# Patient Record
Sex: FEMALE | Race: WHITE | NOT HISPANIC OR LATINO | Employment: OTHER | URBAN - NONMETROPOLITAN AREA
[De-identification: names, ages, dates, MRNs, and addresses within clinical notes are randomized per-mention and may not be internally consistent; named-entity substitution may affect disease eponyms.]

---

## 2020-08-08 DIAGNOSIS — Z12.31 VISIT FOR SCREENING MAMMOGRAM: Primary | ICD-10-CM

## 2020-08-09 DIAGNOSIS — Z13.820 SCREENING FOR OSTEOPOROSIS: Primary | ICD-10-CM

## 2020-10-12 ENCOUNTER — LAB VISIT (OUTPATIENT)
Dept: LAB | Facility: HOSPITAL | Age: 75
End: 2020-10-12
Attending: INTERNAL MEDICINE
Payer: MEDICARE

## 2020-10-12 DIAGNOSIS — R32 UNSPECIFIED URINARY INCONTINENCE: ICD-10-CM

## 2020-10-12 DIAGNOSIS — N18.9 CHRONIC KIDNEY DISEASE, UNSPECIFIED: Primary | ICD-10-CM

## 2020-10-12 DIAGNOSIS — R80.9 PROTEINURIA: ICD-10-CM

## 2020-10-12 LAB
ALBUMIN SERPL BCP-MCNC: 3.8 G/DL (ref 3.5–5.2)
ALP SERPL-CCNC: 79 U/L (ref 55–135)
ALT SERPL W/O P-5'-P-CCNC: 18 U/L (ref 10–44)
ANION GAP SERPL CALC-SCNC: 6 MMOL/L (ref 8–16)
AST SERPL-CCNC: 12 U/L (ref 10–40)
BACTERIA #/AREA URNS HPF: ABNORMAL /HPF
BASOPHILS # BLD AUTO: 0.06 K/UL (ref 0–0.2)
BASOPHILS NFR BLD: 1.1 % (ref 0–1.9)
BILIRUB SERPL-MCNC: 0.7 MG/DL (ref 0.1–1)
BILIRUB UR QL STRIP: NEGATIVE
BUN SERPL-MCNC: 21 MG/DL (ref 8–23)
CALCIUM SERPL-MCNC: 9.1 MG/DL (ref 8.7–10.5)
CHLORIDE SERPL-SCNC: 109 MMOL/L (ref 95–110)
CLARITY UR: ABNORMAL
CO2 SERPL-SCNC: 27 MMOL/L (ref 23–29)
COLOR UR: YELLOW
CREAT SERPL-MCNC: 1.8 MG/DL (ref 0.5–1.4)
CREAT UR-MCNC: 43.9 MG/DL (ref 15–325)
DIFFERENTIAL METHOD: ABNORMAL
EOSINOPHIL # BLD AUTO: 0.2 K/UL (ref 0–0.5)
EOSINOPHIL NFR BLD: 2.8 % (ref 0–8)
ERYTHROCYTE [DISTWIDTH] IN BLOOD BY AUTOMATED COUNT: 13.5 % (ref 11.5–14.5)
EST. GFR  (AFRICAN AMERICAN): 31.3 ML/MIN/1.73 M^2
EST. GFR  (NON AFRICAN AMERICAN): 27.1 ML/MIN/1.73 M^2
GLUCOSE SERPL-MCNC: 127 MG/DL (ref 70–110)
GLUCOSE UR QL STRIP: NEGATIVE
HCT VFR BLD AUTO: 37.9 % (ref 37–48.5)
HGB BLD-MCNC: 12.1 G/DL (ref 12–16)
HGB UR QL STRIP: ABNORMAL
HYALINE CASTS #/AREA URNS LPF: 5 /LPF
IMM GRANULOCYTES # BLD AUTO: 0.01 K/UL (ref 0–0.04)
IMM GRANULOCYTES NFR BLD AUTO: 0.2 % (ref 0–0.5)
KETONES UR QL STRIP: NEGATIVE
LEUKOCYTE ESTERASE UR QL STRIP: ABNORMAL
LYMPHOCYTES # BLD AUTO: 1.7 K/UL (ref 1–4.8)
LYMPHOCYTES NFR BLD: 30.4 % (ref 18–48)
MCH RBC QN AUTO: 30.6 PG (ref 27–31)
MCHC RBC AUTO-ENTMCNC: 31.9 G/DL (ref 32–36)
MCV RBC AUTO: 96 FL (ref 82–98)
MICROSCOPIC COMMENT: ABNORMAL
MONOCYTES # BLD AUTO: 0.4 K/UL (ref 0.3–1)
MONOCYTES NFR BLD: 7.3 % (ref 4–15)
NEUTROPHILS # BLD AUTO: 3.3 K/UL (ref 1.8–7.7)
NEUTROPHILS NFR BLD: 58.2 % (ref 38–73)
NITRITE UR QL STRIP: NEGATIVE
NRBC BLD-RTO: 0 /100 WBC
PH UR STRIP: 8 [PH] (ref 5–8)
PLATELET # BLD AUTO: 201 K/UL (ref 150–350)
PMV BLD AUTO: 10.6 FL (ref 9.2–12.9)
POTASSIUM SERPL-SCNC: 3.9 MMOL/L (ref 3.5–5.1)
PROT SERPL-MCNC: 7.6 G/DL (ref 6–8.4)
PROT UR QL STRIP: ABNORMAL
PROT UR-MCNC: 19.1 MG/DL (ref 0–15)
RBC # BLD AUTO: 3.96 M/UL (ref 4–5.4)
RBC #/AREA URNS HPF: 0 /HPF (ref 0–4)
SODIUM SERPL-SCNC: 142 MMOL/L (ref 136–145)
SP GR UR STRIP: 1.01 (ref 1–1.03)
SQUAMOUS #/AREA URNS HPF: 8 /HPF
URN SPEC COLLECT METH UR: ABNORMAL
UROBILINOGEN UR STRIP-ACNC: NEGATIVE EU/DL
WBC # BLD AUTO: 5.63 K/UL (ref 3.9–12.7)
WBC #/AREA URNS HPF: 59 /HPF (ref 0–5)

## 2020-10-12 PROCEDURE — 82570 ASSAY OF URINE CREATININE: CPT

## 2020-10-12 PROCEDURE — 85025 COMPLETE CBC W/AUTO DIFF WBC: CPT

## 2020-10-12 PROCEDURE — 36415 COLL VENOUS BLD VENIPUNCTURE: CPT

## 2020-10-12 PROCEDURE — 80053 COMPREHEN METABOLIC PANEL: CPT

## 2020-10-12 PROCEDURE — 81000 URINALYSIS NONAUTO W/SCOPE: CPT

## 2020-10-12 PROCEDURE — 84156 ASSAY OF PROTEIN URINE: CPT

## 2021-02-09 ENCOUNTER — HOSPITAL ENCOUNTER (OUTPATIENT)
Dept: RADIOLOGY | Facility: HOSPITAL | Age: 76
Discharge: HOME | End: 2021-02-09
Attending: FAMILY MEDICINE
Payer: MEDICARE

## 2021-02-09 VITALS — BODY MASS INDEX: 28.7 KG/M2 | HEIGHT: 61 IN | WEIGHT: 152 LBS

## 2021-02-09 DIAGNOSIS — Z12.31 VISIT FOR SCREENING MAMMOGRAM: ICD-10-CM

## 2021-02-09 DIAGNOSIS — Z13.820 SCREENING FOR OSTEOPOROSIS: ICD-10-CM

## 2021-02-09 PROCEDURE — 77080 DXA BONE DENSITY AXIAL: CPT | Mod: TC

## 2021-02-09 PROCEDURE — 77067 SCR MAMMO BI INCL CAD: CPT | Mod: TC

## 2021-07-31 ENCOUNTER — HOSPITAL ENCOUNTER (EMERGENCY)
Facility: HOSPITAL | Age: 76
Discharge: HOME | End: 2021-07-31
Attending: EMERGENCY MEDICINE
Payer: MEDICARE

## 2021-07-31 VITALS
OXYGEN SATURATION: 97 % | RESPIRATION RATE: 20 BRPM | HEIGHT: 61 IN | HEART RATE: 86 BPM | WEIGHT: 141.13 LBS | DIASTOLIC BLOOD PRESSURE: 74 MMHG | TEMPERATURE: 99 F | SYSTOLIC BLOOD PRESSURE: 122 MMHG | BODY MASS INDEX: 26.65 KG/M2

## 2021-07-31 DIAGNOSIS — U07.1 COVID-19 VIRUS DETECTED: ICD-10-CM

## 2021-07-31 DIAGNOSIS — U07.1 COVID-19 VIRUS INFECTION: Primary | ICD-10-CM

## 2021-07-31 LAB
CTP QC/QA: YES
SARS-COV-2 RDRP RESP QL NAA+PROBE: POSITIVE

## 2021-07-31 PROCEDURE — 99284 EMERGENCY DEPT VISIT MOD MDM: CPT

## 2021-07-31 PROCEDURE — U0002 COVID-19 LAB TEST NON-CDC: HCPCS | Performed by: EMERGENCY MEDICINE

## 2021-07-31 RX ORDER — AZITHROMYCIN 250 MG/1
250 TABLET, FILM COATED ORAL DAILY
Qty: 6 TABLET | Refills: 0 | Status: ON HOLD | OUTPATIENT
Start: 2021-07-31 | End: 2022-05-06

## 2021-07-31 RX ORDER — IPRATROPIUM BROMIDE AND ALBUTEROL SULFATE 2.5; .5 MG/3ML; MG/3ML
3 SOLUTION RESPIRATORY (INHALATION) EVERY 6 HOURS PRN
Qty: 1 BOX | Refills: 0 | Status: ON HOLD | OUTPATIENT
Start: 2021-07-31 | End: 2022-05-06

## 2021-08-04 ENCOUNTER — HOSPITAL ENCOUNTER (OUTPATIENT)
Dept: RADIOLOGY | Facility: HOSPITAL | Age: 76
Discharge: HOME | End: 2021-08-04
Attending: FAMILY MEDICINE
Payer: MEDICARE

## 2021-08-04 ENCOUNTER — NURSE TRIAGE (OUTPATIENT)
Dept: ADMINISTRATIVE | Facility: CLINIC | Age: 76
End: 2021-08-04

## 2021-08-04 DIAGNOSIS — U07.1 COVID-19: Primary | ICD-10-CM

## 2021-08-04 DIAGNOSIS — U07.1 COVID-19: ICD-10-CM

## 2021-08-04 PROCEDURE — 71046 X-RAY EXAM CHEST 2 VIEWS: CPT | Mod: TC

## 2021-08-06 ENCOUNTER — HOSPITAL ENCOUNTER (EMERGENCY)
Facility: HOSPITAL | Age: 76
Discharge: HOME | End: 2021-08-06
Attending: EMERGENCY MEDICINE | Admitting: INTERNAL MEDICINE
Payer: MEDICARE

## 2021-08-06 VITALS
DIASTOLIC BLOOD PRESSURE: 72 MMHG | HEART RATE: 96 BPM | RESPIRATION RATE: 18 BRPM | TEMPERATURE: 98 F | BODY MASS INDEX: 23.48 KG/M2 | HEIGHT: 65 IN | OXYGEN SATURATION: 95 % | SYSTOLIC BLOOD PRESSURE: 134 MMHG

## 2021-08-06 DIAGNOSIS — J12.82 PNEUMONIA DUE TO COVID-19 VIRUS: Primary | ICD-10-CM

## 2021-08-06 DIAGNOSIS — R06.02 SOB (SHORTNESS OF BREATH): ICD-10-CM

## 2021-08-06 DIAGNOSIS — U07.1 COVID-19: ICD-10-CM

## 2021-08-06 DIAGNOSIS — U07.1 PNEUMONIA DUE TO COVID-19 VIRUS: Primary | ICD-10-CM

## 2021-08-06 LAB
ALBUMIN SERPL BCP-MCNC: 3 G/DL (ref 3.5–5.2)
ALP SERPL-CCNC: 90 U/L (ref 55–135)
ALT SERPL W/O P-5'-P-CCNC: 22 U/L (ref 10–44)
ANION GAP SERPL CALC-SCNC: 10 MMOL/L (ref 8–16)
AST SERPL-CCNC: 28 U/L (ref 10–40)
BASOPHILS # BLD AUTO: 0 K/UL (ref 0–0.2)
BASOPHILS NFR BLD: 0 % (ref 0–1.9)
BILIRUB SERPL-MCNC: 0.5 MG/DL (ref 0.1–1)
BUN SERPL-MCNC: 45 MG/DL (ref 8–23)
CALCIUM SERPL-MCNC: 7.9 MG/DL (ref 8.7–10.5)
CHLORIDE SERPL-SCNC: 105 MMOL/L (ref 95–110)
CO2 SERPL-SCNC: 22 MMOL/L (ref 23–29)
CREAT SERPL-MCNC: 2 MG/DL (ref 0.5–1.4)
DIFFERENTIAL METHOD: ABNORMAL
EOSINOPHIL # BLD AUTO: 0 K/UL (ref 0–0.5)
EOSINOPHIL NFR BLD: 0 % (ref 0–8)
ERYTHROCYTE [DISTWIDTH] IN BLOOD BY AUTOMATED COUNT: 13.7 % (ref 11.5–14.5)
EST. GFR  (AFRICAN AMERICAN): 27.5 ML/MIN/1.73 M^2
EST. GFR  (NON AFRICAN AMERICAN): 23.9 ML/MIN/1.73 M^2
GLUCOSE SERPL-MCNC: 317 MG/DL (ref 70–110)
HCT VFR BLD AUTO: 38 % (ref 37–48.5)
HGB BLD-MCNC: 12.8 G/DL (ref 12–16)
IMM GRANULOCYTES # BLD AUTO: 0.07 K/UL (ref 0–0.04)
IMM GRANULOCYTES NFR BLD AUTO: 0.9 % (ref 0–0.5)
LACTATE SERPL-SCNC: 1.8 MMOL/L (ref 0.5–2.2)
LYMPHOCYTES # BLD AUTO: 0.3 K/UL (ref 1–4.8)
LYMPHOCYTES NFR BLD: 3.7 % (ref 18–48)
MCH RBC QN AUTO: 30 PG (ref 27–31)
MCHC RBC AUTO-ENTMCNC: 33.7 G/DL (ref 32–36)
MCV RBC AUTO: 89 FL (ref 82–98)
MONOCYTES # BLD AUTO: 0.3 K/UL (ref 0.3–1)
MONOCYTES NFR BLD: 4.1 % (ref 4–15)
NEUTROPHILS # BLD AUTO: 7.5 K/UL (ref 1.8–7.7)
NEUTROPHILS NFR BLD: 91.3 % (ref 38–73)
NRBC BLD-RTO: 0 /100 WBC
PLATELET # BLD AUTO: 263 K/UL (ref 150–450)
PMV BLD AUTO: 10 FL (ref 9.2–12.9)
POTASSIUM SERPL-SCNC: 4.2 MMOL/L (ref 3.5–5.1)
PROT SERPL-MCNC: 7.8 G/DL (ref 6–8.4)
RBC # BLD AUTO: 4.27 M/UL (ref 4–5.4)
SODIUM SERPL-SCNC: 137 MMOL/L (ref 136–145)
WBC # BLD AUTO: 8.21 K/UL (ref 3.9–12.7)

## 2021-08-06 PROCEDURE — 83605 ASSAY OF LACTIC ACID: CPT | Performed by: CLINICAL NURSE SPECIALIST

## 2021-08-06 PROCEDURE — 36415 COLL VENOUS BLD VENIPUNCTURE: CPT | Performed by: CLINICAL NURSE SPECIALIST

## 2021-08-06 PROCEDURE — 80053 COMPREHEN METABOLIC PANEL: CPT | Performed by: CLINICAL NURSE SPECIALIST

## 2021-08-06 PROCEDURE — 99284 EMERGENCY DEPT VISIT MOD MDM: CPT | Mod: 25

## 2021-08-06 PROCEDURE — 85025 COMPLETE CBC W/AUTO DIFF WBC: CPT | Performed by: CLINICAL NURSE SPECIALIST

## 2021-08-06 RX ORDER — SODIUM CHLORIDE 0.9 % (FLUSH) 0.9 %
10 SYRINGE (ML) INJECTION
Status: CANCELLED | OUTPATIENT
Start: 2021-08-06

## 2021-08-06 RX ORDER — ONDANSETRON 2 MG/ML
4 INJECTION INTRAMUSCULAR; INTRAVENOUS EVERY 8 HOURS PRN
Status: CANCELLED | OUTPATIENT
Start: 2021-08-06

## 2021-08-06 RX ORDER — ACETAMINOPHEN 325 MG/1
650 TABLET ORAL EVERY 8 HOURS PRN
Status: CANCELLED | OUTPATIENT
Start: 2021-08-06

## 2021-08-06 RX ORDER — SODIUM CHLORIDE 9 MG/ML
INJECTION, SOLUTION INTRAVENOUS CONTINUOUS
Status: CANCELLED | OUTPATIENT
Start: 2021-08-06

## 2021-08-06 RX ORDER — IPRATROPIUM BROMIDE AND ALBUTEROL SULFATE 2.5; .5 MG/3ML; MG/3ML
3 SOLUTION RESPIRATORY (INHALATION) EVERY 6 HOURS PRN
Status: CANCELLED | OUTPATIENT
Start: 2021-08-06

## 2021-08-06 RX ORDER — TALC
6 POWDER (GRAM) TOPICAL NIGHTLY PRN
Status: CANCELLED | OUTPATIENT
Start: 2021-08-06

## 2021-08-06 RX ORDER — DEXAMETHASONE SODIUM PHOSPHATE 4 MG/ML
6 INJECTION, SOLUTION INTRA-ARTICULAR; INTRALESIONAL; INTRAMUSCULAR; INTRAVENOUS; SOFT TISSUE EVERY 24 HOURS
Status: CANCELLED | OUTPATIENT
Start: 2021-08-06

## 2022-05-05 ENCOUNTER — HOSPITAL ENCOUNTER (INPATIENT)
Facility: HOSPITAL | Age: 77
LOS: 7 days | Discharge: HOME HEALTH | DRG: 657 | End: 2022-05-12
Attending: HOSPITALIST | Admitting: HOSPITALIST
Payer: MEDICARE

## 2022-05-05 ENCOUNTER — HOSPITAL ENCOUNTER (EMERGENCY)
Facility: HOSPITAL | Age: 77
Discharge: ANOTHER HOSPITAL | End: 2022-05-05
Attending: STUDENT IN AN ORGANIZED HEALTH CARE EDUCATION/TRAINING PROGRAM
Payer: MEDICARE

## 2022-05-05 VITALS
WEIGHT: 132 LBS | HEART RATE: 91 BPM | TEMPERATURE: 98 F | HEIGHT: 65 IN | OXYGEN SATURATION: 95 % | BODY MASS INDEX: 21.99 KG/M2 | SYSTOLIC BLOOD PRESSURE: 143 MMHG | RESPIRATION RATE: 19 BRPM | DIASTOLIC BLOOD PRESSURE: 65 MMHG

## 2022-05-05 DIAGNOSIS — D49.4 BLADDER TUMOR: Primary | ICD-10-CM

## 2022-05-05 DIAGNOSIS — N32.89 BLADDER MASS: Primary | ICD-10-CM

## 2022-05-05 DIAGNOSIS — R07.9 CHEST PAIN: ICD-10-CM

## 2022-05-05 DIAGNOSIS — R26.81 GAIT INSTABILITY: ICD-10-CM

## 2022-05-05 DIAGNOSIS — R53.1 WEAKNESS: ICD-10-CM

## 2022-05-05 DIAGNOSIS — N28.9 RENAL INSUFFICIENCY: ICD-10-CM

## 2022-05-05 DIAGNOSIS — E83.52 HYPERCALCEMIA: ICD-10-CM

## 2022-05-05 DIAGNOSIS — N32.89 BLADDER MASS: ICD-10-CM

## 2022-05-05 DIAGNOSIS — I31.39 PERICARDIAL EFFUSION: ICD-10-CM

## 2022-05-05 LAB
ABO + RH BLD: NORMAL
ACETONE BLD-MCNC: NEGATIVE MG/DL
ALBUMIN SERPL BCP-MCNC: 2.6 G/DL (ref 3.5–5.2)
ALP SERPL-CCNC: 104 U/L (ref 55–135)
ALT SERPL W/O P-5'-P-CCNC: 14 U/L (ref 10–44)
ANION GAP SERPL CALC-SCNC: 4 MMOL/L (ref 8–16)
APTT BLDCRRT: <21 SEC (ref 21–32)
AST SERPL-CCNC: 8 U/L (ref 10–40)
BACTERIA #/AREA URNS HPF: ABNORMAL /HPF
BASOPHILS # BLD AUTO: 0.07 K/UL (ref 0–0.2)
BASOPHILS NFR BLD: 0.3 % (ref 0–1.9)
BILIRUB SERPL-MCNC: 0.5 MG/DL (ref 0.1–1)
BILIRUB UR QL STRIP: NEGATIVE
BLD GP AB SCN CELLS X3 SERPL QL: NORMAL
BUN SERPL-MCNC: 33 MG/DL (ref 8–23)
CALCIUM SERPL-MCNC: 14.6 MG/DL (ref 8.7–10.5)
CHLORIDE SERPL-SCNC: 98 MMOL/L (ref 95–110)
CLARITY UR: ABNORMAL
CO2 SERPL-SCNC: 31 MMOL/L (ref 23–29)
COLOR UR: YELLOW
CREAT SERPL-MCNC: 2.3 MG/DL (ref 0.5–1.4)
CTP QC/QA: YES
DIFFERENTIAL METHOD: ABNORMAL
EOSINOPHIL # BLD AUTO: 0.1 K/UL (ref 0–0.5)
EOSINOPHIL NFR BLD: 0.4 % (ref 0–8)
ERYTHROCYTE [DISTWIDTH] IN BLOOD BY AUTOMATED COUNT: 15.1 % (ref 11.5–14.5)
EST. GFR  (AFRICAN AMERICAN): 23.1 ML/MIN/1.73 M^2
EST. GFR  (NON AFRICAN AMERICAN): 20 ML/MIN/1.73 M^2
GLUCOSE SERPL-MCNC: 245 MG/DL (ref 70–110)
GLUCOSE UR QL STRIP: NEGATIVE
HCT VFR BLD AUTO: 27.4 % (ref 37–48.5)
HGB BLD-MCNC: 8.5 G/DL (ref 12–16)
HGB UR QL STRIP: ABNORMAL
HYALINE CASTS #/AREA URNS LPF: 2.6 /LPF
IMM GRANULOCYTES # BLD AUTO: 0.15 K/UL (ref 0–0.04)
IMM GRANULOCYTES NFR BLD AUTO: 0.6 % (ref 0–0.5)
INR PPP: 1.2 (ref 0.8–1.2)
KETONES UR QL STRIP: NEGATIVE
LACTATE SERPL-SCNC: 1.8 MMOL/L (ref 0.5–2.2)
LEUKOCYTE ESTERASE UR QL STRIP: ABNORMAL
LYMPHOCYTES # BLD AUTO: 1 K/UL (ref 1–4.8)
LYMPHOCYTES NFR BLD: 4.4 % (ref 18–48)
MCH RBC QN AUTO: 26.2 PG (ref 27–31)
MCHC RBC AUTO-ENTMCNC: 31 G/DL (ref 32–36)
MCV RBC AUTO: 85 FL (ref 82–98)
MICROSCOPIC COMMENT: ABNORMAL
MONOCYTES # BLD AUTO: 0.7 K/UL (ref 0.3–1)
MONOCYTES NFR BLD: 2.9 % (ref 4–15)
NEUTROPHILS # BLD AUTO: 21.4 K/UL (ref 1.8–7.7)
NEUTROPHILS NFR BLD: 91.4 % (ref 38–73)
NITRITE UR QL STRIP: NEGATIVE
NRBC BLD-RTO: 0 /100 WBC
NT-PROBNP SERPL-MCNC: 1470 PG/ML (ref 5–1800)
OB PNL STL: NEGATIVE
PH UR STRIP: 7 [PH] (ref 5–8)
PLATELET # BLD AUTO: 444 K/UL (ref 150–450)
PMV BLD AUTO: 9.9 FL (ref 9.2–12.9)
POCT GLUCOSE: 249 MG/DL (ref 70–110)
POCT GLUCOSE: 258 MG/DL (ref 70–110)
POTASSIUM SERPL-SCNC: 4.4 MMOL/L (ref 3.5–5.1)
PROT SERPL-MCNC: 8.1 G/DL (ref 6–8.4)
PROT UR QL STRIP: ABNORMAL
PROTHROMBIN TIME: 12.7 SEC (ref 9–12.5)
RBC # BLD AUTO: 3.24 M/UL (ref 4–5.4)
RBC #/AREA URNS HPF: 4 /HPF (ref 0–4)
SARS-COV-2 RDRP RESP QL NAA+PROBE: NEGATIVE
SODIUM SERPL-SCNC: 133 MMOL/L (ref 136–145)
SP GR UR STRIP: 1.01 (ref 1–1.03)
SQUAMOUS #/AREA URNS HPF: 0 /HPF
TROPONIN I SERPL DL<=0.01 NG/ML-MCNC: 10.7 PG/ML (ref 0–60)
URN SPEC COLLECT METH UR: ABNORMAL
UROBILINOGEN UR STRIP-ACNC: NEGATIVE EU/DL
WBC # BLD AUTO: 23.49 K/UL (ref 3.9–12.7)
WBC #/AREA URNS HPF: 61 /HPF (ref 0–5)
YEAST URNS QL MICRO: ABNORMAL

## 2022-05-05 PROCEDURE — 83880 ASSAY OF NATRIURETIC PEPTIDE: CPT | Performed by: STUDENT IN AN ORGANIZED HEALTH CARE EDUCATION/TRAINING PROGRAM

## 2022-05-05 PROCEDURE — 82962 GLUCOSE BLOOD TEST: CPT

## 2022-05-05 PROCEDURE — 87088 URINE BACTERIA CULTURE: CPT | Performed by: STUDENT IN AN ORGANIZED HEALTH CARE EDUCATION/TRAINING PROGRAM

## 2022-05-05 PROCEDURE — 87086 URINE CULTURE/COLONY COUNT: CPT | Performed by: STUDENT IN AN ORGANIZED HEALTH CARE EDUCATION/TRAINING PROGRAM

## 2022-05-05 PROCEDURE — 82009 KETONE BODYS QUAL: CPT | Performed by: STUDENT IN AN ORGANIZED HEALTH CARE EDUCATION/TRAINING PROGRAM

## 2022-05-05 PROCEDURE — 85610 PROTHROMBIN TIME: CPT | Performed by: STUDENT IN AN ORGANIZED HEALTH CARE EDUCATION/TRAINING PROGRAM

## 2022-05-05 PROCEDURE — 96361 HYDRATE IV INFUSION ADD-ON: CPT

## 2022-05-05 PROCEDURE — 82272 OCCULT BLD FECES 1-3 TESTS: CPT | Performed by: STUDENT IN AN ORGANIZED HEALTH CARE EDUCATION/TRAINING PROGRAM

## 2022-05-05 PROCEDURE — 86850 RBC ANTIBODY SCREEN: CPT | Performed by: STUDENT IN AN ORGANIZED HEALTH CARE EDUCATION/TRAINING PROGRAM

## 2022-05-05 PROCEDURE — U0002 COVID-19 LAB TEST NON-CDC: HCPCS | Performed by: STUDENT IN AN ORGANIZED HEALTH CARE EDUCATION/TRAINING PROGRAM

## 2022-05-05 PROCEDURE — 85730 THROMBOPLASTIN TIME PARTIAL: CPT | Performed by: STUDENT IN AN ORGANIZED HEALTH CARE EDUCATION/TRAINING PROGRAM

## 2022-05-05 PROCEDURE — 36415 COLL VENOUS BLD VENIPUNCTURE: CPT | Performed by: STUDENT IN AN ORGANIZED HEALTH CARE EDUCATION/TRAINING PROGRAM

## 2022-05-05 PROCEDURE — 63600175 PHARM REV CODE 636 W HCPCS: Performed by: STUDENT IN AN ORGANIZED HEALTH CARE EDUCATION/TRAINING PROGRAM

## 2022-05-05 PROCEDURE — 84484 ASSAY OF TROPONIN QUANT: CPT | Performed by: STUDENT IN AN ORGANIZED HEALTH CARE EDUCATION/TRAINING PROGRAM

## 2022-05-05 PROCEDURE — 99285 EMERGENCY DEPT VISIT HI MDM: CPT | Mod: 25

## 2022-05-05 PROCEDURE — 25000003 PHARM REV CODE 250: Performed by: STUDENT IN AN ORGANIZED HEALTH CARE EDUCATION/TRAINING PROGRAM

## 2022-05-05 PROCEDURE — 87040 BLOOD CULTURE FOR BACTERIA: CPT | Mod: 59 | Performed by: STUDENT IN AN ORGANIZED HEALTH CARE EDUCATION/TRAINING PROGRAM

## 2022-05-05 PROCEDURE — 81000 URINALYSIS NONAUTO W/SCOPE: CPT | Performed by: STUDENT IN AN ORGANIZED HEALTH CARE EDUCATION/TRAINING PROGRAM

## 2022-05-05 PROCEDURE — 93010 EKG 12-LEAD: ICD-10-PCS | Mod: ,,, | Performed by: INTERNAL MEDICINE

## 2022-05-05 PROCEDURE — 96365 THER/PROPH/DIAG IV INF INIT: CPT

## 2022-05-05 PROCEDURE — 20600001 HC STEP DOWN PRIVATE ROOM

## 2022-05-05 PROCEDURE — 83605 ASSAY OF LACTIC ACID: CPT | Performed by: STUDENT IN AN ORGANIZED HEALTH CARE EDUCATION/TRAINING PROGRAM

## 2022-05-05 PROCEDURE — 85025 COMPLETE CBC W/AUTO DIFF WBC: CPT | Performed by: STUDENT IN AN ORGANIZED HEALTH CARE EDUCATION/TRAINING PROGRAM

## 2022-05-05 PROCEDURE — 80053 COMPREHEN METABOLIC PANEL: CPT | Performed by: STUDENT IN AN ORGANIZED HEALTH CARE EDUCATION/TRAINING PROGRAM

## 2022-05-05 PROCEDURE — 93005 ELECTROCARDIOGRAM TRACING: CPT

## 2022-05-05 PROCEDURE — 51702 INSERT TEMP BLADDER CATH: CPT

## 2022-05-05 PROCEDURE — 93010 ELECTROCARDIOGRAM REPORT: CPT | Mod: ,,, | Performed by: INTERNAL MEDICINE

## 2022-05-05 RX ORDER — SODIUM CHLORIDE, SODIUM LACTATE, POTASSIUM CHLORIDE, CALCIUM CHLORIDE 600; 310; 30; 20 MG/100ML; MG/100ML; MG/100ML; MG/100ML
1000 INJECTION, SOLUTION INTRAVENOUS
Status: COMPLETED | OUTPATIENT
Start: 2022-05-05 | End: 2022-05-05

## 2022-05-05 RX ORDER — ALBUTEROL SULFATE 2.5 MG/.5ML
SOLUTION RESPIRATORY (INHALATION)
Status: DISCONTINUED
Start: 2022-05-05 | End: 2022-05-05 | Stop reason: WASHOUT

## 2022-05-05 RX ADMIN — SODIUM CHLORIDE 1000 ML: 0.9 INJECTION, SOLUTION INTRAVENOUS at 12:05

## 2022-05-05 RX ADMIN — CEFTRIAXONE 1 G: 1 INJECTION, SOLUTION INTRAVENOUS at 11:05

## 2022-05-05 RX ADMIN — SODIUM CHLORIDE, SODIUM LACTATE, POTASSIUM CHLORIDE, AND CALCIUM CHLORIDE 1000 ML: .6; .31; .03; .02 INJECTION, SOLUTION INTRAVENOUS at 03:05

## 2022-05-05 RX ADMIN — SODIUM CHLORIDE, SODIUM LACTATE, POTASSIUM CHLORIDE, AND CALCIUM CHLORIDE 1000 ML: .6; .31; .03; .02 INJECTION, SOLUTION INTRAVENOUS at 11:05

## 2022-05-05 NOTE — Clinical Note
A pre-sedation assessment was completed by the physician immediately prior to sedation start.     Patient receiving IV Rocephin Q 24 hrs on the floor (due to receive next dose at 12 pm); per MD Murillo no additional dose needed at this time.

## 2022-05-05 NOTE — ED PROVIDER NOTES
"Encounter Date: 5/5/2022       History     Chief Complaint   Patient presents with    Weakness     Pt stated that for the past week she has been experiencing generalized weakness. Last night she began experiencing dyspnea. Pt stated that she has been having elevated blood sugar in 300-400 range for the past week as well.     Dysuria     Pt stated that her urine has been "strong" smelling with some burning urination.      76-year-old female with history of diabetes presents with generalized weakness for the past 2 weeks with episodes of glucose being above 300-400.  patient also endorses dysuria and odorous urine during this time.  Denies any vomiting, black tarry stool, chest pain, shortness breath, diarrhea,        Review of patient's allergies indicates:  No Known Allergies  Past Medical History:   Diagnosis Date    Bladder mass 5/6/2022    Choledocholithiasis 5/6/2022    Diabetes mellitus     Pneumonia due to COVID-19 virus 8/6/2021     Past Surgical History:   Procedure Laterality Date    CYSTOSCOPY N/A 5/9/2022    Procedure: CYSTOSCOPY;  Surgeon: Adrianna Gutierrez MD;  Location: [facility] OR [address] [place] [address];  Service: Urology;  Laterality: N/A;    RETROGRADE PYELOGRAPHY Right 5/9/2022    Procedure: PYELOGRAM, RETROGRADE;  Surgeon: Adrianna Gutierrez MD;  Location: [facility] OR [address] [place] [address];  Service: Urology;  Laterality: Right;     Family History   Problem Relation Age of Onset    Diabetes Mother     Stomach cancer Mother     Heart attack Father     Diabetes Daughter     Thyroid disease Daughter     Diabetes Son     Kidney cancer Son      Social History     Tobacco Use    Smoking status: Never Smoker    Smokeless tobacco: Never Used   Substance Use Topics    Alcohol use: No    Drug use: No     Review of Systems   Constitutional: Negative.    HENT: Negative.    Respiratory: Positive for shortness of breath.    Cardiovascular: Negative.    Gastrointestinal: Negative.    Genitourinary: Negative.    Musculoskeletal: " Negative.    Skin: Negative.    Neurological: Positive for weakness.   Psychiatric/Behavioral: Negative.    All other systems reviewed and are negative.      Physical Exam     Initial Vitals [05/05/22 0921]   BP Pulse Resp Temp SpO2   (!) 191/76 100 16 98.4 °F (36.9 °C) 100 %      MAP       --         Physical Exam    Nursing note and vitals reviewed.  Constitutional: Vital signs are normal. She appears well-developed and well-nourished.   HENT:   Head: Normocephalic and atraumatic.   Dry mucous membrane   Eyes: Conjunctivae and lids are normal.   Pallor   Neck: Trachea normal. Neck supple.   Cardiovascular: Normal rate, regular rhythm, normal heart sounds, intact distal pulses and normal pulses.   No murmur heard.  Pulmonary/Chest: Breath sounds normal. No respiratory distress.   Abdominal: Abdomen is soft. Bowel sounds are normal.   Musculoskeletal:         General: Normal range of motion.      Cervical back: Neck supple.     Neurological: She is alert and oriented to person, place, and time. She has normal strength. No cranial nerve deficit or sensory deficit.   Skin: Skin is warm. Capillary refill takes less than 2 seconds.   Psychiatric: She has a normal mood and affect. Her speech is normal. Thought content normal.         ED Course   Critical Care    Date/Time: 5/30/2022 5:38 PM  Performed by: Aniket Sullivan MD  Authorized by: Aniket Sullivan MD   Direct patient critical care time: 5 minutes  Additional history critical care time: 5 minutes  Ordering / reviewing critical care time: 5 minutes  Documentation critical care time: 5 minutes  Other critical care time: 5 minutes  Total critical care time (exclusive of procedural time) : 25 minutes  Critical care time was exclusive of separately billable procedures and treating other patients.  Critical care was necessary to treat or prevent imminent or life-threatening deterioration of the following conditions: shock.  Critical care was time spent personally by me on  the following activities: evaluation of patient's response to treatment, examination of patient, ordering and performing treatments and interventions, obtaining history from patient or surrogate, ordering and review of laboratory studies, ordering and review of radiographic studies, re-evaluation of patient's condition and pulse oximetry.        Labs Reviewed   CULTURE, URINE - Abnormal; Notable for the following components:       Result Value    Urine Culture, Routine   (*)     Value: VIRIDANS STREPTOCOCCUS GROUP  > 100,000 cfu/ml  No other significant isolate  Susceptibility testing not routinely performed      All other components within normal limits    Narrative:     Preferred Collection Type->Urine, Clean Catch  Specimen Source->Urine   CBC W/ AUTO DIFFERENTIAL - Abnormal; Notable for the following components:    WBC 23.49 (*)     RBC 3.24 (*)     Hemoglobin 8.5 (*)     Hematocrit 27.4 (*)     MCH 26.2 (*)     MCHC 31.0 (*)     RDW 15.1 (*)     Immature Granulocytes 0.6 (*)     Gran # (ANC) 21.4 (*)     Immature Grans (Abs) 0.15 (*)     Gran % 91.4 (*)     Lymph % 4.4 (*)     Mono % 2.9 (*)     All other components within normal limits   COMPREHENSIVE METABOLIC PANEL - Abnormal; Notable for the following components:    Sodium 133 (*)     CO2 31 (*)     Glucose 245 (*)     BUN 33 (*)     Creatinine 2.3 (*)     Calcium 14.6 (*)     Albumin 2.6 (*)     AST 8 (*)     Anion Gap 4 (*)     eGFR if  23.1 (*)     eGFR if non  20.0 (*)     All other components within normal limits    Narrative:     Calcium   critical result(s) called and verbal readback obtained from   Jorgito Rhodes (ED) by SORIN 05/05/2022 10:38   URINALYSIS, REFLEX TO URINE CULTURE - Abnormal; Notable for the following components:    Appearance, UA Cloudy (*)     Protein, UA 2+ (*)     Occult Blood UA 2+ (*)     Leukocytes, UA 3+ (*)     All other components within normal limits    Narrative:     Preferred Collection  Type->Urine, Clean Catch  Specimen Source->Urine   PROTIME-INR - Abnormal; Notable for the following components:    Prothrombin Time 12.7 (*)     All other components within normal limits   URINALYSIS MICROSCOPIC - Abnormal; Notable for the following components:    WBC, UA 61 (*)     Yeast, UA Rare (*)     Hyaline Casts, UA 2.6 (*)     All other components within normal limits    Narrative:     Preferred Collection Type->Urine, Clean Catch  Specimen Source->Urine   POCT GLUCOSE - Abnormal; Notable for the following components:    POCT Glucose 249 (*)     All other components within normal limits   POCT GLUCOSE - Abnormal; Notable for the following components:    POCT Glucose 258 (*)     All other components within normal limits   CULTURE, BLOOD   CULTURE, BLOOD   TROPONIN I HIGH SENSITIVITY   NT-PRO NATRIURETIC PEPTIDE   APTT   ACETONE   LACTIC ACID, PLASMA   OCCULT BLOOD X 1, STOOL   SARS-COV-2 RDRP GENE    Narrative:     This test utilizes isothermal nucleic acid amplification   technology to detect the SARS-CoV-2 RdRp nucleic acid segment.   The analytical sensitivity (limit of detection) is 125 genome   equivalents/mL.   A POSITIVE result implies infection with the SARS-CoV-2 virus;   the patient is presumed to be contagious.     A NEGATIVE result means that SARS-CoV-2 nucleic acids are not   present above the limit of detection. A NEGATIVE result should be   treated as presumptive. It does not rule out the possibility of   COVID-19 and should not be the sole basis for treatment decisions.   If COVID-19 is strongly suspected based on clinical and exposure   history, re-testing using an alternate molecular assay should be   considered.   This test is only for use under the Food and Drug   Administration s Emergency Use Authorization (EUA).   Commercial kits are provided by [employer].   Performance characteristics of the EUA have been independently   verified by [facility] Department of    Pathology and Laboratory Medicine.   _________________________________________________________________   The authorized Fact Sheet for Healthcare Providers and the authorized Fact   Sheet for Patients of the ID NOW COVID-19 are available on the FDA   website:     https://www.fda.gov/media/408391/download  https://www.fda.gov/media/580046/download           TYPE & SCREEN        ECG Results          EKG 12-lead (Final result)  Result time 05/05/22 13:14:12    Final result by Interface, Lab In [facility] (05/05/22 13:14:12)                 Narrative:    Test Reason :     Vent. Rate : 095 BPM     Atrial Rate : 095 BPM     P-R Int : 158 ms          QRS Dur : 088 ms      QT Int : 320 ms       P-R-T Axes : 051 023 031 degrees     QTc Int : 402 ms    Normal sinus rhythm  Normal ECG  When compared with ECG of 05-MAY-2022 09:24,  No significant change was found  Confirmed by Stefany Bah MD ([id number]) on 5/5/2022 1:14:04 PM    Referred By: AAAREFCASTLILO   SELF           Confirmed By:Stefany Bah MD                             EKG 12-lead (Final result)  Result time 05/05/22 13:09:45    Final result by Interface, Lab In [facility] (05/05/22 13:09:45)                 Narrative:    Test Reason : R53.1,    Vent. Rate : 097 BPM     Atrial Rate : 097 BPM     P-R Int : 150 ms          QRS Dur : 088 ms      QT Int : 314 ms       P-R-T Axes : 059 044 046 degrees     QTc Int : 398 ms    Normal sinus rhythm  Normal ECG  No previous ECGs available  Confirmed by Stefany Bah MD ([id number]) on 5/5/2022 1:09:38 PM    Referred By: AAAREFCASTILLO   SELF           Confirmed By:Stefany Bah MD                            Imaging Results          CT Abdomen Pelvis  Without Contrast (Final result)  Result time 05/05/22 11:45:58    Final result by Ramos Ambrocio MD (05/05/22 11:45:58)                 Impression:      1. Choledocholithiasis with moderate intra and extrahepatic biliary duct dilatation.  Also cholelithiasis.  2. Approximately 6.5 cm mass involving the  left inferior wall of the bladder, worrisome for neoplasm.  3. Severe bilateral hydronephrosis, which may be related to bladder mass and/or bladder distention.      Electronically signed by: Ramos Ambrocio MD  Date:    05/05/2022  Time:    11:45             Narrative:    EXAMINATION:  CT ABDOMEN PELVIS WITHOUT CONTRAST    CLINICAL HISTORY:  bone pain;    TECHNIQUE:  Iterative reconstruction technique was used.    CT/cardiac nuclear exam/s in prior 12 months:  0.    COMPARISON:  None.    FINDINGS:  Unremarkable liver and spleen.  Multiple calcified gallstones.  Moderate intrahepatic and extrahepatic biliary ductal dilatation.  4 mm stone within the common duct.  Unremarkable pancreas and adrenal glands.  Severe distention of the urinary bladder with irregular soft tissue measuring approximately 6.5 cm along the inferior and left lateral wall, highly suspicious for bladder neoplasm.  Severe bilateral hydronephrosis, which may be related to the bladder mass and/or distension no bowel obstruction.  3.2 cm left posterolateral uterine fibroid.  No free fluid.  Extensive vascular calcifications.                               CT Chest Without Contrast (Final result)  Result time 05/05/22 11:41:35    Final result by Ramos Ambrocio MD (05/05/22 11:41:35)                 Impression:      Small amount of pericardial fluid.      Electronically signed by: Ramos Amrbocio MD  Date:    05/05/2022  Time:    11:41             Narrative:    EXAMINATION:  CT CHEST WITHOUT CONTRAST    CLINICAL HISTORY:  bone pain;    TECHNIQUE:  Iterative reconstruction technique was used.    CT/cardiac nuclear exam/s in prior 12 months:  0.    COMPARISON:  None.    FINDINGS:  No mediastinal adenopathy.  No suspicious pulmonary nodule or mass.  No focal consolidation.  No pleural pericardial fluid.  Small amount of pericardial fluid.  No axillary abnormality.  Moderate vascular calcifications.                               X-Ray Chest 1 View (Final  result)  Result time 05/05/22 10:08:31    Final result by Ramos Ambrocio MD (05/05/22 10:08:31)                 Impression:      No active disease.      Electronically signed by: Ramos Ambrocio MD  Date:    05/05/2022  Time:    10:08             Narrative:    EXAMINATION:  XR CHEST 1 VIEW    CLINICAL HISTORY:  Weakness    COMPARISON:  Chest x-ray 08/06/2021.    FINDINGS:  The cardiac silhouette is normal in size. The lungs are clear. No pleural fluid.                                 Medications   sodium chloride 0.9% bolus 1,000 mL (0 mLs Intravenous Stopped 5/5/22 1325)   cefTRIAXone (ROCEPHIN) 1 g/50 mL D5W IVPB (0 g Intravenous Stopped 5/5/22 1159)   lactated ringers bolus 1,000 mL (0 mLs Intravenous Stopped 5/5/22 1419)   lactated ringers infusion (1,000 mLs Intravenous New Bag 5/5/22 1511)     Medical Decision Making:   Initial Assessment:   76-year-old female with history of diabetes presents with generalized weakness for the past 2 weeks with episodes of glucose being above 300-400.  Afebrile vitals stable.  Physical noted.  Will get labs and imaging to rule out infection, electrolyte derangement, anemia.  Give 1 L fluid.  Re-evaluate  Clinical Tests:   Lab Tests: Ordered and Reviewed  The following lab test(s) were unremarkable: CBC, CMP, Troponin, BNP and Urinalysis  Radiological Study: Ordered and Reviewed  Medical Tests: Ordered and Reviewed             ED Course as of 05/30/22 1738   Thu May 05, 2022   1027 Leukocytosis and anemia.  Will add blood culture and lactic acid.  Anemia.  Will do Hemoccult.  Re-evaluate [HD]   1137 Ultrasound shows possible obstructive uropathy.  Soto placed. [HD]   1203 Labs imaging noted.  Patient has new bladder mass concerning for neoplasm.  Plan to transfer patient to facility with oncologist and urology [HD]      ED Course User Index  [HD] Aniket Sullivan MD             Clinical Impression:   Final diagnoses:  [R53.1] Weakness  [N32.89] Bladder mass (Primary)  [E83.52]  Hypercalcemia  [N28.9] Renal insufficiency          ED Disposition Condition    Transfer to Another Facility Stable              Aniket Sullivan MD  05/05/22 9602       Aniket Sullivan MD  05/30/22 1827

## 2022-05-06 PROBLEM — D64.9 ANEMIA: Status: ACTIVE | Noted: 2022-05-06

## 2022-05-06 PROBLEM — E83.52 HYPERCALCEMIA: Status: ACTIVE | Noted: 2022-05-06

## 2022-05-06 PROBLEM — N13.30 HYDRONEPHROSIS: Status: ACTIVE | Noted: 2022-05-06

## 2022-05-06 PROBLEM — E11.9 TYPE 2 DIABETES MELLITUS: Chronic | Status: ACTIVE | Noted: 2022-05-06

## 2022-05-06 PROBLEM — N32.89 BLADDER MASS: Status: ACTIVE | Noted: 2022-05-06

## 2022-05-06 PROBLEM — I10 HTN (HYPERTENSION): Status: ACTIVE | Noted: 2022-05-06

## 2022-05-06 PROBLEM — N39.0 UTI (URINARY TRACT INFECTION): Status: ACTIVE | Noted: 2022-05-06

## 2022-05-06 PROBLEM — K80.50 CHOLEDOCHOLITHIASIS: Status: ACTIVE | Noted: 2022-05-06

## 2022-05-06 LAB
ALBUMIN SERPL BCP-MCNC: 2.4 G/DL (ref 3.5–5.2)
ALP SERPL-CCNC: 87 U/L (ref 55–135)
ALT SERPL W/O P-5'-P-CCNC: 9 U/L (ref 10–44)
ANION GAP SERPL CALC-SCNC: 8 MMOL/L (ref 8–16)
ASCENDING AORTA: 2.7 CM
AST SERPL-CCNC: 11 U/L (ref 10–40)
AV INDEX (PROSTH): 0.79
AV MEAN GRADIENT: 3 MMHG
AV PEAK GRADIENT: 7 MMHG
AV VALVE AREA: 2.33 CM2
AV VELOCITY RATIO: 0.75
BACTERIA UR CULT: ABNORMAL
BASOPHILS # BLD AUTO: 0.07 K/UL (ref 0–0.2)
BASOPHILS NFR BLD: 0.3 % (ref 0–1.9)
BILIRUB SERPL-MCNC: 0.4 MG/DL (ref 0.1–1)
BSA FOR ECHO PROCEDURE: 1.49 M2
BUN SERPL-MCNC: 26 MG/DL (ref 8–23)
CALCIUM SERPL-MCNC: 12.2 MG/DL (ref 8.7–10.5)
CALCIUM SERPL-MCNC: 12.9 MG/DL (ref 8.7–10.5)
CALCIUM SERPL-MCNC: 13.1 MG/DL (ref 8.7–10.5)
CHLORIDE SERPL-SCNC: 106 MMOL/L (ref 95–110)
CO2 SERPL-SCNC: 24 MMOL/L (ref 23–29)
CREAT SERPL-MCNC: 1.8 MG/DL (ref 0.5–1.4)
CV ECHO LV RWT: 0.39 CM
DIFFERENTIAL METHOD: ABNORMAL
DOP CALC AO PEAK VEL: 1.3 M/S
DOP CALC AO VTI: 25.54 CM
DOP CALC LVOT AREA: 3 CM2
DOP CALC LVOT DIAMETER: 1.94 CM
DOP CALC LVOT PEAK VEL: 0.97 M/S
DOP CALC LVOT STROKE VOLUME: 59.62 CM3
DOP CALCLVOT PEAK VEL VTI: 20.18 CM
E WAVE DECELERATION TIME: 189.66 MSEC
E/A RATIO: 0.64
E/E' RATIO: 11.82 M/S
ECHO LV POSTERIOR WALL: 0.75 CM (ref 0.6–1.1)
EJECTION FRACTION: 60 %
EOSINOPHIL # BLD AUTO: 0.1 K/UL (ref 0–0.5)
EOSINOPHIL NFR BLD: 0.4 % (ref 0–8)
ERYTHROCYTE [DISTWIDTH] IN BLOOD BY AUTOMATED COUNT: 15.2 % (ref 11.5–14.5)
EST. GFR  (AFRICAN AMERICAN): 31.1 ML/MIN/1.73 M^2
EST. GFR  (NON AFRICAN AMERICAN): 27 ML/MIN/1.73 M^2
ESTIMATED AVG GLUCOSE: 151 MG/DL (ref 68–131)
FERRITIN SERPL-MCNC: 804 NG/ML (ref 20–300)
FOLATE SERPL-MCNC: 12.9 NG/ML (ref 4–24)
FRACTIONAL SHORTENING: 31 % (ref 28–44)
GLUCOSE SERPL-MCNC: 206 MG/DL (ref 70–110)
HBA1C MFR BLD: 6.9 % (ref 4–5.6)
HCT VFR BLD AUTO: 27 % (ref 37–48.5)
HGB BLD-MCNC: 7.9 G/DL (ref 12–16)
IMM GRANULOCYTES # BLD AUTO: 0.14 K/UL (ref 0–0.04)
IMM GRANULOCYTES NFR BLD AUTO: 0.6 % (ref 0–0.5)
INTERVENTRICULAR SEPTUM: 0.87 CM (ref 0.6–1.1)
IRON SERPL-MCNC: <10 UG/DL (ref 30–160)
IVRT: 94.2 MSEC
LA MAJOR: 4.15 CM
LA MINOR: 4.52 CM
LA WIDTH: 3.84 CM
LEFT ATRIUM SIZE: 3.15 CM
LEFT ATRIUM VOLUME INDEX: 29.9 ML/M2
LEFT ATRIUM VOLUME: 44.49 CM3
LEFT INTERNAL DIMENSION IN SYSTOLE: 2.68 CM (ref 2.1–4)
LEFT VENTRICLE DIASTOLIC VOLUME INDEX: 43.83 ML/M2
LEFT VENTRICLE DIASTOLIC VOLUME: 65.31 ML
LEFT VENTRICLE MASS INDEX: 61 G/M2
LEFT VENTRICLE SYSTOLIC VOLUME INDEX: 17.7 ML/M2
LEFT VENTRICLE SYSTOLIC VOLUME: 26.4 ML
LEFT VENTRICULAR INTERNAL DIMENSION IN DIASTOLE: 3.88 CM (ref 3.5–6)
LEFT VENTRICULAR MASS: 90.43 G
LIPASE SERPL-CCNC: 7 U/L (ref 4–60)
LV LATERAL E/E' RATIO: 10.83 M/S
LV SEPTAL E/E' RATIO: 13 M/S
LYMPHOCYTES # BLD AUTO: 1 K/UL (ref 1–4.8)
LYMPHOCYTES NFR BLD: 4.5 % (ref 18–48)
MAGNESIUM SERPL-MCNC: 1.7 MG/DL (ref 1.6–2.6)
MCH RBC QN AUTO: 26.1 PG (ref 27–31)
MCHC RBC AUTO-ENTMCNC: 29.3 G/DL (ref 32–36)
MCV RBC AUTO: 89 FL (ref 82–98)
MONOCYTES # BLD AUTO: 0.8 K/UL (ref 0.3–1)
MONOCYTES NFR BLD: 3.7 % (ref 4–15)
MV A" WAVE DURATION": 11.7 MSEC
MV PEAK A VEL: 1.02 M/S
MV PEAK E VEL: 0.65 M/S
MV STENOSIS PRESSURE HALF TIME: 55 MS
MV VALVE AREA P 1/2 METHOD: 4 CM2
NEUTROPHILS # BLD AUTO: 20.3 K/UL (ref 1.8–7.7)
NEUTROPHILS NFR BLD: 90.5 % (ref 38–73)
NRBC BLD-RTO: 0 /100 WBC
PHOSPHATE SERPL-MCNC: 2.2 MG/DL (ref 2.7–4.5)
PLATELET # BLD AUTO: 428 K/UL (ref 150–450)
PMV BLD AUTO: 9.9 FL (ref 9.2–12.9)
POCT GLUCOSE: 188 MG/DL (ref 70–110)
POCT GLUCOSE: 203 MG/DL (ref 70–110)
POCT GLUCOSE: 282 MG/DL (ref 70–110)
POTASSIUM SERPL-SCNC: 3.9 MMOL/L (ref 3.5–5.1)
PROT 24H UR-MRATE: ABNORMAL MG/SPEC (ref 0–100)
PROT SERPL-MCNC: 6.8 G/DL (ref 6–8.4)
PROT UR-MCNC: 131 MG/DL (ref 0–15)
PTH-INTACT SERPL-MCNC: 7.1 PG/ML (ref 9–77)
PULM VEIN S/D RATIO: 1.66
PV PEAK D VEL: 0.32 M/S
PV PEAK S VEL: 0.53 M/S
RA MAJOR: 4.13 CM
RA PRESSURE: 3 MMHG
RA WIDTH: 3.25 CM
RBC # BLD AUTO: 3.03 M/UL (ref 4–5.4)
RIGHT VENTRICULAR END-DIASTOLIC DIMENSION: 2.69 CM
RV TISSUE DOPPLER FREE WALL SYSTOLIC VELOCITY 1 (APICAL 4 CHAMBER VIEW): 17.61 CM/S
SATURATED IRON: ABNORMAL % (ref 20–50)
SINUS: 2.91 CM
SODIUM SERPL-SCNC: 138 MMOL/L (ref 136–145)
STJ: 2.13 CM
TDI LATERAL: 0.06 M/S
TDI SEPTAL: 0.05 M/S
TDI: 0.06 M/S
TOTAL IRON BINDING CAPACITY: 182 UG/DL (ref 250–450)
TRANSFERRIN SERPL-MCNC: 123 MG/DL (ref 200–375)
TRICUSPID ANNULAR PLANE SYSTOLIC EXCURSION: 1.65 CM
TSH SERPL DL<=0.005 MIU/L-ACNC: 0.61 UIU/ML (ref 0.4–4)
URINE COLLECTION DURATION: ABNORMAL HR
URINE VOLUME: ABNORMAL ML
VIT B12 SERPL-MCNC: 1071 PG/ML (ref 210–950)
WBC # BLD AUTO: 22.45 K/UL (ref 3.9–12.7)

## 2022-05-06 PROCEDURE — 25000003 PHARM REV CODE 250: Performed by: STUDENT IN AN ORGANIZED HEALTH CARE EDUCATION/TRAINING PROGRAM

## 2022-05-06 PROCEDURE — 84166 PROTEIN E-PHORESIS/URINE/CSF: CPT | Mod: 26,,, | Performed by: PATHOLOGY

## 2022-05-06 PROCEDURE — 83036 HEMOGLOBIN GLYCOSYLATED A1C: CPT

## 2022-05-06 PROCEDURE — 84466 ASSAY OF TRANSFERRIN: CPT

## 2022-05-06 PROCEDURE — 84166 PROTEIN E-PHORESIS/URINE/CSF: CPT | Performed by: STUDENT IN AN ORGANIZED HEALTH CARE EDUCATION/TRAINING PROGRAM

## 2022-05-06 PROCEDURE — 84165 PROTEIN E-PHORESIS SERUM: CPT | Mod: 26,,, | Performed by: PATHOLOGY

## 2022-05-06 PROCEDURE — 86334 IMMUNOFIX E-PHORESIS SERUM: CPT | Performed by: STUDENT IN AN ORGANIZED HEALTH CARE EDUCATION/TRAINING PROGRAM

## 2022-05-06 PROCEDURE — 25000003 PHARM REV CODE 250

## 2022-05-06 PROCEDURE — 83735 ASSAY OF MAGNESIUM: CPT

## 2022-05-06 PROCEDURE — 86334 PATHOLOGIST INTERPRETATION IFE: ICD-10-PCS | Mod: 26,,, | Performed by: PATHOLOGY

## 2022-05-06 PROCEDURE — 84165 PROTEIN E-PHORESIS SERUM: CPT | Performed by: STUDENT IN AN ORGANIZED HEALTH CARE EDUCATION/TRAINING PROGRAM

## 2022-05-06 PROCEDURE — 80053 COMPREHEN METABOLIC PANEL: CPT

## 2022-05-06 PROCEDURE — 99223 1ST HOSP IP/OBS HIGH 75: CPT | Mod: ,,, | Performed by: INTERNAL MEDICINE

## 2022-05-06 PROCEDURE — 36415 COLL VENOUS BLD VENIPUNCTURE: CPT

## 2022-05-06 PROCEDURE — 84443 ASSAY THYROID STIM HORMONE: CPT

## 2022-05-06 PROCEDURE — 84165 PATHOLOGIST INTERPRETATION SPE: ICD-10-PCS | Mod: 26,,, | Performed by: PATHOLOGY

## 2022-05-06 PROCEDURE — 83690 ASSAY OF LIPASE: CPT

## 2022-05-06 PROCEDURE — 84166 PATHOLOGIST INTERPRETATION UPE: ICD-10-PCS | Mod: 26,,, | Performed by: PATHOLOGY

## 2022-05-06 PROCEDURE — 85025 COMPLETE CBC W/AUTO DIFF WBC: CPT

## 2022-05-06 PROCEDURE — 82728 ASSAY OF FERRITIN: CPT

## 2022-05-06 PROCEDURE — 83520 IMMUNOASSAY QUANT NOS NONAB: CPT | Mod: 59 | Performed by: STUDENT IN AN ORGANIZED HEALTH CARE EDUCATION/TRAINING PROGRAM

## 2022-05-06 PROCEDURE — 82607 VITAMIN B-12: CPT

## 2022-05-06 PROCEDURE — 99223 PR INITIAL HOSPITAL CARE,LEVL III: ICD-10-PCS | Mod: ,,, | Performed by: INTERNAL MEDICINE

## 2022-05-06 PROCEDURE — 63600175 PHARM REV CODE 636 W HCPCS

## 2022-05-06 PROCEDURE — 83970 ASSAY OF PARATHORMONE: CPT

## 2022-05-06 PROCEDURE — 82397 CHEMILUMINESCENT ASSAY: CPT | Performed by: STUDENT IN AN ORGANIZED HEALTH CARE EDUCATION/TRAINING PROGRAM

## 2022-05-06 PROCEDURE — 82310 ASSAY OF CALCIUM: CPT

## 2022-05-06 PROCEDURE — 86334 IMMUNOFIX E-PHORESIS SERUM: CPT | Mod: 26,,, | Performed by: PATHOLOGY

## 2022-05-06 PROCEDURE — C9399 UNCLASSIFIED DRUGS OR BIOLOG: HCPCS

## 2022-05-06 PROCEDURE — 84100 ASSAY OF PHOSPHORUS: CPT

## 2022-05-06 PROCEDURE — 20600001 HC STEP DOWN PRIVATE ROOM

## 2022-05-06 PROCEDURE — 82746 ASSAY OF FOLIC ACID SERUM: CPT

## 2022-05-06 PROCEDURE — 84156 ASSAY OF PROTEIN URINE: CPT | Performed by: STUDENT IN AN ORGANIZED HEALTH CARE EDUCATION/TRAINING PROGRAM

## 2022-05-06 RX ORDER — IBANDRONATE SODIUM 150 MG/1
150 TABLET, FILM COATED ORAL
Status: ON HOLD | COMMUNITY
Start: 2022-01-27 | End: 2022-05-09 | Stop reason: CLARIF

## 2022-05-06 RX ORDER — GLUCAGON 1 MG
1 KIT INJECTION
Status: DISCONTINUED | OUTPATIENT
Start: 2022-05-06 | End: 2022-05-12 | Stop reason: HOSPADM

## 2022-05-06 RX ORDER — CALCITONIN SALMON 200 [USP'U]/ML
4 INJECTION, SOLUTION INTRAMUSCULAR; SUBCUTANEOUS ONCE
Status: DISCONTINUED | OUTPATIENT
Start: 2022-05-06 | End: 2022-05-06

## 2022-05-06 RX ORDER — SODIUM CHLORIDE 0.9 % (FLUSH) 0.9 %
10 SYRINGE (ML) INJECTION EVERY 12 HOURS PRN
Status: DISCONTINUED | OUTPATIENT
Start: 2022-05-06 | End: 2022-05-12 | Stop reason: HOSPADM

## 2022-05-06 RX ORDER — INSULIN DETEMIR 100 [IU]/ML
24 INJECTION, SOLUTION SUBCUTANEOUS EVERY MORNING
Status: ON HOLD | COMMUNITY
Start: 2022-02-25 | End: 2022-06-12 | Stop reason: HOSPADM

## 2022-05-06 RX ORDER — OXYCODONE HYDROCHLORIDE 5 MG/1
5 TABLET ORAL EVERY 6 HOURS PRN
Status: DISCONTINUED | OUTPATIENT
Start: 2022-05-06 | End: 2022-05-07

## 2022-05-06 RX ORDER — PRAVASTATIN SODIUM 10 MG/1
10 TABLET ORAL NIGHTLY
Status: ON HOLD | COMMUNITY
Start: 2021-12-20 | End: 2022-05-06

## 2022-05-06 RX ORDER — ACETAMINOPHEN 325 MG/1
650 TABLET ORAL EVERY 6 HOURS PRN
Status: DISCONTINUED | OUTPATIENT
Start: 2022-05-06 | End: 2022-05-07

## 2022-05-06 RX ORDER — IBUPROFEN 200 MG
24 TABLET ORAL
Status: DISCONTINUED | OUTPATIENT
Start: 2022-05-06 | End: 2022-05-12 | Stop reason: HOSPADM

## 2022-05-06 RX ORDER — ACETAMINOPHEN 500 MG
500 TABLET ORAL EVERY 6 HOURS PRN
Status: ON HOLD | COMMUNITY
End: 2022-07-07 | Stop reason: HOSPADM

## 2022-05-06 RX ORDER — NALOXONE HCL 0.4 MG/ML
0.02 VIAL (ML) INJECTION
Status: DISCONTINUED | OUTPATIENT
Start: 2022-05-06 | End: 2022-05-12 | Stop reason: HOSPADM

## 2022-05-06 RX ORDER — CALCIUM CARBONATE 200(500)MG
1 TABLET,CHEWABLE ORAL 2 TIMES DAILY WITH MEALS
COMMUNITY
End: 2022-05-10

## 2022-05-06 RX ORDER — PEN NEEDLE, DIABETIC 32GX 5/32"
NEEDLE, DISPOSABLE MISCELLANEOUS
Status: ON HOLD | COMMUNITY
Start: 2021-11-10 | End: 2022-05-06

## 2022-05-06 RX ORDER — SODIUM CHLORIDE 9 MG/ML
INJECTION, SOLUTION INTRAVENOUS CONTINUOUS
Status: ACTIVE | OUTPATIENT
Start: 2022-05-06 | End: 2022-05-06

## 2022-05-06 RX ORDER — INSULIN ASPART 100 [IU]/ML
0-5 INJECTION, SOLUTION INTRAVENOUS; SUBCUTANEOUS
Status: DISCONTINUED | OUTPATIENT
Start: 2022-05-06 | End: 2022-05-10

## 2022-05-06 RX ORDER — AMOXICILLIN 250 MG
1 CAPSULE ORAL 2 TIMES DAILY
Status: DISCONTINUED | OUTPATIENT
Start: 2022-05-06 | End: 2022-05-12 | Stop reason: HOSPADM

## 2022-05-06 RX ORDER — IBUPROFEN 200 MG
16 TABLET ORAL
Status: DISCONTINUED | OUTPATIENT
Start: 2022-05-06 | End: 2022-05-12 | Stop reason: HOSPADM

## 2022-05-06 RX ORDER — DAPAGLIFLOZIN 5 MG/1
5 TABLET, FILM COATED ORAL DAILY
Status: ON HOLD | COMMUNITY
Start: 2021-12-25 | End: 2022-05-09 | Stop reason: CLARIF

## 2022-05-06 RX ORDER — POLYETHYLENE GLYCOL 3350 17 G/17G
17 POWDER, FOR SOLUTION ORAL DAILY
Status: DISCONTINUED | OUTPATIENT
Start: 2022-05-06 | End: 2022-05-12 | Stop reason: HOSPADM

## 2022-05-06 RX ORDER — PROCHLORPERAZINE EDISYLATE 5 MG/ML
5 INJECTION INTRAMUSCULAR; INTRAVENOUS EVERY 6 HOURS PRN
Status: DISCONTINUED | OUTPATIENT
Start: 2022-05-06 | End: 2022-05-12 | Stop reason: HOSPADM

## 2022-05-06 RX ORDER — CALCIUM CITRATE/VITAMIN D3 200MG-6.25
TABLET ORAL
Status: ON HOLD | COMMUNITY
Start: 2021-12-11 | End: 2022-05-06

## 2022-05-06 RX ORDER — LANCETS 33 GAUGE
EACH MISCELLANEOUS
Status: ON HOLD | COMMUNITY
Start: 2021-12-14 | End: 2022-05-06

## 2022-05-06 RX ORDER — ONDANSETRON 2 MG/ML
4 INJECTION INTRAMUSCULAR; INTRAVENOUS EVERY 8 HOURS PRN
Status: DISCONTINUED | OUTPATIENT
Start: 2022-05-06 | End: 2022-05-12 | Stop reason: HOSPADM

## 2022-05-06 RX ORDER — CALCITONIN SALMON 200 [USP'U]/ML
4 INJECTION, SOLUTION INTRAMUSCULAR; SUBCUTANEOUS ONCE
Status: COMPLETED | OUTPATIENT
Start: 2022-05-06 | End: 2022-05-06

## 2022-05-06 RX ORDER — SODIUM CHLORIDE 9 MG/ML
INJECTION, SOLUTION INTRAVENOUS CONTINUOUS
Status: DISCONTINUED | OUTPATIENT
Start: 2022-05-06 | End: 2022-05-06

## 2022-05-06 RX ORDER — TALC
6 POWDER (GRAM) TOPICAL NIGHTLY PRN
Status: DISCONTINUED | OUTPATIENT
Start: 2022-05-06 | End: 2022-05-12 | Stop reason: HOSPADM

## 2022-05-06 RX ORDER — AMLODIPINE BESYLATE 5 MG/1
5 TABLET ORAL DAILY
Status: DISCONTINUED | OUTPATIENT
Start: 2022-05-06 | End: 2022-05-12 | Stop reason: HOSPADM

## 2022-05-06 RX ADMIN — SENNOSIDES AND DOCUSATE SODIUM 1 TABLET: 50; 8.6 TABLET ORAL at 09:05

## 2022-05-06 RX ADMIN — CEFTRIAXONE 1 G: 1 INJECTION, SOLUTION INTRAVENOUS at 11:05

## 2022-05-06 RX ADMIN — OXYCODONE 5 MG: 5 TABLET ORAL at 01:05

## 2022-05-06 RX ADMIN — SENNOSIDES AND DOCUSATE SODIUM 1 TABLET: 50; 8.6 TABLET ORAL at 08:05

## 2022-05-06 RX ADMIN — SODIUM CHLORIDE: 0.9 INJECTION, SOLUTION INTRAVENOUS at 02:05

## 2022-05-06 RX ADMIN — SODIUM CHLORIDE: 0.9 INJECTION, SOLUTION INTRAVENOUS at 12:05

## 2022-05-06 RX ADMIN — OXYCODONE 5 MG: 5 TABLET ORAL at 05:05

## 2022-05-06 RX ADMIN — SODIUM CHLORIDE: 0.9 INJECTION, SOLUTION INTRAVENOUS at 07:05

## 2022-05-06 RX ADMIN — AMLODIPINE BESYLATE 5 MG: 5 TABLET ORAL at 10:05

## 2022-05-06 RX ADMIN — POLYETHYLENE GLYCOL 3350 17 G: 17 POWDER, FOR SOLUTION ORAL at 08:05

## 2022-05-06 RX ADMIN — INSULIN ASPART 3 UNITS: 100 INJECTION, SOLUTION INTRAVENOUS; SUBCUTANEOUS at 12:05

## 2022-05-06 RX ADMIN — ZOLEDRONIC ACID 4 MG: 0.04 INJECTION, SOLUTION INTRAVENOUS at 02:05

## 2022-05-06 RX ADMIN — INSULIN DETEMIR 10 UNITS: 100 INJECTION, SOLUTION SUBCUTANEOUS at 08:05

## 2022-05-06 RX ADMIN — OXYCODONE 5 MG: 5 TABLET ORAL at 07:05

## 2022-05-06 RX ADMIN — CALCITONIN SALMON 208 UNITS: 200 INJECTION, SOLUTION INTRAMUSCULAR; SUBCUTANEOUS at 02:05

## 2022-05-06 NOTE — SUBJECTIVE & OBJECTIVE
Past Medical History:   Diagnosis Date    Diabetes mellitus        History reviewed. No pertinent surgical history.    Review of patient's allergies indicates:  No Known Allergies    Family History       Problem Relation (Age of Onset)    Diabetes Mother, Daughter, Son    Heart attack Father    Kidney cancer Son    Stomach cancer Mother    Thyroid disease Daughter            Tobacco Use    Smoking status: Never Smoker    Smokeless tobacco: Never Used   Substance and Sexual Activity    Alcohol use: No    Drug use: No    Sexual activity: Not on file       Review of Systems    Objective:     Temp:  [97.7 °F (36.5 °C)-98.4 °F (36.9 °C)] 98.2 °F (36.8 °C)  Pulse:  [] 99  Resp:  [16-20] 16  SpO2:  [94 %-100 %] 95 %  BP: (133-191)/(60-92) 133/60     Body mass index is 21.56 kg/m².           Drains       Drain  Duration                  Urethral Catheter 05/05/22 1140 14 Fr. <1 day                    Physical Exam  Constitutional:       Appearance: Normal appearance.   HENT:      Head: Normocephalic.      Nose: Nose normal.   Eyes:      Pupils: Pupils are equal, round, and reactive to light.   Cardiovascular:      Rate and Rhythm: Normal rate.   Pulmonary:      Effort: Pulmonary effort is normal.   Chest:      Chest wall: No tenderness.   Abdominal:      General: Abdomen is flat. There is no distension.      Palpations: Abdomen is soft.      Tenderness: There is no abdominal tenderness. There is no right CVA tenderness or left CVA tenderness.   Genitourinary:     Comments: Soto catheter in place draining yellow cloudy urine  Musculoskeletal:      Cervical back: Normal range of motion.   Skin:     General: Skin is warm.   Neurological:      General: No focal deficit present.      Mental Status: She is alert and oriented to person, place, and time.   Psychiatric:         Mood and Affect: Mood normal.         Behavior: Behavior normal.       Significant Labs:    BMP:  Recent Labs   Lab 05/05/22  0957   *   K 4.4    CL 98   CO2 31*   BUN 33*   CREATININE 2.3*   CALCIUM 14.6*       CBC:  Recent Labs   Lab 05/05/22  0957   WBC 23.49*   HGB 8.5*   HCT 27.4*          All pertinent labs results from the past 24 hours have been reviewed.    Significant Imaging:  CT: I have reviewed all results within the past 24 hours and my personal findings are:  Pertinent findings in HPI

## 2022-05-06 NOTE — ASSESSMENT & PLAN NOTE
Severe bilateral hydronephrosis noted on CT A/P.  Cr 2.3 on admission, unknown baseline.  Patient reports significant UOP.    PLAN:  --Urology consulted  --Trend CMP

## 2022-05-06 NOTE — PLAN OF CARE
Patient AAOx4. Complaints of back/flank pain relieved with PRN oxy. Echo completed. Soto in place; draining yellow urine. IVF infusing; NS @ 200. Bed in low locked position, call light and personal items within reach. Instructed to call if needed. Plan for cystoscopy and bladder biopsy Monday.

## 2022-05-06 NOTE — ASSESSMENT & PLAN NOTE
Calcium 14.6 on admission. 15.7 when corrected for albumin.  Patient reports 1 weeks of confusion, fatigue, and constipation.    PLAN:  --Continuous IVF @ 200cc/hour for goal -150 cc/hour  --Calcitonin 4 units/kg x1. If no improvement on repeat calcium lab, consider additional dose 8 units/kg  --Zoledronic acid 4mg  --Trend calcium q6h  --Strict I&O's

## 2022-05-06 NOTE — HPI
Karoline Justice is a 76F pmhx DM, HTN who presented as a transfer from OSH with fatigue, back pain, and fevers for 2 weeks and dysuria, foul-smelling urine, and AMS for the last week. At OSH, CT A/P showed a 6.5cm mass posterior bladder wall, severe bilateral hydronephrosis. She has hypercalcemia (14.6) and a UTI. She was transferred to [facility] [place].     On exam, the patient is alert but has difficulty with hearing. Daughter at bedside answering a majority of the questions, she states her mother is more confused the past week . She states she has had foul smelling urine with some dysuria for the last week. Denies hematuria. She is afebrile and HDS. OSH labs with leukocytosis (WBC 23.5), hgb 8.5, elevated Cr 2.3 (baseline unknown but last was ~1.8) hypercalcemia (14.6), Microscopic analysis 4RBC, 61 WBC, occasional bacteria. Urine culture pending. Currently on Rocephin. Petersen catheter was placed at OSH and drained 1.1L. Since admission to [facility], 1300ml urine output that appears cloudy with debris. She states her bladder discomfort has decreased since placing petersen catheter.    She does not have any suprapubic discomfort or flank tenderness on palpation this morning. She states at home she does not have hematuria or and overt bladder fullness or pain. Labs are waiting to be drawn this am.

## 2022-05-06 NOTE — ASSESSMENT & PLAN NOTE
Hb 8.5 on admission. MCV 85.   Unknown baseline.     PLAN:  --Trend CBC  --Follow-up iron studies, B12, folate

## 2022-05-06 NOTE — HPI
Karoline Justice 76F with IDDM2 who presented to [facility] with generalized weakness, back pain, and intermittent fevers for 2 weeks with dysuria, foul-smelling urine, and confusion for the last week. At [facility] a CT A/P showed a 6.5cm mass anterior bladder wall c/f neoplasm, severe bilateral hydronephrosis, and choledocholithiasis with with moderate intra and extrahepatic biliary duct dilatation. She has hypercalcemia and a UTI. She was transferred to [facility] for Urology evaluation.    On arrival, the patient is alert and oriented. Collateral is provided by her daughter at bedside, who agrees that her mother has been more confused for the past week. She is c/o intermittent confusion, decreased appetite, bilateral flank pain, and increased urinary frequency. She is afebrile and mildly hypertensive (157/68). Labs from OSH with leukocytosis (WBC 23.5), anemia (Hb 8.5), elevated Cr (2.3 -- unknown baseline), hypercalcemia (15.7 when corrected for albumin), and UTI on urine studies (UA cloudy with 2+ protein, 3+ leuks and urine microscopy with 61 WBC). Exam notable for significant bilat flank TTP. She will be admitted to  for further care.

## 2022-05-06 NOTE — CONSULTS
[facility] - Oncology ([facility])  Urology  Consult Note    Patient Name: Karoline Justice  MRN: [id number]  Admission Date: 5/5/2022  Hospital Length of Stay: 1   Code Status: Full Code   Attending Provider: Ansley Simeon MD   Consulting Provider: Mario Martin MD  Primary Care Physician: Dee Dee Oconnor MD  Principal Problem:Bladder mass    Inpatient consult to Urology  Consult performed by: Mario Martin MD  Consult ordered by: Reid Blackmon MD  Reason for consult: Bilateral hydronephrosis, bladder lesion          Subjective:     HPI:  Karoline Justice is a 76F pmhx DM, HTN who presented as a transfer from OSH with fatigue, back pain, and fevers for 2 weeks and dysuria, foul-smelling urine, and AMS for the last week. At OSH, CT A/P showed a 6.5cm mass posterior bladder wall, severe bilateral hydronephrosis. She presented with electrolyte abnormalities; hypercalcemia (14.6) and a UTI. She was transferred to [facility] for Urology evaluation.     On exam, the patient is alert but has difficulty with hearing. Daughter at bedside answering a majority of the questions, she states her mother is more confused the past week . She states she has had foul smelling urine with some dysuria for the last week. Denies hematuria. She is afebrile and HDS. OSH labs with leukocytosis (WBC 23.5), hgb 8.5, elevated Cr 2.3 (baseline unknown but last was ~1.8) hypercalcemia (14.6), Microscopic analysis 4RBC, 61 WBC, occasional bacteria. Urine culture pending. Currently on Rocephin. Petersen catheter was placed at OSH and drained 1.1L. Since admission to [facility], 1300ml urine output that appears cloudy with debris. She states her bladder discomfort has decreased since placing petersen catheter.    She does not have any suprapubic discomfort or flank tenderness on palpation this morning. She states at home she does not have hematuria or and overt bladder fullness or pain. Labs are waiting to be drawn this am.      Past Medical History:   Diagnosis Date    Diabetes  mellitus        History reviewed. No pertinent surgical history.    Review of patient's allergies indicates:  No Known Allergies    Family History       Problem Relation (Age of Onset)    Diabetes Mother, Daughter, Son    Heart attack Father    Kidney cancer Son    Stomach cancer Mother    Thyroid disease Daughter            Tobacco Use    Smoking status: Never Smoker    Smokeless tobacco: Never Used   Substance and Sexual Activity    Alcohol use: No    Drug use: No    Sexual activity: Not on file       Review of Systems    Objective:     Temp:  [97.7 °F (36.5 °C)-98.4 °F (36.9 °C)] 98.2 °F (36.8 °C)  Pulse:  [] 99  Resp:  [16-20] 16  SpO2:  [94 %-100 %] 95 %  BP: (133-191)/(60-92) 133/60     Body mass index is 21.56 kg/m².           Drains       Drain  Duration                  Urethral Catheter 05/05/22 1140 14 Fr. <1 day                    Physical Exam  Constitutional:       Appearance: Normal appearance.   HENT:      Head: Normocephalic.      Nose: Nose normal.   Eyes:      Pupils: Pupils are equal, round, and reactive to light.   Cardiovascular:      Rate and Rhythm: Normal rate.   Pulmonary:      Effort: Pulmonary effort is normal.   Chest:      Chest wall: No tenderness.   Abdominal:      General: Abdomen is flat. There is no distension.      Palpations: Abdomen is soft.      Tenderness: There is no abdominal tenderness. There is no right CVA tenderness or left CVA tenderness.   Genitourinary:     Comments: Soto catheter in place draining yellow cloudy urine  Musculoskeletal:      Cervical back: Normal range of motion.   Skin:     General: Skin is warm.   Neurological:      General: No focal deficit present.      Mental Status: She is alert and oriented to person, place, and time.   Psychiatric:         Mood and Affect: Mood normal.         Behavior: Behavior normal.       Significant Labs:    BMP:  Recent Labs   Lab 05/05/22  0957   *   K 4.4   CL 98   CO2 31*   BUN 33*   CREATININE 2.3*    CALCIUM 14.6*       CBC:  Recent Labs   Lab 05/05/22  0957   WBC 23.49*   HGB 8.5*   HCT 27.4*          All pertinent labs results from the past 24 hours have been reviewed.    Significant Imaging:  CT: I have reviewed all results within the past 24 hours and my personal findings are:  Pertinent findings in HPI                      Assessment and Plan:     * Bladder mass  Karoline Justice is a 76F pmhx DM, HTN who presented as a transfer from OSH with fatigue, back pain, and fevers for 2 weeks and dysuria, foul-smelling urine, and AMS for the last week    -CT is concerning for possible malignancy. Due to her electrolyte abnormalities, not presently a candidate for anesthesia for bladder biopsy. If her electrolytes are corrected, would recommend cystoscopy and bladder biopsy this admission. Likely will be scheduled for Monday morning.  -Bilateral hydronephrosis and bladder seen on CT. Petersen placed with 1.1L urine output. Likely bilateral hydro from obstruction and not from bladder mass. Trend Cr. If downtrending there is less concern from malignant obstruction. Renal US tomorrow morning to monitor interval imporovement  -Urine culture pending. Currently on Rocephin. Treat with IV abx based on susceptibility  -Keep petersen catheter for minimal 1 week due to >1000ml urinary retention  -Remainder of care per primary team  -Urology will continue to follow        VTE Risk Mitigation (From admission, onward)           Ordered     Reason for No Pharmacological VTE Prophylaxis  Once        Question:  Reasons:  Answer:  Patient is Ambulatory    05/06/22 0116     IP VTE HIGH RISK PATIENT  Once         05/06/22 0116     Place sequential compression device  Until discontinued         05/06/22 0116                    Thank you for your consult.    Mario Martin MD  Urology  [facility] - Oncology ([facility])    Patient seen at the bedside.  Chart and imaging studies were reviewed.  Plan to take her for cystoscopy, possible TURBT  on Tuesday.  Will obtain an ultrasound today to see if there is an improvement of the hydronephrosis since she has had a catheter in place.

## 2022-05-06 NOTE — H&P
[facility] - Oncology ([facility])  [facility] Medicine  History & Physical    Patient Name: Karoline Justice  MRN: [id number]  Patient Class: IP- Inpatient  Admission Date: 5/5/2022  Attending Physician: Ansley Simeon MD   Primary Care Provider: Dee Dee Oconnor MD     Patient information was obtained from patient, relative(s), past medical records and ER records.     Subjective:     Principal Problem:Bladder mass    Chief Complaint: No chief complaint on file.       HPI: Karoline Justice 76F with IDDM2 who presented to [facility] with generalized weakness, back pain, and intermittent fevers for 2 weeks with dysuria, foul-smelling urine, and confusion for the last week. At [facility] a CT A/P showed a 6.5cm mass anterior bladder wall c/f neoplasm, severe bilateral hydronephrosis, and choledocholithiasis with with moderate intra and extrahepatic biliary duct dilatation. She has hypercalcemia and a UTI. She was [place] [facility] [place].    On arrival, the patient is alert and oriented. Collateral is provided by her daughter at bedside, who agrees that her mother has been more confused for the past week. She is c/o intermittent confusion, decreased appetite, bilateral flank pain, and increased urinary frequency. She is afebrile and mildly hypertensive (157/68). Labs from OSH with leukocytosis (WBC 23.5), anemia (Hb 8.5), elevated Cr (2.3 -- unknown baseline), hypercalcemia (15.7 when corrected for albumin), and UTI on urine studies (UA cloudy with 2+ protein, 3+ leuks and urine microscopy with 61 WBC). Exam notable for significant bilat flank TTP. She will be admitted to  for further care.       Past Medical History:   Diagnosis Date    Diabetes mellitus        No past surgical history on file.    Review of patient's allergies indicates:  No Known Allergies    Current Facility-Administered Medications on File Prior to Encounter   Medication    [COMPLETED] cefTRIAXone (ROCEPHIN) 1 g/50 mL D5W IVPB     [COMPLETED] lactated ringers bolus 1,000 mL    [COMPLETED] lactated ringers infusion    [COMPLETED] sodium chloride 0.9% bolus 1,000 mL    [DISCONTINUED] albuterol sulfate 2.5 mg/0.5 mL nebulizer solution    [DISCONTINUED] lactated ringers bolus 500 mL     Current Outpatient Medications on File Prior to Encounter   Medication Sig    albuterol-ipratropium (DUO-NEB) 2.5 mg-0.5 mg/3 mL nebulizer solution Take 3 mLs by nebulization every 6 (six) hours as needed for Wheezing. Rescue    azithromycin (Z-KATELIN) 250 MG tablet Take 1 tablet (250 mg total) by mouth once daily. Take first 2 tablets together, then 1 every day until finished.    metformin (GLUCOPHAGE) 1000 MG tablet Take 1 tablet (1,000 mg total) by mouth 2 (two) times daily.     Family History    Mother -- Diabetes, Stomach Cancer  Father -- MI  Daughters -- MS, Diabetes, Thyroid Disease  Sons -- Diabetes, Renal Cell Carcinoma       Tobacco Use    Smoking status: Never Smoker    Smokeless tobacco: Never Used   Substance and Sexual Activity    Alcohol use: No    Drug use: No    Sexual activity: Not on file     Review of Systems   Constitutional:  Positive for activity change, appetite change, fatigue and fever. Negative for chills.   HENT:  Negative for congestion, rhinorrhea and sore throat.    Eyes:  Negative for visual disturbance.   Respiratory:  Positive for cough. Negative for shortness of breath.    Cardiovascular:  Negative for chest pain, palpitations and leg swelling.   Gastrointestinal:  Positive for constipation. Negative for abdominal distention, abdominal pain, diarrhea, nausea and vomiting.   Genitourinary:  Positive for frequency. Negative for dysuria, hematuria and urgency.   Musculoskeletal:  Positive for back pain. Negative for myalgias and neck pain.   Skin:  Negative for rash.   Neurological:  Positive for weakness. Negative for dizziness, syncope, light-headedness and headaches.   Psychiatric/Behavioral:  Positive for confusion.  Negative for agitation. The patient is not nervous/anxious.      Objective:     Vital Signs (Most Recent):  Temp: 98.3 °F (36.8 °C) (05/06/22 0051)  Pulse: 91 (05/06/22 0051)  Resp: 18 (05/06/22 0051)  BP: (!) 157/68 (05/06/22 0051)  SpO2: (!) 94 % (05/06/22 0051)   Vital Signs (24h Range):  Temp:  [97.7 °F (36.5 °C)-98.4 °F (36.9 °C)] 98.3 °F (36.8 °C)  Pulse:  [] 91  Resp:  [16-20] 18  SpO2:  [94 %-100 %] 94 %  BP: (143-191)/(65-92) 157/68        There is no height or weight on file to calculate BMI.    Physical Exam  Vitals and nursing note reviewed.   Constitutional:       General: She is not in acute distress.     Appearance: Normal appearance. She is normal weight.   HENT:      Head: Normocephalic and atraumatic.      Mouth/Throat:      Pharynx: Oropharynx is clear.   Eyes:      Extraocular Movements: Extraocular movements intact.      Conjunctiva/sclera: Conjunctivae normal.   Cardiovascular:      Rate and Rhythm: Normal rate and regular rhythm.   Pulmonary:      Effort: Pulmonary effort is normal. No respiratory distress.      Breath sounds: Normal breath sounds. No wheezing.   Abdominal:      General: Abdomen is flat. Bowel sounds are normal. There is no distension.      Palpations: Abdomen is soft.      Tenderness: There is no abdominal tenderness.   Genitourinary:     Comments: Soto in place  Musculoskeletal:         General: No swelling or tenderness. Normal range of motion.      Cervical back: Normal range of motion and neck supple.      Comments: Significant bilat CVA TTP   Skin:     General: Skin is warm and dry.   Neurological:      General: No focal deficit present.      Mental Status: She is alert and oriented to person, place, and time.   Psychiatric:         Mood and Affect: Mood normal.         Behavior: Behavior normal.         Thought Content: Thought content normal.         Judgment: Judgment normal.           Significant Labs: All pertinent labs within the past 24 hours have been  reviewed.  CBC:   Recent Labs   Lab 05/05/22  0957   WBC 23.49*   HGB 8.5*   HCT 27.4*        CMP:   Recent Labs   Lab 05/05/22  0957   *   K 4.4   CL 98   CO2 31*   *   BUN 33*   CREATININE 2.3*   CALCIUM 14.6*   PROT 8.1   ALBUMIN 2.6*   BILITOT 0.5   ALKPHOS 104   AST 8*   ALT 14   ANIONGAP 4*   EGFRNONAA 20.0*     Lactic Acid:   Recent Labs   Lab 05/05/22  1103   LACTATE 1.8     Urine Studies:   Recent Labs   Lab 05/05/22  1145   COLORU Yellow   APPEARANCEUA Cloudy*   PHUR 7.0   SPECGRAV 1.010   PROTEINUA 2+*   GLUCUA Negative   KETONESU Negative   BILIRUBINUA Negative   OCCULTUA 2+*   NITRITE Negative   UROBILINOGEN Negative   LEUKOCYTESUR 3+*   RBCUA 4   WBCUA 61*   BACTERIA Occasional   SQUAMEPITHEL 0   HYALINECASTS 2.6*       Significant Imaging: I have reviewed all pertinent imaging results/findings within the past 24 hours.  CT Abdomen Pelvis  Without Contrast  Narrative: EXAMINATION:  CT ABDOMEN PELVIS WITHOUT CONTRAST    CLINICAL HISTORY:  bone pain;    TECHNIQUE:  Iterative reconstruction technique was used.    CT/cardiac nuclear exam/s in prior 12 months:  0.    COMPARISON:  None.    FINDINGS:  Unremarkable liver and spleen.  Multiple calcified gallstones.  Moderate intrahepatic and extrahepatic biliary ductal dilatation.  4 mm stone within the common duct.  Unremarkable pancreas and adrenal glands.  Severe distention of the urinary bladder with irregular soft tissue measuring approximately 6.5 cm along the inferior and left lateral wall, highly suspicious for bladder neoplasm.  Severe bilateral hydronephrosis, which may be related to the bladder mass and/or distension no bowel obstruction.  3.2 cm left posterolateral uterine fibroid.  No free fluid.  Extensive vascular calcifications.  Impression: 1. Choledocholithiasis with moderate intra and extrahepatic biliary duct dilatation.  Also cholelithiasis.  2. Approximately 6.5 cm mass involving the left inferior wall of the bladder,  worrisome for neoplasm.  3. Severe bilateral hydronephrosis, which may be related to bladder mass and/or bladder distention.    Electronically signed by: Ramos Ambrocio MD  Date:    05/05/2022  Time:    11:45  CT Chest Without Contrast  Narrative: EXAMINATION:  CT CHEST WITHOUT CONTRAST    CLINICAL HISTORY:  bone pain;    TECHNIQUE:  Iterative reconstruction technique was used.    CT/cardiac nuclear exam/s in prior 12 months:  0.    COMPARISON:  None.    FINDINGS:  No mediastinal adenopathy.  No suspicious pulmonary nodule or mass.  No focal consolidation.  No pleural pericardial fluid.  Small amount of pericardial fluid.  No axillary abnormality.  Moderate vascular calcifications.  Impression: Small amount of pericardial fluid.    Electronically signed by: Ramos Ambrocio MD  Date:    05/05/2022  Time:    11:41  X-Ray Chest 1 View  Narrative: EXAMINATION:  XR CHEST 1 VIEW    CLINICAL HISTORY:  Weakness    COMPARISON:  Chest x-ray 08/06/2021.    FINDINGS:  The cardiac silhouette is normal in size. The lungs are clear. No pleural fluid.  Impression: No active disease.    Electronically signed by: Ramos Ambrocio MD  Date:    05/05/2022  Time:    10:08      Assessment/Plan:     * Bladder mass  Karoline Justice 76F with IDDM2 who presented to [facility] with generalized weakness, back pain, and intermittent fevers for 2 weeks with dysuria, foul-smelling urine, and confusion for the last week. At [facility] a CT A/P showed a 6.5cm mass anterior bladder wall c/f neoplasm, severe bilateral hydronephrosis. She has hypercalcemia and a UTI. [place] [facility] [place]. On arrival, the patient is alert and oriented. Collateral is provided by her daughter at bedside, who agrees that her mother has been more confused for the past week. She is c/o intermittent confusion, decreased appetite, bilateral flank pain, and increased urinary frequency. Labs from OSH with leukocytosis, anemia, elevated Cr (2.3 --  unknown baseline), hypercalcemia (15.7 when corrected for albumin), and UTI on urine studies. Exam notable for significant bilat flank TTP.    PLAN:  --Urology consulted  --Trend CMP  --Soto in place  --Renally dose medications, avoid nephrotoxins  --Strict I&O's      Hydronephrosis  Severe bilateral hydronephrosis noted on CT A/P.  Cr 2.3 on admission, unknown baseline.  Patient reports significant UOP.    PLAN:  --Urology consulted  --Trend CMP      Hypercalcemia  Calcium 14.6 on admission. 15.7 when corrected for albumin.  Patient reports 1 weeks of confusion, fatigue, and constipation.    PLAN:  --Continuous IVF @ 200cc/hour for goal -150 cc/hour  --Calcitonin 4 units/kg x1. If no improvement on repeat calcium lab, consider additional dose 8 units/kg  --Zoledronic acid 4mg  --Trend calcium q6h  --Strict I&O's      HTN (hypertension)  Patient denies any history HTN.   BP elevated at OSH (191/76, 175/92). [place] [facility] was 157/68.  Small pericardial effusion noted on CT Chest.    BP today: BP (!) 157/68 (BP Location: Right arm, Patient Position: Lying)   Pulse 91   Temp 98.3 °F (36.8 °C) (Oral)   Resp 18   SpO2 (!) 94%   Breastfeeding No     PLAN:  --Monitor BP  --Start anti-hypertensives as appropriate  --Follow-up TTE      Type 2 diabetes mellitus  Patient reports taking 24 units levemir daily.  No HbA1c on file.    PLAN:  --Insulin detemir 10U QD  --LDSSI  --Follow-up HbA1c  --POCT BG q4h while NPO  --Diabetic diet when appropriate      UTI (urinary tract infection)  Patient with 2 weeks of dysuria, foul-smelling urine, and increased urinary frequency. Also reports intermittent confusion.  Urine studies on admission c/w UTI. UA cloudy with 2+ protein, 3+ leuko. Urine micro with 61 WBC.  Patient received 1g CTX at OSH.    PLAN:  --Continue CTX 1g q24h, de-escalate as appropriate  --Follow-up urine culture      Anemia  Hb 8.5 on admission. MCV 85.   Unknown baseline.     PLAN:  --Trend  CBC  --Follow-up iron studies, B12, folate      Choledocholithiasis  CT A/P with multiple calcified gallstones, moderate intrahepatic and extrahepatic biliary ductal dilatation, and a 4 mm stone within the common duct.  No transaminitis (AST/ALT 8/14), alk phos wnl (104), and Tbili not elevated (0.5)  Patient denies any abdominal pain, nausea, or vomiting. No RUQ or epigastric TTP on exam.    PLAN:  --Trend CMP      VTE Risk Mitigation (From admission, onward)         Ordered     Reason for No Pharmacological VTE Prophylaxis  Once        Question:  Reasons:  Answer:  Patient is Ambulatory    05/06/22 0116     IP VTE HIGH RISK PATIENT  Once         05/06/22 0116     Place sequential compression device  Until discontinued         05/06/22 0116                   Reid Blackmon MD  Department of Hospital Medicine   [facility] - Oncology ([facility])

## 2022-05-06 NOTE — H&P
[facility] - Oncology ([facility])  [facility] Medicine  History & Physical    Patient Name: Karoline Justice  MRN: [id number]  Patient Class: IP- Inpatient  Admission Date: 5/5/2022  Attending Physician: Ansley Simeon MD   Primary Care Provider: Dee Dee Oconnor MD      Patient information was obtained from patient, relative(s), past medical records and ER records.     Subjective:     Principal Problem:Bladder mass    Chief Complaint: No chief complaint on file.       HPI: Karoline Justice [id number] with IDDM2 who presented to [facility] with generalized weakness, back pain, and intermittent fevers for 2 weeks with dysuria, foul-smelling urine, and confusion for the last week. At [facility] a CT A/P showed a 6.5cm mass anterior bladder wall c/f neoplasm, severe bilateral hydronephrosis, and choledocholithiasis with with moderate intra and extrahepatic biliary duct dilatation. She has hypercalcemia and a UTI. She was transferred to [facility] for [place] evaluation.    On arrival, the patient is alert and oriented. Collateral is provided by her daughter at bedside, who agrees that her mother has been more confused for the past week. She is c/o intermittent confusion, decreased appetite, bilateral flank pain, and increased urinary frequency. She is afebrile and mildly hypertensive (157/68). Labs from OSH with leukocytosis (WBC 23.5), anemia (Hb 8.5), elevated Cr (2.3 -- unknown baseline), hypercalcemia (15.7 when corrected for albumin), and UTI on urine studies (UA cloudy with 2+ protein, 3+ leuks and urine microscopy with 61 WBC). Exam notable for significant bilat flank TTP. She will be admitted to  for further care.       Past Medical History:   Diagnosis Date    Diabetes mellitus        No past surgical history on file.    Review of patient's allergies indicates:  No Known Allergies    Current Facility-Administered Medications on File Prior to Encounter   Medication    [COMPLETED] cefTRIAXone (ROCEPHIN) 1 g/50 mL D5W IVPB     [COMPLETED] lactated ringers bolus 1,000 mL    [COMPLETED] lactated ringers infusion    [COMPLETED] sodium chloride 0.9% bolus 1,000 mL    [DISCONTINUED] albuterol sulfate 2.5 mg/0.5 mL nebulizer solution    [DISCONTINUED] lactated ringers bolus 500 mL     Current Outpatient Medications on File Prior to Encounter   Medication Sig    albuterol-ipratropium (DUO-NEB) 2.5 mg-0.5 mg/3 mL nebulizer solution Take 3 mLs by nebulization every 6 (six) hours as needed for Wheezing. Rescue    azithromycin (Z-KATELIN) 250 MG tablet Take 1 tablet (250 mg total) by mouth once daily. Take first 2 tablets together, then 1 every day until finished.    metformin (GLUCOPHAGE) 1000 MG tablet Take 1 tablet (1,000 mg total) by mouth 2 (two) times daily.     Family History    None       Tobacco Use    Smoking status: Never Smoker    Smokeless tobacco: Never Used   Substance and Sexual Activity    Alcohol use: No    Drug use: No    Sexual activity: Not on file     Review of Systems   Constitutional:  Positive for activity change, appetite change, fatigue and fever. Negative for chills.   HENT:  Negative for congestion, rhinorrhea and sore throat.    Eyes:  Negative for visual disturbance.   Respiratory:  Positive for cough. Negative for shortness of breath.    Cardiovascular:  Negative for chest pain, palpitations and leg swelling.   Gastrointestinal:  Positive for constipation. Negative for abdominal distention, abdominal pain, diarrhea, nausea and vomiting.   Genitourinary:  Positive for frequency. Negative for dysuria, hematuria and urgency.   Musculoskeletal:  Positive for back pain. Negative for myalgias and neck pain.   Skin:  Negative for rash.   Neurological:  Positive for weakness. Negative for dizziness, syncope, light-headedness and headaches.   Psychiatric/Behavioral:  Positive for confusion. Negative for agitation. The patient is not nervous/anxious.      Objective:     Vital Signs (Most Recent):  Temp: 98.3 °F (36.8  °C) (05/06/22 0051)  Pulse: 91 (05/06/22 0051)  Resp: 18 (05/06/22 0051)  BP: (!) 157/68 (05/06/22 0051)  SpO2: (!) 94 % (05/06/22 0051)   Vital Signs (24h Range):  Temp:  [97.7 °F (36.5 °C)-98.4 °F (36.9 °C)] 98.3 °F (36.8 °C)  Pulse:  [] 91  Resp:  [16-20] 18  SpO2:  [94 %-100 %] 94 %  BP: (143-191)/(65-92) 157/68        There is no height or weight on file to calculate BMI.    Physical Exam  Vitals and nursing note reviewed.   Constitutional:       General: She is not in acute distress.     Appearance: Normal appearance. She is normal weight.   HENT:      Head: Normocephalic and atraumatic.      Mouth/Throat:      Pharynx: Oropharynx is clear.   Eyes:      Extraocular Movements: Extraocular movements intact.      Conjunctiva/sclera: Conjunctivae normal.   Cardiovascular:      Rate and Rhythm: Normal rate and regular rhythm.   Pulmonary:      Effort: Pulmonary effort is normal. No respiratory distress.      Breath sounds: Normal breath sounds. No wheezing.   Abdominal:      General: Abdomen is flat. Bowel sounds are normal. There is no distension.      Palpations: Abdomen is soft.      Tenderness: There is no abdominal tenderness.   Genitourinary:     Comments: Soto in place  Musculoskeletal:         General: No swelling or tenderness. Normal range of motion.      Cervical back: Normal range of motion and neck supple.      Comments: Significant bilat CVA TTP   Skin:     General: Skin is warm and dry.   Neurological:      General: No focal deficit present.      Mental Status: She is alert and oriented to person, place, and time.   Psychiatric:         Mood and Affect: Mood normal.         Behavior: Behavior normal.         Thought Content: Thought content normal.         Judgment: Judgment normal.           Significant Labs: All pertinent labs within the past 24 hours have been reviewed.  CBC:   Recent Labs   Lab 05/05/22  0957   WBC 23.49*   HGB 8.5*   HCT 27.4*        CMP:   Recent Labs   Lab  05/05/22  0957   *   K 4.4   CL 98   CO2 31*   *   BUN 33*   CREATININE 2.3*   CALCIUM 14.6*   PROT 8.1   ALBUMIN 2.6*   BILITOT 0.5   ALKPHOS 104   AST 8*   ALT 14   ANIONGAP 4*   EGFRNONAA 20.0*     Lactic Acid:   Recent Labs   Lab 05/05/22  1103   LACTATE 1.8     Urine Studies:   Recent Labs   Lab 05/05/22  1145   COLORU Yellow   APPEARANCEUA Cloudy*   PHUR 7.0   SPECGRAV 1.010   PROTEINUA 2+*   GLUCUA Negative   KETONESU Negative   BILIRUBINUA Negative   OCCULTUA 2+*   NITRITE Negative   UROBILINOGEN Negative   LEUKOCYTESUR 3+*   RBCUA 4   WBCUA 61*   BACTERIA Occasional   SQUAMEPITHEL 0   HYALINECASTS 2.6*       Significant Imaging: I have reviewed all pertinent imaging results/findings within the past 24 hours.  CT Abdomen Pelvis  Without Contrast  Narrative: EXAMINATION:  CT ABDOMEN PELVIS WITHOUT CONTRAST    CLINICAL HISTORY:  bone pain;    TECHNIQUE:  Iterative reconstruction technique was used.    CT/cardiac nuclear exam/s in prior 12 months:  0.    COMPARISON:  None.    FINDINGS:  Unremarkable liver and spleen.  Multiple calcified gallstones.  Moderate intrahepatic and extrahepatic biliary ductal dilatation.  4 mm stone within the common duct.  Unremarkable pancreas and adrenal glands.  Severe distention of the urinary bladder with irregular soft tissue measuring approximately 6.5 cm along the inferior and left lateral wall, highly suspicious for bladder neoplasm.  Severe bilateral hydronephrosis, which may be related to the bladder mass and/or distension no bowel obstruction.  3.2 cm left posterolateral uterine fibroid.  No free fluid.  Extensive vascular calcifications.  Impression: 1. Choledocholithiasis with moderate intra and extrahepatic biliary duct dilatation.  Also cholelithiasis.  2. Approximately 6.5 cm mass involving the left inferior wall of the bladder, worrisome for neoplasm.  3. Severe bilateral hydronephrosis, which may be related to bladder mass and/or bladder  distention.    Electronically signed by: Ramos Ambrocio MD  Date:    05/05/2022  Time:    11:45  CT Chest Without Contrast  Narrative: EXAMINATION:  CT CHEST WITHOUT CONTRAST    CLINICAL HISTORY:  bone pain;    TECHNIQUE:  Iterative reconstruction technique was used.    CT/cardiac nuclear exam/s in prior 12 months:  0.    COMPARISON:  None.    FINDINGS:  No mediastinal adenopathy.  No suspicious pulmonary nodule or mass.  No focal consolidation.  No pleural pericardial fluid.  Small amount of pericardial fluid.  No axillary abnormality.  Moderate vascular calcifications.  Impression: Small amount of pericardial fluid.    Electronically signed by: Ramos Ambrocio MD  Date:    05/05/2022  Time:    11:41  X-Ray Chest 1 View  Narrative: EXAMINATION:  XR CHEST 1 VIEW    CLINICAL HISTORY:  Weakness    COMPARISON:  Chest x-ray 08/06/2021.    FINDINGS:  The cardiac silhouette is normal in size. The lungs are clear. No pleural fluid.  Impression: No active disease.    Electronically signed by: Ramos Ambrocio MD  Date:    05/05/2022  Time:    10:08      Assessment/Plan:     * Bladder mass  Karoline Justice 76F with IDDM2 who presented to [facility] with generalized weakness, back pain, and intermittent fevers for 2 weeks with dysuria, foul-smelling urine, and confusion for the last week. At [facility] a CT A/P showed a 6.5cm mass anterior bladder wall c/f neoplasm, severe bilateral hydronephrosis. She has hypercalcemia and a UTI. She was transferred to [facility] for Urology evaluation. On arrival, the patient is alert and oriented. Collateral is provided by her daughter at bedside, who agrees that her mother has been more confused for the past week. She is c/o intermittent confusion, decreased appetite, bilateral flank pain, and increased urinary frequency. Labs from OSH with leukocytosis, anemia, elevated Cr (2.3 -- unknown baseline), hypercalcemia (15.7 when corrected for albumin), and UTI on urine studies. Exam notable for  significant bilat flank TTP.    PLAN:  --Urology consulted  --Trend CMP  --Soto in place  --Renally dose medications, avoid nephrotoxins  --Strict I&O's      Hydronephrosis  Severe bilateral hydronephrosis noted on CT A/P.  Cr 2.3 on admission, unknown baseline.  Patient reports significant UOP.    PLAN:  --Urology consulted  --Trend CMP      Hypercalcemia  Calcium 14.6 on admission. 15.7 when corrected for albumin.  Patient reports 1 weeks of confusion, fatigue, and constipation.    PLAN:  --Continuous IVF @ 200cc/hour for goal -150 cc/hour  --Calcitonin 4 units/kg x1. If no improvement on repeat calcium lab, consider additional dose 8 units/kg  --Zoledronic acid 4mg  --Trend calcium q6h  --Strict I&O's      HTN (hypertension)  Patient denies any history HTN.   BP elevated at OSH (191/76, 175/92). On arrival to [facility], BP was 157/68.  Small pericardial effusion noted on CT Chest.    BP today: BP (!) 157/68 (BP Location: Right arm, Patient Position: Lying)   Pulse 91   Temp 98.3 °F (36.8 °C) (Oral)   Resp 18   SpO2 (!) 94%   Breastfeeding No     PLAN:  --Monitor BP  --Start anti-hypertensives as appropriate  --Follow-up TTE      Type 2 diabetes mellitus  Patient reports taking 24 units levemir daily.  No HbA1c on file.    PLAN:  --Insulin detemir 10U QD  --LDSSI  --Follow-up HbA1c  --POCT BG q4h while NPO  --Diabetic diet when appropriate      UTI (urinary tract infection)  Patient with 2 weeks of dysuria, foul-smelling urine, and increased urinary frequency. Also reports intermittent confusion.  Urine studies on admission c/w UTI. UA cloudy with 2+ protein, 3+ leuko. Urine micro with 61 WBC.  Patient received 1g CTX at OSH.    PLAN:  --Continue CTX 1g q24h, de-escalate as appropriate  --Follow-up urine culture      Anemia  Hb 8.5 on admission. MCV 85.   Unknown baseline.     PLAN:  --Trend CBC  --Follow-up iron studies, B12, folate      Choledocholithiasis  CT A/P with multiple calcified gallstones,  moderate intrahepatic and extrahepatic biliary ductal dilatation, and a 4 mm stone within the common duct.  No transaminitis (AST/ALT 8/14), alk phos wnl (104), and Tbili not elevated (0.5)  Patient denies any abdominal pain, nausea, or vomiting. No RUQ or epigastric TTP on exam.    PLAN:  --Trend CMP        VTE Risk Mitigation (From admission, onward)         Ordered     Reason for No Pharmacological VTE Prophylaxis  Once        Question:  Reasons:  Answer:  Patient is Ambulatory    05/06/22 0116     IP VTE HIGH RISK PATIENT  Once         05/06/22 0116     Place sequential compression device  Until discontinued         05/06/22 0116                   Reid Blackmon MD  Department of Hospital Medicine   [facility] - Oncology ([facility])

## 2022-05-06 NOTE — NURSING
Patient admitted from another hospital. Patient arrived on stretcher accompanied by daughter. AOX4, on room air, initial vital signs BP slightly elevated. Soto in place. Patient is oriented to the unit, call light and bed function. Bed locked and in low position, alarm set. Talked to internal medicine DR for new admission orders. Daughter at the bedside. Will continue to monitor.

## 2022-05-06 NOTE — ASSESSMENT & PLAN NOTE
Karoline Justice 76F with IDDM2 who presented to [facility] with generalized weakness, back pain, and intermittent fevers for 2 weeks with dysuria, foul-smelling urine, and confusion for the last week. At [facility] a CT A/P showed a 6.5cm mass anterior bladder wall c/f neoplasm, severe bilateral hydronephrosis. She has hypercalcemia and a UTI. She was transferred to [facility] for Urology evaluation. On arrival, the patient is alert and oriented. Collateral is provided by her daughter at bedside, who agrees that her mother has been more confused for the past week. She is c/o intermittent confusion, decreased appetite, bilateral flank pain, and increased urinary frequency. Labs from OSH with leukocytosis, anemia, elevated Cr (2.3 -- unknown baseline), hypercalcemia (15.7 when corrected for albumin), and UTI on urine studies. Exam notable for significant bilat flank TTP.    PLAN:  --Urology consulted  --Trend CMP  --Soto in place  --Renally dose medications, avoid nephrotoxins  --Strict I&O's

## 2022-05-06 NOTE — SUBJECTIVE & OBJECTIVE
Past Medical History:   Diagnosis Date    Diabetes mellitus        No past surgical history on file.    Review of patient's allergies indicates:  No Known Allergies    Current Facility-Administered Medications on File Prior to Encounter   Medication    [COMPLETED] cefTRIAXone (ROCEPHIN) 1 g/50 mL D5W IVPB    [COMPLETED] lactated ringers bolus 1,000 mL    [COMPLETED] lactated ringers infusion    [COMPLETED] sodium chloride 0.9% bolus 1,000 mL    [DISCONTINUED] albuterol sulfate 2.5 mg/0.5 mL nebulizer solution    [DISCONTINUED] lactated ringers bolus 500 mL     Current Outpatient Medications on File Prior to Encounter   Medication Sig    albuterol-ipratropium (DUO-NEB) 2.5 mg-0.5 mg/3 mL nebulizer solution Take 3 mLs by nebulization every 6 (six) hours as needed for Wheezing. Rescue    azithromycin (Z-KATELIN) 250 MG tablet Take 1 tablet (250 mg total) by mouth once daily. Take first 2 tablets together, then 1 every day until finished.    metformin (GLUCOPHAGE) 1000 MG tablet Take 1 tablet (1,000 mg total) by mouth 2 (two) times daily.     Family History    None       Tobacco Use    Smoking status: Never Smoker    Smokeless tobacco: Never Used   Substance and Sexual Activity    Alcohol use: No    Drug use: No    Sexual activity: Not on file     Review of Systems   Constitutional:  Positive for activity change, appetite change, fatigue and fever. Negative for chills.   HENT:  Negative for congestion, rhinorrhea and sore throat.    Eyes:  Negative for visual disturbance.   Respiratory:  Positive for cough. Negative for shortness of breath.    Cardiovascular:  Negative for chest pain, palpitations and leg swelling.   Gastrointestinal:  Positive for constipation. Negative for abdominal distention, abdominal pain, diarrhea, nausea and vomiting.   Genitourinary:  Positive for frequency. Negative for dysuria, hematuria and urgency.   Musculoskeletal:  Positive for back pain. Negative for myalgias and neck pain.   Skin:   Negative for rash.   Neurological:  Positive for weakness. Negative for dizziness, syncope, light-headedness and headaches.   Psychiatric/Behavioral:  Positive for confusion. Negative for agitation. The patient is not nervous/anxious.      Objective:     Vital Signs (Most Recent):  Temp: 98.3 °F (36.8 °C) (05/06/22 0051)  Pulse: 91 (05/06/22 0051)  Resp: 18 (05/06/22 0051)  BP: (!) 157/68 (05/06/22 0051)  SpO2: (!) 94 % (05/06/22 0051)   Vital Signs (24h Range):  Temp:  [97.7 °F (36.5 °C)-98.4 °F (36.9 °C)] 98.3 °F (36.8 °C)  Pulse:  [] 91  Resp:  [16-20] 18  SpO2:  [94 %-100 %] 94 %  BP: (143-191)/(65-92) 157/68        There is no height or weight on file to calculate BMI.    Physical Exam  Vitals and nursing note reviewed.   Constitutional:       General: She is not in acute distress.     Appearance: Normal appearance. She is normal weight.   HENT:      Head: Normocephalic and atraumatic.      Mouth/Throat:      Pharynx: Oropharynx is clear.   Eyes:      Extraocular Movements: Extraocular movements intact.      Conjunctiva/sclera: Conjunctivae normal.   Cardiovascular:      Rate and Rhythm: Normal rate and regular rhythm.   Pulmonary:      Effort: Pulmonary effort is normal. No respiratory distress.      Breath sounds: Normal breath sounds. No wheezing.   Abdominal:      General: Abdomen is flat. Bowel sounds are normal. There is no distension.      Palpations: Abdomen is soft.      Tenderness: There is no abdominal tenderness.   Genitourinary:     Comments: Soto in place  Musculoskeletal:         General: No swelling or tenderness. Normal range of motion.      Cervical back: Normal range of motion and neck supple.      Comments: Significant bilat CVA TTP   Skin:     General: Skin is warm and dry.   Neurological:      General: No focal deficit present.      Mental Status: She is alert and oriented to person, place, and time.   Psychiatric:         Mood and Affect: Mood normal.         Behavior: Behavior  normal.         Thought Content: Thought content normal.         Judgment: Judgment normal.           Significant Labs: All pertinent labs within the past 24 hours have been reviewed.  CBC:   Recent Labs   Lab 05/05/22  0957   WBC 23.49*   HGB 8.5*   HCT 27.4*        CMP:   Recent Labs   Lab 05/05/22  0957   *   K 4.4   CL 98   CO2 31*   *   BUN 33*   CREATININE 2.3*   CALCIUM 14.6*   PROT 8.1   ALBUMIN 2.6*   BILITOT 0.5   ALKPHOS 104   AST 8*   ALT 14   ANIONGAP 4*   EGFRNONAA 20.0*     Lactic Acid:   Recent Labs   Lab 05/05/22  1103   LACTATE 1.8     Urine Studies:   Recent Labs   Lab 05/05/22  1145   COLORU Yellow   APPEARANCEUA Cloudy*   PHUR 7.0   SPECGRAV 1.010   PROTEINUA 2+*   GLUCUA Negative   KETONESU Negative   BILIRUBINUA Negative   OCCULTUA 2+*   NITRITE Negative   UROBILINOGEN Negative   LEUKOCYTESUR 3+*   RBCUA 4   WBCUA 61*   BACTERIA Occasional   SQUAMEPITHEL 0   HYALINECASTS 2.6*       Significant Imaging: I have reviewed all pertinent imaging results/findings within the past 24 hours.  CT Abdomen Pelvis  Without Contrast  Narrative: EXAMINATION:  CT ABDOMEN PELVIS WITHOUT CONTRAST    CLINICAL HISTORY:  bone pain;    TECHNIQUE:  Iterative reconstruction technique was used.    CT/cardiac nuclear exam/s in prior 12 months:  0.    COMPARISON:  None.    FINDINGS:  Unremarkable liver and spleen.  Multiple calcified gallstones.  Moderate intrahepatic and extrahepatic biliary ductal dilatation.  4 mm stone within the common duct.  Unremarkable pancreas and adrenal glands.  Severe distention of the urinary bladder with irregular soft tissue measuring approximately 6.5 cm along the inferior and left lateral wall, highly suspicious for bladder neoplasm.  Severe bilateral hydronephrosis, which may be related to the bladder mass and/or distension no bowel obstruction.  3.2 cm left posterolateral uterine fibroid.  No free fluid.  Extensive vascular calcifications.  Impression: 1.  Choledocholithiasis with moderate intra and extrahepatic biliary duct dilatation.  Also cholelithiasis.  2. Approximately 6.5 cm mass involving the left inferior wall of the bladder, worrisome for neoplasm.  3. Severe bilateral hydronephrosis, which may be related to bladder mass and/or bladder distention.    Electronically signed by: Ramos Ambrocio MD  Date:    05/05/2022  Time:    11:45  CT Chest Without Contrast  Narrative: EXAMINATION:  CT CHEST WITHOUT CONTRAST    CLINICAL HISTORY:  bone pain;    TECHNIQUE:  Iterative reconstruction technique was used.    CT/cardiac nuclear exam/s in prior 12 months:  0.    COMPARISON:  None.    FINDINGS:  No mediastinal adenopathy.  No suspicious pulmonary nodule or mass.  No focal consolidation.  No pleural pericardial fluid.  Small amount of pericardial fluid.  No axillary abnormality.  Moderate vascular calcifications.  Impression: Small amount of pericardial fluid.    Electronically signed by: Ramos Ambrocio MD  Date:    05/05/2022  Time:    11:41  X-Ray Chest 1 View  Narrative: EXAMINATION:  XR CHEST 1 VIEW    CLINICAL HISTORY:  Weakness    COMPARISON:  Chest x-ray 08/06/2021.    FINDINGS:  The cardiac silhouette is normal in size. The lungs are clear. No pleural fluid.  Impression: No active disease.    Electronically signed by: Ramos Ambrocio MD  Date:    05/05/2022  Time:    10:08

## 2022-05-06 NOTE — ASSESSMENT & PLAN NOTE
Patient with 2 weeks of dysuria, foul-smelling urine, and increased urinary frequency. Also reports intermittent confusion.  Urine studies on admission c/w UTI. UA cloudy with 2+ protein, 3+ leuko. Urine micro with 61 WBC.  Patient received 1g CTX at OSH.    PLAN:  --Continue CTX 1g q24h, de-escalate as appropriate  --Follow-up urine culture

## 2022-05-06 NOTE — PLAN OF CARE
[facility] - Oncology (Hospital)  Initial Discharge Assessment       Primary Care Provider: Dee Dee Oconnor MD    Admission Diagnosis: Bladder mass [N32.89]    Admission Date: 5/5/2022  Expected Discharge Date: 5/9/2022    Discharge Barriers Identified: None    Payor: HU[employer] MANAGED MEDICARE / Plan: HUMANA SNP (SPECIAL NEEDS PLAN) / Product Type: Medicare Advantage /     Extended Emergency Contact Information  Primary Emergency Contact: Kenya Ford   [place]  Mobile Phone: [phone]  Relation: Daughter  Secondary Emergency Contact: Valentine Swenson  Mobile Phone: [phone]  Relation: Daughter  Preferred language: English   needed? No    Discharge Plan A: Home, Home with family  Discharge Plan B: Home, Home with family      Health system Pharmacy [address] [place] [address] [address] [facility] [address]  Phone: [phone] Fax: [phone]      Initial Assessment (most recent)     Adult Discharge Assessment - 05/06/22 1159        Discharge Assessment    Assessment Type Discharge Planning Assessment     Confirmed/corrected address, phone number and insurance Yes     Confirmed Demographics Contacted registration to update     Source of Information patient;family     Does patient/caregiver understand observation status --   N/A    Communicated GET with patient/caregiver Yes     Reason For Admission Bladder Mass     Lives With spouse     Facility Arrived From: [facility]     Do you expect to return to your current living situation? Yes     Do you have help at home or someone to help you manage your care at home? Yes     Who are your caregiver(s) and their phone number(s)? Kenya Ford [phone]     Prior to hospitilization cognitive status: Alert/Oriented     Current cognitive status: Alert/Oriented     Walking or Climbing Stairs Difficulty none     Dressing/Bathing Difficulty none     Home Layout Able to live on 1st floor     Equipment Currently Used at Home none      Readmission within 30 days? No     Patient currently being followed by outpatient case management? No     Do you currently have service(s) that help you manage your care at home? No     Do you take prescription medications? Yes     Do you have prescription coverage? Yes     Do you have any problems affording any of your prescribed medications? No     Is the patient taking medications as prescribed? yes     How do you get to doctors appointments? car, drives self;family or friend will provide     Are you on dialysis? No     Do you take coumadin? No     Discharge Plan A Home;Home with family     Discharge Plan B Home;Home with family     DME Needed Upon Discharge  none     Discharge Plan discussed with: Patient;Adult children     Discharge Barriers Identified None               Anibal Cortez LMSW  Social Work Case   [facility]  [phone]

## 2022-05-06 NOTE — ASSESSMENT & PLAN NOTE
Karoline Justice is a 76F pmhx DM, HTN who presented as a transfer from OSH with fatigue, back pain, and fevers for 2 weeks and dysuria, foul-smelling urine, and AMS for the last week    -CT is concerning for possible malignancy. Due to her electrolyte abnormalities, not presently a candidate for anesthesia for bladder biopsy. If her electrolytes are corrected, would recommend cystoscopy and bladder biopsy this admission  -Bilateral hydronephrosis and bladder seen on CT. Petersen placed with 1.1L urine output. Likely bilateral hydro from obstruction and not from bladder mass. Trend Cr. If downtrending there is less concern from malignant obstruction  -Urine culture pending. Currently on Rocephin. Treat with IV abx based on susceptibility  -Keep petersen catheter for minimal 1 week due to >1000ml urinary retention  -Remainder of care per primary team  -Urology will continue to follow

## 2022-05-06 NOTE — ASSESSMENT & PLAN NOTE
CT A/P with multiple calcified gallstones, moderate intrahepatic and extrahepatic biliary ductal dilatation, and a 4 mm stone within the common duct.  No transaminitis (AST/ALT 8/14), alk phos wnl (104), and Tbili not elevated (0.5)  Patient denies any abdominal pain, nausea, or vomiting. No RUQ or epigastric TTP on exam.    PLAN:  --Trend CMP

## 2022-05-06 NOTE — ASSESSMENT & PLAN NOTE
Patient reports taking 24 units levemir daily.  No HbA1c on file.    PLAN:  --Insulin detemir 10U QD  --LDSSI  --Follow-up HbA1c  --POCT BG q4h while NPO  --Diabetic diet when appropriate

## 2022-05-06 NOTE — PROVIDER TRANSFER
Outside Transfer Acceptance Note / Regional Referral Center    Referring facility: [facility]   Referring provider: PANDA CHAN  Accepting facility: [facility]  Accepting provider: DIMAS JACOBO  Reason for transfer: Higher Level of Care   Transfer diagnosis: bladder mass  Transfer specialty requested: Urology  Transfer specialty notified: yes  Transfer level: NUMBER 1-5: 2  Isolation status: No active isolations   Admission class or status: IP- Inpatient      Narrative     76-year-old woman with PMH DM2 presented to [facility] with a 2 week h/o weakness, hyperglycemia and dysuria.  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  On exam patient alert and oriented.      Labs WBC 23.4, Hb 8.5, Na 133, CO2 31, AG 4, BUN 33, Cr 2.3 (2.0 on 8/6/21), Glu 245, calcium 14.6, albumin 2.6, LA 1.8, acetone neg, U/A RBC 4, WBC 61.    CT abd pelvis pos for 6.5 cm mass involving the left anterior wall of the bladder concerning for neoplasm.  Also, severe BL hydronephrosis and choledocholithiasis with moderate intra and extrahepatic biliary duct dilatation.    Patient given IVF (2.5 L) and started on CTX.  Transfer requested for higher level of care.  Transfer diagnosis bladder mass, BL hydronephrosis, hypercalcemia, urinary tract infection. [facility]Dr. Perez) has agreed to consult on the patient.      Instructions      [facility] hang-  Admit to Hospital Medicine  Upon patient arrival to floor, please send SecureChat to [facility] or call extension [phone] (if no answer, this will flip to a beeper, so enter your call back number) for Hospital Medicine admit team assignment and for additional admit orders for the patient.  Do not page the attending physician associated with the patient on arrival (this physician may not be on duty at the time of arrival).  Rather, always call [phone] to reach the triage physician  for orders and team assignment.

## 2022-05-06 NOTE — ASSESSMENT & PLAN NOTE
Patient denies any history HTN.   BP elevated at OSH (191/76, 175/92). On arrival to [facility], BP was 157/68.  Small pericardial effusion noted on CT Chest.    BP today: BP (!) 157/68 (BP Location: Right arm, Patient Position: Lying)   Pulse 91   Temp 98.3 °F (36.8 °C) (Oral)   Resp 18   SpO2 (!) 94%   Breastfeeding No     PLAN:  --Monitor BP  --Start anti-hypertensives as appropriate  --Follow-up TTE

## 2022-05-06 NOTE — PHARMACY MED REC
"Admission Medication History     The home medication history was taken by Mayela Taylor.    You may go to "Admission" then "Reconcile Home Medications" tabs to review and/or act upon these items.      The home medication list has been updated by the Pharmacy department.    Please read ALL comments highlighted in yellow.    Please address this information as you see fit.     Feel free to contact us if you have any questions or require assistance.      The medications listed below were removed from the home medication list. Please reorder if appropriate:  Patient reports no longer taking the following medication(s):   PRAVASTATIN 10 MG (CAUSE MUSCLE CRAMPS)      Medications listed below were obtained from: Patient/family-daughter (ALYSSA)  PTA Medications   Medication Sig    acetaminophen (TYLENOL) 500 MG tablet Take 500 mg by mouth every 6 (six) hours as needed for Pain.    calcium carbonate (TUMS) 200 mg calcium (500 mg) chewable tablet Take 1 tablet by mouth 2 (two) times daily with meals.    LEVEMIR FLEXTOUCH U-100 INSULN 100 unit/mL (3 mL) InPn pen Inject 24 Units into the skin every morning.       Potential issues to be addressed PRIOR TO DISCHARGE  Patient reported not taking the following medications: (IBANDRONATE 150 MG & FARXIGA 5 MG). These medications remain on the home medication list. Please address accordingly.     Please discuss with the patient barriers to adherence with medication treatment plans    Mayela Taylor  EXT [phone]                  .        "

## 2022-05-07 LAB
ALBUMIN SERPL BCP-MCNC: 2.3 G/DL (ref 3.5–5.2)
ALP SERPL-CCNC: 101 U/L (ref 55–135)
ALT SERPL W/O P-5'-P-CCNC: 10 U/L (ref 10–44)
ANION GAP SERPL CALC-SCNC: 9 MMOL/L (ref 8–16)
AST SERPL-CCNC: 13 U/L (ref 10–40)
BASOPHILS # BLD AUTO: 0.07 K/UL (ref 0–0.2)
BASOPHILS NFR BLD: 0.4 % (ref 0–1.9)
BILIRUB SERPL-MCNC: 0.3 MG/DL (ref 0.1–1)
BUN SERPL-MCNC: 22 MG/DL (ref 8–23)
CALCIUM SERPL-MCNC: 10.4 MG/DL (ref 8.7–10.5)
CHLORIDE SERPL-SCNC: 105 MMOL/L (ref 95–110)
CO2 SERPL-SCNC: 20 MMOL/L (ref 23–29)
CREAT SERPL-MCNC: 1.7 MG/DL (ref 0.5–1.4)
DIFFERENTIAL METHOD: ABNORMAL
EOSINOPHIL # BLD AUTO: 0.1 K/UL (ref 0–0.5)
EOSINOPHIL NFR BLD: 0.8 % (ref 0–8)
ERYTHROCYTE [DISTWIDTH] IN BLOOD BY AUTOMATED COUNT: 15.8 % (ref 11.5–14.5)
EST. GFR  (AFRICAN AMERICAN): 33.3 ML/MIN/1.73 M^2
EST. GFR  (NON AFRICAN AMERICAN): 28.9 ML/MIN/1.73 M^2
GLUCOSE SERPL-MCNC: 136 MG/DL (ref 70–110)
HCT VFR BLD AUTO: 23.8 % (ref 37–48.5)
HGB BLD-MCNC: 7.2 G/DL (ref 12–16)
IMM GRANULOCYTES # BLD AUTO: 0.11 K/UL (ref 0–0.04)
IMM GRANULOCYTES NFR BLD AUTO: 0.6 % (ref 0–0.5)
LYMPHOCYTES # BLD AUTO: 1 K/UL (ref 1–4.8)
LYMPHOCYTES NFR BLD: 5.5 % (ref 18–48)
MAGNESIUM SERPL-MCNC: 1.4 MG/DL (ref 1.6–2.6)
MCH RBC QN AUTO: 26.5 PG (ref 27–31)
MCHC RBC AUTO-ENTMCNC: 30.3 G/DL (ref 32–36)
MCV RBC AUTO: 88 FL (ref 82–98)
MONOCYTES # BLD AUTO: 0.8 K/UL (ref 0.3–1)
MONOCYTES NFR BLD: 4.4 % (ref 4–15)
NEUTROPHILS # BLD AUTO: 16.4 K/UL (ref 1.8–7.7)
NEUTROPHILS NFR BLD: 88.3 % (ref 38–73)
NRBC BLD-RTO: 0 /100 WBC
PHOSPHATE SERPL-MCNC: 1.2 MG/DL (ref 2.7–4.5)
PLATELET # BLD AUTO: 347 K/UL (ref 150–450)
PMV BLD AUTO: 9.7 FL (ref 9.2–12.9)
POCT GLUCOSE: 134 MG/DL (ref 70–110)
POTASSIUM SERPL-SCNC: 3.9 MMOL/L (ref 3.5–5.1)
PROT SERPL-MCNC: 6.4 G/DL (ref 6–8.4)
RBC # BLD AUTO: 2.72 M/UL (ref 4–5.4)
SODIUM SERPL-SCNC: 134 MMOL/L (ref 136–145)
WBC # BLD AUTO: 18.53 K/UL (ref 3.9–12.7)

## 2022-05-07 PROCEDURE — 99232 SBSQ HOSP IP/OBS MODERATE 35: CPT | Mod: GC,,, | Performed by: HOSPITALIST

## 2022-05-07 PROCEDURE — 63600175 PHARM REV CODE 636 W HCPCS: Performed by: STUDENT IN AN ORGANIZED HEALTH CARE EDUCATION/TRAINING PROGRAM

## 2022-05-07 PROCEDURE — 36415 COLL VENOUS BLD VENIPUNCTURE: CPT

## 2022-05-07 PROCEDURE — 99232 PR SUBSEQUENT HOSPITAL CARE,LEVL II: ICD-10-PCS | Mod: GC,,, | Performed by: HOSPITALIST

## 2022-05-07 PROCEDURE — 84100 ASSAY OF PHOSPHORUS: CPT

## 2022-05-07 PROCEDURE — 83735 ASSAY OF MAGNESIUM: CPT

## 2022-05-07 PROCEDURE — 25000003 PHARM REV CODE 250

## 2022-05-07 PROCEDURE — 80053 COMPREHEN METABOLIC PANEL: CPT

## 2022-05-07 PROCEDURE — 85025 COMPLETE CBC W/AUTO DIFF WBC: CPT

## 2022-05-07 PROCEDURE — C9399 UNCLASSIFIED DRUGS OR BIOLOG: HCPCS | Performed by: STUDENT IN AN ORGANIZED HEALTH CARE EDUCATION/TRAINING PROGRAM

## 2022-05-07 PROCEDURE — 25000003 PHARM REV CODE 250: Performed by: STUDENT IN AN ORGANIZED HEALTH CARE EDUCATION/TRAINING PROGRAM

## 2022-05-07 PROCEDURE — 20600001 HC STEP DOWN PRIVATE ROOM

## 2022-05-07 RX ORDER — LIDOCAINE 50 MG/G
1 PATCH TOPICAL
Status: DISCONTINUED | OUTPATIENT
Start: 2022-05-07 | End: 2022-05-12 | Stop reason: HOSPADM

## 2022-05-07 RX ORDER — ACETAMINOPHEN 500 MG
1000 TABLET ORAL EVERY 6 HOURS PRN
Status: DISCONTINUED | OUTPATIENT
Start: 2022-05-07 | End: 2022-05-12 | Stop reason: HOSPADM

## 2022-05-07 RX ORDER — HYDROMORPHONE HYDROCHLORIDE 2 MG/1
2 TABLET ORAL EVERY 4 HOURS PRN
Status: DISCONTINUED | OUTPATIENT
Start: 2022-05-07 | End: 2022-05-08

## 2022-05-07 RX ORDER — DICLOFENAC SODIUM 10 MG/G
2 GEL TOPICAL DAILY
Status: DISCONTINUED | OUTPATIENT
Start: 2022-05-08 | End: 2022-05-12 | Stop reason: HOSPADM

## 2022-05-07 RX ORDER — SODIUM,POTASSIUM PHOSPHATES 280-250MG
2 POWDER IN PACKET (EA) ORAL EVERY 4 HOURS
Status: COMPLETED | OUTPATIENT
Start: 2022-05-07 | End: 2022-05-07

## 2022-05-07 RX ORDER — MAGNESIUM SULFATE HEPTAHYDRATE 40 MG/ML
2 INJECTION, SOLUTION INTRAVENOUS ONCE
Status: COMPLETED | OUTPATIENT
Start: 2022-05-07 | End: 2022-05-07

## 2022-05-07 RX ADMIN — INSULIN DETEMIR 12 UNITS: 100 INJECTION, SOLUTION SUBCUTANEOUS at 09:05

## 2022-05-07 RX ADMIN — POTASSIUM & SODIUM PHOSPHATES POWDER PACK 280-160-250 MG 2 PACKET: 280-160-250 PACK at 08:05

## 2022-05-07 RX ADMIN — HYDROMORPHONE HYDROCHLORIDE 2 MG: 2 TABLET ORAL at 08:05

## 2022-05-07 RX ADMIN — AMLODIPINE BESYLATE 5 MG: 5 TABLET ORAL at 08:05

## 2022-05-07 RX ADMIN — OXYCODONE 5 MG: 5 TABLET ORAL at 12:05

## 2022-05-07 RX ADMIN — CEFTRIAXONE 1 G: 1 INJECTION, SOLUTION INTRAVENOUS at 11:05

## 2022-05-07 RX ADMIN — POLYETHYLENE GLYCOL 3350 17 G: 17 POWDER, FOR SOLUTION ORAL at 08:05

## 2022-05-07 RX ADMIN — MAGNESIUM SULFATE 2 G: 2 INJECTION INTRAVENOUS at 12:05

## 2022-05-07 RX ADMIN — LIDOCAINE 1 PATCH: 50 PATCH CUTANEOUS at 04:05

## 2022-05-07 RX ADMIN — POTASSIUM & SODIUM PHOSPHATES POWDER PACK 280-160-250 MG 2 PACKET: 280-160-250 PACK at 12:05

## 2022-05-07 RX ADMIN — SENNOSIDES AND DOCUSATE SODIUM 1 TABLET: 50; 8.6 TABLET ORAL at 08:05

## 2022-05-07 RX ADMIN — POTASSIUM & SODIUM PHOSPHATES POWDER PACK 280-160-250 MG 2 PACKET: 280-160-250 PACK at 04:05

## 2022-05-07 RX ADMIN — OXYCODONE 5 MG: 5 TABLET ORAL at 03:05

## 2022-05-07 NOTE — PLAN OF CARE
Patient alert and able to voice needs. No sign of distress. Respirations even and even abdomen soft and nontender. Ultra sound on abdomen. Awaiting results. C/O pain. Prn meds not effective. Notified MD and new orders for hydromophome. No other complaints. Call light in reach.

## 2022-05-07 NOTE — ASSESSMENT & PLAN NOTE
Severe bilateral hydronephrosis noted on CT A/P.  Cr 2.3 on admission, unknown baseline.   Patient reports significant UOP.    PLAN:  --Urology following.   -- Repeat retroperitoneal US to rule persistent hydro.   -- Cr. Improving.   -- Soto in place.   --Trend CMP

## 2022-05-07 NOTE — PLAN OF CARE
Observed patient sleeping with her eyes close through out the shift. Easily to arouse. Family at bedside. No sign of distress. Respirations even and unlabored. Abdomen distended. Bowel sounds slow. Family request for tramadol. Patient refused pain meds. MD notified. Patient agitated at times. Md notified with new orders for IV ativan.

## 2022-05-07 NOTE — SUBJECTIVE & OBJECTIVE
Interval History:  CAMRON, petersen draining clear yellow urine    Review of Systems    Objective:     Temp:  [98 °F (36.7 °C)-100.3 °F (37.9 °C)] 99.1 °F (37.3 °C)  Pulse:  [] 100  Resp:  [16-19] 16  SpO2:  [91 %-96 %] 91 %  BP: (136-175)/(64-73) 155/67     Body mass index is 21.54 kg/m².    Date 05/07/22 0700 - 05/08/22 0659   Shift 0063-0979 8061-7141 8488-6228 24 Hour Total   INTAKE   P.O. 120   120   Shift Total(mL/kg) 120(2.3)   120(2.3)   OUTPUT   Urine(mL/kg/hr) 300   300   Shift Total(mL/kg) 300(5.8)   300(5.8)   Weight (kg) 51.7 51.7 51.7 51.7          Drains       Drain  Duration                  Urethral Catheter 05/05/22 1140 14 Fr. <1 day                    Physical Exam  Constitutional:       Appearance: Normal appearance.   HENT:      Head: Normocephalic.      Nose: Nose normal.   Eyes:      Pupils: Pupils are equal, round, and reactive to light.   Cardiovascular:      Rate and Rhythm: Normal rate.   Pulmonary:      Effort: Pulmonary effort is normal.   Chest:      Chest wall: No tenderness.   Abdominal:      General: Abdomen is flat. There is no distension.      Tenderness: There is no abdominal tenderness. There is no right CVA tenderness or left CVA tenderness.      Comments: Hard mass palpable in pelvis   Genitourinary:     Comments: Petersen catheter in place draining yellow cloudy urine  Limited pelvic exam performed, uterus appeared mobile, but hard mass difficult to palpate given patient positioning, more palpable from skin   Musculoskeletal:      Cervical back: Normal range of motion.   Skin:     General: Skin is warm.   Neurological:      General: No focal deficit present.      Mental Status: She is alert and oriented to person, place, and time.   Psychiatric:         Mood and Affect: Mood normal.         Behavior: Behavior normal.       Significant Labs:    BMP:  Recent Labs   Lab 05/05/22  0957 05/06/22  0758 05/06/22  1156 05/07/22  0808   * 138  --  134*   K 4.4 3.9  --  3.9   CL  98 106  --  105   CO2 31* 24  --  20*   BUN 33* 26*  --  22   CREATININE 2.3* 1.8*  --  1.7*   CALCIUM 14.6* 12.9*  13.1* 12.2* 10.4         CBC:  Recent Labs   Lab 05/05/22  0957 05/06/22  0758 05/07/22  0807   WBC 23.49* 22.45* 18.53*   HGB 8.5* 7.9* 7.2*   HCT 27.4* 27.0* 23.8*    428 347         All pertinent labs results from the past 24 hours have been reviewed.    Significant Imaging:  CT: I have reviewed all results within the past 24 hours and my personal findings are:  Pertinent findings in HPI

## 2022-05-07 NOTE — ASSESSMENT & PLAN NOTE
CT A/P with multiple calcified gallstones, moderate intrahepatic and extrahepatic biliary ductal dilatation, and a 4 mm stone within the common duct.  No transaminitis (AST/ALT 8/14), alk phos wnl (104), and Tbili not elevated (0.5)  Patient denies any abdominal pain, nausea, or vomiting. No RUQ or epigastric TTP on exam.    PLAN:  -- AES consult after evaluation of concerning mass.   --Trend CMP

## 2022-05-07 NOTE — ASSESSMENT & PLAN NOTE
"Patient denies any history HTN.   BP elevated at OSH (191/76, 175/92). On arrival to [facility], BP was 157/68.  Small pericardial effusion noted on CT Chest.    BP today: BP (!) 155/67 (BP Location: Right arm, Patient Position: Lying)   Pulse 100   Temp 99.1 °F (37.3 °C) (Oral)   Resp 16   Ht 5' 1" (1.549 m)   Wt 51.7 kg (114 lb)   SpO2 (!) 91%   Breastfeeding No   BMI 21.54 kg/m²     PLAN:  --Monitor BP  --Start anti-hypertensives as appropriate  --Follow-up TTE    "

## 2022-05-07 NOTE — ASSESSMENT & PLAN NOTE
Karoline Justice is a 76F pmhx DM, HTN who presented as a transfer from OSH with fatigue, back pain, and fevers for 2 weeks and dysuria, foul-smelling urine, and AMS for the last week    - CT is concerning for malignancy. Recommend cystoscopy and bladder biopsy this admission, if deemed safe to undergo anesthesia  - Bilateral hydronephrosis and bladder seen on CT. Petersen placed with 1.1L urine output. Likely bilateral hydro from obstruction and not from bladder mass. Trend Cr. If downtrending there is less concern from malignant obstruction  - will obtain repeat renal US today to assess for improvement in hydronephrosis  - discussed imaging and physical exam findings with gyn onc, consult placed, will see her 5/8  - Urine culture growing strep viridans. Currently on Rocephin. Treat with IV abx based on susceptibility  - Keep petersen catheter for minimal 1 week due to >1000ml urinary retention  - transfuse for hgb < 7   - Remainder of care per primary team

## 2022-05-07 NOTE — ASSESSMENT & PLAN NOTE
Karoline Justice 76F with IDDM2 who presented to [facility] with generalized weakness, back pain, and intermittent fevers for 2 weeks with dysuria, foul-smelling urine, and confusion for the last week. At [facility] a CT A/P showed a 6.5cm mass anterior bladder wall c/f neoplasm, severe bilateral hydronephrosis. She has hypercalcemia and a UTI. [facility]. On arrival, the patient is alert and oriented. Collateral is provided by her daughter at bedside, who agrees that her mother has been more confused for the past week. She is c/o intermittent confusion, decreased appetite, bilateral flank pain, and increased urinary frequency. Labs from OSH with leukocytosis, anemia, elevated Cr (2.3 -- unknown baseline), hypercalcemia (15.7 when corrected for albumin), and UTI on urine studies. Exam notable for significant bilat flank TTP.    PLAN:  --Urology saw the patient.  Likely to do procedure for biopsy on Monday.  Also recommended Gynecology Oncology consult to rule rule mass coming from gyn source.   -- Gyn onc recommended transabdominal or transvaginal ultrasound.  Transabdominal ultrasound pursued because of associated discomfort with transvaginal ultrasound.    -- NPO midnight from Monday 5/9.   --Trend CMP  --Soto in place  --Renally dose medications, avoid nephrotoxins  --Strict I&O's

## 2022-05-07 NOTE — ASSESSMENT & PLAN NOTE
Calcium 14.6 on admission. 15.7 when corrected for albumin.  Patient reports 1 weeks of confusion, fatigue, and constipation.    PLAN:  --S/p continuous IVF @ 200cc/hour for goal -150 cc/hour.   -- S/p Calcitonin 4 units/kg x1.  -- S/p Zoledronic acid 4mg  -- Calcium improving. Cont monitoring qd.   --Strict I&O's

## 2022-05-07 NOTE — PROGRESS NOTES
[facility] - Oncology ([facility])  Hospital Medicine  Progress Note    Patient Name: Karoline Justice  MRN: [id number]  Patient Class: IP- Inpatient   Admission Date: 5/5/2022  Length of Stay: 2 days  Attending Physician: Ansley Simeon MD  Primary Care Provider: Dee Dee Oconnor MD        Subjective:     Principal Problem:Bladder mass        HPI:  Karoline Justice 76F with IDDM2 who presented to [facility] with generalized weakness, back pain, and intermittent fevers for 2 weeks with dysuria, foul-smelling urine, and confusion for the last week. At [facility] a CT A/P showed a 6.5cm mass anterior bladder wall c/f neoplasm, severe bilateral hydronephrosis, and choledocholithiasis with with moderate intra and extrahepatic biliary duct dilatation. She has hypercalcemia and a UTI. She was transferred to [facility] for Urology evaluation.    On arrival, the patient is alert and oriented. Collateral is provided by her daughter at bedside, who agrees that her mother has been more confused for the past week. She is c/o intermittent confusion, decreased appetite, bilateral flank pain, and increased urinary frequency. She is afebrile and mildly hypertensive (157/68). Labs from OSH with leukocytosis (WBC 23.5), anemia (Hb 8.5), elevated Cr (2.3 -- unknown baseline), hypercalcemia (15.7 when corrected for albumin), and UTI on urine studies (UA cloudy with 2+ protein, 3+ leuks and urine microscopy with 61 WBC). Exam notable for significant bilat flank TTP. She will be admitted to  for further care.       Overview/Hospital Course:  No notes on file    Interval History:  Mental status has improved. Calcium level has improved.  Urology saw the patient.  Likely to do procedure for biopsy on Monday.  Also recommended Gynecology Oncology consult to rule rule mass coming from gyn source. Gyn onc recommended transabdominal or transvaginal ultrasound.  Transabdominal ultrasound pursued because of associated discomfort with transvaginal ultrasound.   Retroperitoneal ultrasound ordered to rule out persistent hydronephrosis. Hypercalcemia w/u pending.     Review of Systems   Constitutional:  Negative for activity change, appetite change, chills, fatigue and fever.   HENT:  Negative for congestion, rhinorrhea and sore throat.    Eyes:  Negative for visual disturbance.   Respiratory:  Negative for cough and shortness of breath.    Cardiovascular:  Negative for chest pain, palpitations and leg swelling.   Gastrointestinal:  Negative for abdominal distention, abdominal pain, constipation, diarrhea, nausea and vomiting.   Genitourinary:  Positive for frequency. Negative for dysuria, hematuria and urgency.   Musculoskeletal:  Positive for back pain. Negative for myalgias and neck pain.   Skin:  Negative for rash.   Neurological:  Positive for weakness. Negative for dizziness, syncope, light-headedness and headaches.   Psychiatric/Behavioral:  Negative for agitation and confusion. The patient is not nervous/anxious.    Objective:     Vital Signs (Most Recent):  Temp: 99.1 °F (37.3 °C) (05/07/22 1149)  Pulse: 100 (05/07/22 1149)  Resp: 16 (05/07/22 1251)  BP: (!) 155/67 (05/07/22 1149)  SpO2: (!) 91 % (05/07/22 1149)   Vital Signs (24h Range):  Temp:  [98 °F (36.7 °C)-100.3 °F (37.9 °C)] 99.1 °F (37.3 °C)  Pulse:  [] 100  Resp:  [16-19] 16  SpO2:  [91 %-96 %] 91 %  BP: (136-175)/(64-73) 155/67     Weight: 51.7 kg (114 lb)  Body mass index is 21.54 kg/m².    Intake/Output Summary (Last 24 hours) at 5/7/2022 1409  Last data filed at 5/7/2022 1000  Gross per 24 hour   Intake 2260.84 ml   Output 2350 ml   Net -89.16 ml      Physical Exam  Vitals and nursing note reviewed.   Constitutional:       General: She is not in acute distress.     Appearance: Normal appearance. She is normal weight.   HENT:      Head: Normocephalic and atraumatic.      Mouth/Throat:      Pharynx: Oropharynx is clear.   Eyes:      Extraocular Movements: Extraocular movements intact.       Conjunctiva/sclera: Conjunctivae normal.   Cardiovascular:      Rate and Rhythm: Normal rate and regular rhythm.   Pulmonary:      Effort: Pulmonary effort is normal. No respiratory distress.      Breath sounds: Normal breath sounds. No wheezing.   Abdominal:      General: Abdomen is flat. Bowel sounds are normal. There is no distension.      Palpations: Abdomen is soft.      Tenderness: There is no abdominal tenderness.      Comments: Suprapubic tenderness on deep palpation.    Genitourinary:     Comments: Soto in place  Musculoskeletal:         General: No swelling or tenderness. Normal range of motion.      Cervical back: Normal range of motion and neck supple.   Skin:     General: Skin is warm and dry.   Neurological:      General: No focal deficit present.      Mental Status: She is alert and oriented to person, place, and time.   Psychiatric:         Mood and Affect: Mood normal.         Behavior: Behavior normal.         Thought Content: Thought content normal.         Judgment: Judgment normal.       Significant Labs: All pertinent labs within the past 24 hours have been reviewed.  Recent Lab Results  (Last 5 results in the past 24 hours)        05/07/22  0808   05/07/22  0807   05/07/22  0708   05/06/22  1806   05/06/22  1710        Urine Protein, Timed       SEE COMMENT  Comment: See comment  Random           Albumin 2.3               Alkaline Phosphatase 101               ALT 10               Anion Gap 9               AST 13               Baso #   0.07             Basophil %   0.4             BILIRUBIN TOTAL 0.3  Comment: For infants and newborns, interpretation of results should be based  on gestational age, weight and in agreement with clinical  observations.    Premature Infant recommended reference ranges:  Up to 24 hours.............<8.0 mg/dL  Up to 48 hours............<12.0 mg/dL  3-5 days..................<15.0 mg/dL  6-29 days.................<15.0 mg/dL                 BUN 22                Calcium 10.4               Chloride 105               CO2 20               Creatinine 1.7               Differential Method   Automated             eGFR if  33.3               eGFR if non  28.9  Comment: Calculation used to obtain the estimated glomerular filtration  rate (eGFR) is the CKD-EPI equation.                  Eos #   0.1             Eosinophil %   0.8             Glucose 136               Gran # (ANC)   16.4             Gran %   88.3             Hematocrit   23.8             Hemoglobin   7.2             Immature Grans (Abs)   0.11  Comment: Mild elevation in immature granulocytes is non specific and   can be seen in a variety of conditions including stress response,   acute inflammation, trauma and pregnancy. Correlation with other   laboratory and clinical findings is essential.               Immature Granulocytes   0.6             Lymph #   1.0             Lymph %   5.5             Magnesium 1.4               MCH   26.5             MCHC   30.3             MCV   88             Mono #   0.8             Mono %   4.4             MPV   9.7             nRBC   0             Phosphorus 1.2               Platelets   347             POCT Glucose     134     188       Potassium 3.9               PROTEIN TOTAL 6.4               PROTEIN URINE       131         RBC   2.72             RDW   15.8             Sodium 134               Urine Collection Duration       Random         Urine Total Volume       SEE COMMENT  Comment: See comment  Random           WBC   18.53                                    Significant Imaging: I have reviewed all pertinent imaging results/findings within the past 24 hours.      Assessment/Plan:      * Bladder mass  Karoline Justice 76F with IDDM2 who presented to [facility] with generalized weakness, back pain, and intermittent fevers for 2 weeks with dysuria, foul-smelling urine, and confusion for the last week. At [facility] a CT A/P showed a 6.5cm mass  "anterior bladder wall c/f neoplasm, severe bilateral hydronephrosis. She has hypercalcemia and a UTI. She was transferred to [facility] for Urology evaluation. On arrival, the patient is alert and oriented. Collateral is provided by her daughter at bedside, who agrees that her mother has been more confused for the past week. She is c/o intermittent confusion, decreased appetite, bilateral flank pain, and increased urinary frequency. Labs from OSH with leukocytosis, anemia, elevated Cr (2.3 -- unknown baseline), hypercalcemia (15.7 when corrected for albumin), and UTI on urine studies. Exam notable for significant bilat flank TTP.    PLAN:  --Urology saw the patient.  Likely to do procedure for biopsy on Monday.  Also recommended Gynecology Oncology consult to rule rule mass coming from gyn source.   -- Gyn onc recommended transabdominal or transvaginal ultrasound.  Transabdominal ultrasound pursued because of associated discomfort with transvaginal ultrasound.    -- NPO midnight from Monday 5/9.   --Trend CMP  --Soto in place  --Renally dose medications, avoid nephrotoxins  --Strict I&O's      Anemia  Hb 8.5 on admission. MCV 85.   Unknown baseline.     PLAN:  --Trend CBC        HTN (hypertension)  Patient denies any history HTN.   BP elevated at OSH (191/76, 175/92). On arrival to [facility], BP was 157/68.  Small pericardial effusion noted on CT Chest.    BP today: BP (!) 155/67 (BP Location: Right arm, Patient Position: Lying)   Pulse 100   Temp 99.1 °F (37.3 °C) (Oral)   Resp 16   Ht 5' 1" (1.549 m)   Wt 51.7 kg (114 lb)   SpO2 (!) 91%   Breastfeeding No   BMI 21.54 kg/m²     PLAN:  --Monitor BP  --Start anti-hypertensives as appropriate  --Follow-up TTE      Choledocholithiasis  CT A/P with multiple calcified gallstones, moderate intrahepatic and extrahepatic biliary ductal dilatation, and a 4 mm stone within the common duct.  No transaminitis (AST/ALT 8/14), alk phos wnl (104), and Tbili not elevated (0.5)  Patient " denies any abdominal pain, nausea, or vomiting. No RUQ or epigastric TTP on exam.    PLAN:  -- AES consult after evaluation of concerning mass.   --Trend CMP      Hydronephrosis  Severe bilateral hydronephrosis noted on CT A/P.  Cr 2.3 on admission, unknown baseline.   Patient reports significant UOP.    PLAN:  --Urology following.   -- Repeat retroperitoneal US to rule persistent hydro.   -- Cr. Improving.   -- Soto in place.   --Trend CMP      Hypercalcemia  Calcium 14.6 on admission. 15.7 when corrected for albumin.  Patient reports 1 weeks of confusion, fatigue, and constipation.    PLAN:  --S/p continuous IVF @ 200cc/hour for goal -150 cc/hour.   -- S/p Calcitonin 4 units/kg x1.  -- S/p Zoledronic acid 4mg  -- Calcium improving. Cont monitoring qd.   --Strict I&O's      Type 2 diabetes mellitus  Patient reports taking 24 units levemir daily.  No HbA1c on file.    PLAN:  --Insulin detemir 10U QD  --LDSSI  --Follow-up HbA1c  --POCT BG q4h while NPO  --Diabetic diet when appropriate      UTI (urinary tract infection)  Patient with 2 weeks of dysuria, foul-smelling urine, and increased urinary frequency. Also reports intermittent confusion.  Urine studies on admission c/w UTI. UA cloudy with 2+ protein, 3+ leuko. Urine micro with 61 WBC.  Patient received 1g CTX at OSH.    PLAN:  --Continue CTX 1g q24h X 5 days.   --U/c grew strep viridans.         VTE Risk Mitigation (From admission, onward)         Ordered     Reason for No Pharmacological VTE Prophylaxis  Once        Question:  Reasons:  Answer:  Patient is Ambulatory    05/06/22 0116     IP VTE HIGH RISK PATIENT  Once         05/06/22 0116     Place sequential compression device  Until discontinued         05/06/22 0116                Discharge Planning   GET: 5/11/2022     Code Status: Full Code   Is the patient medically ready for discharge?: No    Reason for patient still in hospital (select all that apply): Patient trending condition  Discharge Plan  A: Home, Home with family                  Estrellita Hernández MD  Department of Hospital Medicine   [facility] - Oncology ([facility])

## 2022-05-07 NOTE — SUBJECTIVE & OBJECTIVE
Interval History:  Mental status has improved. Calcium level has improved.  Urology saw the patient.  Likely to do procedure for biopsy on Monday.  Also recommended Gynecology Oncology consult to rule rule mass coming from gyn source. Gyn onc recommended transabdominal or transvaginal ultrasound.  Transabdominal ultrasound pursued because of associated discomfort with transvaginal ultrasound.  Retroperitoneal ultrasound ordered to rule out persistent hydronephrosis. Hypercalcemia w/u pending.     Review of Systems   Constitutional:  Negative for activity change, appetite change, chills, fatigue and fever.   HENT:  Negative for congestion, rhinorrhea and sore throat.    Eyes:  Negative for visual disturbance.   Respiratory:  Negative for cough and shortness of breath.    Cardiovascular:  Negative for chest pain, palpitations and leg swelling.   Gastrointestinal:  Negative for abdominal distention, abdominal pain, constipation, diarrhea, nausea and vomiting.   Genitourinary:  Positive for frequency. Negative for dysuria, hematuria and urgency.   Musculoskeletal:  Positive for back pain. Negative for myalgias and neck pain.   Skin:  Negative for rash.   Neurological:  Positive for weakness. Negative for dizziness, syncope, light-headedness and headaches.   Psychiatric/Behavioral:  Negative for agitation and confusion. The patient is not nervous/anxious.    Objective:     Vital Signs (Most Recent):  Temp: 99.1 °F (37.3 °C) (05/07/22 1149)  Pulse: 100 (05/07/22 1149)  Resp: 16 (05/07/22 1251)  BP: (!) 155/67 (05/07/22 1149)  SpO2: (!) 91 % (05/07/22 1149)   Vital Signs (24h Range):  Temp:  [98 °F (36.7 °C)-100.3 °F (37.9 °C)] 99.1 °F (37.3 °C)  Pulse:  [] 100  Resp:  [16-19] 16  SpO2:  [91 %-96 %] 91 %  BP: (136-175)/(64-73) 155/67     Weight: 51.7 kg (114 lb)  Body mass index is 21.54 kg/m².    Intake/Output Summary (Last 24 hours) at 5/7/2022 1409  Last data filed at 5/7/2022 1000  Gross per 24 hour   Intake  2260.84 ml   Output 2350 ml   Net -89.16 ml      Physical Exam  Vitals and nursing note reviewed.   Constitutional:       General: She is not in acute distress.     Appearance: Normal appearance. She is normal weight.   HENT:      Head: Normocephalic and atraumatic.      Mouth/Throat:      Pharynx: Oropharynx is clear.   Eyes:      Extraocular Movements: Extraocular movements intact.      Conjunctiva/sclera: Conjunctivae normal.   Cardiovascular:      Rate and Rhythm: Normal rate and regular rhythm.   Pulmonary:      Effort: Pulmonary effort is normal. No respiratory distress.      Breath sounds: Normal breath sounds. No wheezing.   Abdominal:      General: Abdomen is flat. Bowel sounds are normal. There is no distension.      Palpations: Abdomen is soft.      Tenderness: There is no abdominal tenderness.      Comments: Suprapubic tenderness on deep palpation.    Genitourinary:     Comments: Soto in place  Musculoskeletal:         General: No swelling or tenderness. Normal range of motion.      Cervical back: Normal range of motion and neck supple.   Skin:     General: Skin is warm and dry.   Neurological:      General: No focal deficit present.      Mental Status: She is alert and oriented to person, place, and time.   Psychiatric:         Mood and Affect: Mood normal.         Behavior: Behavior normal.         Thought Content: Thought content normal.         Judgment: Judgment normal.       Significant Labs: All pertinent labs within the past 24 hours have been reviewed.  Recent Lab Results  (Last 5 results in the past 24 hours)        05/07/22  0808   05/07/22  0807   05/07/22  0708   05/06/22  1806   05/06/22  1710        Urine Protein, Timed       SEE COMMENT  Comment: See comment  Random           Albumin 2.3               Alkaline Phosphatase 101               ALT 10               Anion Gap 9               AST 13               Baso #   0.07             Basophil %   0.4             BILIRUBIN TOTAL  0.3  Comment: For infants and newborns, interpretation of results should be based  on gestational age, weight and in agreement with clinical  observations.    Premature Infant recommended reference ranges:  Up to 24 hours.............<8.0 mg/dL  Up to 48 hours............<12.0 mg/dL  3-5 days..................<15.0 mg/dL  6-29 days.................<15.0 mg/dL                 BUN 22               Calcium 10.4               Chloride 105               CO2 20               Creatinine 1.7               Differential Method   Automated             eGFR if  33.3               eGFR if non  28.9  Comment: Calculation used to obtain the estimated glomerular filtration  rate (eGFR) is the CKD-EPI equation.                  Eos #   0.1             Eosinophil %   0.8             Glucose 136               Gran # (ANC)   16.4             Gran %   88.3             Hematocrit   23.8             Hemoglobin   7.2             Immature Grans (Abs)   0.11  Comment: Mild elevation in immature granulocytes is non specific and   can be seen in a variety of conditions including stress response,   acute inflammation, trauma and pregnancy. Correlation with other   laboratory and clinical findings is essential.               Immature Granulocytes   0.6             Lymph #   1.0             Lymph %   5.5             Magnesium 1.4               MCH   26.5             MCHC   30.3             MCV   88             Mono #   0.8             Mono %   4.4             MPV   9.7             nRBC   0             Phosphorus 1.2               Platelets   347             POCT Glucose     134     188       Potassium 3.9               PROTEIN TOTAL 6.4               PROTEIN URINE       131         RBC   2.72             RDW   15.8             Sodium 134               Urine Collection Duration       Random         Urine Total Volume       SEE COMMENT  Comment: See comment  Random           WBC   18.53                                     Significant Imaging: I have reviewed all pertinent imaging results/findings within the past 24 hours.

## 2022-05-07 NOTE — PROGRESS NOTES
[facility] - Oncology ([facility])  Urology  Progress Note    Patient Name: Karoline Justice  MRN: [id number]  Admission Date: 5/5/2022  Hospital Length of Stay: 2 days  Code Status: Full Code   Attending Provider: Ansley Simeon MD   Primary Care Physician: Dee Dee Oconnor MD    Subjective:     HPI:  Karoline Justice is a 76F pmhx DM, HTN who presented as a transfer from OSH with fatigue, back pain, and fevers for 2 weeks and dysuria, foul-smelling urine, and AMS for the last week. At OSH, CT A/P showed a 6.5cm mass posterior bladder wall, severe bilateral hydronephrosis. She has hypercalcemia (14.6) and a UTI. She was transferred to [facility] [place].     On exam, the patient is alert but has difficulty with hearing. Daughter at bedside answering a majority of the questions, she states her mother is more confused the past week . She states she has had foul smelling urine with some dysuria for the last week. Denies hematuria. She is afebrile and HDS. OSH labs with leukocytosis (WBC 23.5), hgb 8.5, elevated Cr 2.3 (baseline unknown but last was ~1.8) hypercalcemia (14.6), Microscopic analysis 4RBC, 61 WBC, occasional bacteria. Urine culture pending. Currently on Rocephin. Petersen catheter was placed at OSH and drained 1.1L. Since admission to [facility], 1300ml urine output that appears cloudy with debris. She states her bladder discomfort has decreased since placing petersen catheter.    She does not have any suprapubic discomfort or flank tenderness on palpation this morning. She states at home she does not have hematuria or and overt bladder fullness or pain. Labs are waiting to be drawn this am.      Interval History:  NAEON, petersen draining clear yellow urine    Review of Systems    Objective:     Temp:  [98 °F (36.7 °C)-100.3 °F (37.9 °C)] 99.1 °F (37.3 °C)  Pulse:  [] 100  Resp:  [16-19] 16  SpO2:  [91 %-96 %] 91 %  BP: (136-175)/(64-73) 155/67     Body mass index is 21.54 kg/m².    Date 05/07/22 0700 - 05/08/22 0659    Shift 3558-9746 [id number] 7155-5989 24 Hour Total   INTAKE   P.O. 120   120   Shift Total(mL/kg) 120(2.3)   120(2.3)   OUTPUT   Urine(mL/kg/hr) 300   300   Shift Total(mL/kg) 300(5.8)   300(5.8)   Weight (kg) 51.7 51.7 51.7 51.7          Drains       Drain  Duration                  Urethral Catheter 05/05/22 1140 14 Fr. <1 day                    Physical Exam  Constitutional:       Appearance: Normal appearance.   HENT:      Head: Normocephalic.      Nose: Nose normal.   Eyes:      Pupils: Pupils are equal, round, and reactive to light.   Cardiovascular:      Rate and Rhythm: Normal rate.   Pulmonary:      Effort: Pulmonary effort is normal.   Chest:      Chest wall: No tenderness.   Abdominal:      General: Abdomen is flat. There is no distension.      Tenderness: There is no abdominal tenderness. There is no right CVA tenderness or left CVA tenderness.      Comments: Hard mass palpable in pelvis   Genitourinary:     Comments: Soto catheter in place draining yellow cloudy urine  Limited pelvic exam performed, uterus appeared mobile, but hard mass difficult to palpate given patient positioning, more palpable from skin   Musculoskeletal:      Cervical back: Normal range of motion.   Skin:     General: Skin is warm.   Neurological:      General: No focal deficit present.      Mental Status: She is alert and oriented to person, place, and time.   Psychiatric:         Mood and Affect: Mood normal.         Behavior: Behavior normal.       Significant Labs:    BMP:  Recent Labs   Lab 05/05/22  0957 05/06/22  0758 05/06/22  1156 05/07/22  0808   * 138  --  134*   K 4.4 3.9  --  3.9   CL 98 106  --  105   CO2 31* 24  --  20*   BUN 33* 26*  --  22   CREATININE 2.3* 1.8*  --  1.7*   CALCIUM 14.6* 12.9*  13.1* 12.2* 10.4         CBC:  Recent Labs   Lab 05/05/22  0957 05/06/22  0758 05/07/22  0807   WBC 23.49* 22.45* 18.53*   HGB 8.5* 7.9* 7.2*   HCT 27.4* 27.0* 23.8*    428 347         All pertinent labs  results from the past 24 hours have been reviewed.    Significant Imaging:  CT: I have reviewed all results within the past 24 hours and my personal findings are:  Pertinent findings in HPI                      Assessment/Plan:     * Bladder mass  Karoline Justice is a 76F pmhx DM, HTN who presented as a transfer from OSH with fatigue, back pain, and fevers for 2 weeks and dysuria, foul-smelling urine, and AMS for the last week    - CT is concerning for malignancy. Recommend cystoscopy and bladder biopsy this admission, if deemed safe to undergo anesthesia  - Bilateral hydronephrosis and bladder seen on CT. Petersen placed with 1.1L urine output. Likely bilateral hydro from obstruction and not from bladder mass. Trend Cr. If downtrending there is less concern from malignant obstruction  - will obtain repeat renal US today to assess for improvement in hydronephrosis  - discussed imaging and physical exam findings with gyn onc, consult placed, will see her 5/8  - Urine culture growing strep viridans. Currently on Rocephin. Treat with IV abx based on susceptibility  - Keep petersen catheter for minimal 1 week due to >1000ml urinary retention  - transfuse for hgb < 7   - Remainder of care per primary team        VTE Risk Mitigation (From admission, onward)         Ordered     Reason for No Pharmacological VTE Prophylaxis  Once        Question:  Reasons:  Answer:  Patient is Ambulatory    05/06/22 0116     IP VTE HIGH RISK PATIENT  Once         05/06/22 0116     Place sequential compression device  Until discontinued         05/06/22 0116                Sylvia Escobar MD  Urology  [facility] - Oncology ([facility])

## 2022-05-07 NOTE — PLAN OF CARE
Plan of care reviewed. Patient is oriented X4. Stand by assist. PIV patent and saline locked. Complained of lower back pain; PRN oxycodone administered. IV antibiotics continued. 100.3 T-max. Soto catheter patent and functioning properly. Accu check HS; No coverage required. All questions and concerns addressed. All safety precautions in place. Remains free of injury. Patient is stable. Will continue to monitor.

## 2022-05-08 ENCOUNTER — ANESTHESIA EVENT (OUTPATIENT)
Dept: SURGERY | Facility: HOSPITAL | Age: 77
DRG: 657 | End: 2022-05-08
Payer: MEDICARE

## 2022-05-08 LAB
ALBUMIN SERPL BCP-MCNC: 2.3 G/DL (ref 3.5–5.2)
ALP SERPL-CCNC: 134 U/L (ref 55–135)
ALT SERPL W/O P-5'-P-CCNC: 16 U/L (ref 10–44)
ANION GAP SERPL CALC-SCNC: 9 MMOL/L (ref 8–16)
AST SERPL-CCNC: 23 U/L (ref 10–40)
BASOPHILS # BLD AUTO: 0.06 K/UL (ref 0–0.2)
BASOPHILS NFR BLD: 0.3 % (ref 0–1.9)
BILIRUB SERPL-MCNC: 0.5 MG/DL (ref 0.1–1)
BUN SERPL-MCNC: 21 MG/DL (ref 8–23)
CALCIUM SERPL-MCNC: 9.4 MG/DL (ref 8.7–10.5)
CHLORIDE SERPL-SCNC: 101 MMOL/L (ref 95–110)
CO2 SERPL-SCNC: 19 MMOL/L (ref 23–29)
CREAT SERPL-MCNC: 1.7 MG/DL (ref 0.5–1.4)
DIFFERENTIAL METHOD: ABNORMAL
EOSINOPHIL # BLD AUTO: 0.5 K/UL (ref 0–0.5)
EOSINOPHIL NFR BLD: 2.3 % (ref 0–8)
ERYTHROCYTE [DISTWIDTH] IN BLOOD BY AUTOMATED COUNT: 15.8 % (ref 11.5–14.5)
EST. GFR  (AFRICAN AMERICAN): 33.3 ML/MIN/1.73 M^2
EST. GFR  (NON AFRICAN AMERICAN): 28.9 ML/MIN/1.73 M^2
GLUCOSE SERPL-MCNC: 66 MG/DL (ref 70–110)
HCT VFR BLD AUTO: 24.2 % (ref 37–48.5)
HGB BLD-MCNC: 7 G/DL (ref 12–16)
IMM GRANULOCYTES # BLD AUTO: 0.16 K/UL (ref 0–0.04)
IMM GRANULOCYTES NFR BLD AUTO: 0.7 % (ref 0–0.5)
LYMPHOCYTES # BLD AUTO: 1.6 K/UL (ref 1–4.8)
LYMPHOCYTES NFR BLD: 7.2 % (ref 18–48)
MAGNESIUM SERPL-MCNC: 1.6 MG/DL (ref 1.6–2.6)
MCH RBC QN AUTO: 26 PG (ref 27–31)
MCHC RBC AUTO-ENTMCNC: 28.9 G/DL (ref 32–36)
MCV RBC AUTO: 90 FL (ref 82–98)
MONOCYTES # BLD AUTO: 1.2 K/UL (ref 0.3–1)
MONOCYTES NFR BLD: 5.1 % (ref 4–15)
NEUTROPHILS # BLD AUTO: 19.2 K/UL (ref 1.8–7.7)
NEUTROPHILS NFR BLD: 84.4 % (ref 38–73)
NRBC BLD-RTO: 0 /100 WBC
PHOSPHATE SERPL-MCNC: 2.2 MG/DL (ref 2.7–4.5)
PLATELET # BLD AUTO: 313 K/UL (ref 150–450)
PMV BLD AUTO: 9.8 FL (ref 9.2–12.9)
POCT GLUCOSE: 186 MG/DL (ref 70–110)
POCT GLUCOSE: 225 MG/DL (ref 70–110)
POCT GLUCOSE: 227 MG/DL (ref 70–110)
POTASSIUM SERPL-SCNC: 4.3 MMOL/L (ref 3.5–5.1)
PROT SERPL-MCNC: 5.9 G/DL (ref 6–8.4)
RBC # BLD AUTO: 2.69 M/UL (ref 4–5.4)
SODIUM SERPL-SCNC: 129 MMOL/L (ref 136–145)
WBC # BLD AUTO: 22.72 K/UL (ref 3.9–12.7)

## 2022-05-08 PROCEDURE — 99232 SBSQ HOSP IP/OBS MODERATE 35: CPT | Mod: GC,,, | Performed by: HOSPITALIST

## 2022-05-08 PROCEDURE — 63600175 PHARM REV CODE 636 W HCPCS: Performed by: STUDENT IN AN ORGANIZED HEALTH CARE EDUCATION/TRAINING PROGRAM

## 2022-05-08 PROCEDURE — 99232 PR SUBSEQUENT HOSPITAL CARE,LEVL II: ICD-10-PCS | Mod: GC,,, | Performed by: HOSPITALIST

## 2022-05-08 PROCEDURE — 63600175 PHARM REV CODE 636 W HCPCS

## 2022-05-08 PROCEDURE — 25000003 PHARM REV CODE 250

## 2022-05-08 PROCEDURE — 20600001 HC STEP DOWN PRIVATE ROOM

## 2022-05-08 PROCEDURE — 85025 COMPLETE CBC W/AUTO DIFF WBC: CPT

## 2022-05-08 PROCEDURE — 80053 COMPREHEN METABOLIC PANEL: CPT

## 2022-05-08 PROCEDURE — 36415 COLL VENOUS BLD VENIPUNCTURE: CPT

## 2022-05-08 PROCEDURE — 84100 ASSAY OF PHOSPHORUS: CPT

## 2022-05-08 PROCEDURE — 83735 ASSAY OF MAGNESIUM: CPT

## 2022-05-08 PROCEDURE — 25000003 PHARM REV CODE 250: Performed by: STUDENT IN AN ORGANIZED HEALTH CARE EDUCATION/TRAINING PROGRAM

## 2022-05-08 RX ORDER — HYDROMORPHONE HYDROCHLORIDE 2 MG/1
2 TABLET ORAL EVERY 4 HOURS PRN
Status: DISCONTINUED | OUTPATIENT
Start: 2022-05-08 | End: 2022-05-10

## 2022-05-08 RX ORDER — MAGNESIUM SULFATE HEPTAHYDRATE 40 MG/ML
2 INJECTION, SOLUTION INTRAVENOUS ONCE
Status: COMPLETED | OUTPATIENT
Start: 2022-05-08 | End: 2022-05-08

## 2022-05-08 RX ORDER — SODIUM,POTASSIUM PHOSPHATES 280-250MG
2 POWDER IN PACKET (EA) ORAL ONCE
Status: COMPLETED | OUTPATIENT
Start: 2022-05-08 | End: 2022-05-08

## 2022-05-08 RX ORDER — GABAPENTIN 100 MG/1
100 CAPSULE ORAL 3 TIMES DAILY
Status: DISCONTINUED | OUTPATIENT
Start: 2022-05-08 | End: 2022-05-12 | Stop reason: HOSPADM

## 2022-05-08 RX ORDER — OXYCODONE HYDROCHLORIDE 5 MG/1
5 TABLET ORAL EVERY 4 HOURS PRN
Status: DISCONTINUED | OUTPATIENT
Start: 2022-05-08 | End: 2022-05-12 | Stop reason: HOSPADM

## 2022-05-08 RX ADMIN — SENNOSIDES AND DOCUSATE SODIUM 1 TABLET: 50; 8.6 TABLET ORAL at 09:05

## 2022-05-08 RX ADMIN — LIDOCAINE 1 PATCH: 50 PATCH CUTANEOUS at 04:05

## 2022-05-08 RX ADMIN — POTASSIUM & SODIUM PHOSPHATES POWDER PACK 280-160-250 MG 2 PACKET: 280-160-250 PACK at 09:05

## 2022-05-08 RX ADMIN — INSULIN ASPART 1 UNITS: 100 INJECTION, SOLUTION INTRAVENOUS; SUBCUTANEOUS at 09:05

## 2022-05-08 RX ADMIN — GABAPENTIN 100 MG: 100 CAPSULE ORAL at 03:05

## 2022-05-08 RX ADMIN — CEFTRIAXONE 1 G: 1 INJECTION, SOLUTION INTRAVENOUS at 12:05

## 2022-05-08 RX ADMIN — DICLOFENAC SODIUM 2 G: 10 GEL TOPICAL at 12:05

## 2022-05-08 RX ADMIN — MAGNESIUM SULFATE 2 G: 2 INJECTION INTRAVENOUS at 12:05

## 2022-05-08 RX ADMIN — GABAPENTIN 100 MG: 100 CAPSULE ORAL at 08:05

## 2022-05-08 RX ADMIN — SENNOSIDES AND DOCUSATE SODIUM 1 TABLET: 50; 8.6 TABLET ORAL at 08:05

## 2022-05-08 RX ADMIN — OXYCODONE 5 MG: 5 TABLET ORAL at 01:05

## 2022-05-08 RX ADMIN — POLYETHYLENE GLYCOL 3350 17 G: 17 POWDER, FOR SOLUTION ORAL at 09:05

## 2022-05-08 NOTE — ASSESSMENT & PLAN NOTE
"Patient denies any history HTN.   BP elevated at OSH (191/76, 175/92). [place] [facility] [place] 157/68.  Small pericardial effusion noted on CT Chest.    BP today: /63 (BP Location: Left arm, Patient Position: Lying)   Pulse 100   Temp 98.8 °F (37.1 °C) (Oral)   Resp 18   Ht 5' 1" (1.549 m)   Wt 51.7 kg (114 lb)   SpO2 97%   Breastfeeding No   BMI 21.54 kg/m²     PLAN:  --Monitor BP  --Start anti-hypertensives as appropriate    "

## 2022-05-08 NOTE — SUBJECTIVE & OBJECTIVE
Interval History:  NAEON, petersen draining clear yellow urine with some sediment, repeat US shows persistent bilateral hydronephrosis    Review of Systems    Objective:     Temp:  [99.1 °F (37.3 °C)-99.7 °F (37.6 °C)] 99.7 °F (37.6 °C)  Pulse:  [100-107] 107  Resp:  [16-18] 18  SpO2:  [91 %-97 %] 95 %  BP: (125-155)/(56-67) 125/56     Body mass index is 21.54 kg/m².             Drains       Drain  Duration                  Urethral Catheter 05/05/22 1140 14 Fr. <1 day                    Physical Exam  Constitutional:       Appearance: Normal appearance.   HENT:      Head: Normocephalic.      Nose: Nose normal.   Eyes:      Pupils: Pupils are equal, round, and reactive to light.   Cardiovascular:      Rate and Rhythm: Normal rate.   Pulmonary:      Effort: Pulmonary effort is normal.   Chest:      Chest wall: No tenderness.   Abdominal:      General: Abdomen is flat. There is no distension.      Tenderness: There is no abdominal tenderness. There is no right CVA tenderness or left CVA tenderness.      Comments: Hard mass palpable in pelvis   Genitourinary:     Comments: Petersen catheter in place draining yellow cloudy urine  Limited pelvic exam performed, uterus appeared mobile, but hard mass difficult to palpate given patient positioning, more palpable from skin   Musculoskeletal:      Cervical back: Normal range of motion.   Skin:     General: Skin is warm.   Neurological:      General: No focal deficit present.      Mental Status: She is alert and oriented to person, place, and time.   Psychiatric:         Mood and Affect: Mood normal.         Behavior: Behavior normal.       Significant Labs:    BMP:  Recent Labs   Lab 05/06/22  0758 05/06/22  1156 05/07/22  0808 05/08/22  0412     --  134* 129*   K 3.9  --  3.9 4.3     --  105 101   CO2 24  --  20* 19*   BUN 26*  --  22 21   CREATININE 1.8*  --  1.7* 1.7*   CALCIUM 12.9*  13.1* 12.2* 10.4 9.4         CBC:  Recent Labs   Lab 05/06/22  0758 05/07/22  0807  05/08/22  0412   WBC 22.45* 18.53* 22.72*   HGB 7.9* 7.2* 7.0*   HCT 27.0* 23.8* 24.2*    347 [id number]         All pertinent labs results from the past 24 hours have been reviewed.    Significant Imaging:  CT: I have reviewed all results within the past 24 hours and my personal findings are:  Pertinent findings in HPI

## 2022-05-08 NOTE — ASSESSMENT & PLAN NOTE
Karoline Justice is a 76F pmhx DM, HTN who presented as a transfer from OSH with fatigue, back pain, and fevers for 2 weeks and dysuria, foul-smelling urine, and AMS for the last week    - CT is concerning for malignancy. Recommend cystoscopy and bladder biopsy this admission, if deemed safe to undergo anesthesia  - Bilateral hydronephrosis and bladder seen on CT. Petersen placed with 1.1L urine output. Likely bilateral hydro from obstruction and not from bladder mass. Trend Cr. If downtrending there is less concern from malignant obstruction  - repeat US shows persistent bilateral hydronephrosis  - discussed imaging and physical exam findings with gyn onc, recs pending  - Urine culture growing strep viridans. Currently on Rocephin.   - Keep petersen catheter for minimal 1 week due to >1000ml urinary retention  - transfuse for hgb < 7   - Remainder of care per primary team  - NPO at [facility] for TURBT tomorrow, will likely need ureteral stents as well. If unable to place ureteral stents, may need nephrostomy tubes.   - covid test ordered

## 2022-05-08 NOTE — ASSESSMENT & PLAN NOTE
Severe bilateral hydronephrosis noted on CT A/P.  Cr 2.3 on admission, unknown baseline.   Patient reports significant UOP.    PLAN:  --Urology following.   -- Repeat retroperitoneal US still showing persistent severe hydro on left and moderate on right side  -- Cr. Improving.   -- Soto in place.   --Trend CMP  -- Urology to possibly place bl ureteral stents, if unable pt may need nephrostomy tubes

## 2022-05-08 NOTE — ASSESSMENT & PLAN NOTE
Patient reports taking 24 units levemir daily.  No HbA1c on file.    PLAN:  --Insulin detemir 12U QAM held this Am due to hypoglycemia on morning labs. Will monitor blood glucose and administer insulin as needed   --LDSSI  --HbA1c, 6.9  --POCT BG ACHS  --Diabetic diet when appropriate

## 2022-05-08 NOTE — ASSESSMENT & PLAN NOTE
Karoline Justice 76F with IDDM2 who presented to [facility] with generalized weakness, back pain, and intermittent fevers for 2 weeks with dysuria, foul-smelling urine, and confusion for the last week. At [facility] a CT A/P showed a 6.5cm mass anterior bladder wall c/f neoplasm, severe bilateral hydronephrosis. She has hypercalcemia and a UTI. She was [place] [facility] [place]. On arrival, the patient is alert and oriented. Collateral is provided by her daughter at bedside, who agrees that her mother has been more confused for the past week. She is c/o intermittent confusion, decreased appetite, bilateral flank pain, and increased urinary frequency. Labs from OSH with leukocytosis, anemia, elevated Cr (2.3 -- unknown baseline), hypercalcemia (15.7 when corrected for albumin), and UTI on urine studies. Exam notable for significant bilat flank TTP.    PLAN:  --Urology saw the patient. Plan procedure for biopsy on Monday.  Also recommended Gynecology Oncology consult to rule rule mass coming from gyn source.   -- Gyn onc recommended transabdominal or transvaginal ultrasound.  Transabdominal ultrasound pursued because of associated discomfort with transvaginal ultrasound.  U/S showing small ovarian cyst and uterine fibroid but not other concerning findings with regards to bladder mass. Gyn Onc not convinced this is gyn related and have no signed off.   -- NPO midnight from Monday 5/9.    --Trend CMP  --Soto in place  --Renally dose medications, avoid nephrotoxins  --Strict I&O's

## 2022-05-08 NOTE — SUBJECTIVE & OBJECTIVE
Interval History: Pt reports increased pain in her left side that shoots down into her left hip. Also reports she has not had a BM in two days. Otherwise reports feeling ok this AM and mentating well.    Review of Systems   Constitutional:  Negative for activity change, appetite change, chills, fatigue and fever.   HENT:  Negative for congestion, rhinorrhea and sore throat.    Eyes:  Negative for visual disturbance.   Respiratory:  Negative for cough and shortness of breath.    Cardiovascular:  Negative for chest pain, palpitations and leg swelling.   Gastrointestinal:  Negative for abdominal distention, abdominal pain, constipation, diarrhea, nausea and vomiting.   Genitourinary:  Positive for frequency. Negative for dysuria, hematuria and urgency.   Musculoskeletal:  Positive for back pain. Negative for myalgias and neck pain.   Skin:  Negative for rash.   Neurological:  Positive for weakness. Negative for dizziness, syncope, light-headedness and headaches.   Psychiatric/Behavioral:  Negative for agitation and confusion. The patient is not nervous/anxious.    Objective:     Vital Signs (Most Recent):  Temp: 98.8 °F (37.1 °C) (05/08/22 1203)  Pulse: 100 (05/08/22 1203)  Resp: 18 (05/08/22 1316)  BP: 131/63 (05/08/22 1203)  SpO2: 97 % (05/08/22 1203) Vital Signs (24h Range):  Temp:  [98.8 °F (37.1 °C)-99.7 °F (37.6 °C)] 98.8 °F (37.1 °C)  Pulse:  [100-107] 100  Resp:  [16-18] 18  SpO2:  [94 %-97 %] 97 %  BP: (102-137)/(56-63) 131/63     Weight: 51.7 kg (114 lb)  Body mass index is 21.54 kg/m².    Intake/Output Summary (Last 24 hours) at 5/8/2022 1608  Last data filed at 5/8/2022 1529  Gross per 24 hour   Intake 990 ml   Output 1650 ml   Net -660 ml      Physical Exam  Vitals and nursing note reviewed.   Constitutional:       General: She is not in acute distress.     Appearance: Normal appearance. She is normal weight.   HENT:      Head: Normocephalic and atraumatic.      Mouth/Throat:      Pharynx: Oropharynx is  clear.   Eyes:      Extraocular Movements: Extraocular movements intact.      Conjunctiva/sclera: Conjunctivae normal.   Cardiovascular:      Rate and Rhythm: Normal rate and regular rhythm.   Pulmonary:      Effort: Pulmonary effort is normal. No respiratory distress.      Breath sounds: Normal breath sounds. No wheezing.   Abdominal:      General: Abdomen is flat. Bowel sounds are normal. There is no distension.      Palpations: Abdomen is soft.      Tenderness: There is no abdominal tenderness.      Comments: Suprapubic tenderness on deep palpation.    Genitourinary:     Comments: Soto in place  Musculoskeletal:         General: No swelling or tenderness. Normal range of motion.      Cervical back: Normal range of motion and neck supple.      Comments: Pain on palpation of left side down into hip   Skin:     General: Skin is warm and dry.   Neurological:      General: No focal deficit present.      Mental Status: She is alert and oriented to person, place, and time. Mental status is at baseline.      Cranial Nerves: No cranial nerve deficit.      Sensory: No sensory deficit.   Psychiatric:         Mood and Affect: Mood normal.         Behavior: Behavior normal.         Thought Content: Thought content normal.         Judgment: Judgment normal.       Significant Labs: All pertinent labs within the past 24 hours have been reviewed.    Significant Imaging: I have reviewed all pertinent imaging results/findings within the past 24 hours.

## 2022-05-08 NOTE — PROGRESS NOTES
[facility] - Oncology ([facility])  Urology  Progress Note    Patient Name: Karoline Justice  MRN: [id number]  Admission Date: 5/5/2022  Hospital Length of Stay: 3 days  Code Status: Full Code   Attending Provider: Ansley Simeon MD   Primary Care Physician: Dee Dee Oconnor MD    Subjective:     HPI:  Karoline Justice is a 76F pmhx DM, HTN who presented as a transfer from OSH with fatigue, back pain, and fevers for 2 weeks and dysuria, foul-smelling urine, and AMS for the last week. At OSH, CT A/P showed a 6.5cm mass posterior bladder wall, severe bilateral hydronephrosis. She has hypercalcemia (14.6) and a UTI. [place] [facility] [place]     On exam, the patient is alert but has difficulty with hearing. Daughter at bedside answering a majority of the questions, she states her mother is more confused the past week . She states she has had foul smelling urine with some dysuria for the last week. Denies hematuria. She is afebrile and HDS. OSH labs with leukocytosis (WBC 23.5), hgb 8.5, elevated Cr 2.3 (baseline unknown but last was ~1.8) hypercalcemia (14.6), Microscopic analysis 4RBC, 61 WBC, occasional bacteria. Urine culture pending. Currently on Rocephin. Petersen catheter was placed at OSH and drained 1.1L. [place] [facility] urine output that appears cloudy with debris. She states her bladder discomfort has decreased since placing petersen catheter.    She does not have any suprapubic discomfort or flank tenderness on palpation this morning. She states at home she does not have hematuria or and overt bladder fullness or pain. Labs are waiting to be drawn this am.      Interval History:  NAEON, petersen draining clear yellow urine with some sediment, repeat US shows persistent bilateral hydronephrosis    Review of Systems    Objective:     Temp:  [99.1 °F (37.3 °C)-99.7 °F (37.6 °C)] 99.7 °F (37.6 °C)  Pulse:  [100-107] 107  Resp:  [16-18] 18  SpO2:  [91 %-97 %] 95 %  BP: (125-155)/(56-67)  125/56     Body mass index is 21.54 kg/m².             Drains       Drain  Duration                  Urethral Catheter 05/05/22 1140 14 Fr. <1 day                    Physical Exam  Constitutional:       Appearance: Normal appearance.   HENT:      Head: Normocephalic.      Nose: Nose normal.   Eyes:      Pupils: Pupils are equal, round, and reactive to light.   Cardiovascular:      Rate and Rhythm: Normal rate.   Pulmonary:      Effort: Pulmonary effort is normal.   Chest:      Chest wall: No tenderness.   Abdominal:      General: Abdomen is flat. There is no distension.      Tenderness: There is no abdominal tenderness. There is no right CVA tenderness or left CVA tenderness.      Comments: Hard mass palpable in pelvis   Genitourinary:     Comments: Soto catheter in place draining yellow cloudy urine  Limited pelvic exam performed, uterus appeared mobile, but hard mass difficult to palpate given patient positioning, more palpable from skin   Musculoskeletal:      Cervical back: Normal range of motion.   Skin:     General: Skin is warm.   Neurological:      General: No focal deficit present.      Mental Status: She is alert and oriented to person, place, and time.   Psychiatric:         Mood and Affect: Mood normal.         Behavior: Behavior normal.       Significant Labs:    BMP:  Recent Labs   Lab 05/06/22  0758 05/06/22  1156 05/07/22  0808 05/08/22  0412     --  134* 129*   K 3.9  --  3.9 4.3     --  105 101   CO2 24  --  20* 19*   BUN 26*  --  22 21   CREATININE 1.8*  --  1.7* 1.7*   CALCIUM 12.9*  13.1* 12.2* 10.4 9.4         CBC:  Recent Labs   Lab 05/06/22  0758 05/07/22  0807 05/08/22  0412   WBC 22.45* 18.53* 22.72*   HGB 7.9* 7.2* 7.0*   HCT 27.0* 23.8* 24.2*    347 313         All pertinent labs results from the past 24 hours have been reviewed.    Significant Imaging:  CT: I have reviewed all results within the past 24 hours and my personal findings are:  Pertinent findings in  HPI                      Assessment/Plan:     * Bladder mass  Karoline Justice is a 76F pmhx DM, HTN who presented as a transfer from OSH with fatigue, back pain, and fevers for 2 weeks and dysuria, foul-smelling urine, and AMS for the last week    - CT is concerning for malignancy. Recommend cystoscopy and bladder biopsy this admission, if deemed safe to undergo anesthesia  - Bilateral hydronephrosis and bladder seen on CT. Petersen placed with 1.1L urine output. Likely bilateral hydro from obstruction and not from bladder mass. Trend Cr. If downtrending there is less concern from malignant obstruction  - repeat US shows persistent bilateral hydronephrosis  - discussed imaging and physical exam findings with gyn onc, recs pending  - Urine culture growing strep viridans. Currently on Rocephin.   - Keep petersen catheter for minimal 1 week due to >1000ml urinary retention  - transfuse for hgb < 7   - Remainder of care per primary team  - NPO at [facility] for TURBT tomorrow, will likely need ureteral stents as well. If unable to place ureteral stents, may need nephrostomy tubes.   - covid test ordered        VTE Risk Mitigation (From admission, onward)         Ordered     Reason for No Pharmacological VTE Prophylaxis  Once        Question:  Reasons:  Answer:  Patient is Ambulatory    05/06/22 0116     IP VTE HIGH RISK PATIENT  Once         05/06/22 0116     Place sequential compression device  Until discontinued         05/06/22 0116                Sylvia Escobar MD  Urology  [facility] - Oncology ([facility])

## 2022-05-08 NOTE — ASSESSMENT & PLAN NOTE
Hb 8.5 on admission. MCV 85.   Unknown baseline.   HgB of 7.0 this AM    PLAN:  --Trend CBC  -- Will have up to date type and screen if transfusion is necessary.

## 2022-05-08 NOTE — PLAN OF CARE
Plan of care reviewed. Patient is oriented X4. Stand by assist. PIV flushed and saline locked. Complained of lower back pain; PRN oral dilaudid administered. IV antibiotics continued. Soto catheter patent and functioning properly. Accu check HS; No coverage required. Pelvic examination completed by gynecology this morning. Patient tolerated well. All questions and concerns addressed. All safety precautions in place. Remains free of injury. Patient is stable. Will continue to monitor.

## 2022-05-08 NOTE — PROGRESS NOTES
[facility] - Oncology ([facility])  [facility] Medicine  Progress Note    Patient Name: Karoline Justice  MRN: [id number]  Patient Class: IP- Inpatient   Admission Date: 5/5/2022  Length of Stay: 3 days  Attending Physician: Ansley Simeon MD  Primary Care Provider: Dee Dee Oconnor MD        Subjective:     Principal Problem:Bladder mass        HPI:  Karoline Justice 76F with IDDM2 who presented to [facility] with generalized weakness, back pain, and intermittent fevers for 2 weeks with dysuria, foul-smelling urine, and confusion for the last week. At [facility] a CT A/P showed a 6.5cm mass anterior bladder wall c/f neoplasm, severe bilateral hydronephrosis, and choledocholithiasis with with moderate intra and extrahepatic biliary duct dilatation. She has hypercalcemia and a UTI. She was [place] [facility] [place].    On arrival, the patient is alert and oriented. Collateral is provided by her daughter at bedside, who agrees that her mother has been more confused for the past week. She is c/o intermittent confusion, decreased appetite, bilateral flank pain, and increased urinary frequency. She is afebrile and mildly hypertensive (157/68). Labs from OSH with leukocytosis (WBC 23.5), anemia (Hb 8.5), elevated Cr (2.3 -- unknown baseline), hypercalcemia (15.7 when corrected for albumin), and UTI on urine studies (UA cloudy with 2+ protein, 3+ leuks and urine microscopy with 61 WBC). Exam notable for significant bilat flank TTP. She will be admitted to  for further care.       Overview/Hospital Course:  Pt admitted as a transfer for urology evaluation after being found to have large bladder mass at OSH. On arrival, pt also found to be hypercalcemic to >14 along with having UTI. She was started on fluids, calcitonin, and zoledronic acid. Pt started on rocephin for UTI. Urology consulted as well for bladder mass evaluation. GYN ONC also consulted for their opinion to assess if mass if gyn related. Retroperitoneal U/S  and pelvic U/S obtained for further imaging. After repeat imaging, Gyn not convinced this is gyn related and have signed off. Pt to go for biopsy with urology 5/9/22 and possible placement of ureteral stents to relieve obstructions.       Interval History: Pt reports increased pain in her left side that shoots down into her left hip. Also reports she has not had a BM in two days. Otherwise reports feeling ok this AM and mentating well.    Review of Systems   Constitutional:  Negative for activity change, appetite change, chills, fatigue and fever.   HENT:  Negative for congestion, rhinorrhea and sore throat.    Eyes:  Negative for visual disturbance.   Respiratory:  Negative for cough and shortness of breath.    Cardiovascular:  Negative for chest pain, palpitations and leg swelling.   Gastrointestinal:  Negative for abdominal distention, abdominal pain, constipation, diarrhea, nausea and vomiting.   Genitourinary:  Positive for frequency. Negative for dysuria, hematuria and urgency.   Musculoskeletal:  Positive for back pain. Negative for myalgias and neck pain.   Skin:  Negative for rash.   Neurological:  Positive for weakness. Negative for dizziness, syncope, light-headedness and headaches.   Psychiatric/Behavioral:  Negative for agitation and confusion. The patient is not nervous/anxious.    Objective:     Vital Signs (Most Recent):  Temp: 98.8 °F (37.1 °C) (05/08/22 1203)  Pulse: 100 (05/08/22 1203)  Resp: 18 (05/08/22 1316)  BP: 131/63 (05/08/22 1203)  SpO2: 97 % (05/08/22 1203) Vital Signs (24h Range):  Temp:  [98.8 °F (37.1 °C)-99.7 °F (37.6 °C)] 98.8 °F (37.1 °C)  Pulse:  [100-107] 100  Resp:  [16-18] 18  SpO2:  [94 %-97 %] 97 %  BP: (102-137)/(56-63) 131/63     Weight: 51.7 kg (114 lb)  Body mass index is 21.54 kg/m².    Intake/Output Summary (Last 24 hours) at 5/8/2022 1608  Last data filed at 5/8/2022 1529  Gross per 24 hour   Intake 990 ml   Output 1650 ml   Net -660 ml      Physical Exam  Vitals and  nursing note reviewed.   Constitutional:       General: She is not in acute distress.     Appearance: Normal appearance. She is normal weight.   HENT:      Head: Normocephalic and atraumatic.      Mouth/Throat:      Pharynx: Oropharynx is clear.   Eyes:      Extraocular Movements: Extraocular movements intact.      Conjunctiva/sclera: Conjunctivae normal.   Cardiovascular:      Rate and Rhythm: Normal rate and regular rhythm.   Pulmonary:      Effort: Pulmonary effort is normal. No respiratory distress.      Breath sounds: Normal breath sounds. No wheezing.   Abdominal:      General: Abdomen is flat. Bowel sounds are normal. There is no distension.      Palpations: Abdomen is soft.      Tenderness: There is no abdominal tenderness.      Comments: Suprapubic tenderness on deep palpation.    Genitourinary:     Comments: Soto in place  Musculoskeletal:         General: No swelling or tenderness. Normal range of motion.      Cervical back: Normal range of motion and neck supple.      Comments: Pain on palpation of left side down into hip   Skin:     General: Skin is warm and dry.   Neurological:      General: No focal deficit present.      Mental Status: She is alert and oriented to person, place, and time. Mental status is at baseline.      Cranial Nerves: No cranial nerve deficit.      Sensory: No sensory deficit.   Psychiatric:         Mood and Affect: Mood normal.         Behavior: Behavior normal.         Thought Content: Thought content normal.         Judgment: Judgment normal.       Significant Labs: All pertinent labs within the past 24 hours have been reviewed.    Significant Imaging: I have reviewed all pertinent imaging results/findings within the past 24 hours.      Assessment/Plan:      * Bladder mass  Karoline Justice 76F with IDDM2 who presented to [facility] with generalized weakness, back pain, and intermittent fevers for 2 weeks with dysuria, foul-smelling urine, and confusion for the last  "week. At [facility] a CT A/P showed a 6.5cm mass anterior bladder wall c/f neoplasm, severe bilateral hydronephrosis. She has hypercalcemia and a UTI. She was [place] [facility] [place]. On arrival, the patient is alert and oriented. Collateral is provided by her daughter at bedside, who agrees that her mother has been more confused for the past week. She is c/o intermittent confusion, decreased appetite, bilateral flank pain, and increased urinary frequency. Labs from OSH with leukocytosis, anemia, elevated Cr (2.3 -- unknown baseline), hypercalcemia (15.7 when corrected for albumin), and UTI on urine studies. Exam notable for significant bilat flank TTP.    PLAN:  --Urology saw the patient. Plan procedure for biopsy on Monday.  Also recommended Gynecology Oncology consult to rule rule mass coming from gyn source.   -- Gyn onc recommended transabdominal or transvaginal ultrasound.  Transabdominal ultrasound pursued because of associated discomfort with transvaginal ultrasound.  U/S showing small ovarian cyst and uterine fibroid but not other concerning findings with regards to bladder mass. Gyn Onc not convinced this is gyn related and have no signed off.   -- NPO midnight from Monday 5/9.    --Trend CMP  --Soto in place  --Renally dose medications, avoid nephrotoxins  --Strict I&O's      Anemia  Hb 8.5 on admission. MCV 85.   Unknown baseline.   HgB of 7.0 this AM    PLAN:  --Trend CBC  -- Will have up to date type and screen if transfusion is necessary.         HTN (hypertension)  Patient denies any history HTN.   BP elevated at OSH (191/76, 175/92). On arrival [place] [facility] BP was 157/68.  Small pericardial effusion noted on CT Chest.    BP today: /63 (BP Location: Left arm, Patient Position: Lying)   Pulse 100   Temp 98.8 °F (37.1 °C) (Oral)   Resp 18   Ht 5' 1" (1.549 m)   Wt 51.7 kg (114 lb)   SpO2 97%   Breastfeeding No   BMI 21.54 kg/m²     PLAN:  --Monitor BP  --Start anti-hypertensives as " appropriate      Choledocholithiasis  CT A/P with multiple calcified gallstones, moderate intrahepatic and extrahepatic biliary ductal dilatation, and a 4 mm stone within the common duct.  No transaminitis (AST/ALT 8/14), alk phos wnl (104), and Tbili not elevated (0.5)  Patient denies any abdominal pain, nausea, or vomiting. No RUQ or epigastric TTP on exam.    PLAN:  -- AES consult after evaluation of concerning mass.   --Trend CMP      Hydronephrosis  Severe bilateral hydronephrosis noted on CT A/P.  Cr 2.3 on admission, unknown baseline.   Patient reports significant UOP.    PLAN:  --Urology following.   -- Repeat retroperitoneal US still showing persistent severe hydro on left and moderate on right side  -- Cr. Improving.   -- Soto in place.   --Trend CMP  -- Urology to possibly place bl ureteral stents, if unable pt may need nephrostomy tubes      Hypercalcemia  Calcium 14.6 on admission. 15.7 when corrected for albumin.  Patient reports 1 weeks of confusion, fatigue, and constipation.    PLAN:  --S/p continuous IVF @ 200cc/hour for goal -150 cc/hour.   -- S/p Calcitonin 4 units/kg x1.  -- S/p Zoledronic acid 4mg  -- Calcium improving. Cont monitoring qd.   --Strict I&O's      Type 2 diabetes mellitus  Patient reports taking 24 units levemir daily.  No HbA1c on file.    PLAN:  --Insulin detemir 12U QAM held this Am due to hypoglycemia on morning labs. Will monitor blood glucose and administer insulin as needed   --LDSSI  --HbA1c, 6.9  --POCT BG ACHS  --Diabetic diet when appropriate      UTI (urinary tract infection)  Patient with 2 weeks of dysuria, foul-smelling urine, and increased urinary frequency. Also reports intermittent confusion.  Urine studies on admission c/w UTI. UA cloudy with 2+ protein, 3+ leuko. Urine micro with 61 WBC.  Patient received 1g CTX at OSH.    PLAN:  --Continue CTX 1g q24h X 5 days (current day 3).   --U/c grew strep viridans.       VTE Risk Mitigation (From admission,  onward)         Ordered     Reason for No Pharmacological VTE Prophylaxis  Once        Question:  Reasons:  Answer:  Patient is Ambulatory    05/06/22 0116     IP VTE HIGH RISK PATIENT  Once         05/06/22 0116     Place sequential compression device  Until discontinued         05/06/22 0116                Discharge Planning   GET: 5/12/2022     Code Status: Full Code   Is the patient medically ready for discharge?: No    Reason for patient still in hospital (select all that apply): Treatment  Discharge Plan A: Home, Home with family            Lainey Anton MD  Department of Hospital Medicine   [facility] - Oncology ([facility])

## 2022-05-08 NOTE — CONSULTS
"[facility] - Oncology ([facility])  Obstetrics & Gynecology  Consult Note    Patient Name: Karoline Justice  MRN: [id number]  Admission Date: 2022  Hospital Length of Stay: 3 days  Code Status: Full Code  Primary Care Provider: Dee Dee Oconnor MD  Principal Problem: Bladder mass    Inpatient consult to Gynecologic Oncology  Consult performed by: Katelin Lawrence MD  Consult ordered by: Sylvia Escobar MD        Subjective:     Chief Complaint: Bladder mass    History of Present Illness:   Karoline Justice is a 76 y.o.  F who presented to [facility] with generalized weakness, back pain, and intermittent fevers for 2 weeks with dysuria, foul-smelling urine, and confusion for the last week. At [facility] a CT A/P showed a 6.5cm mass anterior bladder wall c/f neoplasm, severe bilateral hydronephrosis, and choledocholithiasis with with moderate intra and extrahepatic biliary duct dilatation. She has hypercalcemia and a UTI. She was transferred to [facility] for Urology evaluation.     Gyn onc was consulted for potential Gyn origin of bladder mass.  Patient reports that she went through menopause "many many years ago". She denies any postmenopausal bleeding. She denies any abnormal vaginal discharge, itching, burning, or pain. She denies pelvic pain but reports significant left flank pain. She does not know when she last saw a gynecologist but it has been some time. She denies ever having an abnormal pap smear or excisional procedure on her cervix. She reports 11 prior vaginal deliveries and 1 prior CS with midline skin incision. She denies family history of GYN cancers.    No current facility-administered medications on file prior to encounter.     Current Outpatient Medications on File Prior to Encounter   Medication Sig    acetaminophen (TYLENOL) 500 MG tablet Take 500 mg by mouth every 6 (six) hours as needed for Pain.    calcium carbonate (TUMS) 200 mg calcium (500 mg) chewable tablet Take 1 tablet by mouth 2 (two) times " daily with meals.    LEVEMIR FLEXTOUCH U-100 INSULN 100 unit/mL (3 mL) InPn pen Inject 24 Units into the skin every morning.    FARXIGA 5 mg Tab tablet Take 5 mg by mouth once daily.    ibandronate (BONIVA) 150 mg tablet Take 150 mg by mouth every 30 days.       Review of patient's allergies indicates:  No Known Allergies    Past Medical History:   Diagnosis Date    Diabetes mellitus      OB History    Para Term  AB Living   9 9 9 0 0 0   SAB IAB Ectopic Multiple Live Births   0 0 0 0 0      # Outcome Date GA Lbr Vikram/2nd Weight Sex Delivery Anes PTL Lv   9 Term            8 Term            7 Term            6 Term            5 Term            4 Term            3 Term            2 Term            1 Term              History reviewed. No pertinent surgical history.  Family History     Problem Relation (Age of Onset)    Diabetes Mother, Daughter, Son    Heart attack Father    Kidney cancer Son    Stomach cancer Mother    Thyroid disease Daughter        Tobacco Use    Smoking status: Never Smoker    Smokeless tobacco: Never Used   Substance and Sexual Activity    Alcohol use: No    Drug use: No    Sexual activity: Not on file     Review of Systems   Constitutional: Negative for chills and fatigue.   Eyes: Negative for visual disturbance.   Respiratory: Negative for cough and shortness of breath.    Cardiovascular: Negative for chest pain.   Gastrointestinal: Negative for abdominal pain, nausea and vomiting.   Endocrine: Positive for diabetes.   Genitourinary: Positive for dysuria. Negative for vaginal bleeding and vaginal pain.   Musculoskeletal: Positive for back pain.   Neurological: Negative for headaches.     Objective:     Vital Signs (Most Recent):  Temp: 99.7 °F (37.6 °C) (22)  Pulse: 107 (22)  Resp: 18 (22)  BP: (!) 125/56 (22)  SpO2: 95 % (22) Vital Signs (24h Range):  Temp:  [99.1 °F (37.3 °C)-99.7 °F (37.6 °C)] 99.7 °F (37.6  °C)  Pulse:  [100-107] 107  Resp:  [16-18] 18  SpO2:  [91 %-97 %] 95 %  BP: (125-155)/(56-67) 125/56     Weight: 51.7 kg (114 lb)  Body mass index is 21.54 kg/m².  No LMP recorded. Patient is postmenopausal.    Physical Exam:   Constitutional: She is oriented to person, place, and time. She appears well-developed and well-nourished.       Cardiovascular: Normal rate.     Pulmonary/Chest: Breath sounds normal. No respiratory distress.        Abdominal: Soft. There is no abdominal tenderness.   Well healed midline vertical incision from prior CS     Genitourinary:    Genitourinary Comments: Normal appearing atropic external genitalia. Normal appearing vaginal mucosa. Normal appearing cervix with significant apical prolapse. No abnormal findings on speculum exam or bimanual exam. Cervix feels smooth. No abnormalities palpable on anterior or posterior vagina.              Musculoskeletal: Moves all extremeties.       Neurological: She is alert and oriented to person, place, and time.         Laboratory:  I have personallly reviewed all pertinent lab results from the last 24 hours.    Diagnostic Results:  US: Reviewed  CT: Reviewed  .    Assessment/Plan:     Active Diagnoses:    Diagnosis Date Noted POA    PRINCIPAL PROBLEM:  Bladder mass [N32.89] 05/06/2022 Yes    UTI (urinary tract infection) [N39.0] 05/06/2022 Yes    Type 2 diabetes mellitus [E11.9] 05/06/2022 Yes     Chronic    Hypercalcemia [E83.52] 05/06/2022 Unknown    Hydronephrosis [N13.30] 05/06/2022 Unknown    Choledocholithiasis [K80.50] 05/06/2022 Unknown    HTN (hypertension) [I10] 05/06/2022 Unknown    Anemia [D64.9] 05/06/2022 Unknown      Problems Resolved During this Admission:       1. Bladder mass  - CT A/P showed a 6.5cm mass bladder wall c/f neoplasm, severe bilateral hydronephrosis and was transferred to [facility] for Urology evaluation.  - Gyn onc was consulted for potential Gyn origin of bladder mass.  - TVUS showed an 8cm uterus with a [id number]  fundal fibroid and 2cm R adnexal cyst. The left ovary not visualized and the endometrial stripe not visualized. There are no findings on ultrasound suggestive of GYN malignancy.  - Pelvic exam was completely benign. Unlikely that her bladder mass is gynecological in origin.  - Gyn onc will sign off at this time. Due to no suspicious findings on exam, do not feel that the Gyn onc team needs to be present at time of EUA on Monday with urology. However, please reach out to the gyn onc team if intra-operative findings reveal otherwise.      Thank you for your consult. I will sign off. Please contact us if you have any additional questions.    Katelin Lawrence MD   Obstetrics & Gynecology  [facility] - Oncology ([facility])

## 2022-05-08 NOTE — HOSPITAL COURSE
Pt admitted as a transfer for urology evaluation after being found to have large bladder mass at OSH. On arrival, pt also found to be hypercalcemic to >14 along with having UTI. She was started on fluids, calcitonin, and zoledronic acid. Pt started on rocephin for UTI. Urology consulted as well for bladder mass evaluation. GYN ONC also consulted for their opinion to assess if mass if gyn related. Retroperitoneal U/S and pelvic U/S obtained for further imaging. After repeat imaging, Gyn not convinced this is gyn related and have signed off. TURBT w/ Urology on 5/9 w/ mass resection and R ureteral stent placement, unable to place L ureteral stent. L nephrostomy w/ IR on 5/10. Recovering well from procedures. Hgb 6.9 on morning 5/10 and transfused 1 unit pRBC, likely 2/2 procedure w/ hematuria. THOMAS continued to rise, started on continuous IVF 5/11 and continue to monitor sCr. Renal function improved to near what we suspect is her baseline. Stable for discharge on 5/12, to be discharged with petersen and nephrostomy tube in place. Patient was slightly wobbly on feet, but after discussing with patient and family, they were comfortable with her going home with  to start on Monday 5/16, with a home walker ordered for her in the meantime. She was discharged to complete a prolonged course of cefdinir for her complicated UTI with nephrostomy tube. She has f/u scheduled with Urology on 5/20 to assess her petersen and nephrostomy tube. Instructed family to set up PCP f/u in [place] where she lives.

## 2022-05-08 NOTE — ANESTHESIA PREPROCEDURE EVALUATION
"[facility]  Anesthesia Pre-Operative Evaluation         Patient Name: Karoline Justice  YOB: 1945  MRN: [id number]    SUBJECTIVE:     Pre-operative evaluation for Procedure(s) (LRB):  TURBT (TRANSURETHRAL RESECTION OF BLADDER TUMOR) (N/A)     05/08/2022    Karoline Justice is a 76 y.o. female w/ a significant PMHx of DM2 and HTN. She presented for evaluation of fatigue, back pain, and fevers. Imaging identified a 6.5 cm mass on the posterior bladder wall and severe bilateral hydronephrosis. She was also identified to have hypercalcemia (14.6; now 9.4) and anemia (Hgb 7.0). Primary concern is for malignancy.    Patient now presents for the above procedure(s).      LDA:        Peripheral IV - Single Lumen 05/05/22 1051 20 G Right Forearm (Active)   Site Assessment Clean;Dry;Intact;No redness;No swelling;No warmth;No drainage 05/08/22 0400   Extremity Assessment Distal to IV No abnormal discoloration;No redness;No swelling;No warmth 05/07/22 2010   Line Status Flushed;Saline locked 05/07/22 2010   Dressing Status Clean;Dry;Intact 05/07/22 2010   Dressing Intervention Integrity maintained 05/07/22 2010   Dressing Change Due 05/09/22 05/07/22 2010   Site Change Due 05/09/22 05/07/22 2010   Reason Not Rotated Not due 05/07/22 2010   Number of days: 2            Urethral Catheter 05/05/22 1140 14 Fr. (Active)   Site Assessment Clean;Intact 05/07/22 2010   Collection Container Urimeter 05/07/22 2010   Securement Method secured to top of thigh w/ adhesive device 05/07/22 2010   Catheter Care Performed yes 05/07/22 2010   Reason for Continuing Urinary Catheterization Urinary retention 05/07/22 2010   CAUTI Prevention Bundle Securement Device in place with 1" slack;Intact seal between catheter & drainage tubing;Drainage bag/urimeter off the floor;Sheeting clip in use;No dependent loops or kinks;Drainage bag/urimeter not overfilled (<2/3 full);Drainage bag/urimeter below bladder 05/07/22 2010 "   Output (mL) 500 mL 05/08/22 0417   Number of days: 2     Prev airway: None documented.    Drips: None documented.      Patient Active Problem List   Diagnosis    Pneumonia due to COVID-19 virus    UTI (urinary tract infection)    Bladder mass    Type 2 diabetes mellitus    Hypercalcemia    Hydronephrosis    Choledocholithiasis    HTN (hypertension)    Anemia       Review of patient's allergies indicates:  No Known Allergies    Current Inpatient Medications:   amLODIPine  5 mg Oral Daily    cefTRIAXone (ROCEPHIN) IVPB  1 g Intravenous Q24H    diclofenac sodium  2 g Topical (Top) Daily    LIDOcaine  1 patch Transdermal Q24H    magnesium sulfate IVPB  2 g Intravenous Once    polyethylene glycol  17 g Oral Daily    senna-docusate 8.6-50 mg  1 tablet Oral BID       No current facility-administered medications on file prior to encounter.     Current Outpatient Medications on File Prior to Encounter   Medication Sig Dispense Refill    acetaminophen (TYLENOL) 500 MG tablet Take 500 mg by mouth every 6 (six) hours as needed for Pain.      calcium carbonate (TUMS) 200 mg calcium (500 mg) chewable tablet Take 1 tablet by mouth 2 (two) times daily with meals.      LEVEMIR FLEXTOUCH U-100 INSULN 100 unit/mL (3 mL) InPn pen Inject 24 Units into the skin every morning.      FARXIGA 5 mg Tab tablet Take 5 mg by mouth once daily.      ibandronate (BONIVA) 150 mg tablet Take 150 mg by mouth every 30 days.         History reviewed. No pertinent surgical history.    Social History:  Tobacco Use: Low Risk     Smoking Tobacco Use: Never Smoker    Smokeless Tobacco Use: Never Used      Alcohol Use: Not on file        OBJECTIVE:     Vital Signs Range (Last 24H):  Temp:  [37.3 °C (99.1 °F)-37.6 °C (99.7 °F)]   Pulse:  [100-107]   Resp:  [16-18]   BP: (102-155)/(56-67)   SpO2:  [91 %-97 %]       Significant Labs:  Lab Results   Component Value Date    WBC 22.72 (H) 05/08/2022    HGB 7.0 (L) 05/08/2022    HCT 24.2 (L)  "05/08/2022     05/08/2022    ALT 16 05/08/2022    AST 23 05/08/2022     (L) 05/08/2022    K 4.3 05/08/2022     05/08/2022    CREATININE 1.7 (H) 05/08/2022    BUN 21 05/08/2022    CO2 19 (L) 05/08/2022    TSH 0.608 05/06/2022    INR 1.2 05/05/2022    HGBA1C 6.9 (H) 05/06/2022       Diagnostic Studies: No relevant studies.    EKG:   Results for orders placed or performed during the hospital encounter of 05/05/22   EKG 12-lead    Collection Time: 05/05/22  9:55 AM    Narrative    Test Reason :     Vent. Rate : 095 BPM     Atrial Rate : 095 BPM     P-R Int : 158 ms          QRS Dur : 088 ms      QT Int : 320 ms       P-R-T Axes : 051 023 031 degrees     QTc Int : 402 ms    Normal sinus rhythm  Normal ECG  When compared with ECG of 05-MAY-2022 09:24,  No significant change was found  Confirmed by Stefany Bah MD ([id number]) on 5/5/2022 1:14:04 PM    Referred By: [email or web address]   SELF           Confirmed By:Stefany Bah MD       2D ECHO:  TTE:  Results for orders placed or performed during the hospital encounter of 05/05/22   Echo   Result Value Ref Range    BSA 1.49 m2    TDI SEPTAL 0.05 m/s    LV LATERAL E/E' RATIO 10.83 m/s    LV SEPTAL E/E' RATIO 13.00 m/s    LA WIDTH 3.84 cm    TDI LATERAL 0.06 m/s    LVIDd 3.88 3.5 - 6.0 cm    IVS 0.87 0.6 - 1.1 cm    Posterior Wall 0.75 0.6 - 1.1 cm    LVIDs 2.68 2.1 - 4.0 cm    FS 31 28 - 44 %    LA volume 44.49 cm3    Sinus 2.91 cm    STJ 2.13 cm    Ascending aorta 2.70 cm    LV mass 90.43 g    LA size 3.15 cm    RVDD 2.69 cm    TAPSE 1.65 cm    RV S' 17.61 cm/s    Left Ventricle Relative Wall Thickness 0.39 cm    AV mean gradient 3 mmHg    AV valve area 2.33 cm2    AV Velocity Ratio 0.75     AV index (prosthetic) 0.79     MV valve area p 1/2 method 4.00 cm2    E/A ratio 0.64     Mean e' 0.06 m/s    E wave deceleration time 189.66 msec    IVRT 94.20 msec    MV "A" wave duration 11.70 msec    Pulm vein S/D ratio 1.66     LVOT diameter 1.94 cm    LVOT area 3.0 cm2    LVOT " peak andry 0.97 m/s    LVOT peak VTI 20.18 cm    Ao peak andry 1.30 m/s    Ao VTI 25.54 cm    LVOT stroke volume 59.62 cm3    AV peak gradient 7 mmHg    E/E' ratio 11.82 m/s    MV Peak E Andry 0.65 m/s    MV stenosis pressure 1/2 time 55.00 ms    MV Peak A Andry 1.02 m/s    PV Peak S Andry 0.53 m/s    PV Peak D Andry 0.32 m/s    LV Systolic Volume 26.40 mL    LV Systolic Volume Index 17.7 mL/m2    LV Diastolic Volume 65.31 mL    LV Diastolic Volume Index 43.83 mL/m2    LA Volume Index 29.9 mL/m2    LV Mass Index 61 g/m2    RA Major Axis 4.13 cm    Left Atrium Minor Axis 4.52 cm    Left Atrium Major Axis 4.15 cm    RA Width 3.25 cm    Right Atrial Pressure (from IVC) 3 mmHg    EF 60 %    Narrative    · The left ventricle is normal in size with normal systolic function. The   estimated ejection fraction is 60%.  · Normal right ventricular size with normal right ventricular systolic   function.  · Normal left ventricular diastolic function.  · Normal central venous pressure (3 mmHg).  · Small circumferential pericardial effusion.          TRINIDAD:  No results found for this or any previous visit.    ASSESSMENT/PLAN:           Pre-op Assessment    I have reviewed the Patient Summary Reports.     I have reviewed the Nursing Notes. I have reviewed the NPO Status.   I have reviewed the Medications.     Review of Systems  Anesthesia Hx:  Denies Hx of Anesthetic complications   Denies Personal Hx of Anesthesia complications.   Social:  Non-Smoker    Cardiovascular:   Hypertension    Pulmonary:  Pulmonary Normal    Renal/:   Chronic Renal Disease    Hepatic/GI:  Hepatic/GI Normal    Neurological:  Neurology Normal    Endocrine:   Diabetes, type 2        Physical Exam  General: Well nourished, Alert and Oriented    Airway:  Mallampati: III   Mouth Opening: Normal  TM Distance: Normal  Tongue: Normal  Neck ROM: Normal ROM    Dental:  Dentures    Chest/Lungs:  Clear to auscultation, Normal Respiratory Rate    Heart:  Rate: Normal  Rhythm:  Regular Rhythm  Sounds: Normal        Anesthesia Plan  Type of Anesthesia, risks & benefits discussed:    Anesthesia Type: Gen ETT  Intra-op Monitoring Plan: Standard ASA Monitors  Post Op Pain Control Plan: multimodal analgesia and IV/PO Opioids PRN  Induction:  IV  Airway Plan: Direct, Post-Induction  Informed Consent: Informed consent signed with the Patient and all parties understand the risks and agree with anesthesia plan.  All questions answered.   ASA Score: 3  Day of Surgery Review of History & Physical: H&P Update referred to the surgeon/provider.    Ready For Surgery From Anesthesia Perspective.     .

## 2022-05-08 NOTE — ASSESSMENT & PLAN NOTE
Patient with 2 weeks of dysuria, foul-smelling urine, and increased urinary frequency. Also reports intermittent confusion.  Urine studies on admission c/w UTI. UA cloudy with 2+ protein, 3+ leuko. Urine micro with 61 WBC.  Patient received 1g CTX at OSH.    PLAN:  --Continue CTX 1g q24h X 5 days (current day 3).   --U/c grew strep viridans.

## 2022-05-09 ENCOUNTER — ANESTHESIA (OUTPATIENT)
Dept: SURGERY | Facility: HOSPITAL | Age: 77
DRG: 657 | End: 2022-05-09
Payer: MEDICARE

## 2022-05-09 PROBLEM — J12.82 PNEUMONIA DUE TO COVID-19 VIRUS: Status: RESOLVED | Noted: 2021-08-06 | Resolved: 2022-05-09

## 2022-05-09 PROBLEM — U07.1 PNEUMONIA DUE TO COVID-19 VIRUS: Status: RESOLVED | Noted: 2021-08-06 | Resolved: 2022-05-09

## 2022-05-09 LAB
ABO + RH BLD: NORMAL
ALBUMIN SERPL BCP-MCNC: 2.3 G/DL (ref 3.5–5.2)
ALBUMIN SERPL ELPH-MCNC: 2.54 G/DL (ref 3.35–5.55)
ALP SERPL-CCNC: 139 U/L (ref 55–135)
ALPHA1 GLOB SERPL ELPH-MCNC: 0.54 G/DL (ref 0.17–0.41)
ALPHA2 GLOB SERPL ELPH-MCNC: 0.77 G/DL (ref 0.43–0.99)
ALT SERPL W/O P-5'-P-CCNC: 16 U/L (ref 10–44)
ANION GAP SERPL CALC-SCNC: 12 MMOL/L (ref 8–16)
AST SERPL-CCNC: 15 U/L (ref 10–40)
B-GLOBULIN SERPL ELPH-MCNC: 0.74 G/DL (ref 0.5–1.1)
BASOPHILS # BLD AUTO: 0.05 K/UL (ref 0–0.2)
BASOPHILS NFR BLD: 0.2 % (ref 0–1.9)
BILIRUB SERPL-MCNC: 0.5 MG/DL (ref 0.1–1)
BLD GP AB SCN CELLS X3 SERPL QL: NORMAL
BUN SERPL-MCNC: 23 MG/DL (ref 8–23)
CALCIUM SERPL-MCNC: 8.9 MG/DL (ref 8.7–10.5)
CHLORIDE SERPL-SCNC: 103 MMOL/L (ref 95–110)
CO2 SERPL-SCNC: 18 MMOL/L (ref 23–29)
CREAT SERPL-MCNC: 1.8 MG/DL (ref 0.5–1.4)
CTP QC/QA: YES
DIFFERENTIAL METHOD: ABNORMAL
EOSINOPHIL # BLD AUTO: 0.5 K/UL (ref 0–0.5)
EOSINOPHIL NFR BLD: 2.4 % (ref 0–8)
ERYTHROCYTE [DISTWIDTH] IN BLOOD BY AUTOMATED COUNT: 15.4 % (ref 11.5–14.5)
EST. GFR  (AFRICAN AMERICAN): 31.1 ML/MIN/1.73 M^2
EST. GFR  (NON AFRICAN AMERICAN): 27 ML/MIN/1.73 M^2
GAMMA GLOB SERPL ELPH-MCNC: 1.51 G/DL (ref 0.67–1.58)
GLUCOSE SERPL-MCNC: 153 MG/DL (ref 70–110)
HCT VFR BLD AUTO: 24.3 % (ref 37–48.5)
HGB BLD-MCNC: 7.3 G/DL (ref 12–16)
IMM GRANULOCYTES # BLD AUTO: 0.11 K/UL (ref 0–0.04)
IMM GRANULOCYTES NFR BLD AUTO: 0.5 % (ref 0–0.5)
INTERPRETATION SERPL IFE-IMP: NORMAL
KAPPA LC SER QL IA: 12.82 MG/DL (ref 0.33–1.94)
KAPPA LC/LAMBDA SER IA: 1.83 (ref 0.26–1.65)
LAMBDA LC SER QL IA: 7 MG/DL (ref 0.57–2.63)
LYMPHOCYTES # BLD AUTO: 1.1 K/UL (ref 1–4.8)
LYMPHOCYTES NFR BLD: 4.8 % (ref 18–48)
MAGNESIUM SERPL-MCNC: 2 MG/DL (ref 1.6–2.6)
MCH RBC QN AUTO: 25.5 PG (ref 27–31)
MCHC RBC AUTO-ENTMCNC: 30 G/DL (ref 32–36)
MCV RBC AUTO: 85 FL (ref 82–98)
MONOCYTES # BLD AUTO: 1 K/UL (ref 0.3–1)
MONOCYTES NFR BLD: 4.5 % (ref 4–15)
NEUTROPHILS # BLD AUTO: 19.4 K/UL (ref 1.8–7.7)
NEUTROPHILS NFR BLD: 87.6 % (ref 38–73)
NRBC BLD-RTO: 0 /100 WBC
PATHOLOGIST INTERPRETATION IFE: NORMAL
PATHOLOGIST INTERPRETATION SPE: NORMAL
PHOSPHATE SERPL-MCNC: 2.6 MG/DL (ref 2.7–4.5)
PLATELET # BLD AUTO: 346 K/UL (ref 150–450)
PMV BLD AUTO: 9.9 FL (ref 9.2–12.9)
POCT GLUCOSE: 107 MG/DL (ref 70–110)
POCT GLUCOSE: 124 MG/DL (ref 70–110)
POCT GLUCOSE: 144 MG/DL (ref 70–110)
POCT GLUCOSE: 158 MG/DL (ref 70–110)
POCT GLUCOSE: 174 MG/DL (ref 70–110)
POCT GLUCOSE: 183 MG/DL (ref 70–110)
POCT GLUCOSE: 366 MG/DL (ref 70–110)
POCT GLUCOSE: 400 MG/DL (ref 70–110)
POCT GLUCOSE: 81 MG/DL (ref 70–110)
POTASSIUM SERPL-SCNC: 3.9 MMOL/L (ref 3.5–5.1)
PROT SERPL-MCNC: 6.1 G/DL (ref 6–8.4)
PROT SERPL-MCNC: 6.7 G/DL (ref 6–8.4)
RBC # BLD AUTO: 2.86 M/UL (ref 4–5.4)
SARS-COV-2 AG RESP QL IA.RAPID: NEGATIVE
SODIUM SERPL-SCNC: 133 MMOL/L (ref 136–145)
WBC # BLD AUTO: 22.18 K/UL (ref 3.9–12.7)

## 2022-05-09 PROCEDURE — D9220A PRA ANESTHESIA: Mod: CRNA,,, | Performed by: NURSE ANESTHETIST, CERTIFIED REGISTERED

## 2022-05-09 PROCEDURE — 52240 PR CYSTOURETHROSCOPY,FULGUR >5 CM LESN: ICD-10-PCS | Mod: ,,, | Performed by: UROLOGY

## 2022-05-09 PROCEDURE — 74420 PR  X-RAY RETROGRADE PYELOGRAM: ICD-10-PCS | Mod: 26,,, | Performed by: UROLOGY

## 2022-05-09 PROCEDURE — 99233 PR SUBSEQUENT HOSPITAL CARE,LEVL III: ICD-10-PCS | Mod: GC,,, | Performed by: HOSPITALIST

## 2022-05-09 PROCEDURE — 37000009 HC ANESTHESIA EA ADD 15 MINS: Performed by: UROLOGY

## 2022-05-09 PROCEDURE — 88341 IMHCHEM/IMCYTCHM EA ADD ANTB: CPT | Mod: 26,,, | Performed by: PATHOLOGY

## 2022-05-09 PROCEDURE — D9220A PRA ANESTHESIA: ICD-10-PCS | Mod: ANES,,, | Performed by: STUDENT IN AN ORGANIZED HEALTH CARE EDUCATION/TRAINING PROGRAM

## 2022-05-09 PROCEDURE — 37000008 HC ANESTHESIA 1ST 15 MINUTES: Performed by: UROLOGY

## 2022-05-09 PROCEDURE — 52332 PR CYSTOSCOPY,INSERT URETERAL STENT: ICD-10-PCS | Mod: 59,RT,, | Performed by: UROLOGY

## 2022-05-09 PROCEDURE — D9220A PRA ANESTHESIA: Mod: ANES,,, | Performed by: STUDENT IN AN ORGANIZED HEALTH CARE EDUCATION/TRAINING PROGRAM

## 2022-05-09 PROCEDURE — 25000003 PHARM REV CODE 250: Performed by: STUDENT IN AN ORGANIZED HEALTH CARE EDUCATION/TRAINING PROGRAM

## 2022-05-09 PROCEDURE — 25000003 PHARM REV CODE 250

## 2022-05-09 PROCEDURE — 86850 RBC ANTIBODY SCREEN: CPT

## 2022-05-09 PROCEDURE — 71000015 HC POSTOP RECOV 1ST HR: Performed by: UROLOGY

## 2022-05-09 PROCEDURE — 99233 SBSQ HOSP IP/OBS HIGH 50: CPT | Mod: GC,,, | Performed by: HOSPITALIST

## 2022-05-09 PROCEDURE — 52240 CYSTOSCOPY AND TREATMENT: CPT | Mod: ,,, | Performed by: UROLOGY

## 2022-05-09 PROCEDURE — 20600001 HC STEP DOWN PRIVATE ROOM

## 2022-05-09 PROCEDURE — C2617 STENT, NON-COR, TEM W/O DEL: HCPCS | Performed by: UROLOGY

## 2022-05-09 PROCEDURE — 83735 ASSAY OF MAGNESIUM: CPT

## 2022-05-09 PROCEDURE — 36000707: Performed by: UROLOGY

## 2022-05-09 PROCEDURE — 25000003 PHARM REV CODE 250: Performed by: HOSPITALIST

## 2022-05-09 PROCEDURE — 88307 PR  SURG PATH,LEVEL V: ICD-10-PCS | Mod: 26,,, | Performed by: PATHOLOGY

## 2022-05-09 PROCEDURE — 25000003 PHARM REV CODE 250: Performed by: NURSE ANESTHETIST, CERTIFIED REGISTERED

## 2022-05-09 PROCEDURE — 88341 IMHCHEM/IMCYTCHM EA ADD ANTB: CPT | Performed by: PATHOLOGY

## 2022-05-09 PROCEDURE — 84100 ASSAY OF PHOSPHORUS: CPT

## 2022-05-09 PROCEDURE — 88307 TISSUE EXAM BY PATHOLOGIST: CPT | Mod: 26,,, | Performed by: PATHOLOGY

## 2022-05-09 PROCEDURE — 71000044 HC DOSC ROUTINE RECOVERY FIRST HOUR: Performed by: UROLOGY

## 2022-05-09 PROCEDURE — D9220A PRA ANESTHESIA: ICD-10-PCS | Mod: CRNA,,, | Performed by: NURSE ANESTHETIST, CERTIFIED REGISTERED

## 2022-05-09 PROCEDURE — 63600175 PHARM REV CODE 636 W HCPCS: Performed by: NURSE ANESTHETIST, CERTIFIED REGISTERED

## 2022-05-09 PROCEDURE — 88342 IMHCHEM/IMCYTCHM 1ST ANTB: CPT | Performed by: PATHOLOGY

## 2022-05-09 PROCEDURE — 88307 TISSUE EXAM BY PATHOLOGIST: CPT | Performed by: PATHOLOGY

## 2022-05-09 PROCEDURE — 25500020 PHARM REV CODE 255: Performed by: UROLOGY

## 2022-05-09 PROCEDURE — 36000706: Performed by: UROLOGY

## 2022-05-09 PROCEDURE — 63600175 PHARM REV CODE 636 W HCPCS: Performed by: STUDENT IN AN ORGANIZED HEALTH CARE EDUCATION/TRAINING PROGRAM

## 2022-05-09 PROCEDURE — 80053 COMPREHEN METABOLIC PANEL: CPT

## 2022-05-09 PROCEDURE — 74420 UROGRAPHY RTRGR +-KUB: CPT | Mod: 26,,, | Performed by: UROLOGY

## 2022-05-09 PROCEDURE — 86920 COMPATIBILITY TEST SPIN: CPT

## 2022-05-09 PROCEDURE — 88342 IMHCHEM/IMCYTCHM 1ST ANTB: CPT | Mod: 26,,, | Performed by: PATHOLOGY

## 2022-05-09 PROCEDURE — 52332 CYSTOSCOPY AND TREATMENT: CPT | Mod: 59,RT,, | Performed by: UROLOGY

## 2022-05-09 PROCEDURE — 27201423 OPTIME MED/SURG SUP & DEVICES STERILE SUPPLY: Performed by: UROLOGY

## 2022-05-09 PROCEDURE — 85025 COMPLETE CBC W/AUTO DIFF WBC: CPT

## 2022-05-09 PROCEDURE — 94761 N-INVAS EAR/PLS OXIMETRY MLT: CPT

## 2022-05-09 PROCEDURE — C1769 GUIDE WIRE: HCPCS | Performed by: UROLOGY

## 2022-05-09 PROCEDURE — 88341 PR IHC OR ICC EACH ADD'L SINGLE ANTIBODY  STAINPR: ICD-10-PCS | Mod: 26,,, | Performed by: PATHOLOGY

## 2022-05-09 PROCEDURE — 88342 CHG IMMUNOCYTOCHEMISTRY: ICD-10-PCS | Mod: 26,,, | Performed by: PATHOLOGY

## 2022-05-09 PROCEDURE — 36415 COLL VENOUS BLD VENIPUNCTURE: CPT

## 2022-05-09 DEVICE — STENT URETERAL UNIV 6FR 20CM: Type: IMPLANTABLE DEVICE | Site: URETER | Status: FUNCTIONAL

## 2022-05-09 RX ORDER — PROPOFOL 10 MG/ML
VIAL (ML) INTRAVENOUS
Status: DISCONTINUED | OUTPATIENT
Start: 2022-05-09 | End: 2022-05-09

## 2022-05-09 RX ORDER — FENTANYL CITRATE 50 UG/ML
INJECTION, SOLUTION INTRAMUSCULAR; INTRAVENOUS
Status: DISCONTINUED | OUTPATIENT
Start: 2022-05-09 | End: 2022-05-09

## 2022-05-09 RX ORDER — SODIUM CHLORIDE 0.9 % (FLUSH) 0.9 %
3 SYRINGE (ML) INJECTION EVERY 4 HOURS PRN
Status: DISCONTINUED | OUTPATIENT
Start: 2022-05-09 | End: 2022-05-12 | Stop reason: HOSPADM

## 2022-05-09 RX ORDER — MUPIROCIN 20 MG/G
OINTMENT TOPICAL 2 TIMES DAILY
Status: DISCONTINUED | OUTPATIENT
Start: 2022-05-09 | End: 2022-05-12 | Stop reason: HOSPADM

## 2022-05-09 RX ORDER — LIDOCAINE HYDROCHLORIDE 20 MG/ML
INJECTION INTRAVENOUS
Status: DISCONTINUED | OUTPATIENT
Start: 2022-05-09 | End: 2022-05-09

## 2022-05-09 RX ORDER — HALOPERIDOL 5 MG/ML
0.5 INJECTION INTRAMUSCULAR EVERY 10 MIN PRN
Status: DISCONTINUED | OUTPATIENT
Start: 2022-05-09 | End: 2022-05-10

## 2022-05-09 RX ORDER — FENTANYL CITRATE 50 UG/ML
25 INJECTION, SOLUTION INTRAMUSCULAR; INTRAVENOUS EVERY 5 MIN PRN
Status: DISCONTINUED | OUTPATIENT
Start: 2022-05-09 | End: 2022-05-10

## 2022-05-09 RX ORDER — PHENYLEPHRINE HYDROCHLORIDE 10 MG/ML
INJECTION INTRAVENOUS
Status: DISCONTINUED | OUTPATIENT
Start: 2022-05-09 | End: 2022-05-09

## 2022-05-09 RX ORDER — ATROPA BELLADONNA AND OPIUM 16.2; 3 MG/1; MG/1
SUPPOSITORY RECTAL
Status: DISCONTINUED | OUTPATIENT
Start: 2022-05-09 | End: 2022-05-09

## 2022-05-09 RX ORDER — ROCURONIUM BROMIDE 10 MG/ML
INJECTION, SOLUTION INTRAVENOUS
Status: DISCONTINUED | OUTPATIENT
Start: 2022-05-09 | End: 2022-05-09

## 2022-05-09 RX ADMIN — PHENYLEPHRINE HYDROCHLORIDE 100 MCG: 10 INJECTION INTRAVENOUS at 09:05

## 2022-05-09 RX ADMIN — SODIUM CHLORIDE 0.5 MCG/KG/MIN: 9 INJECTION, SOLUTION INTRAVENOUS at 09:05

## 2022-05-09 RX ADMIN — PROPOFOL 120 MG: 10 INJECTION, EMULSION INTRAVENOUS at 08:05

## 2022-05-09 RX ADMIN — PHENYLEPHRINE HYDROCHLORIDE 200 MCG: 10 INJECTION INTRAVENOUS at 09:05

## 2022-05-09 RX ADMIN — CEFTRIAXONE 1 G: 1 INJECTION, SOLUTION INTRAVENOUS at 12:05

## 2022-05-09 RX ADMIN — FENTANYL CITRATE 50 MCG: 50 INJECTION, SOLUTION INTRAMUSCULAR; INTRAVENOUS at 08:05

## 2022-05-09 RX ADMIN — INSULIN ASPART 4 UNITS: 100 INJECTION, SOLUTION INTRAVENOUS; SUBCUTANEOUS at 05:05

## 2022-05-09 RX ADMIN — GABAPENTIN 100 MG: 100 CAPSULE ORAL at 12:05

## 2022-05-09 RX ADMIN — INDIGO CARMINE 5 ML: 8 INJECTION, SOLUTION INTRAMUSCULAR; INTRAVENOUS at 09:05

## 2022-05-09 RX ADMIN — PHENYLEPHRINE HYDROCHLORIDE 100 MCG: 10 INJECTION INTRAVENOUS at 08:05

## 2022-05-09 RX ADMIN — DICLOFENAC SODIUM 2 G: 10 GEL TOPICAL at 12:05

## 2022-05-09 RX ADMIN — ROCURONIUM BROMIDE 20 MG: 10 INJECTION, SOLUTION INTRAVENOUS at 09:05

## 2022-05-09 RX ADMIN — LIDOCAINE 1 PATCH: 50 PATCH CUTANEOUS at 05:05

## 2022-05-09 RX ADMIN — ROCURONIUM BROMIDE 10 MG: 10 INJECTION, SOLUTION INTRAVENOUS at 10:05

## 2022-05-09 RX ADMIN — MUPIROCIN: 20 OINTMENT TOPICAL at 09:05

## 2022-05-09 RX ADMIN — ATROPA BELLADONNA AND OPIUM 30 MG: 16.2; 3 SUPPOSITORY RECTAL at 10:05

## 2022-05-09 RX ADMIN — GABAPENTIN 100 MG: 100 CAPSULE ORAL at 09:05

## 2022-05-09 RX ADMIN — ROCURONIUM BROMIDE 30 MG: 10 INJECTION, SOLUTION INTRAVENOUS at 08:05

## 2022-05-09 RX ADMIN — LIDOCAINE HYDROCHLORIDE 75 MG: 20 INJECTION, SOLUTION INTRAVENOUS at 08:05

## 2022-05-09 RX ADMIN — SODIUM CHLORIDE, SODIUM GLUCONATE, SODIUM ACETATE, POTASSIUM CHLORIDE, MAGNESIUM CHLORIDE, SODIUM PHOSPHATE, DIBASIC, AND POTASSIUM PHOSPHATE: .53; .5; .37; .037; .03; .012; .00082 INJECTION, SOLUTION INTRAVENOUS at 10:05

## 2022-05-09 RX ADMIN — SODIUM CHLORIDE, SODIUM GLUCONATE, SODIUM ACETATE, POTASSIUM CHLORIDE, MAGNESIUM CHLORIDE, SODIUM PHOSPHATE, DIBASIC, AND POTASSIUM PHOSPHATE: .53; .5; .37; .037; .03; .012; .00082 INJECTION, SOLUTION INTRAVENOUS at 08:05

## 2022-05-09 RX ADMIN — INSULIN ASPART 3 UNITS: 100 INJECTION, SOLUTION INTRAVENOUS; SUBCUTANEOUS at 09:05

## 2022-05-09 RX ADMIN — AMLODIPINE BESYLATE 5 MG: 5 TABLET ORAL at 09:05

## 2022-05-09 RX ADMIN — SENNOSIDES AND DOCUSATE SODIUM 1 TABLET: 50; 8.6 TABLET ORAL at 09:05

## 2022-05-09 RX ADMIN — PHENYLEPHRINE HYDROCHLORIDE 200 MCG: 10 INJECTION INTRAVENOUS at 08:05

## 2022-05-09 RX ADMIN — SUGAMMADEX 200 MG: 100 INJECTION, SOLUTION INTRAVENOUS at 10:05

## 2022-05-09 NOTE — ASSESSMENT & PLAN NOTE
CT A/P with multiple calcified gallstones, moderate intrahepatic and extrahepatic biliary ductal dilatation, and a 4 mm stone within the common duct.  No transaminitis (AST/ALT 8/14), alk phos wnl (104), and Tbili not elevated (0.5)  Patient denies any abdominal pain, nausea, or vomiting. No RUQ or epigastric TTP on exam.    PLAN:  --AES consulted for CBD dilation  --Trend CMP

## 2022-05-09 NOTE — ASSESSMENT & PLAN NOTE
"Patient denies any history HTN.   BP elevated at OSH (191/76, 175/92). On arrival to [facility], BP was 157/68.  Small pericardial effusion noted on CT Chest.    BP today: BP (!) 132/58 (BP Location: Left arm, Patient Position: Sitting)   Pulse 99   Temp 98.6 °F (37 °C) (Oral)   Resp 20   Ht 5' 1" (1.549 m)   Wt 51.7 kg (114 lb)   SpO2 97%   Breastfeeding No   BMI 21.54 kg/m²     PLAN:  --Monitor BP  --Start anti-hypertensives as appropriate  "

## 2022-05-09 NOTE — PROGRESS NOTES
[facility] On[facility] ([facility])  Urology  Progress Note    Patient Name: Karoline Justice  MRN: [id number]  Admission Date: 5/5/2022  Hospital Length of Stay: 4 days  Code Status: Full Code   Attending Provider: Ansley Simeon MD   Primary Care Physician: Dee Dee Oconnor MD    Subjective:     HPI:  Karoline Justice is a 76F pmhx DM, HTN who presented as a transfer from OSH with fatigue, back pain, and fevers for 2 weeks and dysuria, foul-smelling urine, and AMS for the last week. At OSH, CT A/P showed a 6.5cm mass posterior bladder wall, severe bilateral hydronephrosis. She has hypercalcemia (14.6) and a UTI. She was transferred to [facility] for Urology evaluation.     On exam, the patient is alert but has difficulty with hearing. Daughter at bedside answering a majority of the questions, she states her mother is more confused the past week . She states she has had foul smelling urine with some dysuria for the last week. Denies hematuria. She is afebrile and HDS. OSH labs with leukocytosis (WBC 23.5), hgb 8.5, elevated Cr 2.3 (baseline unknown but last was ~1.8) hypercalcemia (14.6), Microscopic analysis 4RBC, 61 WBC, occasional bacteria. Urine culture pending. Currently on Rocephin. Petersen catheter was placed at OSH and drained 1.1L. Since admission to [facility], 1300ml urine output that appears cloudy with debris. She states her bladder discomfort has decreased since placing petersen catheter.    She does not have any suprapubic discomfort or flank tenderness on palpation this morning. She states at home she does not have hematuria or and overt bladder fullness or pain. Labs are waiting to be drawn this am.      Interval History: NAEON. AF, HDS. Discussed operative plans with patient, to OR today.      Objective:     Temp:  [98.1 °F (36.7 °C)-99.5 °F (37.5 °C)] 98.8 °F (37.1 °C)  Pulse:  [] 93  Resp:  [16-18] 16  SpO2:  [94 %-97 %] 96 %  BP: (102-147)/(57-67) 123/59     Body mass index is 21.54 kg/m².           Drains       Drain   Duration                  Urethral Catheter 05/05/22 1140 14 Fr. 3 days                    Physical Exam  Constitutional:       Appearance: Normal appearance.   HENT:      Head: Normocephalic.      Nose: Nose normal.   Eyes:      Pupils: Pupils are equal, round, and reactive to light.   Cardiovascular:      Rate and Rhythm: Normal rate.   Pulmonary:      Effort: Pulmonary effort is normal.   Chest:      Chest wall: No tenderness.   Abdominal:      General: Abdomen is flat. There is no distension.      Tenderness: There is no abdominal tenderness. There is no right CVA tenderness or left CVA tenderness.      Comments: Hard mass palpable in pelvis   Genitourinary:     Comments: Soto catheter in place draining yellow cloudy urine  Limited pelvic exam performed, uterus appeared mobile, but hard mass difficult to palpate given patient positioning, more palpable from skin   Musculoskeletal:      Cervical back: Normal range of motion.   Skin:     General: Skin is warm.   Neurological:      General: No focal deficit present.      Mental Status: She is alert and oriented to person, place, and time.   Psychiatric:         Mood and Affect: Mood normal.         Behavior: Behavior normal.       Significant Labs:    BMP:  Recent Labs   Lab 05/06/22  0758 05/06/22  1156 05/07/22  0808 05/08/22  0412     --  134* 129*   K 3.9  --  3.9 4.3     --  105 101   CO2 24  --  20* 19*   BUN 26*  --  22 21   CREATININE 1.8*  --  1.7* 1.7*   CALCIUM 12.9*  13.1* 12.2* 10.4 9.4       CBC:   Recent Labs   Lab 05/06/22  0758 05/07/22  0807 05/08/22  0412   WBC 22.45* 18.53* 22.72*   HGB 7.9* 7.2* 7.0*   HCT 27.0* 23.8* 24.2*    347 313       Blood Culture:   Recent Labs   Lab 05/05/22  1103 05/05/22  1109   LABBLOO No Growth to date  No Growth to date  No Growth to date  No Growth to date No Growth to date  No Growth to date  No Growth to date  No Growth to date     Urine Culture:   Recent Labs   Lab 05/05/22  1145    LABURIN VIRIDANS STREPTOCOCCUS GROUP  > 100,000 cfu/ml  No other significant isolate  Susceptibility testing not routinely performed  *     Urine Studies:   Recent Labs   Lab 05/05/22  1145   COLORU Yellow   APPEARANCEUA Cloudy*   PHUR 7.0   SPECGRAV 1.010   PROTEINUA 2+*   GLUCUA Negative   KETONESU Negative   BILIRUBINUA Negative   OCCULTUA 2+*   NITRITE Negative   UROBILINOGEN Negative   LEUKOCYTESUR 3+*   RBCUA 4   WBCUA 61*   BACTERIA Occasional   SQUAMEPITHEL 0   HYALINECASTS 2.6*       Significant Imaging:  All pertinent imaging results/findings from the past 24 hours have been reviewed.                    Assessment/Plan:     * Bladder mass  Karoline Justice is a 76F pmhx DM, HTN who presented as a transfer from OSH with fatigue, back pain, and fevers for 2 weeks and dysuria, foul-smelling urine, and AMS for the last week    - CT is concerning for malignancy. Recommend cystoscopy and bladder biopsy this admission, if deemed safe to undergo anesthesia  - Bilateral hydronephrosis and bladder seen on CT. Petersen placed with 1.1L urine output. Likely bilateral hydro from obstruction and not from bladder mass.   - repeat US shows persistent bilateral hydronephrosis  - discussed imaging and physical exam findings with gyn onc, recs pending  - Urine culture growing strep viridans. Currently on Rocephin.   - Keep petersen catheter for minimal 1 week due to >1000ml urinary retention  - transfuse for hgb < 7   - Remainder of care per primary team  - NPO  - recc mIVF while NPO  - to OR for TURBT, will likely need ureteral stents as well. If unable to place ureteral stents, may need nephrostomy tubes.   - covid negative        VTE Risk Mitigation (From admission, onward)         Ordered     Reason for No Pharmacological VTE Prophylaxis  Once        Question:  Reasons:  Answer:  Patient is Ambulatory    05/06/22 0116     IP VTE HIGH RISK PATIENT  Once         05/06/22 0116     Place sequential compression device  Until  discontinued         05/06/22 0116                Javier Malloy MD  Urology  [facility] - Oncology ([facility])

## 2022-05-09 NOTE — NURSING TRANSFER
Nursing Transfer Note      5/9/2022     Reason patient is being transferred: condition stable for transfer    Transfer to 857    Transfer via stretcher    Transfer with PIV petersen catheter    Transported by in hospital transport    Medicines sent:NA    Any special needs or follow-up needed: call MD if folet catheter clots off and no urine is draining    Chart send with patient: YES   Notified: Dee vicente resumin care     Patient reassessed at: 05/09/22 4620

## 2022-05-09 NOTE — ANESTHESIA POSTPROCEDURE EVALUATION
Anesthesia Post Evaluation    Patient: Karoline Justice    Procedure(s) Performed: Procedure(s) (LRB):  TURBT (TRANSURETHRAL RESECTION OF BLADDER TUMOR) (N/A)  CYSTOSCOPY (N/A)  PYELOGRAM, RETROGRADE (Right)  STENT, URETERAL (Right)    Final Anesthesia Type: general      Patient location during evaluation: PACU  Patient participation: Yes- Able to Participate  Level of consciousness: awake and alert  Post-procedure vital signs: reviewed and stable  Pain management: adequate  Airway patency: patent    PONV status at discharge: No PONV  Anesthetic complications: no      Cardiovascular status: blood pressure returned to baseline  Respiratory status: unassisted  Hydration status: euvolemic  Follow-up not needed.          Vitals Value Taken Time   /52 05/09/22 1146   Temp 36.7 °C (98.1 °F) 05/09/22 1145   Pulse 99 05/09/22 1149   Resp 31 05/09/22 1149   SpO2 99 % 05/09/22 1149   Vitals shown include unvalidated device data.      No case tracking events are documented in the log.      Pain/Ana Score: Pain Rating Prior to Med Admin: 4 (5/8/2022  1:16 PM)  Pain Rating Post Med Admin: 0 (5/8/2022  2:16 PM)  Ana Score: 9 (5/9/2022 11:15 AM)

## 2022-05-09 NOTE — SUBJECTIVE & OBJECTIVE
Interval History: NAEON. AF, HDS. Discussed operative plans with patient, to OR today.      Objective:     Temp:  [98.1 °F (36.7 °C)-99.5 °F (37.5 °C)] 98.8 °F (37.1 °C)  Pulse:  [] 93  Resp:  [16-18] 16  SpO2:  [94 %-97 %] 96 %  BP: (102-147)/(57-67) 123/59     Body mass index is 21.54 kg/m².           Drains       Drain  Duration                  Urethral Catheter 05/05/22 1140 14 Fr. 3 days                    Physical Exam  Constitutional:       Appearance: Normal appearance.   HENT:      Head: Normocephalic.      Nose: Nose normal.   Eyes:      Pupils: Pupils are equal, round, and reactive to light.   Cardiovascular:      Rate and Rhythm: Normal rate.   Pulmonary:      Effort: Pulmonary effort is normal.   Chest:      Chest wall: No tenderness.   Abdominal:      General: Abdomen is flat. There is no distension.      Tenderness: There is no abdominal tenderness. There is no right CVA tenderness or left CVA tenderness.      Comments: Hard mass palpable in pelvis   Genitourinary:     Comments: Soto catheter in place draining yellow cloudy urine  Limited pelvic exam performed, uterus appeared mobile, but hard mass difficult to palpate given patient positioning, more palpable from skin   Musculoskeletal:      Cervical back: Normal range of motion.   Skin:     General: Skin is warm.   Neurological:      General: No focal deficit present.      Mental Status: She is alert and oriented to person, place, and time.   Psychiatric:         Mood and Affect: Mood normal.         Behavior: Behavior normal.       Significant Labs:    BMP:  Recent Labs   Lab 05/06/22  0758 05/06/22  1156 05/07/22  0808 05/08/22  0412     --  134* 129*   K 3.9  --  3.9 4.3     --  105 101   CO2 24  --  20* 19*   BUN 26*  --  22 21   CREATININE 1.8*  --  1.7* 1.7*   CALCIUM 12.9*  13.1* 12.2* 10.4 9.4       CBC:   Recent Labs   Lab 05/06/22  0758 05/07/22  0807 05/08/22  0412   WBC 22.45* 18.53* 22.72*   HGB 7.9* 7.2* 7.0*   HCT  27.0* 23.8* 24.2*    347 313       Blood Culture:   Recent Labs   Lab 05/05/22  1103 05/05/22  1109   LABBLOO No Growth to date  No Growth to date  No Growth to date  No Growth to date No Growth to date  No Growth to date  No Growth to date  No Growth to date     Urine Culture:   Recent Labs   Lab 05/05/22  1145   LABURIN VIRIDANS STREPTOCOCCUS GROUP  > 100,000 cfu/ml  No other significant isolate  Susceptibility testing not routinely performed  *     Urine Studies:   Recent Labs   Lab 05/05/22  1145   COLORU Yellow   APPEARANCEUA Cloudy*   PHUR 7.0   SPECGRAV 1.010   PROTEINUA 2+*   GLUCUA Negative   KETONESU Negative   BILIRUBINUA Negative   OCCULTUA 2+*   NITRITE Negative   UROBILINOGEN Negative   LEUKOCYTESUR 3+*   RBCUA 4   WBCUA 61*   BACTERIA Occasional   SQUAMEPITHEL 0   HYALINECASTS 2.6*       Significant Imaging:  All pertinent imaging results/findings from the past 24 hours have been reviewed.

## 2022-05-09 NOTE — OP NOTE
[facility]  Operative Note    Date: 05/09/2022    Pre-Op Diagnosis: bladder tumor    Patient Active Problem List    Diagnosis Date Noted    UTI (urinary tract infection) 05/06/2022    Bladder mass 05/06/2022    Type 2 diabetes mellitus 05/06/2022    Hypercalcemia 05/06/2022    Hydronephrosis 05/06/2022    Choledocholithiasis 05/06/2022    HTN (hypertension) 05/06/2022    Anemia 05/06/2022    Pneumonia due to COVID-19 virus 08/06/2021         Post-Op Diagnosis: same    Procedure(s) Performed:   1.  TURBT of large (>5 cm) sized bladder tumor  2.  Right retrograde pyelogram  3. Right ureteral stent placement    Specimen(s):   1. Bladder tumor (superficial)  2. Bladder tumor (deep)    Staff Surgeon: Adrianna Gutierrez MD    Assistant Surgeon: Marco A Graham MD (TA); Javier Malloy MD    Anesthesia: General endotracheal anesthesia    Indications: Karoline Justice is a 76 y.o. female with a bladder tumor.  She presents today for surgical resection.      Findings:   1.  Large, nodular, high-grade appearing bladder tumor with much necrotic tissue overlying the left bladder wall and floor, including the expected location of the left ureteral orifice; appeared to be grossly invading muscle  2. Right retrograde pyelogram showed J-hooking of ureter, hydronephrosis, no filling defects in upper tract. Stent placed. Unable to visualize left ureteral orifice during the case, even after administration of indigo carmine  3.   Bimanual exam post-TURBT showed apical and anterior prolapse    Estimated Blood Loss: min    Drains: 24 Fr 3-way petersen catheter, 30 cc in balloon    Procedure in detail:  After the risks, benefits and possible complications of the procedure were explained, consents were obtained. The patient was taken to the operating room and placed under anesthesia. Pre-operative antibiotics were administered 30 minutes prior to expected start time. The patient was placed in the dorsal lithotomy position and  prepped and draped in the normal and sterile fashion. Time out was performed.      A resectoscope in a 26 Fr sheath was introduced into the bladder per urethra. This passed easily.  The entire urethra was visualized which showed no masses or strictures.  The right and left ureteral orifices were identified in the normal anatomic position and were seen effluxing clear urine.  Formal cystoscopy was performed which revealed the findings described above. Moderate trabeculations were noted as well.      The visual obturator was exchanged for the resecting mechanism.  The tumor was then resected. The tumor appeared grossly to be invading muscle.  Superficial necrotic tissue was first resected and sent for gross pathology, followed by deep resection. Specimens were then removed and passed off the field for pathologic analysis.      A right retrograde pyelogram was performed showing the findings described above. A motion wire was advanced into the renal pelvis. Under direct vision, a 6 Fr x 20 cm right JJ ureteral stent was placed in standard fashion. Good coils were seen in the renal pelvis and bladder. The left ureteral orifice was unable to be visualized even after indigo carmine administration.    The bladder was drained and hemostasis was achieved.  The resectoscope was removed.  A 24 Fr 3-way petersen catheter was placed with 30 cc in the balloon.  The bladder was then irrigated.    Post-resection bimanual exam revealed the findings described above.      The patient tolerated the procedure well and was transferred to recovery in stable condition.    Disposition:  The patient will return to the care of the hospital medicine team. Interventional Radiology has been consulted for left nephrostomy tube placement.    MD JR Ordoñez was present for the entire case and agree with the above note.

## 2022-05-09 NOTE — NURSING
Glucometer malfunction breakfast and lunch blood glucose listed below:    Breakfast B   Lunch B     No sliding scale insulin needed for breakfast and lunch.

## 2022-05-09 NOTE — SUBJECTIVE & OBJECTIVE
Interval History: NAEON. VSS. Reported some lip swelling this morning on left side of upper lip, resolved later this afternoon. No breathing difficulty or concern for anaphylaxis. Had TURBT w/ Urology this morning, doing well after procedure. Plan for left-sided nephrostomy tube tomorrow. AES consulted for CBD dilation.     Review of Systems   Constitutional:  Negative for activity change, appetite change, chills, fatigue and fever.   HENT:  Negative for congestion, rhinorrhea and sore throat.    Eyes:  Negative for visual disturbance.   Respiratory:  Negative for cough and shortness of breath.    Cardiovascular:  Negative for chest pain, palpitations and leg swelling.   Gastrointestinal:  Negative for abdominal distention, abdominal pain, constipation, diarrhea, nausea and vomiting.   Genitourinary:  Positive for frequency. Negative for dysuria, hematuria and urgency.   Musculoskeletal:  Positive for back pain. Negative for myalgias and neck pain.   Skin:  Negative for rash.   Neurological:  Positive for weakness. Negative for dizziness, syncope, light-headedness and headaches.   Psychiatric/Behavioral:  Negative for agitation and confusion. The patient is not nervous/anxious.    Objective:     Vital Signs (Most Recent):  Temp: 97.7 °F (36.5 °C) (05/09/22 1315)  Pulse: 88 (05/09/22 1315)  Resp: 18 (05/09/22 1315)  BP: (!) 149/60 (05/09/22 1315)  SpO2: 98 % (05/09/22 1315) Vital Signs (24h Range):  Temp:  [97.7 °F (36.5 °C)-98.8 °F (37.1 °C)] 97.7 °F (36.5 °C)  Pulse:  [] 88  Resp:  [14-24] 18  SpO2:  [95 %-100 %] 98 %  BP: (109-149)/(51-67) 149/60     Weight: 51.7 kg (114 lb)  Body mass index is 21.54 kg/m².    Intake/Output Summary (Last 24 hours) at 5/9/2022 1542  Last data filed at 5/9/2022 1500  Gross per 24 hour   Intake 2339.74 ml   Output 2100 ml   Net 239.74 ml        Physical Exam  Vitals and nursing note reviewed.   Constitutional:       General: She is not in acute distress.     Appearance: Normal  appearance. She is normal weight.   HENT:      Head: Normocephalic and atraumatic.      Mouth/Throat:      Mouth: Mucous membranes are moist.      Pharynx: Oropharynx is clear.   Eyes:      Extraocular Movements: Extraocular movements intact.      Conjunctiva/sclera: Conjunctivae normal.   Cardiovascular:      Rate and Rhythm: Normal rate and regular rhythm.   Pulmonary:      Effort: Pulmonary effort is normal. No respiratory distress.      Breath sounds: Normal breath sounds. No wheezing.   Abdominal:      General: Abdomen is flat. Bowel sounds are normal. There is no distension.      Palpations: Abdomen is soft.      Tenderness: There is no abdominal tenderness.      Comments: Suprapubic tenderness on deep palpation.    Genitourinary:     Comments: Soto in place, draining clear/yellow urine  Musculoskeletal:         General: No swelling or tenderness. Normal range of motion.      Cervical back: Normal range of motion and neck supple.      Comments: Pain on palpation of left side down into hip   Skin:     General: Skin is warm and dry.   Neurological:      General: No focal deficit present.      Mental Status: She is alert and oriented to person, place, and time. Mental status is at baseline.      Cranial Nerves: No cranial nerve deficit.      Sensory: No sensory deficit.   Psychiatric:         Mood and Affect: Mood normal.         Behavior: Behavior normal.       Significant Labs: All pertinent labs within the past 24 hours have been reviewed.    Significant Imaging: I have reviewed all pertinent imaging results/findings within the past 24 hours.

## 2022-05-09 NOTE — PLAN OF CARE
Patient aao today. SB assist with ambulation. Patients daughter at the bedside attentive to the patient involved in patients care. Patient went for TURP today, 80% tumor removed. Un marjorie to cannulate left ureter, NPO after MN for left nephtube placement.  Vitals stable today. Soto draining very light blue urine. Pain controlled.

## 2022-05-09 NOTE — ASSESSMENT & PLAN NOTE
Karoline Justice is a 76F pmhx DM, HTN who presented as a transfer from OSH with fatigue, back pain, and fevers for 2 weeks and dysuria, foul-smelling urine, and AMS for the last week    - CT is concerning for malignancy. Recommend cystoscopy and bladder biopsy this admission, if deemed safe to undergo anesthesia  - Bilateral hydronephrosis and bladder seen on CT. Petersen placed with 1.1L urine output. Likely bilateral hydro from obstruction and not from bladder mass.   - repeat US shows persistent bilateral hydronephrosis  - discussed imaging and physical exam findings with gyn onc, recs pending  - Urine culture growing strep viridans. Currently on Rocephin.   - Keep petersen catheter for minimal 1 week due to >1000ml urinary retention  - transfuse for hgb < 7   - Remainder of care per primary team  - NPO  - recc mIVF while NPO  - to OR for TURBT, will likely need ureteral stents as well. If unable to place ureteral stents, may need nephrostomy tubes.   - covid negative

## 2022-05-09 NOTE — ASSESSMENT & PLAN NOTE
Patient reports taking 24 units levemir daily.  No HbA1c on file.    PLAN:  --Insulin detemir 12U QAM held this Am due to hypoglycemia on morning labs. Will monitor blood glucose and administer insulin as needed   --LDSSI  --HbA1c, 6.9  --POCT BG ACHS  --Diabetic diet

## 2022-05-09 NOTE — PLAN OF CARE
Plan of care reviewed. Patient is oriented X4. Pleasant and quiet. Stand by assist. PIV flushed and saline locked. No c/o pain. Soto catheter patent and functioning properly. Accu check q4hr. NPO since midnight for bladder biopsy this morning. Covid test pending result. All questions and concerns addressed. All safety precautions in place. Remains free of injury. Patient is stable. Will continue to monitor.

## 2022-05-09 NOTE — PLAN OF CARE
[facility] ([facility])  Discharge Reassessment    Primary Care Provider: Dee Dee Oconnor MD    Expected Discharge Date: 5/13/2022    Reassessment (most recent)     Discharge Reassessment - 05/09/22 1348        Discharge Reassessment    Assessment Type Discharge Planning Reassessment     Discharge Plan discussed with: Patient     Communicated GET with patient/caregiver Date not available/Unable to determine     Discharge Plan A Home;Home Health     Discharge Plan B Home with family     DME Needed Upon Discharge  none     Discharge Barriers Identified None     Why the patient remains in the hospital Requires continued medical care        Post-Acute Status    Post-Acute Authorization Home Health     Home Health Status Pending medical clearance/testing     Discharge Delays None known at this time             Anticipate home with family/home health -       5/09/2022 - Procedure(s) Performed: Procedure(s) (LRB):  TURBT (TRANSURETHRAL RESECTION OF BLADDER TUMOR) (N/A)  CYSTOSCOPY (N/A)  PYELOGRAM, RETROGRADE (Right)  STENT, URETERAL (Right)    ALEXA Howard  Case   Ext [phone]

## 2022-05-09 NOTE — ASSESSMENT & PLAN NOTE
Patient with 2 weeks of dysuria, foul-smelling urine, and increased urinary frequency. Also reports intermittent confusion.  Urine studies on admission c/w UTI. UA cloudy with 2+ protein, 3+ leuko. Urine micro with 61 WBC.  Patient received 1g CTX at OSH.    PLAN:  --Continue CTX 1g q24h X 5 days  --U/c grew strep viridans.

## 2022-05-09 NOTE — TRANSFER OF CARE
"Anesthesia Transfer of Care Note    Patient: Karoline Justice    Procedure(s) Performed: Procedure(s) (LRB):  TURBT (TRANSURETHRAL RESECTION OF BLADDER TUMOR) (N/A)  CYSTOSCOPY (N/A)  PYELOGRAM, RETROGRADE (Right)  STENT, URETERAL (Right)    Patient location: [facility]    Anesthesia Type: general    Transport from OR: Transported from OR on 6-10 L/min O2 by face mask with adequate spontaneous ventilation    Post pain: adequate analgesia    Post assessment: no apparent anesthetic complications and tolerated procedure well    Post vital signs: stable    Level of consciousness: awake    Nausea/Vomiting: no nausea/vomiting    Complications: none    Transfer of care protocol was followed      Last vitals:   Visit Vitals  BP (!) 109/51 (BP Location: Left arm, Patient Position: Lying)   Pulse 82   Temp 36.6 °C (97.9 °F) (Temporal)   Resp (!) 22   Ht 5' 1" (1.549 m)   Wt 51.7 kg (114 lb)   SpO2 100%   Breastfeeding No   BMI 21.54 kg/m²     "

## 2022-05-09 NOTE — ASSESSMENT & PLAN NOTE
Karoline Justice 76F with IDDM2 who presented to [facility] with generalized weakness, back pain, and intermittent fevers for 2 weeks with dysuria, foul-smelling urine, and confusion for the last week. At [facility] a CT A/P showed a 6.5cm mass anterior bladder wall c/f neoplasm, severe bilateral hydronephrosis. She has hypercalcemia and a UTI. She was [place] [facility] [place]. On arrival, the patient is alert and oriented. Collateral is provided by her daughter at bedside, who agrees that her mother has been more confused for the past week. She is c/o intermittent confusion, decreased appetite, bilateral flank pain, and increased urinary frequency. Labs from OSH with leukocytosis, anemia, elevated Cr (2.3 -- unknown baseline), hypercalcemia (15.7 when corrected for albumin), and UTI on urine studies. Exam notable for significant bilat flank TTP.  S/p TURBT on 5/9 with Urology, bladder mass resected, R ureteral stent placed, unable to place L ureteral stent    PLAN:  --Urology following, appreciate recs  -- Gyn onc recommended transabdominal or transvaginal ultrasound.  Transabdominal ultrasound pursued because of associated discomfort with transvaginal ultrasound.  U/S showing small ovarian cyst and uterine fibroid but not other concerning findings with regards to bladder mass. Gyn Onc not convinced this is gyn related and have signed off.   --Trend CMP  --Soto in place  --Renally dose medications, avoid nephrotoxins  --Strict I&O's  --Plan for L nephrostomy tube tomorrow per IR  --Follow pathology

## 2022-05-09 NOTE — ASSESSMENT & PLAN NOTE
Calcium 14.6 on admission. 15.7 when corrected for albumin.  Patient reports 1 weeks of confusion, fatigue, and constipation.  Resolved    PLAN:  --S/p continuous IVF @ 200cc/hour for goal -150 cc/hour.   -- S/p Calcitonin 4 units/kg x1.  -- S/p Zoledronic acid 4mg  -- Calcium improving. Cont monitoring qd.   --Strict I&O's

## 2022-05-09 NOTE — PROGRESS NOTES
Heraclio Schroeder - Oncology ([facility])  [facility] Medicine  Progress Note    Patient Name: Karoline Justice  MRN: [id number]  Patient Class: IP- Inpatient   Admission Date: 5/5/2022  Length of Stay: 4 days  Attending Physician: Ansley Simeon MD  Primary Care Provider: Dee Dee Oconnor MD        Subjective:     Principal Problem:Bladder mass        HPI:  Karoline Justice 76F with IDDM2 who presented to [facility] with generalized weakness, back pain, and intermittent fevers for 2 weeks with dysuria, foul-smelling urine, and confusion for the last week. At [facility] a CT A/P showed a 6.5cm mass anterior bladder wall c/f neoplasm, severe bilateral hydronephrosis, and choledocholithiasis with with moderate intra and extrahepatic biliary duct dilatation. She has hypercalcemia and a UTI. She was transferred to [facility] for Urology evaluation.    On arrival, the patient is alert and oriented. Collateral is provided by her daughter at bedside, who agrees that her mother has been more confused for the past week. She is c/o intermittent confusion, decreased appetite, bilateral flank pain, and increased urinary frequency. She is afebrile and mildly hypertensive (157/68). Labs from OSH with leukocytosis (WBC 23.5), anemia (Hb 8.5), elevated Cr (2.3 -- unknown baseline), hypercalcemia (15.7 when corrected for albumin), and UTI on urine studies (UA cloudy with 2+ protein, 3+ leuks and urine microscopy with 61 WBC). Exam notable for significant bilat flank TTP. She will be admitted to  for further care.       Overview/Hospital Course:  Pt admitted as a transfer for urology evaluation after being found to have large bladder mass at OSH. On arrival, pt also found to be hypercalcemic to >14 along with having UTI. She was started on fluids, calcitonin, and zoledronic acid. Pt started on rocephin for UTI. Urology consulted as well for bladder mass evaluation. GYN ONC also consulted for their opinion to assess if mass if gyn related. Retroperitoneal U/S  and pelvic U/S obtained for further imaging. After repeat imaging, Gyn not convinced this is gyn related and have signed off. Pt to go for biopsy with urology 5/9/22 and possible placement of ureteral stents to relieve obstructions.       Interval History: NAEON. VSS. Reported some lip swelling this morning on left side of upper lip, resolved later this afternoon. No breathing difficulty or concern for anaphylaxis. Had TURBT w/ Urology this morning, doing well after procedure. Plan for left-sided nephrostomy tube tomorrow. AES consulted for CBD dilation.     Review of Systems   Constitutional:  Negative for activity change, appetite change, chills, fatigue and fever.   HENT:  Negative for congestion, rhinorrhea and sore throat.    Eyes:  Negative for visual disturbance.   Respiratory:  Negative for cough and shortness of breath.    Cardiovascular:  Negative for chest pain, palpitations and leg swelling.   Gastrointestinal:  Negative for abdominal distention, abdominal pain, constipation, diarrhea, nausea and vomiting.   Genitourinary:  Positive for frequency. Negative for dysuria, hematuria and urgency.   Musculoskeletal:  Positive for back pain. Negative for myalgias and neck pain.   Skin:  Negative for rash.   Neurological:  Positive for weakness. Negative for dizziness, syncope, light-headedness and headaches.   Psychiatric/Behavioral:  Negative for agitation and confusion. The patient is not nervous/anxious.    Objective:     Vital Signs (Most Recent):  Temp: 97.7 °F (36.5 °C) (05/09/22 1315)  Pulse: 88 (05/09/22 1315)  Resp: 18 (05/09/22 1315)  BP: (!) 149/60 (05/09/22 1315)  SpO2: 98 % (05/09/22 1315) Vital Signs (24h Range):  Temp:  [97.7 °F (36.5 °C)-98.8 °F (37.1 °C)] 97.7 °F (36.5 °C)  Pulse:  [] 88  Resp:  [14-24] 18  SpO2:  [95 %-100 %] 98 %  BP: (109-149)/(51-67) 149/60     Weight: 51.7 kg (114 lb)  Body mass index is 21.54 kg/m².    Intake/Output Summary (Last 24 hours) at 5/9/2022 1542  Last  data filed at 5/9/2022 1500  Gross per 24 hour   Intake 2339.74 ml   Output 2100 ml   Net 239.74 ml        Physical Exam  Vitals and nursing note reviewed.   Constitutional:       General: She is not in acute distress.     Appearance: Normal appearance. She is normal weight.   HENT:      Head: Normocephalic and atraumatic.      Mouth/Throat:      Mouth: Mucous membranes are moist.      Pharynx: Oropharynx is clear.   Eyes:      Extraocular Movements: Extraocular movements intact.      Conjunctiva/sclera: Conjunctivae normal.   Cardiovascular:      Rate and Rhythm: Normal rate and regular rhythm.   Pulmonary:      Effort: Pulmonary effort is normal. No respiratory distress.      Breath sounds: Normal breath sounds. No wheezing.   Abdominal:      General: Abdomen is flat. Bowel sounds are normal. There is no distension.      Palpations: Abdomen is soft.      Tenderness: There is no abdominal tenderness.      Comments: Suprapubic tenderness on deep palpation.    Genitourinary:     Comments: Soto in place, draining clear/yellow urine  Musculoskeletal:         General: No swelling or tenderness. Normal range of motion.      Cervical back: Normal range of motion and neck supple.      Comments: Pain on palpation of left side down into hip   Skin:     General: Skin is warm and dry.   Neurological:      General: No focal deficit present.      Mental Status: She is alert and oriented to person, place, and time. Mental status is at baseline.      Cranial Nerves: No cranial nerve deficit.      Sensory: No sensory deficit.   Psychiatric:         Mood and Affect: Mood normal.         Behavior: Behavior normal.       Significant Labs: All pertinent labs within the past 24 hours have been reviewed.    Significant Imaging: I have reviewed all pertinent imaging results/findings within the past 24 hours.      Assessment/Plan:      * Bladder mass  Karolinebebo Justice 76F with IDDM2 who presented to [facility] with generalized  weakness, back pain, and intermittent fevers for 2 weeks with dysuria, foul-smelling urine, and confusion for the last week. At [facility] a CT A/P showed a 6.5cm mass anterior bladder wall c/f neoplasm, severe bilateral hydronephrosis. She has hypercalcemia and a UTI. She was transferred to [facility] for Urology evaluation. On arrival, the patient is alert and oriented. Collateral is provided by her daughter at bedside, who agrees that her mother has been more confused for the past week. She is c/o intermittent confusion, decreased appetite, bilateral flank pain, and increased urinary frequency. Labs from OSH with leukocytosis, anemia, elevated Cr (2.3 -- unknown baseline), hypercalcemia (15.7 when corrected for albumin), and UTI on urine studies. Exam notable for significant bilat flank TTP.  S/p TURBT on 5/9 with Urology, bladder mass resected, R ureteral stent placed, unable to place L ureteral stent    PLAN:  --Urology following, appreciate recs  -- Gyn onc recommended transabdominal or transvaginal ultrasound.  Transabdominal ultrasound pursued because of associated discomfort with transvaginal ultrasound.  U/S showing small ovarian cyst and uterine fibroid but not other concerning findings with regards to bladder mass. Gyn Onc not convinced this is gyn related and have signed off.   --Trend CMP  --Soto in place  --Renally dose medications, avoid nephrotoxins  --Strict I&O's  --Plan for L nephrostomy tube tomorrow per IR  --Follow pathology     Anemia  Hb 8.5 on admission. MCV 85.   Unknown baseline.   HgB of 7.0 this AM    PLAN:  --Trend CBC  -- Will have up to date type and screen if transfusion is necessary.     HTN (hypertension)  Patient denies any history HTN.   BP elevated at OSH (191/76, 175/92). On arrival to [facility], BP was 157/68.  Small pericardial effusion noted on CT Chest.    BP today: BP (!) 132/58 (BP Location: Left arm, Patient Position: Sitting)   Pulse 99   Temp 98.6 °F (37 °C) (Oral)   Resp 20   Ht 5'  "1" (1.549 m)   Wt 51.7 kg (114 lb)   SpO2 97%   Breastfeeding No   BMI 21.54 kg/m²     PLAN:  --Monitor BP  --Start anti-hypertensives as appropriate    Choledocholithiasis  CT A/P with multiple calcified gallstones, moderate intrahepatic and extrahepatic biliary ductal dilatation, and a 4 mm stone within the common duct.  No transaminitis (AST/ALT 8/14), alk phos wnl (104), and Tbili not elevated (0.5)  Patient denies any abdominal pain, nausea, or vomiting. No RUQ or epigastric TTP on exam.    PLAN:  --AES consulted for CBD dilation  --Trend CMP    Hydronephrosis  Severe bilateral hydronephrosis noted on CT A/P.  Cr 2.3 on admission, unknown baseline.   Patient reports significant UOP.    PLAN:  --Urology following.   -- Repeat retroperitoneal US still showing persistent severe hydro on left and moderate on right side  -- Cr. Improving.   -- Soto in place.   --Trend CMP  -- Urology to possibly place bl ureteral stents, if unable pt may need nephrostomy tubes      Hypercalcemia  Calcium 14.6 on admission. 15.7 when corrected for albumin.  Patient reports 1 weeks of confusion, fatigue, and constipation.  Resolved    PLAN:  --S/p continuous IVF @ 200cc/hour for goal -150 cc/hour.   -- S/p Calcitonin 4 units/kg x1.  -- S/p Zoledronic acid 4mg  -- Calcium improving. Cont monitoring qd.   --Strict I&O's    Type 2 diabetes mellitus  Patient reports taking 24 units levemir daily.  No HbA1c on file.    PLAN:  --Insulin detemir 12U QAM held this Am due to hypoglycemia on morning labs. Will monitor blood glucose and administer insulin as needed   --LDSSI  --HbA1c, 6.9  --POCT BG ACHS  --Diabetic diet      UTI (urinary tract infection)  Patient with 2 weeks of dysuria, foul-smelling urine, and increased urinary frequency. Also reports intermittent confusion.  Urine studies on admission c/w UTI. UA cloudy with 2+ protein, 3+ leuko. Urine micro with 61 WBC.  Patient received 1g CTX at OSH.    PLAN:  --Continue CTX " 1g q24h X 5 days  --U/c grew strep viridans.       VTE Risk Mitigation (From admission, onward)         Ordered     Reason for No Pharmacological VTE Prophylaxis  Once        Question:  Reasons:  Answer:  Patient is Ambulatory    05/06/22 0116     IP VTE HIGH RISK PATIENT  Once         05/06/22 0116     Place sequential compression device  Until discontinued         05/06/22 0116                Discharge Planning   GET: 5/13/2022     Code Status: Full Code   Is the patient medically ready for discharge?: No    Reason for patient still in hospital (select all that apply): Patient trending condition  Discharge Plan A: Home, Home Health   Discharge Delays: None known at this time      Chuy Min DO  Department of Hospital Medicine   [facility] - Oncology ([facility])

## 2022-05-09 NOTE — MEDICAL/APP STUDENT
Hospital Medicine Student   Progress Note  [facility] HOSP MED 1    Admit Date: 5/5/2022  Hospital Day: 4  05/09/2022  1:32 PM    SUBJECTIVE:   Ms. Karoline Justice is a 76 y.o. female with a relevant medical history of DM II who is being followed up for Bladder mass. She presented to [facility] with generalized weakness, back pain, and intermittent fevers for 2 weeks with dysuria, foul-smelling urine, and confusion for the last week. CT A/P showed a 6.5cm mass anterior bladder wall c/f neoplasm, severe bilateral hydronephrosis, and choledocholithiasis with with moderate intra and extrahepatic biliary duct dilatation. She has hypercalcemia and a UTI. Transferred to [facility]. On arrival, the patient is alert and oriented. Collateral is provided by her daughter at bedside, who agrees that her mother has been more confused for the past week. She is c/o intermittent confusion, decreased appetite, bilateral flank pain, and increased urinary frequency. Labs from OSH with leukocytosis, anemia, elevated Cr (2.3 -- unknown baseline), hypercalcemia (15.7 when corrected for albumin), and UTI on urine studies. Exam notable for significant bilat flank TTP.    Overview/Hospital Course:  Pt admitted as a transfer for urology evaluation after being found to have large bladder mass at OSH. On arrival, pt also found to be hypercalcemic to >14 along with having UTI. She was started on fluids, calcitonin, and zoledronic acid. Pt started on rocephin for UTI. Urology consulted as well for bladder mass evaluation. GYN ONC also consulted for their opinion to assess if mass if gyn related. Retroperitoneal U/S and pelvic U/S obtained for further imaging. After repeat imaging, Gyn not convinced this is gyn related and have signed off.     Interval history: Patient reports upper lip swelling overnight with associated point tenderness in the left corner of her mouth. Fist noted around 12 am. Denies any fever, rash, SOB, dysphagia, or airway swelling.  Denies any allergies to food or medications. Does note paroxysmal night cough that wakes her up. Patient states icing her upper lip has seem to improve the swelling. Patient had bladder mass resection/biopsy with urology today. Specimen sent to pathology. R ureter stent placed. IR consulted for placement of left nephrostomy tube.     Review of Systems   Constitutional: Negative for chills, fever and malaise/fatigue.   HENT: Negative for hearing loss and sore throat.    Eyes: Negative for blurred vision.   Respiratory: Negative for cough, shortness of breath, wheezing and stridor.    Cardiovascular: Negative for chest pain, palpitations, orthopnea and leg swelling.   Gastrointestinal: Negative for abdominal pain, blood in stool, constipation, diarrhea, heartburn, melena, nausea and vomiting.   Genitourinary: Negative for dysuria and urgency.   Musculoskeletal: Negative for myalgias.   Skin: Negative for itching and rash.   Neurological: Negative for dizziness, speech change, weakness and headaches.       Please refer to the H&P for past medical, family, and social history.    OBJECTIVE:     Vital Signs Recent:  Temp: 97.7 °F (36.5 °C) (05/09/22 1315)  Pulse: 88 (05/09/22 1315)  Resp: 18 (05/09/22 1315)  BP: (!) 149/60 (05/09/22 1315)  SpO2: 98 % (05/09/22 1315)  Oxygen Documentation:    Flow (L/min): 9           O2 Device (Oxygen Therapy): room air         Vital Signs Range (Last 24H):  Temp:  [97.7 °F (36.5 °C)-98.8 °F (37.1 °C)]   Pulse:  []   Resp:  [14-24]   BP: (109-149)/(51-67)   SpO2:  [95 %-100 %]        I & O (Last 24H):    Intake/Output Summary (Last 24 hours) at 5/9/2022 1332  Last data filed at 5/9/2022 1053  Gross per 24 hour   Intake 2179.74 ml   Output 1700 ml   Net 479.74 ml        Physical Exam:  Physical Exam  Constitutional:       Appearance: Normal appearance.   HENT:      Head: Normocephalic.      Comments: Upper lip swelling w/o redness. Pinpoint tenderness in the Left corner of mouth       Nose: No congestion or rhinorrhea.      Mouth/Throat:      Mouth: Mucous membranes are moist.   Eyes:      Conjunctiva/sclera: Conjunctivae normal.      Pupils: Pupils are equal, round, and reactive to light.   Cardiovascular:      Rate and Rhythm: Normal rate and regular rhythm.      Heart sounds: No murmur heard.    No friction rub. No gallop.   Pulmonary:      Effort: Pulmonary effort is normal. No respiratory distress.      Breath sounds: Normal breath sounds. No stridor. No wheezing or rhonchi.   Abdominal:      General: Abdomen is flat. Bowel sounds are normal. There is no distension.      Palpations: Abdomen is soft.      Tenderness: There is no abdominal tenderness.   Musculoskeletal:      Cervical back: Normal range of motion.      Right lower leg: No edema.      Left lower leg: No edema.   Skin:     General: Skin is warm and dry.      Findings: No erythema or rash.   Neurological:      General: No focal deficit present.      Mental Status: She is alert and oriented to person, place, and time.   Psychiatric:         Mood and Affect: Mood normal.         Labs:   Recent Labs   Lab 05/07/22  0808 05/08/22  0412 05/09/22  0804   * 129* 133*   K 3.9 4.3 3.9    101 103   CO2 20* 19* 18*   BUN 22 21 23   CREATININE 1.7* 1.7* 1.8*   * 66* 153*   CALCIUM 10.4 9.4 8.9   MG 1.4* 1.6 2.0   PHOS 1.2* 2.2* 2.6*     Recent Labs   Lab 05/05/22  0957 05/06/22  0758 05/07/22  0808 05/08/22  0412 05/09/22  0804   ALKPHOS 104   < > 101 134 139*   ALT 14   < > 10 16 16   AST 8*   < > 13 23 15   ALBUMIN 2.6*   < > 2.3* 2.3* 2.3*   PROT 8.1   < > 6.4 5.9* 6.7   BILITOT 0.5   < > 0.3 0.5 0.5   INR 1.2  --   --   --   --     < > = values in this interval not displayed.     Recent Labs   Lab 05/07/22  0807 05/08/22  0412 05/09/22  0804   WBC 18.53* 22.72* 22.18*   HGB 7.2* 7.0* 7.3*   HCT 23.8* 24.2* 24.3*    313 346       Diagnostic Results:    CT A/P w/o Contrast    Impression:  1. Choledocholithiasis  with moderate intra and extrahepatic biliary duct dilatation.  Also cholelithiasis.  2. Approximately 6.5 cm mass involving the left inferior wall of the bladder, worrisome for neoplasm.  3. Severe bilateral hydronephrosis, which may be related to bladder mass and/or bladder distention.    Bladder Biopsy    Findings:   1.  Large, nodular, high-grade appearing bladder tumor with much necrotic tissue overlying the left bladder wall and floor, including the expected location of the left ureteral orifice; appeared to be grossly invading muscle  2. Right retrograde pyelogram showed J-hooking of ureter, hydronephrosis, no filling defects in upper tract. Stent placed. Unable to visualize left ureteral orifice during the case, even after administration of indigo carmine  3.   Bimanual exam post-TURBT showed apical and anterior prolapse    Scheduled Meds:   amLODIPine  5 mg Oral Daily    cefTRIAXone (ROCEPHIN) IVPB  1 g Intravenous Q24H    diclofenac sodium  2 g Topical (Top) Daily    gabapentin  100 mg Oral TID    LIDOcaine  1 patch Transdermal Q24H    polyethylene glycol  17 g Oral Daily    senna-docusate 8.6-50 mg  1 tablet Oral BID     Continuous Infusions:  PRN Meds:acetaminophen, dextrose 10%, dextrose 10%, fentaNYL, glucagon (human recombinant), glucose, glucose, haloperidol lactate, HYDROmorphone, insulin aspart U-100, melatonin, naloxone, ondansetron, oxyCODONE, prochlorperazine, sodium chloride 0.9%, sodium chloride 0.9%    ASSESSMENT/PLAN:   Ms. Karoline Justice is a 76 y.o. female with a relevant medical history of DM II who is being followed up for Bladder mass.  Presented to [facility] with generalized weakness, back pain, and intermittent fevers for 2 weeks with dysuria, foul-smelling urine, and confusion for the last week. At [facility] a CT A/P showed a 6.5cm mass anterior bladder wall c/f neoplasm, severe bilateral hydronephrosis. She has hypercalcemia and a UTI.     Active Hospital Problems     Diagnosis  POA    *Bladder mass [N32.89]  Yes    UTI (urinary tract infection) [N39.0]  Yes    Type 2 diabetes mellitus [E11.9]  Yes     Chronic    Hypercalcemia [E83.52]  Unknown    Hydronephrosis [N13.30]  Unknown    Choledocholithiasis [K80.50]  Unknown    HTN (hypertension) [I10]  Unknown    Anemia [D64.9]  Unknown      Resolved Hospital Problems   No resolved problems to display.       * Bladder mass      CT A/P showed a 6.5cm mass anterior bladder wall c/f neoplasm, severe bilateral hydronephrosis.  U/S showing small ovarian cyst and uterine fibroid but not other concerning findings with regards to bladder mass. Gyn Onc not convinced this is gyn related and have no signed off. Pt underwent bladder mass resection/biopsy with urology. Specimen sent to pathology. R ureter stent also placed.    PLAN:  -- IR consulted for placement of L nephrostomy tube   -- consider post nephrostomy tube anticoagulation in addition to SCD's for DVT prophylaxis due to hypercoagulability 2/2 possible malignancy         Anemia       Worsening normocytic anemia. Hb 8.5 on admission. Has been trending down, currently 7.3        Plan:  --Monitor H/H  -- Will have up to date type and screen if transfusion is necessary.       HTN (hypertension)  --Monitor BP  --Start anti-hypertensives as appropriate       Choledocholithiasis  CT A/P with multiple calcified gallstones, moderate intrahepatic and extrahepatic biliary ductal dilatation, and a 4 mm stone within the common duct.  No transaminitis (AST/ALT 8/14), alk phos wnl (104), and Tbili not elevated (0.5)  Patient denies any abdominal pain, nausea, or vomiting. No RUQ or epigastric TTP on exam.    PLAN:  --AES consult for ERCP  --Trend CMP        Hydronephrosis  Severe bilateral hydronephrosis noted on CT A/P.  Cr 2.3 on admission     PLAN:  -- IR consulted for L nephrostomy tube        Hypercalcemia  Calcium 14.6 on admission. 15.7 when corrected for albumin.     PLAN:  --S/p  continuous IVF @ 200cc/hour for goal -150 cc/hour.   -- S/p Calcitonin 4 units/kg x1.  -- S/p Zoledronic acid 4mg  -- Calcium improving. Cont monitoring qd.   --Strict I&O's        Type 2 diabetes mellitus  Patient reports taking 24 units levemir daily.     PLAN:  --continue Insulin detemir 12U QAM. PRN pre prandial aspart for hyperglycemia.  --HbA1c, 6.9  --POCT BG ACHS  --Diabetic diet when appropriate        UTI (urinary tract infection)  Patient with 2 weeks of dysuria, foul-smelling urine, and increased urinary frequency. Also reports intermittent confusion.  Urine studies on admission c/w UTI. UA cloudy with 2+ protein, 3+ leuko. Urine micro with 61 WBC.  Patient received 1g CTX at OSH.      U/c grew strep viridans.     PLAN:  --Continue CTX 1g q24h X 5 days (current day 4).              VTE Risk Mitigation (From admission, onward)                  Ordered        Reason for No Pharmacological VTE Prophylaxis  Once        Question:  Reasons:  Answer:  Patient is Ambulatory    05/06/22 0116        IP VTE HIGH RISK PATIENT  Once         05/06/22 0116        Place sequential compression device  Until discontinued         05/06/22 0116             Discharge planning:   GET: 5/12/2022     Code Status: Full Code   Is the patient medically ready for discharge?: No    Reason for patient still in hospital (select all that apply): Treatment  Discharge Plan A: Home, Home with family     Kenyon Jacobs   MS3  [employer] [employer] [facility]

## 2022-05-10 ENCOUNTER — TELEPHONE (OUTPATIENT)
Dept: PHARMACY | Facility: CLINIC | Age: 77
End: 2022-05-10
Payer: MEDICARE

## 2022-05-10 DIAGNOSIS — K80.50 CHOLEDOCHOLITHIASIS: Primary | ICD-10-CM

## 2022-05-10 LAB
ALBUMIN SERPL BCP-MCNC: 2.2 G/DL (ref 3.5–5.2)
ALP SERPL-CCNC: 135 U/L (ref 55–135)
ALT SERPL W/O P-5'-P-CCNC: 13 U/L (ref 10–44)
ANION GAP SERPL CALC-SCNC: 11 MMOL/L (ref 8–16)
AST SERPL-CCNC: 11 U/L (ref 10–40)
BACTERIA BLD CULT: NORMAL
BACTERIA BLD CULT: NORMAL
BASOPHILS # BLD AUTO: 0.02 K/UL (ref 0–0.2)
BASOPHILS NFR BLD: 0.1 % (ref 0–1.9)
BILIRUB SERPL-MCNC: 0.3 MG/DL (ref 0.1–1)
BLD PROD TYP BPU: NORMAL
BLOOD UNIT EXPIRATION DATE: NORMAL
BLOOD UNIT TYPE CODE: 5100
BLOOD UNIT TYPE: NORMAL
BUN SERPL-MCNC: 31 MG/DL (ref 8–23)
CALCIUM SERPL-MCNC: 7.8 MG/DL (ref 8.7–10.5)
CHLORIDE SERPL-SCNC: 102 MMOL/L (ref 95–110)
CO2 SERPL-SCNC: 19 MMOL/L (ref 23–29)
CODING SYSTEM: NORMAL
CREAT SERPL-MCNC: 2 MG/DL (ref 0.5–1.4)
DIFFERENTIAL METHOD: ABNORMAL
DISPENSE STATUS: NORMAL
EOSINOPHIL # BLD AUTO: 0 K/UL (ref 0–0.5)
EOSINOPHIL NFR BLD: 0 % (ref 0–8)
ERYTHROCYTE [DISTWIDTH] IN BLOOD BY AUTOMATED COUNT: 15.8 % (ref 11.5–14.5)
EST. GFR  (AFRICAN AMERICAN): 27.4 ML/MIN/1.73 M^2
EST. GFR  (NON AFRICAN AMERICAN): 23.7 ML/MIN/1.73 M^2
GLUCOSE SERPL-MCNC: 309 MG/DL (ref 70–110)
HCT VFR BLD AUTO: 21.7 % (ref 37–48.5)
HGB BLD-MCNC: 6.9 G/DL (ref 12–16)
IMM GRANULOCYTES # BLD AUTO: 0.12 K/UL (ref 0–0.04)
IMM GRANULOCYTES NFR BLD AUTO: 0.6 % (ref 0–0.5)
LYMPHOCYTES # BLD AUTO: 0.9 K/UL (ref 1–4.8)
LYMPHOCYTES NFR BLD: 4.1 % (ref 18–48)
MAGNESIUM SERPL-MCNC: 1.9 MG/DL (ref 1.6–2.6)
MCH RBC QN AUTO: 26.2 PG (ref 27–31)
MCHC RBC AUTO-ENTMCNC: 31.8 G/DL (ref 32–36)
MCV RBC AUTO: 83 FL (ref 82–98)
MONOCYTES # BLD AUTO: 0.5 K/UL (ref 0.3–1)
MONOCYTES NFR BLD: 2.4 % (ref 4–15)
NEUTROPHILS # BLD AUTO: 19.7 K/UL (ref 1.8–7.7)
NEUTROPHILS NFR BLD: 92.8 % (ref 38–73)
NRBC BLD-RTO: 0 /100 WBC
NUM UNITS TRANS PACKED RBC: NORMAL
PHOSPHATE SERPL-MCNC: 2.6 MG/DL (ref 2.7–4.5)
PLATELET # BLD AUTO: 329 K/UL (ref 150–450)
PMV BLD AUTO: 10 FL (ref 9.2–12.9)
POCT GLUCOSE: 186 MG/DL (ref 70–110)
POCT GLUCOSE: 209 MG/DL (ref 70–110)
POCT GLUCOSE: 340 MG/DL (ref 70–110)
POCT GLUCOSE: 368 MG/DL (ref 70–110)
POTASSIUM SERPL-SCNC: 3.8 MMOL/L (ref 3.5–5.1)
PROT PATTERN UR ELPH-IMP: NORMAL
PROT SERPL-MCNC: 6.3 G/DL (ref 6–8.4)
RBC # BLD AUTO: 2.63 M/UL (ref 4–5.4)
SODIUM SERPL-SCNC: 132 MMOL/L (ref 136–145)
WBC # BLD AUTO: 21.19 K/UL (ref 3.9–12.7)

## 2022-05-10 PROCEDURE — 99232 SBSQ HOSP IP/OBS MODERATE 35: CPT | Mod: GC,,, | Performed by: HOSPITALIST

## 2022-05-10 PROCEDURE — 84100 ASSAY OF PHOSPHORUS: CPT

## 2022-05-10 PROCEDURE — 25000003 PHARM REV CODE 250

## 2022-05-10 PROCEDURE — P9016 RBC LEUKOCYTES REDUCED: HCPCS

## 2022-05-10 PROCEDURE — 80053 COMPREHEN METABOLIC PANEL: CPT

## 2022-05-10 PROCEDURE — 20600001 HC STEP DOWN PRIVATE ROOM

## 2022-05-10 PROCEDURE — 36430 TRANSFUSION BLD/BLD COMPNT: CPT

## 2022-05-10 PROCEDURE — 99223 PR INITIAL HOSPITAL CARE,LEVL III: ICD-10-PCS | Mod: GC,,, | Performed by: INTERNAL MEDICINE

## 2022-05-10 PROCEDURE — 83735 ASSAY OF MAGNESIUM: CPT

## 2022-05-10 PROCEDURE — 25000003 PHARM REV CODE 250: Performed by: HOSPITALIST

## 2022-05-10 PROCEDURE — 63600175 PHARM REV CODE 636 W HCPCS: Performed by: STUDENT IN AN ORGANIZED HEALTH CARE EDUCATION/TRAINING PROGRAM

## 2022-05-10 PROCEDURE — 94761 N-INVAS EAR/PLS OXIMETRY MLT: CPT

## 2022-05-10 PROCEDURE — 36415 COLL VENOUS BLD VENIPUNCTURE: CPT

## 2022-05-10 PROCEDURE — 63600175 PHARM REV CODE 636 W HCPCS: Performed by: RADIOLOGY

## 2022-05-10 PROCEDURE — 99232 PR SUBSEQUENT HOSPITAL CARE,LEVL II: ICD-10-PCS | Mod: GC,,, | Performed by: HOSPITALIST

## 2022-05-10 PROCEDURE — 85025 COMPLETE CBC W/AUTO DIFF WBC: CPT

## 2022-05-10 PROCEDURE — C9399 UNCLASSIFIED DRUGS OR BIOLOG: HCPCS | Performed by: HOSPITALIST

## 2022-05-10 PROCEDURE — 63600175 PHARM REV CODE 636 W HCPCS

## 2022-05-10 PROCEDURE — 25500020 PHARM REV CODE 255: Performed by: HOSPITALIST

## 2022-05-10 PROCEDURE — 99223 1ST HOSP IP/OBS HIGH 75: CPT | Mod: GC,,, | Performed by: INTERNAL MEDICINE

## 2022-05-10 PROCEDURE — 25000003 PHARM REV CODE 250: Performed by: STUDENT IN AN ORGANIZED HEALTH CARE EDUCATION/TRAINING PROGRAM

## 2022-05-10 RX ORDER — MIDAZOLAM HYDROCHLORIDE 1 MG/ML
INJECTION INTRAMUSCULAR; INTRAVENOUS CODE/TRAUMA/SEDATION MEDICATION
Status: COMPLETED | OUTPATIENT
Start: 2022-05-10 | End: 2022-05-10

## 2022-05-10 RX ORDER — INSULIN ASPART 100 [IU]/ML
1-10 INJECTION, SOLUTION INTRAVENOUS; SUBCUTANEOUS
Status: DISCONTINUED | OUTPATIENT
Start: 2022-05-10 | End: 2022-05-11

## 2022-05-10 RX ORDER — HYDROCODONE BITARTRATE AND ACETAMINOPHEN 500; 5 MG/1; MG/1
TABLET ORAL
Status: DISCONTINUED | OUTPATIENT
Start: 2022-05-10 | End: 2022-05-12 | Stop reason: HOSPADM

## 2022-05-10 RX ORDER — AMLODIPINE BESYLATE 5 MG/1
5 TABLET ORAL DAILY
Qty: 90 TABLET | Refills: 3 | Status: ON HOLD | OUTPATIENT
Start: 2022-05-11 | End: 2022-07-22 | Stop reason: HOSPADM

## 2022-05-10 RX ORDER — FENTANYL CITRATE 50 UG/ML
INJECTION, SOLUTION INTRAMUSCULAR; INTRAVENOUS CODE/TRAUMA/SEDATION MEDICATION
Status: COMPLETED | OUTPATIENT
Start: 2022-05-10 | End: 2022-05-10

## 2022-05-10 RX ORDER — LIDOCAINE 50 MG/G
1 PATCH TOPICAL DAILY
Qty: 30 PATCH | Refills: 2 | Status: ON HOLD | OUTPATIENT
Start: 2022-05-10 | End: 2022-07-22 | Stop reason: HOSPADM

## 2022-05-10 RX ADMIN — INSULIN ASPART 4 UNITS: 100 INJECTION, SOLUTION INTRAVENOUS; SUBCUTANEOUS at 04:05

## 2022-05-10 RX ADMIN — SENNOSIDES AND DOCUSATE SODIUM 1 TABLET: 50; 8.6 TABLET ORAL at 09:05

## 2022-05-10 RX ADMIN — LIDOCAINE 1 PATCH: 50 PATCH CUTANEOUS at 03:05

## 2022-05-10 RX ADMIN — FENTANYL CITRATE 50 MCG: 50 INJECTION, SOLUTION INTRAMUSCULAR; INTRAVENOUS at 08:05

## 2022-05-10 RX ADMIN — ACETAMINOPHEN 1000 MG: 500 TABLET ORAL at 09:05

## 2022-05-10 RX ADMIN — GABAPENTIN 100 MG: 100 CAPSULE ORAL at 03:05

## 2022-05-10 RX ADMIN — INSULIN ASPART 8 UNITS: 100 INJECTION, SOLUTION INTRAVENOUS; SUBCUTANEOUS at 11:05

## 2022-05-10 RX ADMIN — FENTANYL CITRATE 25 MCG: 50 INJECTION, SOLUTION INTRAMUSCULAR; INTRAVENOUS at 08:05

## 2022-05-10 RX ADMIN — MIDAZOLAM HYDROCHLORIDE 1 MG: 1 INJECTION, SOLUTION INTRAMUSCULAR; INTRAVENOUS at 08:05

## 2022-05-10 RX ADMIN — MUPIROCIN: 20 OINTMENT TOPICAL at 09:05

## 2022-05-10 RX ADMIN — AMLODIPINE BESYLATE 5 MG: 5 TABLET ORAL at 09:05

## 2022-05-10 RX ADMIN — IOHEXOL 10 ML: 300 INJECTION, SOLUTION INTRAVENOUS at 08:05

## 2022-05-10 RX ADMIN — CEFTRIAXONE 1 G: 1 INJECTION, SOLUTION INTRAVENOUS at 12:05

## 2022-05-10 RX ADMIN — INSULIN ASPART 1 UNITS: 100 INJECTION, SOLUTION INTRAVENOUS; SUBCUTANEOUS at 08:05

## 2022-05-10 RX ADMIN — GABAPENTIN 100 MG: 100 CAPSULE ORAL at 09:05

## 2022-05-10 RX ADMIN — DICLOFENAC SODIUM 2 G: 10 GEL TOPICAL at 09:05

## 2022-05-10 RX ADMIN — MIDAZOLAM HYDROCHLORIDE 0.5 MG: 1 INJECTION, SOLUTION INTRAMUSCULAR; INTRAVENOUS at 08:05

## 2022-05-10 RX ADMIN — POLYETHYLENE GLYCOL 3350 17 G: 17 POWDER, FOR SOLUTION ORAL at 09:05

## 2022-05-10 RX ADMIN — INSULIN DETEMIR 8 UNITS: 100 INJECTION, SOLUTION SUBCUTANEOUS at 09:05

## 2022-05-10 NOTE — ASSESSMENT & PLAN NOTE
Karoline Justice 76F with IDDM2 who presented to [facility] with generalized weakness, back pain, and intermittent fevers for 2 weeks with dysuria, foul-smelling urine, and confusion for the last week. At [facility] a CT A/P showed a 6.5cm mass anterior bladder wall c/f neoplasm, severe bilateral hydronephrosis. She has hypercalcemia and a UTI. She was [place] [facility] [place]. On arrival, the patient is alert and oriented. Collateral is provided by her daughter at bedside, who agrees that her mother has been more confused for the past week. She is c/o intermittent confusion, decreased appetite, bilateral flank pain, and increased urinary frequency. Labs from OSH with leukocytosis, anemia, elevated Cr (2.3 -- unknown baseline), hypercalcemia (15.7 when corrected for albumin), and UTI on urine studies. Exam notable for significant bilat flank TTP.  Gyn Onc recommended transabdominal or transvaginal ultrasound.  Transabdominal ultrasound pursued because of associated discomfort with transvaginal ultrasound.  U/S showing small ovarian cyst and uterine fibroid but not other concerning findings with regards to bladder mass. Gyn Onc not convinced this is gyn related and have signed off.   S/p TURBT on 5/9 w/ Urology, bladder mass resected, R ureteral stent placed, unable to place L ureteral stent  S/p L nephrectomy tube w/ IR on 5/10  Good UOP, sCr trending upward    PLAN:  --Urology following, appreciate recs  --Soto in place  --Renally dose medications, avoid nephrotoxins  --Strict I&O's  --Follow pathology from resection  --Trend renal function on BMP

## 2022-05-10 NOTE — ASSESSMENT & PLAN NOTE
Calcium 14.6 on admission. 15.7 when corrected for albumin.  Patient reports 1 weeks of confusion, fatigue, and constipation.  Resolved    PLAN:  --S/p continuous IVF @ 200cc/hour for goal -150 cc/hour.   --S/p Calcitonin 4 units/kg x1.  --S/p Zoledronic acid 4mg  --Calcium improving. Cont monitoring qd.   --Strict I&O's

## 2022-05-10 NOTE — ASSESSMENT & PLAN NOTE
Severe bilateral hydronephrosis noted on CT A/P.  Cr 2.3 on admission, unknown baseline.   Patient reports significant UOP.  Repeat retroperitoneal US still showing persistent severe hydro on left and moderate on right side.   S/p R ureteral stent placed 5/9 and L nephrostomy tube placed 5/10     PLAN:  --Urology following, appreciate recs  --Continue Soto, R ureteral stent, L nephrostomy tube in place  --Trend creatinine   --Request nephrostomy supplies for patient at discharge  --Education for patient/family on nephrostomy/Soto care at discharge  --Will follow up with Urology at discharge in office

## 2022-05-10 NOTE — ASSESSMENT & PLAN NOTE
Severe bilateral hydronephrosis noted on CT A/P.  Cr 2.3 on admission, unknown baseline.   Patient reports significant UOP.  Repeat retroperitoneal US still showing persistent severe hydro on left and moderate on right side.   S/p R ureteral stent placed 5/9 and L nephrostomy tube placed 5/10     PLAN:  --Urology following, appreciate recs  --Continue Soto, R ureteral stent, L nephrostomy tube in place  --Trend creatinine

## 2022-05-10 NOTE — SUBJECTIVE & OBJECTIVE
Interval History: NAEON. HDS. No complaints per patient. Tolerated nephrostomy tube placement this morning, denies pain after procedure. Eating well. Seen by [facility] inpatient for choledocholithiasis, plan for ERCP as outpatient. Good UOP, sCr increasing. Continue to monitor renal function.    Review of Systems   Constitutional:  Negative for activity change, appetite change, chills, fatigue and fever.   HENT:  Negative for congestion, rhinorrhea and sore throat.    Eyes:  Negative for visual disturbance.   Respiratory:  Negative for cough and shortness of breath.    Cardiovascular:  Negative for chest pain, palpitations and leg swelling.   Gastrointestinal:  Negative for abdominal distention, abdominal pain, constipation, diarrhea, nausea and vomiting.   Genitourinary:  Positive for frequency. Negative for dysuria, hematuria and urgency.   Musculoskeletal:  Positive for back pain. Negative for myalgias and neck pain.   Skin:  Negative for rash.   Neurological:  Positive for weakness. Negative for dizziness, syncope, light-headedness and headaches.   Psychiatric/Behavioral:  Negative for agitation and confusion. The patient is not nervous/anxious.    Objective:     Vital Signs (Most Recent):  Temp: 97.6 °F (36.4 °C) (05/10/22 1150)  Pulse: 77 (05/10/22 1150)  Resp: 18 (05/10/22 1150)  BP: 97/65 (05/10/22 1150)  SpO2: 96 % (05/10/22 1150) Vital Signs (24h Range):  Temp:  [97.5 °F (36.4 °C)-98.6 °F (37 °C)] 97.6 °F (36.4 °C)  Pulse:  [62-99] 77  Resp:  [15-20] 18  SpO2:  [95 %-98 %] 96 %  BP: ()/(46-65) 97/65     Weight: 51.7 kg (114 lb)  Body mass index is 21.54 kg/m².    Intake/Output Summary (Last 24 hours) at 5/10/2022 1214  Last data filed at 5/10/2022 0342  Gross per 24 hour   Intake 500 ml   Output 1200 ml   Net -700 ml        Physical Exam  Vitals and nursing note reviewed.   Constitutional:       General: She is not in acute distress.     Appearance: Normal appearance. She is normal weight.   HENT:       Head: Normocephalic and atraumatic.      Mouth/Throat:      Mouth: Mucous membranes are moist.      Pharynx: Oropharynx is clear.   Eyes:      Extraocular Movements: Extraocular movements intact.      Conjunctiva/sclera: Conjunctivae normal.   Cardiovascular:      Rate and Rhythm: Normal rate and regular rhythm.   Pulmonary:      Effort: Pulmonary effort is normal. No respiratory distress.      Breath sounds: Normal breath sounds. No wheezing.   Abdominal:      General: Abdomen is flat. Bowel sounds are normal. There is no distension.      Palpations: Abdomen is soft.      Tenderness: There is no abdominal tenderness.   Genitourinary:     Comments: Soto in place, draining clear/yellow urine  Musculoskeletal:         General: No swelling or tenderness. Normal range of motion.      Cervical back: Normal range of motion and neck supple.      Comments: Left flank TTP   Skin:     General: Skin is warm and dry.   Neurological:      General: No focal deficit present.      Mental Status: She is alert and oriented to person, place, and time. Mental status is at baseline.      Cranial Nerves: No cranial nerve deficit.      Sensory: No sensory deficit.   Psychiatric:         Mood and Affect: Mood normal.         Behavior: Behavior normal. Behavior is not agitated. Behavior is cooperative.       Significant Labs: All pertinent labs within the past 24 hours have been reviewed.    Significant Imaging: I have reviewed all pertinent imaging results/findings within the past 24 hours.

## 2022-05-10 NOTE — SUBJECTIVE & OBJECTIVE
Interval History: NAEON. Still c/o some left hip pain, no flank pain. Catheter draining clear yellow urine.      Objective:     Temp:  [97.7 °F (36.5 °C)-98.8 °F (37.1 °C)] 98.4 °F (36.9 °C)  Pulse:  [77-99] 77  Resp:  [14-24] 18  SpO2:  [95 %-100 %] 95 %  BP: (109-149)/(51-64) 120/57     Body mass index is 21.54 kg/m².           Drains       Drain  Duration                  Urethral Catheter 05/09/22 Latex;Straight-tip;Triple-lumen 24 Fr. 1 day                    Physical Exam  Constitutional:       Appearance: Normal appearance.   HENT:      Head: Normocephalic.      Nose: Nose normal.   Eyes:      Pupils: Pupils are equal, round, and reactive to light.   Cardiovascular:      Rate and Rhythm: Normal rate.   Pulmonary:      Effort: Pulmonary effort is normal.   Chest:      Chest wall: No tenderness.   Abdominal:      General: Abdomen is flat. There is no distension.      Tenderness: There is no abdominal tenderness. There is no right CVA tenderness or left CVA tenderness.      Comments: Hard mass palpable in pelvis   Genitourinary:     Comments: Soto catheter in place clear yellow urine  Limited pelvic exam performed, uterus appeared mobile, but hard mass difficult to palpate given patient positioning, more palpable from skin   Musculoskeletal:      Cervical back: Normal range of motion.   Skin:     General: Skin is warm.   Neurological:      General: No focal deficit present.      Mental Status: She is alert and oriented to person, place, and time.   Psychiatric:         Mood and Affect: Mood normal.         Behavior: Behavior normal.       Significant Labs:    BMP:  Recent Labs   Lab 05/08/22 0412 05/09/22  0804 05/10/22  0352   * 133* 132*   K 4.3 3.9 3.8    103 102   CO2 19* 18* 19*   BUN 21 23 31*   CREATININE 1.7* 1.8* 2.0*   CALCIUM 9.4 8.9 7.8*       CBC:   Recent Labs   Lab 05/08/22 0412 05/09/22  0804 05/10/22  0352   WBC 22.72* 22.18* 21.19*   HGB 7.0* 7.3* 6.9*   HCT 24.2* 24.3* 21.7*     346 329       Blood Culture:   Recent Labs   Lab 05/05/22  1103 05/05/22  1109   LABBLOO No Growth to date  No Growth to date  No Growth to date  No Growth to date  No Growth to date No Growth to date  No Growth to date  No Growth to date  No Growth to date  No Growth to date     Urine Culture:   Recent Labs   Lab 05/05/22  1145   LABURIN VIRIDANS STREPTOCOCCUS GROUP  > 100,000 cfu/ml  No other significant isolate  Susceptibility testing not routinely performed  *     Urine Studies:   Recent Labs   Lab 05/05/22  1145   COLORU Yellow   APPEARANCEUA Cloudy*   PHUR 7.0   SPECGRAV 1.010   PROTEINUA 2+*   GLUCUA Negative   KETONESU Negative   BILIRUBINUA Negative   OCCULTUA 2+*   NITRITE Negative   UROBILINOGEN Negative   LEUKOCYTESUR 3+*   RBCUA 4   WBCUA 61*   BACTERIA Occasional   SQUAMEPITHEL 0   HYALINECASTS 2.6*       Significant Imaging:  All pertinent imaging results/findings from the past 24 hours have been reviewed.

## 2022-05-10 NOTE — NURSING
Received pt to [facility] bay 7. Bedside report given by ANGELA Almazan RN. Pt placed on continuous cardiac monitoring and vital signs documented. Pt denies pain at this time. Dressing to new nephrostomy tube on left lower back is clean, dry and intact. Nephrostomy is open to gravity with approximately 100mL of yellow drainage in bag. Pt's recovery time is one hour to end at 0915. Will continue to monitor.

## 2022-05-10 NOTE — PROGRESS NOTES
[facility] ([facility])  Urology  Progress Note    Patient Name: Karoline Justice  MRN: [id number]  Admission Date: 5/5/2022  Hospital Length of Stay: 5 days  Code Status: Full Code   Attending Provider: Ansley Simeon MD   Primary Care Physician: Dee Dee Oconnor MD    Subjective:     HPI:  Karoline Justice is a 76F pmhx DM, HTN who presented as a transfer from OSH with fatigue, back pain, and fevers for 2 weeks and dysuria, foul-smelling urine, and AMS for the last week. At OSH, CT A/P showed a 6.5cm mass posterior bladder wall, severe bilateral hydronephrosis. She has hypercalcemia (14.6) and a UTI. She was transferred to [facility] for Urology evaluation.     On exam, the patient is alert but has difficulty with hearing. Daughter at bedside answering a majority of the questions, she states her mother is more confused the past week . She states she has had foul smelling urine with some dysuria for the last week. Denies hematuria. She is afebrile and HDS. OSH labs with leukocytosis (WBC 23.5), hgb 8.5, elevated Cr 2.3 (baseline unknown but last was ~1.8) hypercalcemia (14.6), Microscopic analysis 4RBC, 61 WBC, occasional bacteria. Urine culture pending. Currently on Rocephin. Petersen catheter was placed at OSH and drained 1.1L. Since admission to [facility], 1300ml urine output that appears cloudy with debris. She states her bladder discomfort has decreased since placing petersen catheter.    She does not have any suprapubic discomfort or flank tenderness on palpation this morning. She states at home she does not have hematuria or and overt bladder fullness or pain. Labs are waiting to be drawn this am.      Interval History: NAEON. Still c/o some left hip pain, no flank pain. Catheter draining clear yellow urine.      Objective:     Temp:  [97.7 °F (36.5 °C)-98.8 °F (37.1 °C)] 98.4 °F (36.9 °C)  Pulse:  [77-99] 77  Resp:  [14-24] 18  SpO2:  [95 %-100 %] 95 %  BP: (109-149)/(51-64) 120/57     Body mass index is 21.54 kg/m².            Drains       Drain  Duration                  Urethral Catheter 05/09/22 Latex;Straight-tip;Triple-lumen 24 Fr. 1 day                    Physical Exam  Constitutional:       Appearance: Normal appearance.   HENT:      Head: Normocephalic.      Nose: Nose normal.   Eyes:      Pupils: Pupils are equal, round, and reactive to light.   Cardiovascular:      Rate and Rhythm: Normal rate.   Pulmonary:      Effort: Pulmonary effort is normal.   Chest:      Chest wall: No tenderness.   Abdominal:      General: Abdomen is flat. There is no distension.      Tenderness: There is no abdominal tenderness. There is no right CVA tenderness or left CVA tenderness.      Comments: Hard mass palpable in pelvis   Genitourinary:     Comments: Soto catheter in place clear yellow urine  Limited pelvic exam performed, uterus appeared mobile, but hard mass difficult to palpate given patient positioning, more palpable from skin   Musculoskeletal:      Cervical back: Normal range of motion.   Skin:     General: Skin is warm.   Neurological:      General: No focal deficit present.      Mental Status: She is alert and oriented to person, place, and time.   Psychiatric:         Mood and Affect: Mood normal.         Behavior: Behavior normal.       Significant Labs:    BMP:  Recent Labs   Lab 05/08/22  0412 05/09/22  0804 05/10/22  0352   * 133* 132*   K 4.3 3.9 3.8    103 102   CO2 19* 18* 19*   BUN 21 23 31*   CREATININE 1.7* 1.8* 2.0*   CALCIUM 9.4 8.9 7.8*       CBC:   Recent Labs   Lab 05/08/22  0412 05/09/22  0804 05/10/22  0352   WBC 22.72* 22.18* 21.19*   HGB 7.0* 7.3* 6.9*   HCT 24.2* 24.3* 21.7*    346 329       Blood Culture:   Recent Labs   Lab 05/05/22  1103 05/05/22  1109   LABBLOO No Growth to date  No Growth to date  No Growth to date  No Growth to date  No Growth to date No Growth to date  No Growth to date  No Growth to date  No Growth to date  No Growth to date     Urine Culture:   Recent Labs    Lab 05/05/22  1145   LABURIN VIRIDANS STREPTOCOCCUS GROUP  > 100,000 cfu/ml  No other significant isolate  Susceptibility testing not routinely performed  *     Urine Studies:   Recent Labs   Lab 05/05/22  1145   COLORU Yellow   APPEARANCEUA Cloudy*   PHUR 7.0   SPECGRAV 1.010   PROTEINUA 2+*   GLUCUA Negative   KETONESU Negative   BILIRUBINUA Negative   OCCULTUA 2+*   NITRITE Negative   UROBILINOGEN Negative   LEUKOCYTESUR 3+*   RBCUA 4   WBCUA 61*   BACTERIA Occasional   SQUAMEPITHEL 0   HYALINECASTS 2.6*       Significant Imaging:  All pertinent imaging results/findings from the past 24 hours have been reviewed.                    Assessment/Plan:     * Bladder mass  Karoline Justice is a 76F pmhx DM, HTN who presented as a transfer from OSH with fatigue, back pain, and fevers for 2 weeks and dysuria, foul-smelling urine, and AMS for the last week    - s/p cystoscopy with TURBT and R ureteral stent placement, unable to place L ureteral stent  - IR consulted for L NT  - NPO for anticipated NT  - f/u pathology  - Bilateral hydronephrosis and bladder seen on CT. Petersen placed with 1.1L urine output. Likely bilateral hydro from obstruction and not from bladder mass.   - Urine culture growing strep viridans. Currently on Rocephin.   - Keep petersen catheter for minimal 1 week due to >1000ml urinary retention  - transfuse for hgb < 7   - Remainder of care per primary team  - recc mIVF while NPO        VTE Risk Mitigation (From admission, onward)         Ordered     Reason for No Pharmacological VTE Prophylaxis  Once        Question:  Reasons:  Answer:  Patient is Ambulatory    05/06/22 0116     IP VTE HIGH RISK PATIENT  Once         05/06/22 0116     Place sequential compression device  Until discontinued         05/06/22 0116                Javier Malloy MD  Urology  [facility] - Oncology ([facility])

## 2022-05-10 NOTE — H&P
Inpatient Radiology Pre-procedure Note    History of Present Illness:  Karoline Justice is a 76 y.o. female with bladder cancer status post TURBT on 05/09 who presents for left nephrostomy tube placement.     Admission H&P reviewed.  Past Medical History:   Diagnosis Date    Diabetes mellitus     Pneumonia due to COVID-19 virus 8/6/2021     Past Surgical History:   Procedure Laterality Date    CYSTOSCOPY N/A 5/9/2022    Procedure: CYSTOSCOPY;  Surgeon: Adrianna Gutierrez MD;  Location: [facility] OR [address] [place] [address];  Service: Urology;  Laterality: N/A;    RETROGRADE PYELOGRAPHY Right 5/9/2022    Procedure: PYELOGRAM, RETROGRADE;  Surgeon: Adrianna Gutierrez MD;  Location: [facility] OR [address] [place] [address];  Service: Urology;  Laterality: Right;       Review of Systems:   As documented in primary team H&P    Home Meds:   Prior to Admission medications    Medication Sig Start Date End Date Taking? Authorizing Provider   acetaminophen (TYLENOL) 500 MG tablet Take 500 mg by mouth every 6 (six) hours as needed for Pain.   Yes Historical Provider   calcium carbonate (TUMS) 200 mg calcium (500 mg) chewable tablet Take 1 tablet by mouth 2 (two) times daily with meals.   Yes Historical Provider   LEVEMIR FLEXTOUCH U-100 INSULN 100 unit/mL (3 mL) InPn pen Inject 24 Units into the skin every morning. 2/25/22  Yes Historical Provider     Scheduled Meds:    amLODIPine  5 mg Oral Daily    cefTRIAXone (ROCEPHIN) IVPB  1 g Intravenous Q24H    diclofenac sodium  2 g Topical (Top) Daily    gabapentin  100 mg Oral TID    insulin detemir U-100  8 Units Subcutaneous Daily    LIDOcaine  1 patch Transdermal Q24H    mupirocin   Nasal BID    polyethylene glycol  17 g Oral Daily    senna-docusate 8.6-50 mg  1 tablet Oral BID     Continuous Infusions:   PRN Meds:sodium chloride, acetaminophen, dextrose 10%, dextrose 10%, fentaNYL, glucagon (human recombinant), glucose, glucose, insulin aspart U-100, melatonin, midazolam, naloxone, ondansetron, oxyCODONE,  prochlorperazine, sodium chloride 0.9%, sodium chloride 0.9%  Anticoagulants/Antiplatelets: no anticoagulation    Allergies: Review of patient's allergies indicates:  No Known Allergies  Sedation Hx: have not been any systemic reactions    Labs:  Recent Labs   Lab 05/05/22 0957   INR 1.2       Recent Labs   Lab 05/10/22  0352   WBC 21.19*   HGB 6.9*   HCT 21.7*   MCV 83         Recent Labs   Lab 05/10/22  0352   *   *   K 3.8      CO2 19*   BUN 31*   CREATININE 2.0*   CALCIUM 7.8*   MG 1.9   ALT 13   AST 11   ALBUMIN 2.2*   BILITOT 0.3         Vitals:  Temp: 97.5 °F (36.4 °C) (05/10/22 0731)  Pulse: 70 (05/10/22 0731)  Resp: 18 (05/10/22 0731)  BP: 136/60 (05/10/22 0731)  SpO2: 98 % (05/10/22 0731)     Physical Exam:  ASA: III  Mallampati: III    General: no acute distress  Mental Status: alert and oriented to person, place and time  HEENT: normocephalic, atraumatic  Chest: unlabored breathing  Heart: regular heart rate  Abdomen: nondistended  Extremity: moves all extremities    Plan: Patient will undergo left percutaneous nephrostomy tube placement.   Sedation Plan: brian Rojas, PGY-5

## 2022-05-10 NOTE — PROGRESS NOTES
[facility] - Oncology ([facility])  Hospital Medicine  Progress Note    Patient Name: Karoline Justice  MRN: [id number]  Patient Class: IP- Inpatient   Admission Date: 5/5/2022  Length of Stay: 5 days  Attending Physician: Ansley Simeon MD  Primary Care Provider: Dee Dee Oconnor MD        Subjective:     Principal Problem:Bladder mass        HPI:  Karoline Justice 76F with IDDM2 who presented to [facility] with generalized weakness, back pain, and intermittent fevers for 2 weeks with dysuria, foul-smelling urine, and confusion for the last week. At [facility] a CT A/P showed a 6.5cm mass anterior bladder wall c/f neoplasm, severe bilateral hydronephrosis, and choledocholithiasis with with moderate intra and extrahepatic biliary duct dilatation. She has hypercalcemia and a UTI. She was transferred to [facility] for Urology evaluation.    On arrival, the patient is alert and oriented. Collateral is provided by her daughter at bedside, who agrees that her mother has been more confused for the past week. She is c/o intermittent confusion, decreased appetite, bilateral flank pain, and increased urinary frequency. She is afebrile and mildly hypertensive (157/68). Labs from OSH with leukocytosis (WBC 23.5), anemia (Hb 8.5), elevated Cr (2.3 -- unknown baseline), hypercalcemia (15.7 when corrected for albumin), and UTI on urine studies (UA cloudy with 2+ protein, 3+ leuks and urine microscopy with 61 WBC). Exam notable for significant bilat flank TTP. She will be admitted to  for further care.       Overview/Hospital Course:  Pt admitted as a transfer for urology evaluation after being found to have large bladder mass at OSH. On arrival, pt also found to be hypercalcemic to >14 along with having UTI. She was started on fluids, calcitonin, and zoledronic acid. Pt started on rocephin for UTI. Urology consulted as well for bladder mass evaluation. GYN ONC also consulted for their opinion to assess if mass if gyn related. Retroperitoneal U/S  and pelvic U/S obtained for further imaging. After repeat imaging, Gyn not convinced this is gyn related and have signed off. Pt to go for biopsy with urology 5/9/22 and possible placement of ureteral stents to relieve obstructions.       Interval History: NAEON. HDS. No complaints per patient. Tolerated nephrostomy tube placement this morning, denies pain after procedure. Eating well. Seen by [facility] inpatient for choledocholithiasis, plan for ERCP as outpatient. Good UOP, sCr increasing. Continue to monitor renal function.    Review of Systems   Constitutional:  Negative for activity change, appetite change, chills, fatigue and fever.   HENT:  Negative for congestion, rhinorrhea and sore throat.    Eyes:  Negative for visual disturbance.   Respiratory:  Negative for cough and shortness of breath.    Cardiovascular:  Negative for chest pain, palpitations and leg swelling.   Gastrointestinal:  Negative for abdominal distention, abdominal pain, constipation, diarrhea, nausea and vomiting.   Genitourinary:  Positive for frequency. Negative for dysuria, hematuria and urgency.   Musculoskeletal:  Positive for back pain. Negative for myalgias and neck pain.   Skin:  Negative for rash.   Neurological:  Positive for weakness. Negative for dizziness, syncope, light-headedness and headaches.   Psychiatric/Behavioral:  Negative for agitation and confusion. The patient is not nervous/anxious.    Objective:     Vital Signs (Most Recent):  Temp: 97.6 °F (36.4 °C) (05/10/22 1150)  Pulse: 77 (05/10/22 1150)  Resp: 18 (05/10/22 1150)  BP: 97/65 (05/10/22 1150)  SpO2: 96 % (05/10/22 1150) Vital Signs (24h Range):  Temp:  [97.5 °F (36.4 °C)-98.6 °F (37 °C)] 97.6 °F (36.4 °C)  Pulse:  [62-99] 77  Resp:  [15-20] 18  SpO2:  [95 %-98 %] 96 %  BP: ()/(46-65) 97/65     Weight: 51.7 kg (114 lb)  Body mass index is 21.54 kg/m².    Intake/Output Summary (Last 24 hours) at 5/10/2022 1214  Last data filed at 5/10/2022 0342  Gross per 24 hour    Intake 500 ml   Output 1200 ml   Net -700 ml        Physical Exam  Vitals and nursing note reviewed.   Constitutional:       General: She is not in acute distress.     Appearance: Normal appearance. She is normal weight.   HENT:      Head: Normocephalic and atraumatic.      Mouth/Throat:      Mouth: Mucous membranes are moist.      Pharynx: Oropharynx is clear.   Eyes:      Extraocular Movements: Extraocular movements intact.      Conjunctiva/sclera: Conjunctivae normal.   Cardiovascular:      Rate and Rhythm: Normal rate and regular rhythm.   Pulmonary:      Effort: Pulmonary effort is normal. No respiratory distress.      Breath sounds: Normal breath sounds. No wheezing.   Abdominal:      General: Abdomen is flat. Bowel sounds are normal. There is no distension.      Palpations: Abdomen is soft.      Tenderness: There is no abdominal tenderness.   Genitourinary:     Comments: Soto in place, draining clear/yellow urine  Musculoskeletal:         General: No swelling or tenderness. Normal range of motion.      Cervical back: Normal range of motion and neck supple.      Comments: Left flank TTP   Skin:     General: Skin is warm and dry.   Neurological:      General: No focal deficit present.      Mental Status: She is alert and oriented to person, place, and time. Mental status is at baseline.      Cranial Nerves: No cranial nerve deficit.      Sensory: No sensory deficit.   Psychiatric:         Mood and Affect: Mood normal.         Behavior: Behavior normal. Behavior is not agitated. Behavior is cooperative.       Significant Labs: All pertinent labs within the past 24 hours have been reviewed.    Significant Imaging: I have reviewed all pertinent imaging results/findings within the past 24 hours.      Assessment/Plan:      * Bladder mass  Karoline Justice 76F with IDDM2 who presented to [facility] with generalized weakness, back pain, and intermittent fevers for 2 weeks with dysuria, foul-smelling urine,  "and confusion for the last week. 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 She has hypercalcemia and a UTI. She was transferred to [facility] for Urology evaluation. On arrival, the patient is alert and oriented. Collateral is provided by her daughter at bedside, who agrees that her mother has been more confused for the past week. She is c/o intermittent confusion, decreased appetite, bilateral flank pain, and increased urinary frequency. Labs from OSH with leukocytosis, anemia, elevated Cr (2.3 -- unknown baseline), hypercalcemia (15.7 when corrected for albumin), and UTI on urine studies. Exam notable for significant bilat flank TTP.  Gyn Onc recommended transabdominal or transvaginal ultrasound.  Transabdominal ultrasound pursued because of associated discomfort with transvaginal ultrasound.  U/S showing small ovarian cyst and uterine fibroid but not other concerning findings with regards to bladder mass. Gyn Onc not convinced this is gyn related and have signed off.   S/p TURBT on 5/9 w/ Urology, bladder mass resected, R ureteral stent placed, unable to place L ureteral stent  S/p L nephrectomy tube w/ IR on 5/10  Good UOP, sCr trending upward    PLAN:  --Urology following, appreciate recs  --Soto in place  --Renally dose medications, avoid nephrotoxins  --Strict I&O's  --Follow pathology from resection  --Trend renal function on BMP    Anemia  Hb 8.5 on admission. MCV 85.   Unknown baseline.   Hgb of 6.9 this AM   No s/s of bleeding, HDS    PLAN:  --Trend CBC  --Will have up to date type and screen if transfusion is necessary  --Transfuse 1 unit pRBC today    HTN (hypertension)  Patient denies any history HTN.   BP elevated at OSH (191/76, 175/92). On arrival to [facility], BP was 157/68.  Small pericardial effusion noted on CT Chest.    BP today: BP 97/65   Pulse 77   Temp 97.6 °F (36.4 °C) (Oral)   Resp 18   Ht 5' 1" (1.549 m)   Wt 51.7 kg (114 lb)   " SpO2 99%   Breastfeeding No   BMI 21.54 kg/m²     PLAN:  --Continue amlodipine 5mg    Choledocholithiasis  CT A/P with multiple calcified gallstones, moderate intrahepatic and extrahepatic biliary ductal dilatation, and a 4 mm stone within the common duct.  No transaminitis (AST/ALT 8/14), alk phos wnl (104), and Tbili not elevated (0.5)  Patient denies any abdominal pain, nausea, or vomiting. No RUQ or epigastric TTP on exam.    PLAN:  --[facility] consulted and saw patient, recommend outpatient ERCP, no intervention needed on this hospitalization    Hydronephrosis  Severe bilateral hydronephrosis noted on CT A/P.  Cr 2.3 on admission, unknown baseline.   Patient reports significant UOP.  Repeat retroperitoneal US still showing persistent severe hydro on left and moderate on right side.   S/p R ureteral stent placed 5/9 and L nephrostomy tube placed 5/10     PLAN:  --Urology following, appreciate recs  --Continue Soto, R ureteral stent, L nephrostomy tube in place  --Trend creatinine   --Request nephrostomy supplies for patient at discharge  --Education for patient/family on nephrostomy/Soto care at discharge    Hypercalcemia  Calcium 14.6 on admission. 15.7 when corrected for albumin.  Patient reports 1 weeks of confusion, fatigue, and constipation.  Resolved    PLAN:  --S/p continuous IVF @ 200cc/hour for goal -150 cc/hour.   --S/p Calcitonin 4 units/kg x1.  --S/p Zoledronic acid 4mg  --Calcium improving. Cont monitoring qd.   --Strict I&O's    Controlled Type 2 diabetes mellitus  Patient reports taking 24 units levemir daily.  No HbA1c on file.  Insulin detemir 12U QAM stopped 5/7 due to hypoglycemia on morning labs but has now rebounded  HbA1c, 6.9    PLAN:  --Resume insulin determir 8 units qd  --Moderate dose SSI  --POCT BG ACHS  --Diabetic diet    UTI (urinary tract infection)  Patient with 2 weeks of dysuria, foul-smelling urine, and increased urinary frequency. Also reports intermittent  confusion.  Urine studies on admission c/w UTI. UA cloudy with 2+ protein, 3+ leuko. Urine micro with 61 WBC.  Patient received 1g CTX at OSH.  Urine cx grew Strep viridans    PLAN:  --Continue CTX 1g q24h X 10-14 days for complicated UTI now with indwelling stent and petersen    VTE Risk Mitigation (From admission, onward)         Ordered     Reason for No Pharmacological VTE Prophylaxis  Once        Question:  Reasons:  Answer:  Patient is Ambulatory    05/06/22 0116     IP VTE HIGH RISK PATIENT  Once         05/06/22 0116     Place sequential compression device  Until discontinued         05/06/22 0116                Discharge Planning   GET: 5/13/2022     Code Status: Full Code   Is the patient medically ready for discharge?: No    Reason for patient still in hospital (select all that apply): Patient trending condition  Discharge Plan A: Home, Home Health   Discharge Delays: None known at this time      Chuy Min DO  Department of Hospital Medicine   [facility] - Oncology ([facility])

## 2022-05-10 NOTE — ASSESSMENT & PLAN NOTE
Hb 8.5 on admission. MCV 85.   Unknown baseline.   Hgb of 6.9 this AM   No s/s of bleeding, HDS    PLAN:  --Trend CBC  --Will have up to date type and screen if transfusion is necessary  --Transfuse 1 unit pRBC today

## 2022-05-10 NOTE — ASSESSMENT & PLAN NOTE
"Patient denies any history HTN.   BP elevated at OSH (191/76, 175/92). On arrival to [facility], BP was 157/68.  Small pericardial effusion noted on CT Chest.    BP today: BP 97/65   Pulse 77   Temp 97.6 °F (36.4 °C) (Oral)   Resp 18   Ht 5' 1" (1.549 m)   Wt 51.7 kg (114 lb)   SpO2 99%   Breastfeeding No   BMI 21.54 kg/m²     PLAN:  --Monitor BP  --Start anti-hypertensives as appropriate  "

## 2022-05-10 NOTE — ASSESSMENT & PLAN NOTE
CT A/P with multiple calcified gallstones, moderate intrahepatic and extrahepatic biliary ductal dilatation, and a 4 mm stone within the common duct.  No transaminitis (AST/ALT 8/14), alk phos wnl (104), and Tbili not elevated (0.5)  Patient denies any abdominal pain, nausea, or vomiting. No RUQ or epigastric TTP on exam.    PLAN:  --[facility] consulted and saw patient, recommend outpatient ERCP, no intervention needed on this hospitalization

## 2022-05-10 NOTE — ASSESSMENT & PLAN NOTE
Patient with 2 weeks of dysuria, foul-smelling urine, and increased urinary frequency. Also reports intermittent confusion.  Urine studies on admission c/w UTI. UA cloudy with 2+ protein, 3+ leuko. Urine micro with 61 WBC.  Patient received 1g CTX at OSH.  Urine cx grew Strep viridans    PLAN:  --Continue CTX 1g q24h X 10-14 days for complicated UTI now with indwelling stent and petersen

## 2022-05-10 NOTE — PLAN OF CARE
Procedure completed. Patient tolerated well; VSS. Site clean, dry and intact. Patient to be transported to [facility] for sedation recovery; report to be given at the bedside. Report to also be called to inpatient RN.

## 2022-05-10 NOTE — ASSESSMENT & PLAN NOTE
Karoline Justice is a 76F pmhx DM, HTN who presented as a transfer from OSH with fatigue, back pain, and fevers for 2 weeks and dysuria, foul-smelling urine, and AMS for the last week    - s/p cystoscopy with TURBT and R ureteral stent placement, unable to place L ureteral stent  - IR consulted for L NT  - NPO for anticipated NT  - f/u pathology  - Bilateral hydronephrosis and bladder seen on CT. Petersen placed with 1.1L urine output. Likely bilateral hydro from obstruction and not from bladder mass.   - Urine culture growing strep viridans. Currently on Rocephin.   - Keep petersen catheter for minimal 1 week due to >1000ml urinary retention  - transfuse for hgb < 7   - Remainder of care per primary team  - recc mIVF while NPO

## 2022-05-10 NOTE — NURSING
End of shift note    AAOx4  RA  Nephrostomy tube placed this AM  1 unit RBC transfused  Ambulated with assist  Soto output- 300cc  VSS  NADN

## 2022-05-10 NOTE — ASSESSMENT & PLAN NOTE
"Patient denies any history HTN.   BP elevated at OSH (191/76, 175/92). On arrival to [facility], BP was 157/68.  Small pericardial effusion noted on CT Chest.    BP today: BP 97/65   Pulse 77   Temp 97.6 °F (36.4 °C) (Oral)   Resp 18   Ht 5' 1" (1.549 m)   Wt 51.7 kg (114 lb)   SpO2 99%   Breastfeeding No   BMI 21.54 kg/m²     PLAN:  --Continue amlodipine 5mg  "

## 2022-05-10 NOTE — ASSESSMENT & PLAN NOTE
Patient reports taking 24 units levemir daily.  No HbA1c on file.  Insulin detemir 12U QAM stopped 5/7 due to hypoglycemia on morning labs but has now rebounded  HbA1c, 6.9    PLAN:  --Resume insulin determir 8 units qd  --Moderate dose SSI  --POCT BG ACHS  --Diabetic diet

## 2022-05-10 NOTE — PLAN OF CARE
Pt arrived to IR Room 190 for Left neph tube placement. Pt oriented to unit and staff. Plan of care reviewed with patient, patient verbalizes understanding. Comfort measures utilized. Pt safely transferred from stretcher to procedural table. Fall risk reviewed with patient, fall risk interventions maintained. Safety strap applied, positioner pillows utilized to minimize pressure points. Blankets applied. Pt prepped and draped utilizing standard sterile technique. Patient placed on continuous monitoring, as required by sedation policy. Timeouts completed utilizing standard universal time-out, per department and facility policy. RN to remain at bedside, continuous monitoring maintained. Pt resting comfortably. Denies pain/discomfort. Will continue to monitor. See flow sheets for monitoring, medication administration, and updates.

## 2022-05-10 NOTE — PLAN OF CARE
Problem: Adult Inpatient Plan of Care  Goal: Plan of Care Review  Outcome: Ongoing, Progressing  Goal: Patient-Specific Goal (Individualized)  Outcome: Ongoing, Progressing  Goal: Absence of Hospital-Acquired Illness or Injury  Outcome: Ongoing, Progressing  Goal: Optimal Comfort and Wellbeing  Outcome: Ongoing, Progressing  Goal: Readiness for Transition of Care  Outcome: Ongoing, Progressing     Problem: Diabetes Comorbidity  Goal: Blood Glucose Level Within Targeted Range  Outcome: Ongoing, Progressing     Problem: Infection  Goal: Absence of Infection Signs and Symptoms  Outcome: Ongoing, Progressing     Problem: Skin Injury Risk Increased  Goal: Skin Health and Integrity  Outcome: Ongoing, Progressing     Patient slept for several hours through the night, no complaints of pain, patient help NPO after midnight for procedure in the AM , S, [facility]

## 2022-05-11 PROBLEM — N17.9 AKI (ACUTE KIDNEY INJURY): Status: ACTIVE | Noted: 2022-05-11

## 2022-05-11 LAB
ALBUMIN SERPL BCP-MCNC: 2.3 G/DL (ref 3.5–5.2)
ALP SERPL-CCNC: 106 U/L (ref 55–135)
ALT SERPL W/O P-5'-P-CCNC: 13 U/L (ref 10–44)
ANION GAP SERPL CALC-SCNC: 9 MMOL/L (ref 8–16)
AST SERPL-CCNC: 11 U/L (ref 10–40)
BASOPHILS # BLD AUTO: 0.03 K/UL (ref 0–0.2)
BASOPHILS NFR BLD: 0.2 % (ref 0–1.9)
BILIRUB SERPL-MCNC: 0.4 MG/DL (ref 0.1–1)
BUN SERPL-MCNC: 34 MG/DL (ref 8–23)
CALCIUM SERPL-MCNC: 7.7 MG/DL (ref 8.7–10.5)
CHLORIDE SERPL-SCNC: 108 MMOL/L (ref 95–110)
CO2 SERPL-SCNC: 20 MMOL/L (ref 23–29)
CREAT SERPL-MCNC: 2.2 MG/DL (ref 0.5–1.4)
DIFFERENTIAL METHOD: ABNORMAL
EOSINOPHIL # BLD AUTO: 0.4 K/UL (ref 0–0.5)
EOSINOPHIL NFR BLD: 2.6 % (ref 0–8)
ERYTHROCYTE [DISTWIDTH] IN BLOOD BY AUTOMATED COUNT: 15.7 % (ref 11.5–14.5)
EST. GFR  (AFRICAN AMERICAN): 24.4 ML/MIN/1.73 M^2
EST. GFR  (NON AFRICAN AMERICAN): 21.1 ML/MIN/1.73 M^2
GLUCOSE SERPL-MCNC: 128 MG/DL (ref 70–110)
HCT VFR BLD AUTO: 24.1 % (ref 37–48.5)
HGB BLD-MCNC: 7.7 G/DL (ref 12–16)
IMM GRANULOCYTES # BLD AUTO: 0.07 K/UL (ref 0–0.04)
IMM GRANULOCYTES NFR BLD AUTO: 0.4 % (ref 0–0.5)
LYMPHOCYTES # BLD AUTO: 1.8 K/UL (ref 1–4.8)
LYMPHOCYTES NFR BLD: 10.9 % (ref 18–48)
MAGNESIUM SERPL-MCNC: 2 MG/DL (ref 1.6–2.6)
MCH RBC QN AUTO: 26.2 PG (ref 27–31)
MCHC RBC AUTO-ENTMCNC: 32 G/DL (ref 32–36)
MCV RBC AUTO: 82 FL (ref 82–98)
MONOCYTES # BLD AUTO: 0.8 K/UL (ref 0.3–1)
MONOCYTES NFR BLD: 5 % (ref 4–15)
NEUTROPHILS # BLD AUTO: 13.5 K/UL (ref 1.8–7.7)
NEUTROPHILS NFR BLD: 80.9 % (ref 38–73)
NRBC BLD-RTO: 0 /100 WBC
PATHOLOGIST INTERPRETATION UPE: NORMAL
PHOSPHATE SERPL-MCNC: 2.5 MG/DL (ref 2.7–4.5)
PLATELET # BLD AUTO: 311 K/UL (ref 150–450)
PMV BLD AUTO: 9.8 FL (ref 9.2–12.9)
POCT GLUCOSE: 111 MG/DL (ref 70–110)
POCT GLUCOSE: 141 MG/DL (ref 70–110)
POCT GLUCOSE: 187 MG/DL (ref 70–110)
POCT GLUCOSE: 268 MG/DL (ref 70–110)
POCT GLUCOSE: 76 MG/DL (ref 70–110)
POTASSIUM SERPL-SCNC: 4.2 MMOL/L (ref 3.5–5.1)
PROT SERPL-MCNC: 5.7 G/DL (ref 6–8.4)
PTH RELATED PROT SERPL-SCNC: 8.1 PMOL/L
RBC # BLD AUTO: 2.94 M/UL (ref 4–5.4)
SODIUM SERPL-SCNC: 137 MMOL/L (ref 136–145)
WBC # BLD AUTO: 16.72 K/UL (ref 3.9–12.7)

## 2022-05-11 PROCEDURE — 20600001 HC STEP DOWN PRIVATE ROOM

## 2022-05-11 PROCEDURE — 85025 COMPLETE CBC W/AUTO DIFF WBC: CPT

## 2022-05-11 PROCEDURE — 25000003 PHARM REV CODE 250: Performed by: HOSPITALIST

## 2022-05-11 PROCEDURE — 25000003 PHARM REV CODE 250

## 2022-05-11 PROCEDURE — 25000003 PHARM REV CODE 250: Performed by: STUDENT IN AN ORGANIZED HEALTH CARE EDUCATION/TRAINING PROGRAM

## 2022-05-11 PROCEDURE — 99232 SBSQ HOSP IP/OBS MODERATE 35: CPT | Mod: GC,,, | Performed by: HOSPITALIST

## 2022-05-11 PROCEDURE — 99232 PR SUBSEQUENT HOSPITAL CARE,LEVL II: ICD-10-PCS | Mod: GC,,, | Performed by: HOSPITALIST

## 2022-05-11 PROCEDURE — 83735 ASSAY OF MAGNESIUM: CPT

## 2022-05-11 PROCEDURE — 84100 ASSAY OF PHOSPHORUS: CPT

## 2022-05-11 PROCEDURE — 80053 COMPREHEN METABOLIC PANEL: CPT

## 2022-05-11 PROCEDURE — 63600175 PHARM REV CODE 636 W HCPCS

## 2022-05-11 PROCEDURE — 36415 COLL VENOUS BLD VENIPUNCTURE: CPT

## 2022-05-11 PROCEDURE — 94761 N-INVAS EAR/PLS OXIMETRY MLT: CPT

## 2022-05-11 RX ORDER — HEPARIN SODIUM 5000 [USP'U]/ML
5000 INJECTION, SOLUTION INTRAVENOUS; SUBCUTANEOUS EVERY 8 HOURS
Status: DISCONTINUED | OUTPATIENT
Start: 2022-05-11 | End: 2022-05-12 | Stop reason: HOSPADM

## 2022-05-11 RX ORDER — SODIUM CHLORIDE, SODIUM LACTATE, POTASSIUM CHLORIDE, CALCIUM CHLORIDE 600; 310; 30; 20 MG/100ML; MG/100ML; MG/100ML; MG/100ML
INJECTION, SOLUTION INTRAVENOUS CONTINUOUS
Status: ACTIVE | OUTPATIENT
Start: 2022-05-11 | End: 2022-05-12

## 2022-05-11 RX ORDER — INSULIN ASPART 100 [IU]/ML
4 INJECTION, SOLUTION INTRAVENOUS; SUBCUTANEOUS
Status: DISCONTINUED | OUTPATIENT
Start: 2022-05-11 | End: 2022-05-12 | Stop reason: HOSPADM

## 2022-05-11 RX ORDER — INSULIN ASPART 100 [IU]/ML
0-5 INJECTION, SOLUTION INTRAVENOUS; SUBCUTANEOUS
Status: DISCONTINUED | OUTPATIENT
Start: 2022-05-11 | End: 2022-05-12 | Stop reason: HOSPADM

## 2022-05-11 RX ADMIN — SENNOSIDES AND DOCUSATE SODIUM 1 TABLET: 50; 8.6 TABLET ORAL at 08:05

## 2022-05-11 RX ADMIN — INSULIN ASPART 1 UNITS: 100 INJECTION, SOLUTION INTRAVENOUS; SUBCUTANEOUS at 12:05

## 2022-05-11 RX ADMIN — AMLODIPINE BESYLATE 5 MG: 5 TABLET ORAL at 08:05

## 2022-05-11 RX ADMIN — SODIUM CHLORIDE, SODIUM LACTATE, POTASSIUM CHLORIDE, AND CALCIUM CHLORIDE: .6; .31; .03; .02 INJECTION, SOLUTION INTRAVENOUS at 10:05

## 2022-05-11 RX ADMIN — SODIUM CHLORIDE, SODIUM LACTATE, POTASSIUM CHLORIDE, AND CALCIUM CHLORIDE: .6; .31; .03; .02 INJECTION, SOLUTION INTRAVENOUS at 07:05

## 2022-05-11 RX ADMIN — SODIUM CHLORIDE, SODIUM LACTATE, POTASSIUM CHLORIDE, AND CALCIUM CHLORIDE: .6; .31; .03; .02 INJECTION, SOLUTION INTRAVENOUS at 11:05

## 2022-05-11 RX ADMIN — INSULIN ASPART 6 UNITS: 100 INJECTION, SOLUTION INTRAVENOUS; SUBCUTANEOUS at 08:05

## 2022-05-11 RX ADMIN — POLYETHYLENE GLYCOL 3350 17 G: 17 POWDER, FOR SOLUTION ORAL at 08:05

## 2022-05-11 RX ADMIN — INSULIN ASPART 4 UNITS: 100 INJECTION, SOLUTION INTRAVENOUS; SUBCUTANEOUS at 12:05

## 2022-05-11 RX ADMIN — INSULIN ASPART 4 UNITS: 100 INJECTION, SOLUTION INTRAVENOUS; SUBCUTANEOUS at 06:05

## 2022-05-11 RX ADMIN — INSULIN ASPART 3 UNITS: 100 INJECTION, SOLUTION INTRAVENOUS; SUBCUTANEOUS at 12:05

## 2022-05-11 RX ADMIN — OXYCODONE 5 MG: 5 TABLET ORAL at 05:05

## 2022-05-11 RX ADMIN — GABAPENTIN 100 MG: 100 CAPSULE ORAL at 08:05

## 2022-05-11 RX ADMIN — GABAPENTIN 100 MG: 100 CAPSULE ORAL at 04:05

## 2022-05-11 RX ADMIN — OXYCODONE 5 MG: 5 TABLET ORAL at 08:05

## 2022-05-11 RX ADMIN — DICLOFENAC SODIUM 2 G: 10 GEL TOPICAL at 08:05

## 2022-05-11 RX ADMIN — LIDOCAINE 1 PATCH: 50 PATCH CUTANEOUS at 04:05

## 2022-05-11 RX ADMIN — HEPARIN SODIUM 5000 UNITS: 5000 INJECTION INTRAVENOUS; SUBCUTANEOUS at 03:05

## 2022-05-11 RX ADMIN — MUPIROCIN: 20 OINTMENT TOPICAL at 08:05

## 2022-05-11 RX ADMIN — INSULIN DETEMIR 8 UNITS: 100 INJECTION, SOLUTION SUBCUTANEOUS at 08:05

## 2022-05-11 RX ADMIN — OXYCODONE 5 MG: 5 TABLET ORAL at 12:05

## 2022-05-11 NOTE — ASSESSMENT & PLAN NOTE
Hb 8.5 on admission. MCV 85.   Unknown baseline.   Hgb of 7.7 this AM, improved after pRBC   No s/s of bleeding, HDS    PLAN:  --Trend CBC  --Will have up to date type and screen if transfusion is necessary

## 2022-05-11 NOTE — MEDICAL/APP STUDENT
Hospital Medicine Student   Progress Note  [facility] HOSP MED 1    Admit Date: 5/5/2022  Hospital Day: 6  05/11/2022  8:11 AM    SUBJECTIVE:   Ms. Karoline Justice is a 76 y.o. female with a relevant medical history of DMII who is being followed up for Bladder mass.  She presented to [facility] with generalized weakness, back pain, and intermittent fevers for 2 weeks with dysuria, foul-smelling urine, and confusion for the last week. CT A/P showed a 6.5cm mass anterior bladder wall c/f neoplasm, severe bilateral hydronephrosis, and choledocholithiasis with with moderate intra and extrahepatic biliary duct dilatation. She has hypercalcemia and a UTI. Transferred to [facility]. On arrival, the patient is alert and oriented. Collateral is provided by her daughter at bedside, who agrees that her mother has been more confused for the past week. She is c/o intermittent confusion, decreased appetite, bilateral flank pain, and increased urinary frequency. Labs from OSH with leukocytosis, anemia, elevated Cr (2.3 -- unknown baseline), hypercalcemia (15.7 when corrected for albumin), and UTI on urine studies. Exam notable for significant bilat flank TTP.    Overview/Hospital Course:  Pt admitted as a transfer for urology evaluation after being found to have large bladder mass at OSH. On arrival, pt also found to be hypercalcemic to >14 along with having UTI. She was started on fluids, calcitonin, and zoledronic acid. Pt started on rocephin for UTI. Urology consulted as well for bladder mass evaluation. GYN ONC also consulted for their opinion to assess if mass if gyn related. Retroperitoneal U/S and pelvic U/S obtained for further imaging. After repeat imaging, Gyn not convinced this is gyn related and have signed off. Pt to go for biopsy with urology 5/9/22 and possible placement of ureteral stents to relieve obstructions.     Interval history:  Patient is AOx4 no acute distress. Nephrostomy tube site is clean and dry w/o  tenderness. Patient did report one episode of brown urine drainge, however urine this morning was draining clear yellow. Most likely residual blood from urology procedure. Patient also reports feeling unstable when walking. Denies any troubles with bladder or bowel movement. BUN and sCR have worsened. Patient seen by AES, recommened outpatient ERCP    Review of Systems   Constitutional: Negative for chills, diaphoresis and fever.   HENT: Negative for ear pain.    Eyes: Negative for blurred vision, double vision and pain.   Respiratory: Negative for cough, hemoptysis, shortness of breath and wheezing.    Cardiovascular: Negative for chest pain, palpitations, orthopnea, leg swelling and PND.   Gastrointestinal: Negative for abdominal pain, heartburn, nausea and vomiting.   Genitourinary: Positive for hematuria. Negative for dysuria, flank pain, frequency and urgency.        Patient noticed single incidence of brown urine drainage. Urine this morning was draining yellow. Most likely residual blood from urology procedure    Musculoskeletal: Negative for back pain, myalgias and neck pain.   Skin: Negative for itching and rash.   Neurological: Negative for dizziness, focal weakness, weakness and headaches.   Endo/Heme/Allergies: Does not bruise/bleed easily.       Please refer to the H&P for past medical, family, and social history.    OBJECTIVE:     Vital Signs Recent:  Temp: 97.6 °F (36.4 °C) (05/11/22 0746)  Pulse: 82 (05/11/22 0746)  Resp: 18 (05/11/22 0746)  BP: (!) 140/65 (05/11/22 0746)  SpO2: 95 % (05/11/22 0746)  Oxygen Documentation:    Flow (L/min): 9           O2 Device (Oxygen Therapy): room air         Vital Signs Range (Last 24H):  Temp:  [97.3 °F (36.3 °C)-97.8 °F (36.6 °C)]   Pulse:  [62-82]   Resp:  [15-20]   BP: ()/(46-65)   SpO2:  [95 %-100 %]        I & O (Last 24H):    Intake/Output Summary (Last 24 hours) at 5/11/2022 0811  Last data filed at 5/11/2022 0507  Gross per 24 hour   Intake 1044.58  ml   Output 2050 ml   Net -1005.42 ml        Physical Exam:  Physical Exam  Constitutional:       General: She is not in acute distress.     Appearance: Normal appearance.   HENT:      Head: Normocephalic and atraumatic.      Mouth/Throat:      Mouth: Mucous membranes are moist.   Eyes:      Extraocular Movements: Extraocular movements intact.      Pupils: Pupils are equal, round, and reactive to light.   Cardiovascular:      Rate and Rhythm: Normal rate and regular rhythm.      Heart sounds: No murmur heard.    No friction rub. No gallop.   Pulmonary:      Effort: No respiratory distress.      Breath sounds: No stridor. No wheezing, rhonchi or rales.   Abdominal:      General: Bowel sounds are normal. There is no distension.      Tenderness: There is no abdominal tenderness. There is no guarding.   Musculoskeletal:         General: No swelling.      Cervical back: No rigidity or tenderness.      Right lower leg: No edema.      Left lower leg: No edema.   Skin:     General: Skin is dry.   Neurological:      General: No focal deficit present.      Mental Status: She is alert and oriented to person, place, and time.         Labs:   Recent Labs   Lab 05/09/22  0804 05/10/22  0352 05/11/22  0438   * 132* 137   K 3.9 3.8 4.2    102 108   CO2 18* 19* 20*   BUN 23 31* 34*   CREATININE 1.8* 2.0* 2.2*   * 309* 128*   CALCIUM 8.9 7.8* 7.7*   MG 2.0 1.9 2.0   PHOS 2.6* 2.6* 2.5*     Recent Labs   Lab 05/05/22  0957 05/06/22  0758 05/09/22  0804 05/10/22  0352 05/11/22  0438   ALKPHOS 104   < > 139* 135 106   ALT 14   < > 16 13 13   AST 8*   < > 15 11 11   ALBUMIN 2.6*   < > 2.3* 2.2* 2.3*   PROT 8.1   < > 6.7 6.3 5.7*   BILITOT 0.5   < > 0.5 0.3 0.4   INR 1.2  --   --   --   --     < > = values in this interval not displayed.     Recent Labs   Lab 05/09/22  0804 05/10/22  0352 05/11/22  0438   WBC 22.18* 21.19* 16.72*   HGB 7.3* 6.9* 7.7*   HCT 24.3* 21.7* 24.1*    329 311       Diagnostic  Results:    Scheduled Meds:   amLODIPine  5 mg Oral Daily    diclofenac sodium  2 g Topical (Top) Daily    gabapentin  100 mg Oral TID    insulin detemir U-100  8 Units Subcutaneous Daily    LIDOcaine  1 patch Transdermal Q24H    mupirocin   Nasal BID    polyethylene glycol  17 g Oral Daily    senna-docusate 8.6-50 mg  1 tablet Oral BID     Continuous Infusions:  PRN Meds:sodium chloride, acetaminophen, dextrose 10%, dextrose 10%, glucagon (human recombinant), glucose, glucose, insulin aspart U-100, melatonin, naloxone, ondansetron, oxyCODONE, prochlorperazine, sodium chloride 0.9%, sodium chloride 0.9%    ASSESSMENT/PLAN:   Ms. Karoline Justice is a 76 y.o. female with a relevant medical history of DM II who is being followed up for Bladder mass.  presented to [facility] with generalized weakness, back pain, and intermittent fevers for 2 weeks with dysuria, foul-smelling urine, and confusion for the last week. At [facility] a CT A/P showed a 6.5cm mass anterior bladder wall c/f neoplasm, severe bilateral hydronephrosis. She has hypercalcemia and a UTI. She was transferred to [facility] for Urology evaluation. On arrival, the patient is alert and oriented. Collateral is provided by her daughter at bedside, who agrees that her mother has been more confused for the past week. She is c/o intermittent confusion, decreased appetite, bilateral flank pain, and increased urinary frequency. Labs from OSH with leukocytosis, anemia, elevated Cr (2.3 -- unknown baseline), hypercalcemia (15.7 when corrected for albumin), and UTI on urine studies. Exam notable for significant bilat flank TTP.    Active Hospital Problems    Diagnosis  POA    *Bladder mass [N32.89]  Yes    UTI (urinary tract infection) [N39.0]  Yes    Controlled Type 2 diabetes mellitus [E11.9]  Yes     Chronic    Hypercalcemia [E83.52]  Yes    Hydronephrosis [N13.30]  Yes    Choledocholithiasis [K80.50]  Unknown    HTN (hypertension) [I10]  Yes    Anemia  [D64.9]  Yes      Resolved Hospital Problems   No resolved problems to display.     * Bladder mass   Gyn Onc recommended transabdominal or transvaginal ultrasound.  Transabdominal ultrasound pursued because of associated discomfort with transvaginal ultrasound.  U/S showing small ovarian cyst and uterine fibroid but not other concerning findings with regards to bladder mass. Gyn Onc not convinced this is gyn related and have signed off.   S/p TURBT on 5/9 w/ Urology, bladder mass resected, R ureteral stent placed, unable to place L ureteral stent  S/p L nephrectomy tube w/ IR on 5/10  Good UOP, sCr trending upward     PLAN:  --Urology following  --Soto in place  --Renally dose medications, avoid nephrotoxins  --Strict I&O's  --Follow pathology from resection  --Trend renal function on BMP     Anemia  Hb 8.5 on admission. MCV 85.   Unknown baseline.   Hgb of 7.7 this AM s/p 1 unit  RBC  No s/s of bleeding, HDS     PLAN:  --Trend CBC  --Will have up to date type and screen        HTN (hypertension)  Patient denies any history HTN.   BP elevated at OSH (191/76, 175/92). On arrival to [facility], BP was 157/68.  Small pericardial effusion noted on CT Chest.     PLAN:  --Continue amlodipine 5mg     Choledocholithiasis  CT A/P with multiple calcified gallstones, moderate intrahepatic and extrahepatic biliary ductal dilatation, and a 4 mm stone within the common duct.  No transaminitis (AST/ALT 8/14), alk phos wnl (104), and Tbili not elevated (0.5)  Patient denies any abdominal pain, nausea, or vomiting. No RUQ or epigastric TTP on exam.     PLAN:  --AES consulted and saw patient, recommend outpatient ERCP, no intervention needed on this hospitalization     Hydronephrosis  Severe bilateral hydronephrosis noted on CT A/P.  Cr 2.3 on admission, unknown baseline.   Patient reports significant UOP.  Repeat retroperitoneal US still showing persistent severe hydro on left and moderate on right side.   S/p R ureteral stent  placed 5/9 and L nephrostomy tube placed 5/10   BUN and sCR have worsened, ratio indicating prenal azotemia     PLAN:  --fluid challenge with NS/LR, monitor renal function  --Urology following, appreciate recs  --Continue Petersen, R ureteral stent, L nephrostomy tube in place  --Trend creatinine   --Requested nephrostomy supplies for patient at discharge  --Education for patient/family on nephrostomy/Petersen care at discharge     Hypercalcemia  Calcium 14.6 on admission. 15.7 when corrected for albumin.  Patient reports 1 weeks of confusion, fatigue, and constipation.  Resolved     PLAN:  --S/p continuous IVF @ 200cc/hour for goal -150 cc/hour.   --S/p Calcitonin 4 units/kg x1.  --S/p Zoledronic acid 4mg  --Calcium improving. Cont monitoring qd.   --Strict I&O's     Controlled Type 2 diabetes mellitus  Patient reports taking 24 units levemir daily.  No HbA1c on file.  HbA1c, 6.9     PLAN:  --Continue insulin determir 8 units qd  --Moderate dose SSI  --POCT BG ACHS  --Diabetic diet     UTI (urinary tract infection)  Patient with 2 weeks of dysuria, foul-smelling urine, and increased urinary frequency. Also reports intermittent confusion.  Urine studies on admission c/w UTI. UA cloudy with 2+ protein, 3+ leuko. Urine micro with 61 WBC.  Patient received 1g CTX at OSH.  Urine cx grew Strep viridans     PLAN:  --Continue CTX 1g q24h X 10-14 days for complicated UTI now with indwelling stent and petersen         VTE Risk Mitigation (From admission, onward)                  Ordered        Reason for No Pharmacological VTE Prophylaxis  Once        Question:  Reasons:  Answer:  Patient is Ambulatory    05/06/22 0116        IP VTE HIGH RISK PATIENT  Once         05/06/22 0116        Place sequential compression device  Until discontinued         05/06/22 0116               Discharge planning:   GET: 5/12/2022     Code Status: Full Code   Is the patient medically ready for discharge?: No    Reason for patient still in  hospital (select all that apply): Treatment  Discharge Plan A: Home, Home with family     Kenyon Jacobs  MS3   [employer]

## 2022-05-11 NOTE — PROGRESS NOTES
[facility]ac[facility] Schroeder - Oncology ([facility]  [facility] Medicine  Progress Note    Patient Name: Karoline Justice  MRN: [id number]  Patient Class: IP- Inpatient   Admission Date: 5/5/2022  Length of Stay: 6 days  Attending Physician: Ansley Simeon MD  Primary Care Provider: Dee Dee Oconnor MD        Subjective:     Principal Problem:Bladder mass        HPI:  Karoline Justice 76F with IDDM2 who presented to [facility] with generalized weakness, back pain, and intermittent fevers for 2 weeks with dysuria, foul-smelling urine, and confusion for the last week. At [facility] a CT A/P showed a 6.5cm mass anterior bladder wall c/f neoplasm, severe bilateral hydronephrosis, and choledocholithiasis with with moderate intra and extrahepatic biliary duct dilatation. She has hypercalcemia and a UTI. She was transferred to [facility] for Urology evaluation.    On arrival, the patient is alert and oriented. Collateral is provided by her daughter at bedside, who agrees that her mother has been more confused for the past week. She is c/o intermittent confusion, decreased appetite, bilateral flank pain, and increased urinary frequency. She is afebrile and mildly hypertensive (157/68). Labs from OSH with leukocytosis (WBC 23.5), anemia (Hb 8.5), elevated Cr (2.3 -- unknown baseline), hypercalcemia (15.7 when corrected for albumin), and UTI on urine studies (UA cloudy with 2+ protein, 3+ leuks and urine microscopy with 61 WBC). Exam notable for significant bilat flank TTP. She will be admitted to  for further care.       Overview/Hospital Course:  Pt admitted as a transfer for urology evaluation after being found to have large bladder mass at OSH. On arrival, pt also found to be hypercalcemic to >14 along with having UTI. She was started on fluids, calcitonin, and zoledronic acid. Pt started on rocephin for UTI. Urology consulted as well for bladder mass evaluation. GYN ONC also consulted for their opinion to assess if mass if gyn related. Retroperitoneal U/S  and pelvic U/S obtained for further imaging. After repeat imaging, Gyn not convinced this is gyn related and have signed off. Pt to go for biopsy with urology 5/9/22 and possible placement of ureteral stents to relieve obstructions.       Interval History: NAEON. HDS. No complaints per patient. Eating well. Good UOP, sCr increasing. THOMAS worsening, will start IVF. Continue to monitor renal function.    Review of Systems   Constitutional:  Negative for activity change, appetite change, chills, fatigue and fever.   HENT:  Negative for congestion, rhinorrhea and sore throat.    Eyes:  Negative for visual disturbance.   Respiratory:  Negative for cough and shortness of breath.    Cardiovascular:  Negative for chest pain, palpitations and leg swelling.   Gastrointestinal:  Negative for abdominal distention, abdominal pain, constipation, diarrhea, nausea and vomiting.   Genitourinary:  Positive for frequency. Negative for dysuria, hematuria and urgency.   Musculoskeletal:  Positive for back pain. Negative for myalgias and neck pain.   Skin:  Negative for rash.   Neurological:  Positive for weakness. Negative for dizziness, syncope, light-headedness and headaches.   Psychiatric/Behavioral:  Negative for agitation and confusion. The patient is not nervous/anxious.    Objective:     Vital Signs (Most Recent):  Temp: 97.6 °F (36.4 °C) (05/11/22 0746)  Pulse: 80 (05/11/22 0957)  Resp: 20 (05/11/22 1213)  BP: (!) 140/65 (05/11/22 0746)  SpO2: 96 % (05/11/22 0957) Vital Signs (24h Range):  Temp:  [97.3 °F (36.3 °C)-97.7 °F (36.5 °C)] 97.6 °F (36.4 °C)  Pulse:  [70-82] 80  Resp:  [17-20] 20  SpO2:  [95 %-100 %] 96 %  BP: (115-140)/(59-65) 140/65     Weight: 51.7 kg (114 lb)  Body mass index is 21.54 kg/m².    Intake/Output Summary (Last 24 hours) at 5/11/2022 1312  Last data filed at 5/11/2022 1052  Gross per 24 hour   Intake 1044.58 ml   Output 3050 ml   Net -2005.42 ml        Physical Exam  Vitals and nursing note reviewed.    Constitutional:       General: She is not in acute distress.     Appearance: Normal appearance. She is normal weight.   HENT:      Head: Normocephalic and atraumatic.      Mouth/Throat:      Mouth: Mucous membranes are moist.      Pharynx: Oropharynx is clear.   Eyes:      Extraocular Movements: Extraocular movements intact.      Conjunctiva/sclera: Conjunctivae normal.   Cardiovascular:      Rate and Rhythm: Normal rate and regular rhythm.   Pulmonary:      Effort: Pulmonary effort is normal. No respiratory distress.      Breath sounds: Normal breath sounds. No wheezing.   Abdominal:      General: Abdomen is flat. Bowel sounds are normal. There is no distension.      Palpations: Abdomen is soft.      Tenderness: There is no abdominal tenderness.   Genitourinary:     Comments: Soto in place, draining clear/yellow urine  Nephrostomy bag draining clear/yellow urine; insertion site w/ dressing clean, dry, intact  Musculoskeletal:         General: No swelling or tenderness. Normal range of motion.      Cervical back: Normal range of motion and neck supple.      Comments: Left flank TTP   Skin:     General: Skin is warm and dry.   Neurological:      General: No focal deficit present.      Mental Status: She is alert and oriented to person, place, and time. Mental status is at baseline.      Cranial Nerves: No cranial nerve deficit.      Sensory: No sensory deficit.   Psychiatric:         Mood and Affect: Mood normal.         Behavior: Behavior normal. Behavior is not agitated. Behavior is cooperative.       Significant Labs: All pertinent labs within the past 24 hours have been reviewed.    Significant Imaging: I have reviewed all pertinent imaging results/findings within the past 24 hours.      Assessment/Plan:      * Bladder mass  Karoline Justice 76F with IDDM2 who presented to [facility] with generalized weakness, back pain, and intermittent fevers for 2 weeks with dysuria, foul-smelling urine, and confusion  for the last week. At [facility] a CT A/P showed a 6.5cm mass anterior bladder wall c/f neoplasm, severe bilateral hydronephrosis. She has hypercalcemia and a UTI. She was transferred to [facility] for Urology evaluation. On arrival, the patient is alert and oriented. Collateral is provided by her daughter at bedside, who agrees that her mother has been more confused for the past week. She is c/o intermittent confusion, decreased appetite, bilateral flank pain, and increased urinary frequency. Labs from OSH with leukocytosis, anemia, elevated Cr (2.3 -- unknown baseline), hypercalcemia (15.7 when corrected for albumin), and UTI on urine studies. Exam notable for significant bilat flank TTP.  Gyn Onc recommended transabdominal or transvaginal ultrasound.  Transabdominal ultrasound pursued because of associated discomfort with transvaginal ultrasound.  U/S showing small ovarian cyst and uterine fibroid but not other concerning findings with regards to bladder mass. Gyn Onc not convinced this is gyn related and have signed off.   S/p TURBT on 5/9 w/ Urology, bladder mass resected, R ureteral stent placed, unable to place L ureteral stent  S/p L nephrectomy tube w/ IR on 5/10    PLAN:  --Urology signed off, will follow in clinic  --Soto in place  --Renally dose medications, avoid nephrotoxins  --Strict I&O's  --Follow pathology from resection  --Trend renal function on BMP    THOMAS (acute kidney injury)  Patient with acute kidney injury likely d/t IVVD/Dehydration and Post-Obstructive d/t bladder mass Which is currently worsening. Labs reviewed- Renal function/electrolytes with Estimated Creatinine Clearance: 16.4 mL/min (A) (based on SCr of 2.2 mg/dL (H)). according to latest data. Monitor urine output and serial BMP and adjust therapy as needed. Avoid nephrotoxins and renally dose meds for GFR listed above.     PLAN:  --See plan for Bladder Mass and Hydronephrosis to address post-renal/obstructive causes  --Will start IVF and  "continue to encourage po intake for hypovolemia  --Monitor sCr and renal function on BMP      Anemia  Hb 8.5 on admission. MCV 85.   Unknown baseline.   Hgb of 7.7 this AM, improved after pRBC   No s/s of bleeding, HDS    PLAN:  --Trend CBC  --Will have up to date type and screen if transfusion is necessary    HTN (hypertension)  Patient denies any history HTN.   BP elevated at OSH (191/76, 175/92). On arrival to [facility], BP was 157/68.  Small pericardial effusion noted on CT Chest.    BP today: BP 97/65   Pulse 77   Temp 97.6 °F (36.4 °C) (Oral)   Resp 18   Ht 5' 1" (1.549 m)   Wt 51.7 kg (114 lb)   SpO2 99%   Breastfeeding No   BMI 21.54 kg/m²     PLAN:  --Continue amlodipine 5mg    Choledocholithiasis  CT A/P with multiple calcified gallstones, moderate intrahepatic and extrahepatic biliary ductal dilatation, and a 4 mm stone within the common duct.  No transaminitis (AST/ALT 8/14), alk phos wnl (104), and Tbili not elevated (0.5)  Patient denies any abdominal pain, nausea, or vomiting. No RUQ or epigastric TTP on exam.    PLAN:  --AES consulted and saw patient, recommend outpatient ERCP, no intervention needed on this hospitalization    Hydronephrosis  Severe bilateral hydronephrosis noted on CT A/P.  Cr 2.3 on admission, unknown baseline.   Patient reports significant UOP.  Repeat retroperitoneal US still showing persistent severe hydro on left and moderate on right side.   S/p R ureteral stent placed 5/9 and L nephrostomy tube placed 5/10     PLAN:  --Urology following, appreciate recs  --Continue Soto, R ureteral stent, L nephrostomy tube in place  --Trend creatinine   --Request nephrostomy supplies for patient at discharge  --Education for patient/family on nephrostomy/Soto care at discharge  --Will follow up with Urology at discharge in office    Hypercalcemia  Calcium 14.6 on admission. 15.7 when corrected for albumin.  Patient reports 1 weeks of confusion, fatigue, and " constipation.  Resolved    PLAN:  --S/p continuous IVF @ 200cc/hour for goal -150 cc/hour.   --S/p Calcitonin 4 units/kg x1.  --S/p Zoledronic acid 4mg  --Calcium improving. Cont monitoring qd.   --Strict I&O's    Controlled Type 2 diabetes mellitus  Patient reports taking 24 units levemir daily.  No HbA1c on file.  Insulin detemir 12U QAM stopped 5/7 due to hypoglycemia on morning labs but has now rebounded  HbA1c, 6.9    PLAN:  --Resume insulin determir 8 units qd  --Start insulin aspart 4 units tidwm  --Low dose SSI  --POCT BG ACHS  --Diabetic diet    UTI (urinary tract infection)  Patient with 2 weeks of dysuria, foul-smelling urine, and increased urinary frequency. Also reports intermittent confusion.  Urine studies on admission c/w UTI. UA cloudy with 2+ protein, 3+ leuko. Urine micro with 61 WBC.  Patient received 1g CTX at OSH.  Urine cx grew Strep viridans    PLAN:  --Continue CTX 1g q24h X 10-14 days for complicated UTI now with indwelling stent and petersen      VTE Risk Mitigation (From admission, onward)         Ordered     heparin (porcine) injection 5,000 Units  Every 8 hours         05/11/22 1025     Reason for No Pharmacological VTE Prophylaxis  Once        Question:  Reasons:  Answer:  Patient is Ambulatory    05/06/22 0116     IP VTE HIGH RISK PATIENT  Once         05/06/22 0116     Place sequential compression device  Until discontinued         05/06/22 0116                Discharge Planning   GET: 5/13/2022     Code Status: Full Code   Is the patient medically ready for discharge?: No    Reason for patient still in hospital (select all that apply): Patient trending condition  Discharge Plan A: Home, Home Health   Discharge Delays: None known at this time      Chuy Min DO  Department of Hospital Medicine   [facility] ([facility])

## 2022-05-11 NOTE — PLAN OF CARE
Patient aao x 3 stable  Taught how to empty bags today  She is noted with understanding   Pain controlled with gabepentin and prn medication  Adequeate intake and output today  Dressing to flank intact  Accu stable this shift  No signs of infection  Safety precautions maintained   Problem: Adult Inpatient Plan of Care  Goal: Plan of Care Review  Outcome: Ongoing, Progressing  Goal: Patient-Specific Goal (Individualized)  Outcome: Ongoing, Progressing  Goal: Absence of Hospital-Acquired Illness or Injury  Outcome: Ongoing, Progressing  Goal: Optimal Comfort and Wellbeing  Outcome: Ongoing, Progressing  Goal: Readiness for Transition of Care  Outcome: Ongoing, Progressing     Problem: Diabetes Comorbidity  Goal: Blood Glucose Level Within Targeted Range  Outcome: Ongoing, Progressing     Problem: Infection  Goal: Absence of Infection Signs and Symptoms  Outcome: Ongoing, Progressing

## 2022-05-11 NOTE — ASSESSMENT & PLAN NOTE
Patient reports taking 24 units levemir daily.  No HbA1c on file.  Insulin detemir 12U QAM stopped 5/7 due to hypoglycemia on morning labs but has now rebounded  HbA1c, 6.9    PLAN:  --Resume insulin determir 8 units qd  --Start insulin aspart 4 units tidwm  --Low dose SSI  --POCT BG ACHS  --Diabetic diet

## 2022-05-11 NOTE — PLAN OF CARE
Currently patient has no post-acute service needs. SW will continue follow Interval History: NAEON. HDS. No complaints per patient. Eating well. Good UOP, sCr increasing. THOMAS worsening, will start IVF. Continue to monitor renal function.     Review of Systems atient for any future needs that may occur. Md anticipates Home with family.       Sobeida Mixon LMSW  PRN - [facility]  [phone]

## 2022-05-11 NOTE — ASSESSMENT & PLAN NOTE
Karoline Justice 76F with IDDM2 who presented to [facility] with generalized weakness, back pain, and intermittent fevers for 2 weeks with dysuria, foul-smelling urine, and confusion for the last week. At [facility] a CT A/P showed a 6.5cm mass anterior bladder wall c/f neoplasm, severe bilateral hydronephrosis. She has hypercalcemia and a UTI. She was transferred to [facility] for Urology evaluation. On arrival, the patient is alert and oriented. Collateral is provided by her daughter at bedside, who agrees that her mother has been more confused for the past week. She is c/o intermittent confusion, decreased appetite, bilateral flank pain, and increased urinary frequency. Labs from OSH with leukocytosis, anemia, elevated Cr (2.3 -- unknown baseline), hypercalcemia (15.7 when corrected for albumin), and UTI on urine studies. Exam notable for significant bilat flank TTP.  Gyn Onc recommended transabdominal or transvaginal ultrasound.  Transabdominal ultrasound pursued because of associated discomfort with transvaginal ultrasound.  U/S showing small ovarian cyst and uterine fibroid but not other concerning findings with regards to bladder mass. Gyn Onc not convinced this is gyn related and have signed off.   S/p TURBT on 5/9 w/ Urology, bladder mass resected, R ureteral stent placed, unable to place L ureteral stent  S/p L nephrectomy tube w/ IR on 5/10    PLAN:  --Urology signed off, will follow in clinic  --Soto in place  --Renally dose medications, avoid nephrotoxins  --Strict I&O's  --Follow pathology from resection  --Trend renal function on BMP

## 2022-05-11 NOTE — SUBJECTIVE & OBJECTIVE
Interval History: NAEON. AFVSS.  Received left nephrostomy tube yesterday.  Catheter with clear yellow urine.    Objective:     Temp:  [97.3 °F (36.3 °C)-97.8 °F (36.6 °C)] 97.3 °F (36.3 °C)  Pulse:  [62-81] 70  Resp:  [15-20] 17  SpO2:  [95 %-100 %] 98 %  BP: ()/(46-65) 135/62     Body mass index is 21.54 kg/m².           Drains       Drain  Duration                  Urethral Catheter 05/09/22 Latex;Straight-tip;Triple-lumen 24 Fr. 2 days         Nephrostomy 05/10/22 0805 Left 8 Fr. <1 day                    Physical Exam  Constitutional:       Appearance: Normal appearance.   HENT:      Head: Normocephalic.      Nose: Nose normal.   Eyes:      Pupils: Pupils are equal, round, and reactive to light.   Cardiovascular:      Rate and Rhythm: Normal rate.   Pulmonary:      Effort: Pulmonary effort is normal.   Chest:      Chest wall: No tenderness.   Abdominal:      General: Abdomen is flat. There is no distension.      Tenderness: There is no abdominal tenderness. There is no right CVA tenderness or left CVA tenderness.      Comments: Hard mass palpable in pelvis   Genitourinary:     Comments: Soto catheter in place clear yellow urine  Musculoskeletal:      Cervical back: Normal range of motion.   Skin:     General: Skin is warm.   Neurological:      General: No focal deficit present.      Mental Status: She is alert and oriented to person, place, and time.   Psychiatric:         Mood and Affect: Mood normal.         Behavior: Behavior normal.       Significant Labs:    BMP:  Recent Labs   Lab 05/09/22  0804 05/10/22  0352 05/11/22  0438   * 132* 137   K 3.9 3.8 4.2    102 108   CO2 18* 19* 20*   BUN 23 31* 34*   CREATININE 1.8* 2.0* 2.2*   CALCIUM 8.9 7.8* 7.7*       CBC:   Recent Labs   Lab 05/09/22  0804 05/10/22  0352 05/11/22  0438   WBC 22.18* 21.19* 16.72*   HGB 7.3* 6.9* 7.7*   HCT 24.3* 21.7* 24.1*    329 311       All pertinent labs results from the past 24 hours have been  reviewed.    Significant Imaging:  All pertinent imaging results/findings from the past 24 hours have been reviewed.

## 2022-05-11 NOTE — PLAN OF CARE
Patient resting in bed: side rails up x 2, lowest position, bed ,locked and call bell in place. Plan of care was reviewed with patient. She is tolerating PO intake and ambulating to to the restroom. Afebrile and vitals remain stable; notes back pain at nephrostomy site. Labs obtained, awaiting results. Frequent rounding completed and patient encouraged to call as needed. Will continue to monitor patient.     Problem: Adult Inpatient Plan of Care  Goal: Plan of Care Review  Outcome: Ongoing, Progressing  Goal: Patient-Specific Goal (Individualized)  Outcome: Ongoing, Progressing  Goal: Absence of Hospital-Acquired Illness or Injury  Outcome: Ongoing, Progressing  Goal: Optimal Comfort and Wellbeing  Outcome: Ongoing, Progressing  Goal: Readiness for Transition of Care  Outcome: Ongoing, Progressing     Problem: Diabetes Comorbidity  Goal: Blood Glucose Level Within Targeted Range  Outcome: Ongoing, Progressing     Problem: Infection  Goal: Absence of Infection Signs and Symptoms  Outcome: Ongoing, Progressing     Problem: Skin Injury Risk Increased  Goal: Skin Health and Integrity  Outcome: Ongoing, Progressing     Problem: Fall Injury Risk  Goal: Absence of Fall and Fall-Related Injury  Outcome: Ongoing, Progressing     Problem: Pain Acute  Goal: Acceptable Pain Control and Functional Ability  Outcome: Ongoing, Progressing

## 2022-05-11 NOTE — SUBJECTIVE & OBJECTIVE
Interval History: NAEON. HDS. No complaints per patient. Eating well. Good UOP, sCr increasing. THOMAS worsening, will start IVF. Continue to monitor renal function.    Review of Systems   Constitutional:  Negative for activity change, appetite change, chills, fatigue and fever.   HENT:  Negative for congestion, rhinorrhea and sore throat.    Eyes:  Negative for visual disturbance.   Respiratory:  Negative for cough and shortness of breath.    Cardiovascular:  Negative for chest pain, palpitations and leg swelling.   Gastrointestinal:  Negative for abdominal distention, abdominal pain, constipation, diarrhea, nausea and vomiting.   Genitourinary:  Positive for frequency. Negative for dysuria, hematuria and urgency.   Musculoskeletal:  Positive for back pain. Negative for myalgias and neck pain.   Skin:  Negative for rash.   Neurological:  Positive for weakness. Negative for dizziness, syncope, light-headedness and headaches.   Psychiatric/Behavioral:  Negative for agitation and confusion. The patient is not nervous/anxious.    Objective:     Vital Signs (Most Recent):  Temp: 97.6 °F (36.4 °C) (05/11/22 0746)  Pulse: 80 (05/11/22 0957)  Resp: 20 (05/11/22 1213)  BP: (!) 140/65 (05/11/22 0746)  SpO2: 96 % (05/11/22 0957) Vital Signs (24h Range):  Temp:  [97.3 °F (36.3 °C)-97.7 °F (36.5 °C)] 97.6 °F (36.4 °C)  Pulse:  [70-82] 80  Resp:  [17-20] 20  SpO2:  [95 %-100 %] 96 %  BP: (115-140)/(59-65) 140/65     Weight: 51.7 kg (114 lb)  Body mass index is 21.54 kg/m².    Intake/Output Summary (Last 24 hours) at 5/11/2022 1312  Last data filed at 5/11/2022 1052  Gross per 24 hour   Intake 1044.58 ml   Output 3050 ml   Net -2005.42 ml        Physical Exam  Vitals and nursing note reviewed.   Constitutional:       General: She is not in acute distress.     Appearance: Normal appearance. She is normal weight.   HENT:      Head: Normocephalic and atraumatic.      Mouth/Throat:      Mouth: Mucous membranes are moist.      Pharynx:  Oropharynx is clear.   Eyes:      Extraocular Movements: Extraocular movements intact.      Conjunctiva/sclera: Conjunctivae normal.   Cardiovascular:      Rate and Rhythm: Normal rate and regular rhythm.   Pulmonary:      Effort: Pulmonary effort is normal. No respiratory distress.      Breath sounds: Normal breath sounds. No wheezing.   Abdominal:      General: Abdomen is flat. Bowel sounds are normal. There is no distension.      Palpations: Abdomen is soft.      Tenderness: There is no abdominal tenderness.   Genitourinary:     Comments: Soto in place, draining clear/yellow urine  Nephrostomy bag draining clear/yellow urine; insertion site w/ dressing clean, dry, intact  Musculoskeletal:         General: No swelling or tenderness. Normal range of motion.      Cervical back: Normal range of motion and neck supple.      Comments: Left flank TTP   Skin:     General: Skin is warm and dry.   Neurological:      General: No focal deficit present.      Mental Status: She is alert and oriented to person, place, and time. Mental status is at baseline.      Cranial Nerves: No cranial nerve deficit.      Sensory: No sensory deficit.   Psychiatric:         Mood and Affect: Mood normal.         Behavior: Behavior normal. Behavior is not agitated. Behavior is cooperative.       Significant Labs: All pertinent labs within the past 24 hours have been reviewed.    Significant Imaging: I have reviewed all pertinent imaging results/findings within the past 24 hours.

## 2022-05-11 NOTE — ASSESSMENT & PLAN NOTE
Karoline Justice is a 76F pmhx DM, HTN who presented as a transfer from OSH with fatigue, back pain, and fevers for 2 weeks and dysuria, foul-smelling urine, and AMS for the last week    - s/p cystoscopy with TURBT and R ureteral stent placement, unable to place L ureteral stent  - L NT placed with IR  - Diet per primary  - f/u pathology  - Maintain catheter at discharge  - transfuse for hgb < 7   - Remainder of care per primary team

## 2022-05-11 NOTE — PROGRESS NOTES
[facility] - Oncology ([facility])  Urology  Progress Note    Patient Name: Karoline Justice  MRN: [id number]  Admission Date: 5/5/2022  Hospital Length of Stay: 6 days  Code Status: Full Code   Attending Provider: Ansley Simeon MD   Primary Care Physician: Dee Dee Oconnor MD    Subjective:     HPI:  Karoline Justice is a 76F pmhx DM, HTN who presented as a transfer from OSH with fatigue, back pain, and fevers for 2 weeks and dysuria, foul-smelling urine, and AMS for the last week. At OSH, CT A/P showed a 6.5cm mass posterior bladder wall, severe bilateral hydronephrosis. She has hypercalcemia (14.6) and a UTI. She was transferred to [facility] for Urology evaluation.     On exam, the patient is alert but has difficulty with hearing. Daughter at bedside answering a majority of the questions, she states her mother is more confused the past week . She states she has had foul smelling urine with some dysuria for the last week. Denies hematuria. She is afebrile and HDS. OSH labs with leukocytosis (WBC 23.5), hgb 8.5, elevated Cr 2.3 (baseline unknown but last was ~1.8) hypercalcemia (14.6), Microscopic analysis 4RBC, 61 WBC, occasional bacteria. Urine culture pending. Currently on Rocephin. Petersen catheter was placed at OSH and drained 1.1L. Since admission to [facility], 1300ml urine output that appears cloudy with debris. She states her bladder discomfort has decreased since placing petersen catheter.    She does not have any suprapubic discomfort or flank tenderness on palpation this morning. She states at home she does not have hematuria or and overt bladder fullness or pain. Labs are waiting to be drawn this am.      Interval History: NAEON. AFVSS.  Received left nephrostomy tube yesterday.  Catheter with clear yellow urine.    Objective:     Temp:  [97.3 °F (36.3 °C)-97.8 °F (36.6 °C)] 97.3 °F (36.3 °C)  Pulse:  [62-81] 70  Resp:  [15-20] 17  SpO2:  [95 %-100 %] 98 %  BP: ()/(46-65) 135/62     Body mass index is 21.54 kg/m².            Drains       Drain  Duration                  Urethral Catheter 05/09/22 Latex;Straight-tip;Triple-lumen 24 Fr. 2 days         Nephrostomy 05/10/22 0805 Left 8 Fr. <1 day                    Physical Exam  Constitutional:       Appearance: Normal appearance.   HENT:      Head: Normocephalic.      Nose: Nose normal.   Eyes:      Pupils: Pupils are equal, round, and reactive to light.   Cardiovascular:      Rate and Rhythm: Normal rate.   Pulmonary:      Effort: Pulmonary effort is normal.   Chest:      Chest wall: No tenderness.   Abdominal:      General: Abdomen is flat. There is no distension.      Tenderness: There is no abdominal tenderness. There is no right CVA tenderness or left CVA tenderness.      Comments: Hard mass palpable in pelvis   Genitourinary:     Comments: Soto catheter in place clear yellow urine  Musculoskeletal:      Cervical back: Normal range of motion.   Skin:     General: Skin is warm.   Neurological:      General: No focal deficit present.      Mental Status: She is alert and oriented to person, place, and time.   Psychiatric:         Mood and Affect: Mood normal.         Behavior: Behavior normal.       Significant Labs:    BMP:  Recent Labs   Lab 05/09/22  0804 05/10/22  0352 05/11/22  0438   * 132* 137   K 3.9 3.8 4.2    102 108   CO2 18* 19* 20*   BUN 23 31* 34*   CREATININE 1.8* 2.0* 2.2*   CALCIUM 8.9 7.8* 7.7*       CBC:   Recent Labs   Lab 05/09/22  0804 05/10/22  0352 05/11/22  0438   WBC 22.18* 21.19* 16.72*   HGB 7.3* 6.9* 7.7*   HCT 24.3* 21.7* 24.1*    329 311       All pertinent labs results from the past 24 hours have been reviewed.    Significant Imaging:  All pertinent imaging results/findings from the past 24 hours have been reviewed.      Assessment/Plan:     * Bladder mass  Karoline Justice is a 76F pmhx DM, HTN who presented as a transfer from OSH with fatigue, back pain, and fevers for 2 weeks and dysuria, foul-smelling urine, and AMS for the last  week    - s/p cystoscopy with TURBT and R ureteral stent placement, unable to place L ureteral stent  - L NT placed with IR  - Diet per primary  - f/u pathology  - Maintain catheter at discharge  - transfuse for hgb < 7   - Remainder of care per primary team  - Will set up outpatient follow up for voiding trial and path review    Urology will now sign off. Please call with any additional questions/concerns.     VTE Risk Mitigation (From admission, onward)         Ordered     Reason for No Pharmacological VTE Prophylaxis  Once        Question:  Reasons:  Answer:  Patient is Ambulatory    05/06/22 0116     IP VTE HIGH RISK PATIENT  Once         05/06/22 0116     Place sequential compression device  Until discontinued         05/06/22 0116                Nolan Greene MD  Urology  [facility] - Oncology ([facility])

## 2022-05-11 NOTE — ASSESSMENT & PLAN NOTE
Patient with acute kidney injury likely d/t IVVD/Dehydration and Post-Obstructive d/t bladder mass Which is currently worsening. Labs reviewed- Renal function/electrolytes with Estimated Creatinine Clearance: 16.4 mL/min (A) (based on SCr of 2.2 mg/dL (H)). according to latest data. Monitor urine output and serial BMP and adjust therapy as needed. Avoid nephrotoxins and renally dose meds for GFR listed above.     PLAN:  --See plan for Bladder Mass and Hydronephrosis to address post-renal/obstructive causes  --Will start IVF and continue to encourage po intake for hypovolemia  --Monitor sCr and renal function on BMP

## 2022-05-12 VITALS
SYSTOLIC BLOOD PRESSURE: 138 MMHG | OXYGEN SATURATION: 98 % | RESPIRATION RATE: 16 BRPM | TEMPERATURE: 100 F | DIASTOLIC BLOOD PRESSURE: 63 MMHG | WEIGHT: 114 LBS | HEIGHT: 61 IN | HEART RATE: 100 BPM | BODY MASS INDEX: 21.52 KG/M2

## 2022-05-12 PROBLEM — N30.00 ACUTE CYSTITIS WITHOUT HEMATURIA: Status: ACTIVE | Noted: 2022-05-06

## 2022-05-12 LAB
ALBUMIN SERPL BCP-MCNC: 2.2 G/DL (ref 3.5–5.2)
ALP SERPL-CCNC: 114 U/L (ref 55–135)
ALT SERPL W/O P-5'-P-CCNC: 9 U/L (ref 10–44)
ANION GAP SERPL CALC-SCNC: 9 MMOL/L (ref 8–16)
AST SERPL-CCNC: 12 U/L (ref 10–40)
BASOPHILS # BLD AUTO: 0.05 K/UL (ref 0–0.2)
BASOPHILS NFR BLD: 0.2 % (ref 0–1.9)
BILIRUB SERPL-MCNC: 0.5 MG/DL (ref 0.1–1)
BUN SERPL-MCNC: 23 MG/DL (ref 8–23)
CALCIUM SERPL-MCNC: 7.6 MG/DL (ref 8.7–10.5)
CHLORIDE SERPL-SCNC: 108 MMOL/L (ref 95–110)
CO2 SERPL-SCNC: 18 MMOL/L (ref 23–29)
CREAT SERPL-MCNC: 1.7 MG/DL (ref 0.5–1.4)
DIFFERENTIAL METHOD: ABNORMAL
EOSINOPHIL # BLD AUTO: 0.5 K/UL (ref 0–0.5)
EOSINOPHIL NFR BLD: 2.4 % (ref 0–8)
ERYTHROCYTE [DISTWIDTH] IN BLOOD BY AUTOMATED COUNT: 16 % (ref 11.5–14.5)
EST. GFR  (AFRICAN AMERICAN): 33.3 ML/MIN/1.73 M^2
EST. GFR  (NON AFRICAN AMERICAN): 28.9 ML/MIN/1.73 M^2
GLUCOSE SERPL-MCNC: 141 MG/DL (ref 70–110)
HCT VFR BLD AUTO: 25.2 % (ref 37–48.5)
HGB BLD-MCNC: 8 G/DL (ref 12–16)
IMM GRANULOCYTES # BLD AUTO: 0.11 K/UL (ref 0–0.04)
IMM GRANULOCYTES NFR BLD AUTO: 0.5 % (ref 0–0.5)
LYMPHOCYTES # BLD AUTO: 1.9 K/UL (ref 1–4.8)
LYMPHOCYTES NFR BLD: 9.2 % (ref 18–48)
MAGNESIUM SERPL-MCNC: 1.5 MG/DL (ref 1.6–2.6)
MCH RBC QN AUTO: 26.4 PG (ref 27–31)
MCHC RBC AUTO-ENTMCNC: 31.7 G/DL (ref 32–36)
MCV RBC AUTO: 83 FL (ref 82–98)
MONOCYTES # BLD AUTO: 1.4 K/UL (ref 0.3–1)
MONOCYTES NFR BLD: 6.9 % (ref 4–15)
NEUTROPHILS # BLD AUTO: 16.5 K/UL (ref 1.8–7.7)
NEUTROPHILS NFR BLD: 80.8 % (ref 38–73)
NRBC BLD-RTO: 0 /100 WBC
PHOSPHATE SERPL-MCNC: 1.8 MG/DL (ref 2.7–4.5)
PLATELET # BLD AUTO: 300 K/UL (ref 150–450)
PMV BLD AUTO: 9.8 FL (ref 9.2–12.9)
POCT GLUCOSE: 171 MG/DL (ref 70–110)
POCT GLUCOSE: 206 MG/DL (ref 70–110)
POTASSIUM SERPL-SCNC: 4 MMOL/L (ref 3.5–5.1)
PROT SERPL-MCNC: 6 G/DL (ref 6–8.4)
RBC # BLD AUTO: 3.03 M/UL (ref 4–5.4)
SODIUM SERPL-SCNC: 135 MMOL/L (ref 136–145)
WBC # BLD AUTO: 20.41 K/UL (ref 3.9–12.7)

## 2022-05-12 PROCEDURE — 1111F PR DISCHARGE MEDS RECONCILED W/ CURRENT OUTPATIENT MED LIST: ICD-10-PCS | Mod: CPTII,GC,, | Performed by: HOSPITALIST

## 2022-05-12 PROCEDURE — 25000003 PHARM REV CODE 250: Performed by: STUDENT IN AN ORGANIZED HEALTH CARE EDUCATION/TRAINING PROGRAM

## 2022-05-12 PROCEDURE — 25000003 PHARM REV CODE 250

## 2022-05-12 PROCEDURE — 99238 PR HOSPITAL DISCHARGE DAY,<30 MIN: ICD-10-PCS | Mod: GC,,, | Performed by: HOSPITALIST

## 2022-05-12 PROCEDURE — 63600175 PHARM REV CODE 636 W HCPCS: Performed by: STUDENT IN AN ORGANIZED HEALTH CARE EDUCATION/TRAINING PROGRAM

## 2022-05-12 PROCEDURE — 1111F DSCHRG MED/CURRENT MED MERGE: CPT | Mod: CPTII,GC,, | Performed by: HOSPITALIST

## 2022-05-12 PROCEDURE — 84100 ASSAY OF PHOSPHORUS: CPT

## 2022-05-12 PROCEDURE — 99238 HOSP IP/OBS DSCHRG MGMT 30/<: CPT | Mod: GC,,, | Performed by: HOSPITALIST

## 2022-05-12 PROCEDURE — 63600175 PHARM REV CODE 636 W HCPCS

## 2022-05-12 PROCEDURE — 85025 COMPLETE CBC W/AUTO DIFF WBC: CPT

## 2022-05-12 PROCEDURE — 36415 COLL VENOUS BLD VENIPUNCTURE: CPT

## 2022-05-12 PROCEDURE — 80053 COMPREHEN METABOLIC PANEL: CPT

## 2022-05-12 PROCEDURE — 83735 ASSAY OF MAGNESIUM: CPT

## 2022-05-12 RX ORDER — SODIUM,POTASSIUM PHOSPHATES 280-250MG
2 POWDER IN PACKET (EA) ORAL EVERY 4 HOURS
Status: COMPLETED | OUTPATIENT
Start: 2022-05-12 | End: 2022-05-12

## 2022-05-12 RX ORDER — CEFDINIR 300 MG/1
300 CAPSULE ORAL DAILY
Qty: 7 CAPSULE | Refills: 0 | Status: SHIPPED | OUTPATIENT
Start: 2022-05-12 | End: 2022-05-19

## 2022-05-12 RX ORDER — MAGNESIUM SULFATE HEPTAHYDRATE 40 MG/ML
2 INJECTION, SOLUTION INTRAVENOUS ONCE
Status: COMPLETED | OUTPATIENT
Start: 2022-05-12 | End: 2022-05-12

## 2022-05-12 RX ADMIN — MAGNESIUM SULFATE 2 G: 2 INJECTION INTRAVENOUS at 11:05

## 2022-05-12 RX ADMIN — INSULIN ASPART 2 UNITS: 100 INJECTION, SOLUTION INTRAVENOUS; SUBCUTANEOUS at 08:05

## 2022-05-12 RX ADMIN — HEPARIN SODIUM 5000 UNITS: 5000 INJECTION INTRAVENOUS; SUBCUTANEOUS at 04:05

## 2022-05-12 RX ADMIN — INSULIN DETEMIR 8 UNITS: 100 INJECTION, SOLUTION SUBCUTANEOUS at 08:05

## 2022-05-12 RX ADMIN — MUPIROCIN: 20 OINTMENT TOPICAL at 08:05

## 2022-05-12 RX ADMIN — POTASSIUM & SODIUM PHOSPHATES POWDER PACK 280-160-250 MG 2 PACKET: 280-160-250 PACK at 02:05

## 2022-05-12 RX ADMIN — DICLOFENAC SODIUM 2 G: 10 GEL TOPICAL at 08:05

## 2022-05-12 RX ADMIN — CEFTRIAXONE 1 G: 1 INJECTION, SOLUTION INTRAVENOUS at 10:05

## 2022-05-12 RX ADMIN — OXYCODONE 5 MG: 5 TABLET ORAL at 03:05

## 2022-05-12 RX ADMIN — GABAPENTIN 100 MG: 100 CAPSULE ORAL at 02:05

## 2022-05-12 RX ADMIN — GABAPENTIN 100 MG: 100 CAPSULE ORAL at 08:05

## 2022-05-12 RX ADMIN — HEPARIN SODIUM 5000 UNITS: 5000 INJECTION INTRAVENOUS; SUBCUTANEOUS at 02:05

## 2022-05-12 RX ADMIN — AMLODIPINE BESYLATE 5 MG: 5 TABLET ORAL at 08:05

## 2022-05-12 RX ADMIN — INSULIN ASPART 4 UNITS: 100 INJECTION, SOLUTION INTRAVENOUS; SUBCUTANEOUS at 01:05

## 2022-05-12 RX ADMIN — POTASSIUM & SODIUM PHOSPHATES POWDER PACK 280-160-250 MG 2 PACKET: 280-160-250 PACK at 11:05

## 2022-05-12 RX ADMIN — OXYCODONE 5 MG: 5 TABLET ORAL at 08:05

## 2022-05-12 RX ADMIN — POTASSIUM & SODIUM PHOSPHATES POWDER PACK 280-160-250 MG 2 PACKET: 280-160-250 PACK at 08:05

## 2022-05-12 RX ADMIN — SENNOSIDES AND DOCUSATE SODIUM 1 TABLET: 50; 8.6 TABLET ORAL at 08:05

## 2022-05-12 RX ADMIN — INSULIN ASPART 4 UNITS: 100 INJECTION, SOLUTION INTRAVENOUS; SUBCUTANEOUS at 08:05

## 2022-05-12 RX ADMIN — POLYETHYLENE GLYCOL 3350 17 G: 17 POWDER, FOR SOLUTION ORAL at 08:05

## 2022-05-12 RX ADMIN — INSULIN ASPART 3 UNITS: 100 INJECTION, SOLUTION INTRAVENOUS; SUBCUTANEOUS at 01:05

## 2022-05-12 NOTE — DISCHARGE SUMMARY
Herac[facility] Schroeder - Oncology ([facility])  Hospital Medicine  Discharge Summary      Patient Name: Karoline Justice  MRN: [id number]  Patient Class: IP- Inpatient  Admission Date: 5/5/2022  Hospital Length of Stay: 7 days  Discharge Date and Time:  05/12/2022 4:01 PM  Attending Physician: Linden Rees MD   Discharging Provider: Kristy Bustillos MD  Primary Care Provider: Dee Dee Oconnor MD  [facility] Medicine Team: [facility] HOSP MED 1 Kristy Bustillos MD    HPI:   Karoline Justice 76F with IDDM2 who presented to [facility] with generalized weakness, back pain, and intermittent fevers for 2 weeks with dysuria, foul-smelling urine, and confusion for the last week. At [facility] a CT A/P showed a 6.5cm mass anterior bladder wall c/f neoplasm, severe bilateral hydronephrosis, and choledocholithiasis with with moderate intra and extrahepatic biliary duct dilatation. She has hypercalcemia and a UTI. She was transferred to [facility] for Urology evaluation.    On arrival, the patient is alert and oriented. Collateral is provided by her daughter at bedside, who agrees that her mother has been more confused for the past week. She is c/o intermittent confusion, decreased appetite, bilateral flank pain, and increased urinary frequency. She is afebrile and mildly hypertensive (157/68). Labs from OSH with leukocytosis (WBC 23.5), anemia (Hb 8.5), elevated Cr (2.3 -- unknown baseline), hypercalcemia (15.7 when corrected for albumin), and UTI on urine studies (UA cloudy with 2+ protein, 3+ leuks and urine microscopy with 61 WBC). Exam notable for significant bilat flank TTP. She will be admitted to  for further care.       Procedure(s) (LRB):  TURBT (TRANSURETHRAL RESECTION OF BLADDER TUMOR) (N/A)  CYSTOSCOPY (N/A)  PYELOGRAM, RETROGRADE (Right)  STENT, URETERAL (Right)      Hospital Course:   Pt admitted as a transfer for urology evaluation after being found to have large bladder mass at OSH. On arrival, pt also found to be hypercalcemic to >14  along with having UTI. She was started on fluids, calcitonin, and zoledronic acid. Pt started on rocephin for UTI. Urology consulted as well for bladder mass evaluation. GYN ONC also consulted for their opinion to assess if mass if gyn related. Retroperitoneal U/S and pelvic U/S obtained for further imaging. After repeat imaging, Gyn not convinced this is gyn related and have signed off. TURBT w/ Urology on 5/9 w/ mass resection and R ureteral stent placement, unable to place L ureteral stent. L nephrostomy w/ IR on 5/10. Recovering well from procedures. Hgb 6.9 on morning 5/10 and transfused 1 unit pRBC, likely 2/2 procedure w/ hematuria. THOMAS continued to rise, started on continuous IVF 5/11 and continue to monitor sCr. Renal function improved to near what we suspect is her baseline. Stable for discharge on 5/12, to be discharged with petersen and nephrostomy tube in place. Patient was slightly wobbly on feet, but after discussing with patient and family, they were comfortable with her going home with  to start on Monday 5/16, with a home walker ordered for her in the meantime. She was discharged to complete a prolonged course of cefdinir for her complicated UTI with nephrostomy tube. She has f/u scheduled with Urology on 5/20 to assess her petersen and nephrostomy tube. Instructed family to set up PCP f/u in [place] where she lives.        Physical Exam  Vitals and nursing note reviewed.   Constitutional:       General: She is not in acute distress.     Appearance: She is not toxic-appearing or diaphoretic.   HENT:      Head: Normocephalic.   Cardiovascular:      Rate and Rhythm: Normal rate and regular rhythm.   Pulmonary:      Effort: Pulmonary effort is normal. No respiratory distress.   Abdominal:      Palpations: Abdomen is soft.      Tenderness: There is no guarding.   Musculoskeletal:      Right lower leg: No edema.      Left lower leg: No edema.   Skin:     General: Skin is warm and dry.   Neurological:     "  Mental Status: She is alert. Mental status is at baseline.      Cranial Nerves: No dysarthria.   Psychiatric:         Mood and Affect: Mood normal.         Behavior: Behavior normal.     Goals of Care Treatment Preferences:  Code Status: Full Code      Consults:   Consults (From admission, onward)        Status Ordering Provider     Inpatient consult to [facility] ([facility])  Once        Provider:  (Not yet assigned)    Completed SENTHIL ORTEGA     Inpatient consult to Gynecologic Oncology  Once        Provider:  (Not yet assigned)    Completed JOSE VEGA     Inpatient consult to Urology  Once        Provider:  (Not yet assigned)    Completed TING RAGLAND          No new Assessment & Plan notes have been filed under this hospital service since the last note was generated.  Service: Hospital Medicine    Final Active Diagnoses:    Diagnosis Date Noted POA    PRINCIPAL PROBLEM:  Bladder mass [N32.89] 05/06/2022 Yes    THOMAS (acute kidney injury) [N17.9] 05/11/2022 Yes    Acute cystitis without hematuria [N30.00] 05/06/2022 Yes    Controlled Type 2 diabetes mellitus [E11.9] 05/06/2022 Yes     Chronic    Hypercalcemia [E83.52] 05/06/2022 Yes    Hydronephrosis [N13.30] 05/06/2022 Yes    Choledocholithiasis [K80.50] 05/06/2022 Yes    HTN (hypertension) [I10] 05/06/2022 Yes    Anemia [D64.9] 05/06/2022 Yes      Problems Resolved During this Admission:       Discharged Condition: fair    Disposition:     Follow Up:   Follow-up Information     Dee Dee Oconnor MD Follow up on 5/17/2022.    Specialty: Family Medicine  Why: Hospital follow up at 9 am with Michelle Cardenas NP  Contact information:  [address] [address] [phone]                       Patient Instructions:      WALKER FOR HOME USE     Order Specific Question Answer Comments   Type of Walker: Adult (5'4"-6'6")    With wheels? Yes    Height: 5' 1" (1.549 m)    Weight: 51.7 kg (114 " lb)    Length of need (1-99 months): 99    Does patient have medical equipment at home? none    Please check all that apply: Patient's condition impairs ambulation.        Significant Diagnostic Studies: Microbiology:   Urine Culture    Lab Results   Component Value Date    LABURIN (A) 05/05/2022     VIRIDANS STREPTOCOCCUS GROUP  > 100,000 cfu/ml  No other significant isolate  Susceptibility testing not routinely performed         Pending Diagnostic Studies:     Procedure Component Value Units Date/Time    Specimen to Pathology, Surgery Urology [[id number]] Collected: 05/09/22 1047    Order Status: Sent Lab Status: In process Updated: 05/09/22 1400    Specimen: Tissue          Medications:  Reconciled Home Medications:      Medication List      START taking these medications    amLODIPine 5 MG tablet  Commonly known as: NORVASC  Take 1 tablet (5 mg total) by mouth once daily.     cefdinir 300 MG capsule  Commonly known as: OMNICEF  Take 1 capsule (300 mg total) by mouth once daily. for 7 days     LIDOcaine 5 %  Commonly known as: LIDODERM  Place 1 patch onto the skin once daily. Place patch to back. Leave on for 12 hours and remove for 12 hours.        CONTINUE taking these medications    acetaminophen 500 MG tablet  Commonly known as: TYLENOL  Take 500 mg by mouth every 6 (six) hours as needed for Pain.     LEVEMIR FLEXTOUCH U-100 INSULN 100 unit/mL (3 mL) Inpn pen  Generic drug: insulin detemir U-100  Inject 24 Units into the skin every morning.        STOP taking these medications    calcium carbonate 200 mg calcium (500 mg) chewable tablet  Commonly known as: TUMS            Indwelling Lines/Drains at time of discharge:   Lines/Drains/Airways     Drain  Duration                Urethral Catheter 05/09/22 Latex;Straight-tip;Triple-lumen 24 Fr. 3 days         Nephrostomy 05/10/22 0805 Left 8 Fr. 2 days                Time spent on the discharge of patient: 120 minutes         Kristy Bustillos MD  Department of  [facility] Medicine  Herac[facility] Schroeder - Oncology ([facility])

## 2022-05-12 NOTE — MEDICAL/APP STUDENT
Hospital Medicine Student   Progress Note  [facility] HOSP MED 1    Admit Date: 5/5/2022  Hospital Day: 7  05/12/2022  12:50 PM    SUBJECTIVE:   Ms. Karoline Justice is a 76 y.o. female with a relevant medical history of DM II who is being followed up for Bladder mass.    She presented to [facility] with generalized weakness, back pain, and intermittent fevers for 2 weeks with dysuria, foul-smelling urine, and confusion for the last week. CT A/P showed a 6.5cm mass anterior bladder wall c/f neoplasm, severe bilateral hydronephrosis, and choledocholithiasis with with moderate intra and extrahepatic biliary duct dilatation. She has hypercalcemia and a UTI. Transferred to [facility]. On arrival, the patient is alert and oriented. Collateral is provided by her daughter at bedside, who agrees that her mother has been more confused for the past week. She is c/o intermittent confusion, decreased appetite, bilateral flank pain, and increased urinary frequency. Labs from OSH with leukocytosis, anemia, elevated Cr (2.3 -- unknown baseline), hypercalcemia (15.7 when corrected for albumin), and UTI on urine studies. Exam notable for significant bilat flank TTP.     Overview/Hospital Course:  Pt admitted as a transfer for urology evaluation after being found to have large bladder mass at OSH. On arrival, pt also found to be hypercalcemic to >14 along with having UTI. She was started on fluids, calcitonin, and zoledronic acid. Pt started on rocephin for UTI. Urology consulted as well for bladder mass evaluation. GYN ONC also consulted for their opinion to assess if mass if gyn related. Retroperitoneal U/S and pelvic U/S obtained for further imaging. After repeat imaging, Gyn not convinced this is gyn related and have signed off. Pt to went for biopsy with urology 5/9/22. R ureter stent Placed. IR placed L nephrostomy tube on 5/10.     Interval history: Had an acute fever over night which has now resolved. Patient reports episode of  shakiness over night. POCT BG was 76 at the time. Mild tenderness at nephrostomy site, well controleld with prn oxycodone. Nurse provided patient with crackers which improved her sx. Repeat POCT BG was 111. Patient also reports instability when walking. Will need PT/OT eval.    Review of Systems   Constitutional: Positive for fever. Negative for chills and diaphoresis.   HENT: Negative for sore throat.    Eyes: Negative for blurred vision and redness.   Respiratory: Negative for cough, hemoptysis, shortness of breath and wheezing.    Cardiovascular: Negative for chest pain, palpitations, orthopnea, claudication and leg swelling.   Gastrointestinal: Negative for abdominal pain, constipation, diarrhea, heartburn, nausea and vomiting.   Genitourinary: Negative for dysuria, flank pain, frequency and urgency.   Musculoskeletal: Negative for myalgias.   Skin: Negative for itching and rash.   Neurological: Positive for dizziness. Negative for tingling, tremors and headaches.       Please refer to the H&P for past medical, family, and social history.    OBJECTIVE:     Vital Signs Recent:  Temp: 98.4 °F (36.9 °C) (05/12/22 1127)  Pulse: 69 (05/12/22 1127)  Resp: 16 (05/12/22 1127)  BP: 120/80 (05/12/22 1127)  SpO2: 97 % (05/12/22 1127)  Oxygen Documentation:    Flow (L/min): 9           O2 Device (Oxygen Therapy): room air         Vital Signs Range (Last 24H):  Temp:  [97.8 °F (36.6 °C)-100.1 °F (37.8 °C)]   Pulse:  []   Resp:  [16-20]   BP: (120-158)/(57-80)   SpO2:  [94 %-97 %]        I & O (Last 24H):    Intake/Output Summary (Last 24 hours) at 5/12/2022 1250  Last data filed at 5/12/2022 1200  Gross per 24 hour   Intake 2267.03 ml   Output 4255 ml   Net -1987.97 ml        Physical Exam:  Physical Exam  Constitutional:       General: She is not in acute distress.     Appearance: Normal appearance.   HENT:      Head: Normocephalic and atraumatic.      Mouth/Throat:      Mouth: Mucous membranes are moist.   Eyes:       Extraocular Movements: Extraocular movements intact.      Pupils: Pupils are equal, round, and reactive to light.   Cardiovascular:      Rate and Rhythm: Regular rhythm. Tachycardia present.      Heart sounds: No murmur heard.    No friction rub. No gallop.   Pulmonary:      Effort: No respiratory distress.      Breath sounds: No stridor. No wheezing or rales.   Abdominal:      General: There is no distension.      Tenderness: There is no abdominal tenderness. There is no guarding.   Musculoskeletal:         General: No swelling.      Right lower leg: No edema.      Left lower leg: No edema.   Skin:     Coloration: Skin is not jaundiced.      Findings: No bruising.   Neurological:      General: No focal deficit present.      Mental Status: She is alert and oriented to person, place, and time.   Psychiatric:         Mood and Affect: Mood normal.         Behavior: Behavior normal.         Labs:   Recent Labs   Lab 05/10/22  0352 05/11/22  0438 05/12/22  0254   * 137 135*   K 3.8 4.2 4.0    108 108   CO2 19* 20* 18*   BUN 31* 34* 23   CREATININE 2.0* 2.2* 1.7*   * 128* 141*   CALCIUM 7.8* 7.7* 7.6*   MG 1.9 2.0 1.5*   PHOS 2.6* 2.5* 1.8*     Recent Labs   Lab 05/10/22  0352 05/11/22  0438 05/12/22  0254   ALKPHOS 135 106 114   ALT 13 13 9*   AST 11 11 12   ALBUMIN 2.2* 2.3* 2.2*   PROT 6.3 5.7* 6.0   BILITOT 0.3 0.4 0.5     Recent Labs   Lab 05/10/22  0352 05/11/22  0438 05/12/22  0254   WBC 21.19* 16.72* 20.41*   HGB 6.9* 7.7* 8.0*   HCT 21.7* 24.1* 25.2*    311 300       Diagnostic Results:    Scheduled Meds:   amLODIPine  5 mg Oral Daily    cefTRIAXone (ROCEPHIN) IVPB  1 g Intravenous Q24H    diclofenac sodium  2 g Topical (Top) Daily    gabapentin  100 mg Oral TID    heparin (porcine)  5,000 Units Subcutaneous Q8H    insulin aspart U-100  4 Units Subcutaneous TIDWM    insulin detemir U-100  8 Units Subcutaneous Daily    LIDOcaine  1 patch Transdermal Q24H    magnesium sulfate IVPB   2 g Intravenous Once    mupirocin   Nasal BID    polyethylene glycol  17 g Oral Daily    potassium, sodium phosphates  2 packet Oral Q4H    senna-docusate 8.6-50 mg  1 tablet Oral BID     Continuous Infusions:  PRN Meds:sodium chloride, acetaminophen, dextrose 10%, dextrose 10%, glucagon (human recombinant), glucose, glucose, insulin aspart U-100, melatonin, naloxone, ondansetron, oxyCODONE, prochlorperazine, sodium chloride 0.9%, sodium chloride 0.9%    ASSESSMENT/PLAN:   Ms. Karoline Justice is a 76 y.o. female with a relevant medical history of DM II who is being followed up for Bladder mass.  She appears well today and is in no acute distress. Denies any fever, nausea, vomiting. Soto and nephrostomy are drainign clear yellow urine. Nephrostomy site is clean and dry with no signs of infection. Renal function has improved after administration of LR. She has not been on her scheduled coarse of Rocephin. Restarted Rocephin today. Reports instability with walking. Would like to have PT evaluate before discharge. Possible discharge today pending PT eval.     Active Hospital Problems    Diagnosis  POA    *Bladder mass [N32.89]  Yes    THOMAS (acute kidney injury) [N17.9]  Yes    UTI (urinary tract infection) [N39.0]  Yes    Controlled Type 2 diabetes mellitus [E11.9]  Yes     Chronic    Hypercalcemia [E83.52]  Yes    Hydronephrosis [N13.30]  Yes    Choledocholithiasis [K80.50]  Unknown    HTN (hypertension) [I10]  Yes    Anemia [D64.9]  Yes      Resolved Hospital Problems   No resolved problems to display.       Assessment and Plan     * Bladder mass   Gyn Onc recommended transabdominal or transvaginal ultrasound.  Transabdominal ultrasound pursued because of associated discomfort with transvaginal ultrasound.  U/S showing small ovarian cyst and uterine fibroid but not other concerning findings with regards to bladder mass. Gyn Onc not convinced this is gyn related and have signed off.   S/p TURBT on  5/9 w/ Urology, bladder mass resected, R ureteral stent placed, unable to place L ureteral stent  S/p L nephrectomy tube w/ IR on 5/10  Good UOP, sCr stabalized     PLAN:  --Urology following  --Soto in place  --Renally dose medications, avoid nephrotoxins  --Strict I&O's  --Follow pathology from resection  --Trend renal function on BMP     Anemia  Hb 8.5 on admission. MCV 85.   Unknown baseline.   No s/s of bleeding, HDS     PLAN:  --Trend CBC        HTN (hypertension)  Patient denies any history HTN.   BP elevated at OSH (191/76, 175/92). On arrival to [facility], BP was 157/68.  Small pericardial effusion noted on CT Chest.     PLAN:  --Continue amlodipine 5mg     Choledocholithiasis  CT A/P with multiple calcified gallstones, moderate intrahepatic and extrahepatic biliary ductal dilatation, and a 4 mm stone within the common duct.  No transaminitis (AST/ALT 8/14), alk phos wnl (104), and Tbili not elevated (0.5)  Patient denies any abdominal pain, nausea, or vomiting. No RUQ or epigastric TTP on exam.     PLAN:  --AES consulted and saw patient, recommend outpatient ERCP, no intervention needed on this hospitalization     Hydronephrosis  Severe bilateral hydronephrosis noted on CT A/P.  Cr 2.3 on admission, unknown baseline.   Patient reports significant UOP.  Repeat retroperitoneal US still showing persistent severe hydro on left and moderate on right side.   S/p R ureteral stent placed 5/9 and L nephrostomy tube placed 5/10   BUN and sCR have improved after fluid challenge  PLAN:  --Urology following, appreciate recs  --Continue Soto, R ureteral stent, L nephrostomy tube in place  --Requested nephrostomy supplies for patient at discharge  --Education for patient/family on nephrostomy/Soto care at discharge     Hypercalcemia  Calcium 14.6 on admission. 15.7 when corrected for albumin.  Patient reports 1 weeks of confusion, fatigue, and constipation.  Resolved     PLAN:  --S/p continuous IVF @ 200cc/hour for goal UOP  100-150 cc/hour.   --S/p Calcitonin 4 units/kg x1.  --S/p Zoledronic acid 4mg  --Calcium improving. Cont monitoring qd.   --Strict I&O's     Controlled Type 2 diabetes mellitus  Patient reports taking 24 units levemir daily.  No HbA1c on file.  HbA1c, 6.9     PLAN:  --Continue insulin determir 8 units qd  --Moderate dose SSI  --POCT BG ACHS  --Diabetic diet     UTI (urinary tract infection)  Patient with 2 weeks of dysuria, foul-smelling urine, and increased urinary frequency. Also reports intermittent confusion.  Urine studies on admission c/w UTI. UA cloudy with 2+ protein, 3+ leuko. Urine micro with 61 WBC.  Patient received 1g CTX at OSH.  Urine cx grew Strep viridans  Patient was suppose to be on on Rocephin for 14 days. Reported patient had only received two doses.      PLAN:  --Restarted Rocephin in AM   -- switch to outpatient doxycycline on discharge                    VTE Risk Mitigation (From admission, onward)                        Ordered         Reason for No Pharmacological VTE Prophylaxis  Once        Question:  Reasons:  Answer:  Patient is Ambulatory    05/06/22 0116         IP VTE HIGH RISK PATIENT  Once         05/06/22 0116         Place sequential compression device  Until discontinued         05/06/22 0116           .    Discharge planning:   GET: 5/12/2022     Code Status: Full Code   Is the patient medically ready for discharge?: No    Reason for patient still in hospital (select all that apply): Pending PT eval   Discharge Plan A: Home, Home with family      Kenyon Jacobs  MS3   [employer] [employer] [facility]

## 2022-05-12 NOTE — PLAN OF CARE
05/12/22 1647   Post-Acute Status   Post-Acute Authorization Placement;Home Health   Post-Acute Placement Status Set-up Complete/Auth obtained   Home Health Status Set-up Complete/Auth obtained  (The Medical Team)     Advised by MD that Pt is going to dc today. Sent orders and dc summary to The Medical Team with note that Pt will dc today.    Sandra Knott LCSW  Neurocritical Care   [facility]  [id number]

## 2022-05-12 NOTE — PLAN OF CARE
[facility] - Oncology (Hospital)      HOME HEALTH ORDERS  FACE TO FACE ENCOUNTER    Patient Name: Karoline Justice  YOB: 1945    PCP: Dee Dee Oconnor MD   PCP Address: [address] [place] [address] /*  PCP Phone Number: [phone]  PCP Fax: [phone]    Encounter Date: 5/5/22    Admit to Home Health    Diagnoses:  Active Hospital Problems    Diagnosis  POA    *Bladder mass [N32.89]  Yes    THOMAS (acute kidney injury) [N17.9]  Yes    Acute cystitis without hematuria [N30.00]  Yes    Controlled Type 2 diabetes mellitus [E11.9]  Yes     Chronic    Hypercalcemia [E83.52]  Yes    Hydronephrosis [N13.30]  Yes    Choledocholithiasis [K80.50]  Yes    HTN (hypertension) [I10]  Yes    Anemia [D64.9]  Yes      Resolved Hospital Problems   No resolved problems to display.       Follow Up Appointments:  Future Appointments   Date Time Provider Department Center   5/20/2022  3:20 PM [facility] UROLOGY PHYSICIAN CLINIC [facility] UROLOG [facility] Schroeder       Allergies:Review of patient's allergies indicates:  No Known Allergies    Medications: Review discharge medications with patient and family and provide education.      I have seen and examined this patient within the last 30 days. My clinical findings that support the need for the home health skilled services and home bound status are the following:no   Weakness/numbness causing balance and gait disturbance due to Surgery making it taxing to leave home.  Medical restrictions requiring assistance of another human to leave home due to  Unstable ambulation.     Diet:   diabetic diet 2000 calorie    Labs:  No labs    Referrals/ Consults  Physical Therapy to evaluate and treat. Evaluate for home safety and equipment needs; Establish/upgrade home exercise program. Perform / instruct on therapeutic exercises, gait training, transfer training, and Range of Motion.  Occupational Therapy to evaluate and treat. Evaluate home environment for safety and  equipment needs. Perform/Instruct on transfers, ADL training, ROM, and therapeutic exercises.  Aide to provide assistance with personal care, ADLs, and vital signs.    Activities:   ambulate in house with assistance    Nursing:   Agency to admit patient within 24 hours of hospital discharge unless specified on physician order or at patient request    SN to complete comprehensive assessment including routine vital signs. Instruct on disease process and s/s of complications to report to MD. Review/verify medication list sent home with the patient at time of discharge  and instruct patient/caregiver as needed. Frequency may be adjusted depending on start of care date.     Skilled nurse to perform up to 3 visits PRN for symptoms related to diagnosis    Notify MD if SBP > 160 or < 90; DBP > 90 or < 50; HR > 120 or < 50; Temp > 101; O2 < 88%; Other:   Signs of infection of nephrostomy tube or tube entry site    Ok to schedule additional visits based on staff availability and patient request on consecutive days within the home health episode.    Miscellaneous   Soto Care: Instruct patient/caregiver to empty Soto bag.  Change Soto every month.  Nephrostomy Tube Care: Instruct patient/caregiver to empty Nephrostomy tube bag. No need to flush tube as long as it is draining.     Home Health Aide:  Home Health Aide Monday, Wednesday and Friday    Wound Care Orders  no    I certify that this patient is confined to her home and needs intermittent skilled nursing care, physical therapy and occupational therapy.

## 2022-05-12 NOTE — PLAN OF CARE
"   05/12/22 1218   Post-Acute Status   Post-Acute Authorization Home Health   Home Health Status Referrals Sent     SW met with Pt at bedside. Discussed therapy recs for HH and provided list. Addressed questions and advised by Pt that her  had HH before but the name had "[facility]" in it. Otherwise she has no preference. Unable to find HH agency with that name combo.     Sent via [facility] to:  Nursing care Declined (not in network)  [facility]ceived call from Anya ([phone]) and they reported they can accept this Pt. Will send orders as she is dc today.   [facility] Declined (not in network)  [facility]    ALYSSA HamiltonW  Neurocritical Care   [facility]  [id number]    "

## 2022-05-12 NOTE — NURSING
Charge nurse was alerted to bag of medication that was left in patient room. I had given then occurring to time at correct time and right medication Other medications that was left in room was placed in patient non specific bin

## 2022-05-12 NOTE — PLAN OF CARE
Patient resting in bed: side rails up x 2, lowest position, bed ,locked and call bell in place. Plan of care was reviewed with patient. She is tolerating PO intake and able to ambulate in room to the restroom. Vitals stable but patient had low grade temp over night. Per MD notes, patient was to receive CTX for 10-14 days but she only received it for 2 days. Labs obtained, results reviewed; slight increase in WBC. awaiting results and electrolytes to be replaced per PRN orders. Frequent rounding completed and patient encouraged to call as needed. Will continue to monitor patient.     Problem: Adult Inpatient Plan of Care  Goal: Plan of Care Review  Outcome: Ongoing, Progressing  Goal: Patient-Specific Goal (Individualized)  Outcome: Ongoing, Progressing  Goal: Absence of Hospital-Acquired Illness or Injury  Outcome: Ongoing, Progressing  Goal: Optimal Comfort and Wellbeing  Outcome: Ongoing, Progressing  Goal: Readiness for Transition of Care  Outcome: Ongoing, Progressing     Problem: Diabetes Comorbidity  Goal: Blood Glucose Level Within Targeted Range  Outcome: Ongoing, Progressing     Problem: Infection  Goal: Absence of Infection Signs and Symptoms  Outcome: Ongoing, Progressing     Problem: Skin Injury Risk Increased  Goal: Skin Health and Integrity  Outcome: Ongoing, Progressing     Problem: Fall Injury Risk  Goal: Absence of Fall and Fall-Related Injury  Outcome: Ongoing, Progressing     Problem: Pain Acute  Goal: Acceptable Pain Control and Functional Ability  Outcome: Ongoing, Progressing     Problem: Fluid and Electrolyte Imbalance (Acute Kidney Injury/Impairment)  Goal: Fluid and Electrolyte Balance  Outcome: Ongoing, Progressing     Problem: Oral Intake Inadequate (Acute Kidney Injury/Impairment)  Goal: Optimal Nutrition Intake  Outcome: Ongoing, Progressing     Problem: Renal Function Impairment (Acute Kidney Injury/Impairment)  Goal: Effective Renal Function  Outcome: Ongoing, Progressing

## 2022-05-13 NOTE — PLAN OF CARE
Side rails up x2; call bell in place; bed in lowest, locked position; skid proof socks on; no evidence of skin breakdown; care plan explained to patient; pt remains free of injury.  Pt tolerated po, voids, ambulates with standby assist. Mg rider and Na phos replacements completed. Nephrostomy tube draining clear yellow urine, dressing d/c/I. Petersen secured and petersen care done. Pt given discharge teaching on petersen and nephrostomy care. Supplies for petersen and nephrostomy given. Pt received 2 petersen bags, 2 leg bags and 2 nephrostomy bags. Notified by MD pt would be receiving  care. Prescription received from outpatient pharmacy. IV d/cd dressing applied. Reviewed discharge instructions, medications, and appts. CBG monitored insulin given as needed. Oxy IR given x 2 for pain. VSS and afebrile. Escort ordered for w/c transportation.

## 2022-05-13 NOTE — PLAN OF CARE
"[facility] (Hospital)  Discharge Final Note    Primary Care Provider: Dee Dee Oconnor MD    Expected Discharge Date: 5/12/2022     Patient discharged to home with [facility] and a rolling walker per MD.     Final Discharge Note (most recent)     Final Note - 05/13/22 0859        Final Note    Assessment Type Final Discharge Note     Anticipated Discharge Disposition Home-Health Care Sv     What phone number can be called within the next 1-3 days to see how you are doing after discharge? [phone]        Post-Acute Status    Post-Acute Authorization Home Health                              Contact Info     Dee Dee Oconnor MD   Specialty: Family Medicine   Relationship: PCP - General    [address] [facility] [address] [facility] [address]   Phone: [phone]       Next Steps: Follow up on 5/17/2022    Instructions: Hospital follow up at 9 am with Michelle Cardenas NP    [facility]   Specialty: Home Health Services    [address] [facility] [address]  [place] [address]   Phone: [phone]       Next Steps: Follow up    Instructions: Home Health     [facility]   Specialty: DME Provider    [address]  [facility] [address]   Phone: [phone]       Next Steps: Follow up    Instructions: DME Rolling Walker          Discharge Procedure Orders   WALKER FOR HOME USE     Order Specific Question Answer Comments   Type of Walker: Adult (5'4"-6'6")    With wheels? Yes    Height: 5' 1" (1.549 m)    Weight: 51.7 kg (114 lb)    Length of need (1-99 months): 99    Does patient have medical equipment at home? none    Please check all that apply: Patient's condition impairs ambulation.        Michelle Bustillos RN, CCRN-K, [facility]  Neuro-Critical Care   [phone]    "

## 2022-05-19 LAB
FINAL PATHOLOGIC DIAGNOSIS: NORMAL
GROSS: NORMAL
Lab: NORMAL

## 2022-05-20 ENCOUNTER — OFFICE VISIT (OUTPATIENT)
Dept: UROLOGY | Facility: CLINIC | Age: 77
End: 2022-05-20
Payer: MEDICARE

## 2022-05-20 VITALS
SYSTOLIC BLOOD PRESSURE: 159 MMHG | BODY MASS INDEX: 22.7 KG/M2 | DIASTOLIC BLOOD PRESSURE: 71 MMHG | WEIGHT: 120.25 LBS | HEIGHT: 61 IN | HEART RATE: 102 BPM

## 2022-05-20 DIAGNOSIS — R33.9 URINARY RETENTION: ICD-10-CM

## 2022-05-20 DIAGNOSIS — C67.9 MALIGNANT NEOPLASM OF URINARY BLADDER, UNSPECIFIED SITE: ICD-10-CM

## 2022-05-20 PROCEDURE — 1111F DSCHRG MED/CURRENT MED MERGE: CPT | Mod: CPTII,S$GLB,, | Performed by: UROLOGY

## 2022-05-20 PROCEDURE — 1111F PR DISCHARGE MEDS RECONCILED W/ CURRENT OUTPATIENT MED LIST: ICD-10-PCS | Mod: CPTII,S$GLB,, | Performed by: UROLOGY

## 2022-05-20 PROCEDURE — 99999 PR PBB SHADOW E&M-EST. PATIENT-LVL III: ICD-10-PCS | Mod: PBBFAC,,,

## 2022-05-20 PROCEDURE — 99214 PR OFFICE/OUTPT VISIT, EST, LEVL IV, 30-39 MIN: ICD-10-PCS | Mod: S$GLB,,, | Performed by: UROLOGY

## 2022-05-20 PROCEDURE — 99214 OFFICE O/P EST MOD 30 MIN: CPT | Mod: S$GLB,,, | Performed by: UROLOGY

## 2022-05-20 PROCEDURE — 99999 PR PBB SHADOW E&M-EST. PATIENT-LVL III: CPT | Mod: PBBFAC,,,

## 2022-05-20 NOTE — PATIENT INSTRUCTIONS
For emergencies AFTER HOURS/WEEKENDS call [phone] (general urology line) and press option 3 for DOCTOR on CALL for our urology resident on call.

## 2022-05-20 NOTE — PHYSICIAN QUERY
PT Name: Karoline Justice  MR #: [id number]    DOCUMENTATION CLARIFICATION     CDS: Xuan Burger RN         Contact information:[email or web address] or (cell) [phone]    This form is a permanent document in the medical record.     Query Date: May 20, 2022    By submitting this query, we are merely seeking further clarification of documentation.  Please utilize your independent clinical judgment when addressing the question(s) below.    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 The carcinoma extensively invades the muscularis propria. Necrosis present.  Surgical Path 5/9       Please clarify:    [  x] Pathology findings noted above are ruled in/confirmed as diagnoses     [  ] Pathology findings noted above are not confirmed as diagnoses     [  ] Other diagnosis (please specify): ___________     [  ] Clinically Undetermined       Form No. [id number]

## 2022-05-20 NOTE — PROGRESS NOTES
Urology - [facility]  Clinic Note    SUBJECTIVE:     Chief Complaint: Bladder tumor    History of Present Illness:  Karoline Justice is a 76 y.o. female with history of DM, HTN who was recently admitted with electrolyte abnormalities; hypercalcemia (14.6) and a UTI and found to have a 6.5 cm mass posterior bladder wall, severe bilateral hydronephrosis on CT.     Now s/p TURBT with right ureteral stent placement on 5/9/22 with subsequent L NT. Findings from TURBT on 5/9/22 demonstrated a large, nodular, HG appearing bladder tumor with much necrotic tissue overlying the left bladder wall and floor, including the expected location of the L UO, appeared to be grossly invading muscle. R RPJ showed hydronephrosis, no filling defects in upper tract. Unable to visualize L UO.    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 The carcinoma extensively invades the muscularis propria.     Doing well since surgery, denies any fevers, chills, nausea, vomiting. Appetite improved. Minimal discomfort with nephrostomy tube. Denies any headaches, visual changes, bone pain.    Anticoagulation:  No    PATIENT HISTORY:  Past Medical History:   Diagnosis Date    Bladder mass 5/6/2022    Choledocholithiasis 5/6/2022    Diabetes mellitus     Pneumonia due to COVID-19 virus 8/6/2021     Past Surgical History:   Procedure Laterality Date    CYSTOSCOPY N/A 5/9/2022    Procedure: CYSTOSCOPY;  Surgeon: Adrianna Gutierrez MD;  Location: [facility] OR [address] [place] [address];  Service: Urology;  Laterality: N/A;    RETROGRADE PYELOGRAPHY Right 5/9/2022    Procedure: PYELOGRAM, RETROGRADE;  Surgeon: Adrianna Gutierrez MD;  Location: [facility] OR [address] [place] [address];  Service: Urology;  Laterality: Right;     Family History   Problem Relation Age of Onset    Diabetes Mother     Stomach cancer Mother     Heart attack Father      "Diabetes Daughter     Thyroid disease Daughter     Diabetes Son     Kidney cancer Son      Social History     Socioeconomic History    Marital status:    Tobacco Use    Smoking status: Never Smoker    Smokeless tobacco: Never Used   Substance and Sexual Activity    Alcohol use: No    Drug use: No       Medications:  Outpatient Encounter Medications as of 2022   Medication Sig Dispense Refill    acetaminophen (TYLENOL) 500 MG tablet Take 500 mg by mouth every 6 (six) hours as needed for Pain.      amLODIPine (NORVASC) 5 MG tablet Take 1 tablet (5 mg total) by mouth once daily. 90 tablet 3    LEVEMIR FLEXTOUCH U-100 INSULN 100 unit/mL (3 mL) InPn pen Inject 24 Units into the skin every morning.      LIDOcaine (LIDODERM) 5 % Place 1 patch onto the skin once daily. Place patch to back. Leave on for 12 hours and remove for 12 hours. 30 patch 2    [] cefdinir (OMNICEF) 300 MG capsule Take 1 capsule (300 mg total) by mouth once daily. for 7 days 7 capsule 0    [DISCONTINUED] calcium carbonate (TUMS) 200 mg calcium (500 mg) chewable tablet Take 1 tablet by mouth 2 (two) times daily with meals.       No facility-administered encounter medications on file as of 2022.     Allergies:  Patient has no known allergies.      OBJECTIVE:     Estimated body mass index is 22.72 kg/m² as calculated from the following:    Height as of this encounter: 5' 1" (1.549 m).    Weight as of this encounter: 54.5 kg (120 lb 4.2 oz).    Vital Signs (Most Recent)  Pulse: 102 (22 1429)  BP: (!) 159/71 (22 1429)    Physical Exam  Constitutional:       General: She is not in acute distress.     Appearance: She is well-developed. She is not diaphoretic.   HENT:      Head: Normocephalic and atraumatic.   Eyes:      General: No scleral icterus.  Pulmonary:      Effort: Pulmonary effort is normal. No respiratory distress.      Breath sounds: No stridor.   Abdominal:      General: There is no distension.     "  Palpations: Abdomen is soft.      Tenderness: There is no abdominal tenderness.      Comments: L NT draining clear yellow urine   Genitourinary:     Comments: 24 Fr 3 way catheter in place, removed  Musculoskeletal:         General: Normal range of motion.      Right lower leg: Edema present.      Left lower leg: Edema present.   Skin:     General: Skin is warm and dry.   Neurological:      Mental Status: She is alert.   Psychiatric:         Behavior: Behavior normal.         Labs:  Lab Results   Component Value Date    BUN 23 05/12/2022    CREATININE 1.7 (H) 05/12/2022    WBC 20.41 (H) 05/12/2022    HGB 8.0 (L) 05/12/2022    HCT 25.2 (L) 05/12/2022     05/12/2022    AST 12 05/12/2022    ALT 9 (L) 05/12/2022    ALKPHOS 114 05/12/2022    ALBUMIN 2.2 (L) 05/12/2022    HGBA1C 6.9 (H) 05/06/2022      Imaging:  Following imaging personally reviewed and finds are as follows:    CT a/p w/o (5/5/22) severe bilateral hydronephrosis and hydroureter down to the level of the bladder, widely distended bladder with heterogenous appearing mass at the left posterior wall    CT chest (5/5/22) no evidence of metastatic disease    ASSESSMENT     1. Malignant neoplasm of urinary bladder, unspecified site    2. Urinary retention        PLAN:   Karoline was seen today for cath removal.    Diagnoses and all orders for this visit:    Malignant neoplasm of urinary bladder, unspecified site    Urinary retention    - will add on patient to tumor board to review pathology  - no need for additional staging imaging, labs at this time  - catheter removed, voiding trial to follow. Patient instructed to call urology on call if she develops urinary retention  - patient completed course of cefdinir yesterday for UTI, no additional prophylaxis for catheter removal  - maintain R ureteral stent, L NT for maximal drainage at this time. Will determine disposition after therapy plan in place  - ambulatory referral to heme/onc    Javier Malloy MD      Letter to Dee Dee Oconnor MD, Ansley Simeon MD

## 2022-05-25 ENCOUNTER — TELEPHONE (OUTPATIENT)
Dept: HEMATOLOGY/ONCOLOGY | Facility: CLINIC | Age: 77
End: 2022-05-25
Payer: MEDICARE

## 2022-05-25 DIAGNOSIS — C67.9 MALIGNANT NEOPLASM OF URINARY BLADDER, UNSPECIFIED SITE: Primary | ICD-10-CM

## 2022-05-26 ENCOUNTER — TELEPHONE (OUTPATIENT)
Dept: UROLOGY | Facility: CLINIC | Age: 77
End: 2022-05-26
Payer: MEDICARE

## 2022-05-26 ENCOUNTER — TUMOR BOARD CONFERENCE (OUTPATIENT)
Dept: UROLOGY | Facility: CLINIC | Age: 77
End: 2022-05-26
Payer: MEDICARE

## 2022-05-26 LAB
POCT GLUCOSE: 124 MG/DL (ref 70–110)
POCT GLUCOSE: 227 MG/DL (ref 70–110)
POCT GLUCOSE: 275 MG/DL (ref 70–110)
POCT GLUCOSE: 325 MG/DL (ref 70–110)
POCT GLUCOSE: 83 MG/DL (ref 70–110)

## 2022-05-27 ENCOUNTER — HOSPITAL ENCOUNTER (OUTPATIENT)
Dept: RADIOLOGY | Facility: HOSPITAL | Age: 77
Discharge: HOME | End: 2022-05-27
Attending: STUDENT IN AN ORGANIZED HEALTH CARE EDUCATION/TRAINING PROGRAM
Payer: MEDICARE

## 2022-05-27 ENCOUNTER — OFFICE VISIT (OUTPATIENT)
Dept: HEMATOLOGY/ONCOLOGY | Facility: CLINIC | Age: 77
End: 2022-05-27
Payer: MEDICARE

## 2022-05-27 VITALS
HEART RATE: 96 BPM | HEIGHT: 61 IN | OXYGEN SATURATION: 99 % | TEMPERATURE: 99 F | RESPIRATION RATE: 20 BRPM | BODY MASS INDEX: 22.48 KG/M2 | SYSTOLIC BLOOD PRESSURE: 132 MMHG | WEIGHT: 119.06 LBS | DIASTOLIC BLOOD PRESSURE: 60 MMHG

## 2022-05-27 DIAGNOSIS — C67.9 MALIGNANT NEOPLASM OF URINARY BLADDER, UNSPECIFIED SITE: Primary | ICD-10-CM

## 2022-05-27 DIAGNOSIS — N17.9 AKI (ACUTE KIDNEY INJURY): ICD-10-CM

## 2022-05-27 DIAGNOSIS — C67.8 MALIGNANT NEOPLASM OF OVERLAPPING SITES OF BLADDER: ICD-10-CM

## 2022-05-27 DIAGNOSIS — G89.3 CANCER ASSOCIATED PAIN: ICD-10-CM

## 2022-05-27 DIAGNOSIS — N13.30 HYDRONEPHROSIS, UNSPECIFIED HYDRONEPHROSIS TYPE: ICD-10-CM

## 2022-05-27 DIAGNOSIS — C67.9 MALIGNANT NEOPLASM OF URINARY BLADDER, UNSPECIFIED SITE: ICD-10-CM

## 2022-05-27 DIAGNOSIS — D63.0 ANEMIA IN NEOPLASTIC DISEASE: ICD-10-CM

## 2022-05-27 PROCEDURE — 3288F FALL RISK ASSESSMENT DOCD: CPT | Mod: CPTII,S$GLB,, | Performed by: INTERNAL MEDICINE

## 2022-05-27 PROCEDURE — 1125F AMNT PAIN NOTED PAIN PRSNT: CPT | Mod: CPTII,S$GLB,, | Performed by: INTERNAL MEDICINE

## 2022-05-27 PROCEDURE — 1159F MED LIST DOCD IN RCRD: CPT | Mod: CPTII,S$GLB,, | Performed by: INTERNAL MEDICINE

## 2022-05-27 PROCEDURE — 99205 PR OFFICE/OUTPT VISIT, NEW, LEVL V, 60-74 MIN: ICD-10-PCS | Mod: S$GLB,,, | Performed by: INTERNAL MEDICINE

## 2022-05-27 PROCEDURE — 1111F PR DISCHARGE MEDS RECONCILED W/ CURRENT OUTPATIENT MED LIST: ICD-10-PCS | Mod: CPTII,S$GLB,, | Performed by: INTERNAL MEDICINE

## 2022-05-27 PROCEDURE — 72197 MRI PELVIS W/O & W/DYE: CPT | Mod: 26,,, | Performed by: RADIOLOGY

## 2022-05-27 PROCEDURE — 1101F PT FALLS ASSESS-DOCD LE1/YR: CPT | Mod: CPTII,S$GLB,, | Performed by: INTERNAL MEDICINE

## 2022-05-27 PROCEDURE — A9585 GADOBUTROL INJECTION: HCPCS | Performed by: STUDENT IN AN ORGANIZED HEALTH CARE EDUCATION/TRAINING PROGRAM

## 2022-05-27 PROCEDURE — 1159F PR MEDICATION LIST DOCUMENTED IN MEDICAL RECORD: ICD-10-PCS | Mod: CPTII,S$GLB,, | Performed by: INTERNAL MEDICINE

## 2022-05-27 PROCEDURE — 99205 OFFICE O/P NEW HI 60 MIN: CPT | Mod: S$GLB,,, | Performed by: INTERNAL MEDICINE

## 2022-05-27 PROCEDURE — 3078F DIAST BP <80 MM HG: CPT | Mod: CPTII,S$GLB,, | Performed by: INTERNAL MEDICINE

## 2022-05-27 PROCEDURE — 25500020 PHARM REV CODE 255: Performed by: STUDENT IN AN ORGANIZED HEALTH CARE EDUCATION/TRAINING PROGRAM

## 2022-05-27 PROCEDURE — 72197 MRI PELVIS W/O & W/DYE: CPT | Mod: TC

## 2022-05-27 PROCEDURE — 3288F PR FALLS RISK ASSESSMENT DOCUMENTED: ICD-10-PCS | Mod: CPTII,S$GLB,, | Performed by: INTERNAL MEDICINE

## 2022-05-27 PROCEDURE — 99999 PR PBB SHADOW E&M-EST. PATIENT-LVL IV: CPT | Mod: PBBFAC,,, | Performed by: INTERNAL MEDICINE

## 2022-05-27 PROCEDURE — 99999 PR PBB SHADOW E&M-EST. PATIENT-LVL IV: ICD-10-PCS | Mod: PBBFAC,,, | Performed by: INTERNAL MEDICINE

## 2022-05-27 PROCEDURE — 3075F PR MOST RECENT SYSTOLIC BLOOD PRESS GE 130-139MM HG: ICD-10-PCS | Mod: CPTII,S$GLB,, | Performed by: INTERNAL MEDICINE

## 2022-05-27 PROCEDURE — 72197 MRI BLADDER CANCER WITH AND WITHOUT CONTRAST: ICD-10-PCS | Mod: 26,,, | Performed by: RADIOLOGY

## 2022-05-27 PROCEDURE — 1111F DSCHRG MED/CURRENT MED MERGE: CPT | Mod: CPTII,S$GLB,, | Performed by: INTERNAL MEDICINE

## 2022-05-27 PROCEDURE — 1101F PR PT FALLS ASSESS DOC 0-1 FALLS W/OUT INJ PAST YR: ICD-10-PCS | Mod: CPTII,S$GLB,, | Performed by: INTERNAL MEDICINE

## 2022-05-27 PROCEDURE — 3075F SYST BP GE 130 - 139MM HG: CPT | Mod: CPTII,S$GLB,, | Performed by: INTERNAL MEDICINE

## 2022-05-27 PROCEDURE — 1125F PR PAIN SEVERITY QUANTIFIED, PAIN PRESENT: ICD-10-PCS | Mod: CPTII,S$GLB,, | Performed by: INTERNAL MEDICINE

## 2022-05-27 PROCEDURE — 3078F PR MOST RECENT DIASTOLIC BLOOD PRESSURE < 80 MM HG: ICD-10-PCS | Mod: CPTII,S$GLB,, | Performed by: INTERNAL MEDICINE

## 2022-05-27 RX ORDER — TRAMADOL HYDROCHLORIDE 50 MG/1
50 TABLET ORAL EVERY 6 HOURS PRN
Qty: 30 TABLET | Refills: 0 | Status: ON HOLD | OUTPATIENT
Start: 2022-05-27 | End: 2022-07-07 | Stop reason: HOSPADM

## 2022-05-27 RX ORDER — GADOBUTROL 604.72 MG/ML
10 INJECTION INTRAVENOUS
Status: COMPLETED | OUTPATIENT
Start: 2022-05-27 | End: 2022-05-27

## 2022-05-27 RX ADMIN — GADOBUTROL 10 ML: 604.72 INJECTION INTRAVENOUS at 01:05

## 2022-05-27 NOTE — Clinical Note
Nitesh Flores, can you coordinate appts with Dr. Sanchez and Rad/Onc in 2 weeks when we see patient again to discuss treatment plan. Appts same day. Drives from 2 hours away.  Thanks

## 2022-05-27 NOTE — PROGRESS NOTES
Oncology History   Bladder cancer    Tumor Conference    Presenting Hospital / Clinic: [facility]  Virtual Tumor Board Conference: Virtual  Presenter: Sylvia Eddieas  Date Presented to Tumor Board: 5/26/2022  Specialties Present: Pathology; Urology; Radiation Oncology  Cancer Type: Bladder cancer  Recommended Plan Note: Will obtain bladder MRI, PET CT and bone scan to further evaluate for metastatic disease. Seeing medical oncology to discuss systemic therapy.              PATIENT SUMMARY:   76 YOF [facility] with a large bladder mass and bilateral hydronephrosis.   Underwent TURBT 5/9/22. Right RGP showed hydronephrosis, no filling defects. Unable to find left UO. Underwent placement of L neph tube.  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 The carcinoma extensively invades the muscularis propria.     Stage IIIA, T3b N0 M0  PMH: HLD, CKD 3   Cr 1.7, GFR 28.9    PERFORMANCE STATUS:  ECOG 2    Estimated GFR/CKD Stage: GFR 28.9/CKD 3    Clinical/Pathologic Stage (TNM): T3b N0 M0    DISCUSSION:  Currently with left nephrostomy tube and right ureterals stent. Pathology indicates poor prognosis. Systemic therapy options? Not a good surgical candidate.     FACULTY IN ATTENDANCE:    Urologic Oncology: Trace Harvey MD [x], Jed Prince MD [x] , Reid Sanchez MD []    CONSULT NEEDED:     [] Urologic Oncology    [x] Hem/Onc    [] Rad/Onc     [x] Treatment Guidelines (NCCN and AUA) reviewed and care planned is consistent with guidelines.    TUMOR BOARD RECOMMENDATIONS/PLAN/CONSENSUS:     Case discussed among group. Pathology and radiologic images were reviewed (if applicable).    Pathology confirmed that although her path was undifferentiated carcinoma, it is urothelial in origin. Discussed getting bladder MRI in  order to be able to follow MRIs to assess for improvement if she is able get systemic therapy. Will also obtain PET-CT and bone scan to further evaluate for mets. Has appointment with Dr. Snider to discuss possible systemic therapy options.

## 2022-05-27 NOTE — PROGRESS NOTES
NEW ONCOLOGY VISIT - ESTABLISH CARE    Subjective:      Patient ID: Karoline Justice    Chief Complaint: Consult    HPI  Karoline Justice is a 76 y.o. female, referred by Javier Malloy MD, to clinic for evaluation and management of newly diagnosed bladder carcinoma. Patient originally presented to [facility] with generalized weakness, back pain, and intermittent fevers for 2 weeks with dysuria, foul-smelling urine, and confusion. Imaging showed a 6.5cm mass anterior bladder wall c/f neoplasm and severe bilateral hydronephrosis. TURBT w/ Urology on 5/9 w/ mass resection and R ureteral stent placement, unable to place L ureteral stent. L nephrostomy w/ IR on 5/10.     Pathology - showed show poorly differentiated carcinoma (80%) with areas of squamous differentiation (15%), osteoclast like giant cells (4%) and classic high grade papillary urothelial carcinoma (1%). The carcinoma extensively invades the muscularis propria.     Today, she reports increased fatigue. Pain is not controlled with taking Tylenol. She denies any nausea, vomiting, diarrhea, constipation, abdominal pain, weight loss, loss of appetite, chest pain, shortness of breath, leg swelling, pain, headaches, dizziness, or mood changes.    ECOG Performance status: 1 - Symptomatic but completely ambulatory    Cancer Staging  No matching staging information was found for the patient.    Oncologic History:  Oncology History   Bladder cancer   5/20/2022 Initial Diagnosis    Bladder cancer      Tumor Conference    Presenting Hospital / Clinic: [facility]  Virtual Tumor Board Conference: Virtual  Presenter: Sylvia Escobar  Date Presented to Tumor Board: 5/26/2022  Specialties Present: Pathology; Urology; Radiation Oncology  Cancer Type: Bladder cancer  Recommended Plan Note: Will obtain bladder MRI, PET CT and bone scan to further evaluate for metastatic disease. Seeing medical oncology to discuss systemic therapy.             Review of  Systems   Constitutional: Positive for fatigue. Negative for activity change, appetite change, chills, fever and unexpected weight change.   HENT: Negative for ear pain, facial swelling, hearing loss, mouth sores, nosebleeds, sore throat and trouble swallowing.    Eyes: Negative for pain, discharge, redness and visual disturbance.   Respiratory: Negative for cough, chest tightness and shortness of breath.    Cardiovascular: Negative for chest pain, palpitations and leg swelling.   Gastrointestinal: Negative for abdominal distention, abdominal pain, blood in stool, constipation, diarrhea, nausea and vomiting.   Endocrine: Negative for cold intolerance and heat intolerance.   Genitourinary: Negative for decreased urine volume, difficulty urinating, dysuria, frequency, hematuria, hot flashes, urgency and vaginal bleeding.        Nephrostomy tube   Musculoskeletal: Positive for back pain. Negative for arthralgias, gait problem, leg pain and myalgias.   Integumentary:  Negative for pallor, rash and wound.   Allergic/Immunologic: Negative for immunocompromised state.   Neurological: Negative for dizziness, tremors, weakness, light-headedness, numbness and headaches.   Hematological: Negative for adenopathy. Does not bruise/bleed easily.   Psychiatric/Behavioral: Negative for agitation, confusion, dysphoric mood and sleep disturbance. The patient is nervous/anxious.        Allergies:  Review of patient's allergies indicates:  No Known Allergies    Medications:  Current Outpatient Medications   Medication Sig Dispense Refill    acetaminophen (TYLENOL) 500 MG tablet Take 500 mg by mouth every 6 (six) hours as needed for Pain.      LEVEMIR FLEXTOUCH U-100 INSULN 100 unit/mL (3 mL) InPn pen Inject 24 Units into the skin every morning.      LIDOcaine (LIDODERM) 5 % Place 1 patch onto the skin once daily. Place patch to back. Leave on for 12 hours and remove for 12 hours. 30 patch 2    amLODIPine (NORVASC) 5 MG tablet Take 1  "tablet (5 mg total) by mouth once daily. (Patient not taking: Reported on 5/27/2022) 90 tablet 3    traMADoL (ULTRAM) 50 mg tablet Take 1 tablet (50 mg total) by mouth every 6 (six) hours as needed for Pain. 30 tablet 0     No current facility-administered medications for this visit.       PMH:  Past Medical History:   Diagnosis Date    Bladder mass 5/6/2022    Choledocholithiasis 5/6/2022    Diabetes mellitus     Pneumonia due to COVID-19 virus 8/6/2021       PSH:  Past Surgical History:   Procedure Laterality Date    CYSTOSCOPY N/A 5/9/2022    Procedure: CYSTOSCOPY;  Surgeon: Adrianna Gutierrez MD;  Location: [facility] OR [address] [place] [address];  Service: Urology;  Laterality: N/A;    RETROGRADE PYELOGRAPHY Right 5/9/2022    Procedure: PYELOGRAM, RETROGRADE;  Surgeon: Adrianna Gutierrez MD;  Location: [facility] OR [address] [place] [address];  Service: Urology;  Laterality: Right;       FamHx:  Family History   Problem Relation Age of Onset    Diabetes Mother     Stomach cancer Mother     Heart attack Father     Diabetes Daughter     Thyroid disease Daughter     Diabetes Son     Kidney cancer Son        SocHx:  Social History     Socioeconomic History    Marital status:    Tobacco Use    Smoking status: Never Smoker    Smokeless tobacco: Never Used   Substance and Sexual Activity    Alcohol use: No    Drug use: No       Distress Score    Distress Score: 0        Objective:      Vitals:    05/27/22 1453   BP: 132/60   BP Location: Left arm   Patient Position: Sitting   BP Method: Medium (Automatic)   Pulse: 96   Resp: 20   Temp: 98.8 °F (37.1 °C)   TempSrc: Oral   SpO2: 99%   Weight: 54 kg (119 lb 0.8 oz)   Height: 5' 1" (1.549 m)     Physical Exam  Vitals and nursing note reviewed.   Constitutional:       Appearance: Normal appearance. She is well-developed and normal weight.   HENT:      Head: Normocephalic and atraumatic.   Eyes:      Conjunctiva/sclera: Conjunctivae normal.      Pupils: Pupils are equal, round, and reactive to " light.   Pulmonary:      Effort: Pulmonary effort is normal. No respiratory distress.   Abdominal:      General: There is no distension.      Palpations: Abdomen is soft.   Musculoskeletal:         General: No swelling. Normal range of motion.      Cervical back: Normal range of motion and neck supple.   Lymphadenopathy:      Cervical: No cervical adenopathy.   Skin:     General: Skin is warm and dry.          Neurological:      General: No focal deficit present.      Mental Status: She is alert and oriented to person, place, and time.   Psychiatric:         Mood and Affect: Mood and affect normal.         Behavior: Behavior normal.           LABS:  WBC   Date Value Ref Range Status   05/27/2022 22.47 (H) 3.90 - 12.70 K/uL Final     Hemoglobin   Date Value Ref Range Status   05/27/2022 7.1 (L) 12.0 - 16.0 g/dL Final     Hematocrit   Date Value Ref Range Status   05/27/2022 22.8 (L) 37.0 - 48.5 % Final     Platelets   Date Value Ref Range Status   05/27/2022 408 150 - 450 K/uL Final       Chemistry        Component Value Date/Time     (L) 05/27/2022 1537    K 3.8 05/27/2022 1537     05/27/2022 1537    CO2 18 (L) 05/27/2022 1537    BUN 18 05/27/2022 1537    CREATININE 1.5 (H) 05/27/2022 1537    GLU 70 05/27/2022 1537        Component Value Date/Time    CALCIUM 8.2 (L) 05/27/2022 1537    ALKPHOS 115 05/27/2022 1537    AST 12 05/27/2022 1537    ALT 10 05/27/2022 1537    BILITOT 0.5 05/27/2022 1537    ESTGFRAFRICA 38.7 (A) 05/27/2022 1537    EGFRNONAA 33.6 (A) 05/27/2022 1537              Assessment:       1. Malignant neoplasm of urinary bladder, unspecified site    2. Cancer associated pain    3. THOMAS (acute kidney injury)    4. Hydronephrosis, unspecified hydronephrosis type    5. Anemia in neoplastic disease    6. Malignant neoplasm of overlapping sites of bladder          Plan:       1. Muscle Invasive Bladder Cancer  Discussed in detail diagnosis, current stage (pending imaging), and possible treatment  options.  Patient is hesitant to receive chemotherapy and undergo cystectomy. She is also not currently a candidate for cisplatin chemotherapy due to renal function. Will refer to radiation oncology to discuss chemoRT as bladder sparing option.  If patient does not have metastatic disease, she may be a candidate for gemcitabine concurrent with hypofractionated RT.  Since platinum based chemotherapy is not an option, we would likely add 3 cycles of Keytruda based on promising CR rates from phase 2 clinical trial.  She would prefer to get treatment [place] at [facility].    [email or web address]    2 Prescribing ultram, oxycodone too strong per patient.    3,4 Not a candidate for cisplatin chemotherapy currently.              60 minutes total time spent with patient, 45 minutes were spent face to face with the patient and her family to discuss the disease, natural history, treatment options and survival statistics. Greater than 50% of this time was for counseling.  15 minutes of chart review and coordination of care. I have provided the patient with an opportunity to ask questions and have all questions answered to his satisfaction.       she will return to clinic after PETCT, but knows to call in the interim if symptoms change or should a problem arise.     Reid Snider M.D.  Hematology/Oncology Attending  [place] [facility]  [facility]        Route Chart for Scheduling    Med Onc Chart Routing      Follow up with physician 2 weeks. Discuss imaging and plan (6/9 or 6/10)   Follow up with CHRISTY    Labs CBC and CMP   Lab interval: every 2 weeks     Imaging PET scan   Next available   Pharmacy appointment    Other referrals

## 2022-05-29 LAB
CREAT SERPL-MCNC: 1.6 MG/DL (ref 0.5–1.4)
SAMPLE: ABNORMAL

## 2022-05-30 ENCOUNTER — TELEPHONE (OUTPATIENT)
Dept: HEMATOLOGY/ONCOLOGY | Facility: CLINIC | Age: 77
End: 2022-05-30
Payer: MEDICARE

## 2022-06-01 ENCOUNTER — DOCUMENTATION ONLY (OUTPATIENT)
Dept: HEMATOLOGY/ONCOLOGY | Facility: CLINIC | Age: 77
End: 2022-06-01
Payer: MEDICARE

## 2022-06-01 ENCOUNTER — TELEPHONE (OUTPATIENT)
Dept: HEMATOLOGY/ONCOLOGY | Facility: CLINIC | Age: 77
End: 2022-06-01
Payer: MEDICARE

## 2022-06-01 NOTE — TELEPHONE ENCOUNTER
Voicemail left for patient's daughter by oncology nurse navigator to schedule virual radiation oncology consult on 6/9. Detailed message including call back information provided for return call.

## 2022-06-01 NOTE — PROGRESS NOTES
[facility] LCSW received [employer] message requesting assistance securing a room at the [facility] from 6/8/2022 to 6/10/2022. LCSW spoke with pt and pt's son, Reid Swenson. Reid Swenson will be pt's caregiver at the [facility]. Reid Stahl is fully vaccinated pt is not. LCSW emailed the completed [facility] request to Xiang Lauren. Waiting for response regarding current rules regarding vaccination for pts. LCSW also provided Reid Stahl with information regarding [facility] via email. LCSW will follow and assist.      [facility]i [facility]  Oncology Social Worker License # [id number]  [facility]  [email or web address]  P. [phone]; F. [phone]

## 2022-06-02 ENCOUNTER — CLINICAL SUPPORT (OUTPATIENT)
Dept: HEMATOLOGY/ONCOLOGY | Facility: CLINIC | Age: 77
End: 2022-06-02
Payer: MEDICARE

## 2022-06-02 NOTE — PROGRESS NOTES
Established Patient - Audio Only Telehealth Visit     The patient location is: home     The chief complaint leading to consultation is: bladder cancer, referral from medical oncology to radiation oncology  Visit type: Virtual visit with audio only (telephone)  Total time spent with patient: 11 minutes       The reason for the audio only service rather than synchronous audio and video virtual visit was related to technical difficulties or patient preference/necessity.     Each patient to whom I provide medical services by telemedicine is:  (1) informed of the relationship between the physician and patient and the respective role of any other health care provider with respect to management of the patient; and (2) notified that they may decline to receive medical services by telemedicine and may withdraw from such care at any time. Patient verbally consented to receive this service via voice-only telephone call.       Nurse navigator contacted patient and son via telephone to discuss referral coordination and future care needs. Patient is established with medical oncology at present. Virtual visit offered but patient declined, requesting call instead. Briefly discussed referral and optional appointments available for next steps in patient care. Discussed shared staff supportive services available to patient at [facility]. Opportunity for questions offered and answers provided to the best of RN ability until MD consult. Provided patient and her son with direct contact information including phone and e-mail. Instructions for future virtual visit with MD emailed.  Informed patient and son to contact nurse navigator for assistance as needs arise. Patient verbalized understanding. Nurse navigator thanked patient for her time and the opportunity to participate in her care. Total time spent with patient 11 minutes.                 This service was not originating from a related E/M service provided within the  previous 7 days nor will  to an E/M service or procedure within the next 24 hours or my soonest available appointment.  Prevailing standard of care was able to be met in this audio-only visit.

## 2022-06-07 ENCOUNTER — PATIENT MESSAGE (OUTPATIENT)
Dept: HEMATOLOGY/ONCOLOGY | Facility: CLINIC | Age: 77
End: 2022-06-07
Payer: MEDICARE

## 2022-06-07 ENCOUNTER — HOSPITAL ENCOUNTER (INPATIENT)
Facility: HOSPITAL | Age: 77
LOS: 1 days | Discharge: OTHER INSTITUTION | DRG: 683 | End: 2022-06-08
Attending: EMERGENCY MEDICINE | Admitting: INTERNAL MEDICINE
Payer: MEDICARE

## 2022-06-07 ENCOUNTER — TELEPHONE (OUTPATIENT)
Dept: ENDOSCOPY | Facility: HOSPITAL | Age: 77
End: 2022-06-07
Payer: MEDICARE

## 2022-06-07 DIAGNOSIS — N39.0 URINARY TRACT INFECTION WITHOUT HEMATURIA, SITE UNSPECIFIED: Primary | ICD-10-CM

## 2022-06-07 DIAGNOSIS — I82.4Y1 ACUTE DEEP VEIN THROMBOSIS (DVT) OF PROXIMAL VEIN OF RIGHT LOWER EXTREMITY: ICD-10-CM

## 2022-06-07 DIAGNOSIS — R60.9 SWELLING: ICD-10-CM

## 2022-06-07 LAB
ALBUMIN SERPL BCP-MCNC: 2.1 G/DL (ref 3.5–5.2)
ALP SERPL-CCNC: 141 U/L (ref 55–135)
ALT SERPL W/O P-5'-P-CCNC: 13 U/L (ref 10–44)
ANION GAP SERPL CALC-SCNC: 9 MMOL/L (ref 8–16)
AST SERPL-CCNC: 14 U/L (ref 10–40)
BACTERIA #/AREA URNS HPF: NEGATIVE /HPF
BASOPHILS # BLD AUTO: ABNORMAL K/UL (ref 0–0.2)
BASOPHILS NFR BLD: 0 % (ref 0–1.9)
BILIRUB SERPL-MCNC: 0.5 MG/DL (ref 0.1–1)
BILIRUB UR QL STRIP: NEGATIVE
BUN SERPL-MCNC: 26 MG/DL (ref 8–23)
CALCIUM SERPL-MCNC: 9.6 MG/DL (ref 8.7–10.5)
CHLORIDE SERPL-SCNC: 104 MMOL/L (ref 95–110)
CLARITY UR: ABNORMAL
CO2 SERPL-SCNC: 19 MMOL/L (ref 23–29)
COLOR UR: YELLOW
CREAT SERPL-MCNC: 1.8 MG/DL (ref 0.5–1.4)
CTP QC/QA: YES
DIFFERENTIAL METHOD: ABNORMAL
EOSINOPHIL # BLD AUTO: ABNORMAL K/UL (ref 0–0.5)
EOSINOPHIL NFR BLD: 2 % (ref 0–8)
ERYTHROCYTE [DISTWIDTH] IN BLOOD BY AUTOMATED COUNT: 15.7 % (ref 11.5–14.5)
EST. GFR  (AFRICAN AMERICAN): 31.1 ML/MIN/1.73 M^2
EST. GFR  (NON AFRICAN AMERICAN): 27 ML/MIN/1.73 M^2
GLUCOSE SERPL-MCNC: 280 MG/DL (ref 70–110)
GLUCOSE UR QL STRIP: ABNORMAL
HCT VFR BLD AUTO: 22 % (ref 37–48.5)
HGB BLD-MCNC: 6.9 G/DL (ref 12–16)
HGB UR QL STRIP: ABNORMAL
HYALINE CASTS #/AREA URNS LPF: 1.9 /LPF
IMM GRANULOCYTES # BLD AUTO: ABNORMAL K/UL (ref 0–0.04)
IMM GRANULOCYTES NFR BLD AUTO: ABNORMAL % (ref 0–0.5)
KETONES UR QL STRIP: NEGATIVE
LACTATE SERPL-SCNC: 1.7 MMOL/L (ref 0.5–2.2)
LEUKOCYTE ESTERASE UR QL STRIP: ABNORMAL
LYMPHOCYTES # BLD AUTO: ABNORMAL K/UL (ref 1–4.8)
LYMPHOCYTES NFR BLD: 5 % (ref 18–48)
MCH RBC QN AUTO: 26.4 PG (ref 27–31)
MCHC RBC AUTO-ENTMCNC: 31.4 G/DL (ref 32–36)
MCV RBC AUTO: 84 FL (ref 82–98)
MICROSCOPIC COMMENT: ABNORMAL
MONOCYTES # BLD AUTO: ABNORMAL K/UL (ref 0.3–1)
MONOCYTES NFR BLD: 1 % (ref 4–15)
NEUTROPHILS NFR BLD: 92 % (ref 38–73)
NITRITE UR QL STRIP: NEGATIVE
NRBC BLD-RTO: 0 /100 WBC
PH UR STRIP: 6 [PH] (ref 5–8)
PLATELET # BLD AUTO: 380 K/UL (ref 150–450)
PMV BLD AUTO: 9.2 FL (ref 9.2–12.9)
POTASSIUM SERPL-SCNC: 4.2 MMOL/L (ref 3.5–5.1)
PROT SERPL-MCNC: 7.4 G/DL (ref 6–8.4)
PROT UR QL STRIP: ABNORMAL
RBC # BLD AUTO: 2.61 M/UL (ref 4–5.4)
RBC #/AREA URNS HPF: 10 /HPF (ref 0–4)
SARS-COV-2 RDRP RESP QL NAA+PROBE: NEGATIVE
SODIUM SERPL-SCNC: 132 MMOL/L (ref 136–145)
SP GR UR STRIP: 1.01 (ref 1–1.03)
SQUAMOUS #/AREA URNS HPF: 1 /HPF
URN SPEC COLLECT METH UR: ABNORMAL
UROBILINOGEN UR STRIP-ACNC: NEGATIVE EU/DL
WBC # BLD AUTO: 29.35 K/UL (ref 3.9–12.7)
WBC #/AREA URNS HPF: >100 /HPF (ref 0–5)

## 2022-06-07 PROCEDURE — U0002 COVID-19 LAB TEST NON-CDC: HCPCS | Performed by: EMERGENCY MEDICINE

## 2022-06-07 PROCEDURE — 63600175 PHARM REV CODE 636 W HCPCS: Performed by: EMERGENCY MEDICINE

## 2022-06-07 PROCEDURE — 83605 ASSAY OF LACTIC ACID: CPT | Performed by: EMERGENCY MEDICINE

## 2022-06-07 PROCEDURE — 87040 BLOOD CULTURE FOR BACTERIA: CPT | Mod: 59 | Performed by: EMERGENCY MEDICINE

## 2022-06-07 PROCEDURE — 36415 COLL VENOUS BLD VENIPUNCTURE: CPT | Performed by: EMERGENCY MEDICINE

## 2022-06-07 PROCEDURE — 85007 BL SMEAR W/DIFF WBC COUNT: CPT | Performed by: EMERGENCY MEDICINE

## 2022-06-07 PROCEDURE — 96375 TX/PRO/DX INJ NEW DRUG ADDON: CPT

## 2022-06-07 PROCEDURE — 85027 COMPLETE CBC AUTOMATED: CPT | Performed by: EMERGENCY MEDICINE

## 2022-06-07 PROCEDURE — 96365 THER/PROPH/DIAG IV INF INIT: CPT

## 2022-06-07 PROCEDURE — 96361 HYDRATE IV INFUSION ADD-ON: CPT

## 2022-06-07 PROCEDURE — 80053 COMPREHEN METABOLIC PANEL: CPT | Performed by: EMERGENCY MEDICINE

## 2022-06-07 PROCEDURE — 81000 URINALYSIS NONAUTO W/SCOPE: CPT | Performed by: EMERGENCY MEDICINE

## 2022-06-07 PROCEDURE — 87086 URINE CULTURE/COLONY COUNT: CPT | Performed by: EMERGENCY MEDICINE

## 2022-06-07 PROCEDURE — 63600175 PHARM REV CODE 636 W HCPCS: Performed by: INTERNAL MEDICINE

## 2022-06-07 PROCEDURE — 96376 TX/PRO/DX INJ SAME DRUG ADON: CPT

## 2022-06-07 PROCEDURE — 99285 EMERGENCY DEPT VISIT HI MDM: CPT | Mod: 25

## 2022-06-07 PROCEDURE — 11000001 HC ACUTE MED/SURG PRIVATE ROOM

## 2022-06-07 RX ORDER — MORPHINE SULFATE 2 MG/ML
2 INJECTION, SOLUTION INTRAMUSCULAR; INTRAVENOUS EVERY 4 HOURS PRN
Status: DISCONTINUED | OUTPATIENT
Start: 2022-06-07 | End: 2022-06-08 | Stop reason: HOSPADM

## 2022-06-07 RX ORDER — IBUPROFEN 200 MG
24 TABLET ORAL
Status: DISCONTINUED | OUTPATIENT
Start: 2022-06-07 | End: 2022-06-08 | Stop reason: HOSPADM

## 2022-06-07 RX ORDER — MORPHINE SULFATE 2 MG/ML
2 INJECTION, SOLUTION INTRAMUSCULAR; INTRAVENOUS
Status: COMPLETED | OUTPATIENT
Start: 2022-06-07 | End: 2022-06-07

## 2022-06-07 RX ORDER — TALC
6 POWDER (GRAM) TOPICAL NIGHTLY PRN
Status: DISCONTINUED | OUTPATIENT
Start: 2022-06-07 | End: 2022-06-08 | Stop reason: HOSPADM

## 2022-06-07 RX ORDER — SODIUM CHLORIDE, SODIUM LACTATE, POTASSIUM CHLORIDE, CALCIUM CHLORIDE 600; 310; 30; 20 MG/100ML; MG/100ML; MG/100ML; MG/100ML
INJECTION, SOLUTION INTRAVENOUS CONTINUOUS
Status: DISCONTINUED | OUTPATIENT
Start: 2022-06-07 | End: 2022-06-08

## 2022-06-07 RX ORDER — GLUCAGON 1 MG
1 KIT INJECTION
Status: DISCONTINUED | OUTPATIENT
Start: 2022-06-07 | End: 2022-06-08 | Stop reason: HOSPADM

## 2022-06-07 RX ORDER — SODIUM CHLORIDE 0.9 % (FLUSH) 0.9 %
10 SYRINGE (ML) INJECTION
Status: DISCONTINUED | OUTPATIENT
Start: 2022-06-07 | End: 2022-06-08 | Stop reason: HOSPADM

## 2022-06-07 RX ORDER — IBUPROFEN 200 MG
16 TABLET ORAL
Status: DISCONTINUED | OUTPATIENT
Start: 2022-06-07 | End: 2022-06-08 | Stop reason: HOSPADM

## 2022-06-07 RX ORDER — ENOXAPARIN SODIUM 100 MG/ML
1 INJECTION SUBCUTANEOUS EVERY 24 HOURS
Status: DISCONTINUED | OUTPATIENT
Start: 2022-06-07 | End: 2022-06-08

## 2022-06-07 RX ADMIN — PIPERACILLIN AND TAZOBACTAM 4.5 G: 4; .5 INJECTION, POWDER, LYOPHILIZED, FOR SOLUTION INTRAVENOUS; PARENTERAL at 05:06

## 2022-06-07 RX ADMIN — MORPHINE SULFATE 2 MG: 2 INJECTION, SOLUTION INTRAMUSCULAR; INTRAVENOUS at 02:06

## 2022-06-07 RX ADMIN — ENOXAPARIN SODIUM 50 MG: 60 INJECTION SUBCUTANEOUS at 08:06

## 2022-06-07 RX ADMIN — SODIUM CHLORIDE, SODIUM LACTATE, POTASSIUM CHLORIDE, AND CALCIUM CHLORIDE: .6; .31; .03; .02 INJECTION, SOLUTION INTRAVENOUS at 10:06

## 2022-06-07 RX ADMIN — MORPHINE SULFATE 2 MG: 2 INJECTION, SOLUTION INTRAMUSCULAR; INTRAVENOUS at 10:06

## 2022-06-07 NOTE — TELEPHONE ENCOUNTER
Telephoned pt's son, Reid to schedule ERCP+/-EUS.  Spoke with Reid and he stated his mom is not feeling well and is currently headed to the Emergency Room.  Reid will telephoned in the next few days to reschedule.

## 2022-06-07 NOTE — Clinical Note
Diagnosis: Urinary tract infection without hematuria, site unspecified [[id number]]   Future Attending Provider: MICKY ALLEN [[id number]]   Admitting Provider:: MICKY ALLEN. [[id number]]

## 2022-06-07 NOTE — ED PROVIDER NOTES
Encounter Date: 6/7/2022       History     Chief Complaint   Patient presents with    Back Pain     Pt was dx with bladder cancer and was admitted to [facility] last month for 7 days. Has nephrostomy tube in place. Complaints of low back pain, mouth pain, vaginal pain, burning with urination, vaginal bleeding, foot pain.      Patient is a 76-year-old female with past medical history significant for recent diagnosis of bladder cancer with left-sided nephrostomy tube and right-sided stent placed, diabetes who presents to the emergency department with low back pain, burning with urination and right lower extremity swelling.  Patient denies any chest pain or shortness of breath        Review of patient's allergies indicates:  No Known Allergies  Past Medical History:   Diagnosis Date    Bladder mass 5/6/2022    Choledocholithiasis 5/6/2022    Diabetes mellitus     Pneumonia due to COVID-19 virus 8/6/2021     Past Surgical History:   Procedure Laterality Date    CYSTOSCOPY N/A 5/9/2022    Procedure: CYSTOSCOPY;  Surgeon: Adrianna Gutierrez MD;  Location: [facility] OR [address] [place] [address];  Service: Urology;  Laterality: N/A;    RETROGRADE PYELOGRAPHY Right 5/9/2022    Procedure: PYELOGRAM, RETROGRADE;  Surgeon: Adrianna Gutierrez MD;  Location: [facility] OR [address] [place] [address];  Service: Urology;  Laterality: Right;     Family History   Problem Relation Age of Onset    Diabetes Mother     Stomach cancer Mother     Heart attack Father     Diabetes Daughter     Thyroid disease Daughter     Diabetes Son     Kidney cancer Son      Social History     Tobacco Use    Smoking status: Never Smoker    Smokeless tobacco: Never Used   Substance Use Topics    Alcohol use: No    Drug use: No     Review of Systems   Constitutional: Positive for fatigue. Negative for chills, diaphoresis and fever.   HENT: Negative for congestion.    Respiratory: Negative for cough, chest tightness, shortness of breath, wheezing and stridor.    Cardiovascular: Positive  for leg swelling. Negative for chest pain and palpitations.   Gastrointestinal: Negative for abdominal pain, constipation, diarrhea, nausea and vomiting.   Genitourinary: Positive for dysuria and flank pain. Negative for pelvic pain.   Musculoskeletal: Positive for back pain. Negative for neck pain.   Neurological: Negative for dizziness and headaches.   All other systems reviewed and are negative.      Physical Exam     Initial Vitals [06/07/22 1410]   BP Pulse Resp Temp SpO2   (!) 109/54 101 18 98.2 °F (36.8 °C) 100 %      MAP       --         Physical Exam    Nursing note and vitals reviewed.  Constitutional: She appears well-developed and well-nourished. She is not diaphoretic. No distress.   HENT:   Head: Normocephalic and atraumatic.   Neck: Neck supple.   Normal range of motion.  Cardiovascular: Normal rate, regular rhythm, normal heart sounds and intact distal pulses.   Pulmonary/Chest: Breath sounds normal. No respiratory distress. She has no wheezes. She has no rhonchi. She has no rales.   Abdominal: Abdomen is soft. She exhibits no distension. There is abdominal tenderness.   Mild suprapubic tenderness to palpation, no rebound or guard There is no rebound and no guarding.   Musculoskeletal:         General: Edema present. Normal range of motion.      Cervical back: Normal range of motion and neck supple.      Comments: Right lower extremity edema     Neurological: She is alert and oriented to person, place, and time. She has normal strength. No cranial nerve deficit or sensory deficit. GCS score is 15. GCS eye subscore is 4. GCS verbal subscore is 5. GCS motor subscore is 6.   Skin: Skin is warm and dry.         ED Course   Procedures  Labs Reviewed   CBC W/ AUTO DIFFERENTIAL - Abnormal; Notable for the following components:       Result Value    WBC 29.35 (*)     RBC 2.61 (*)     Hemoglobin 6.9 (*)     Hematocrit 22.0 (*)     MCH 26.4 (*)     MCHC 31.4 (*)     RDW 15.7 (*)     Gran % 92.0 (*)     Lymph %  5.0 (*)     Mono % 1.0 (*)     All other components within normal limits   COMPREHENSIVE METABOLIC PANEL - Abnormal; Notable for the following components:    Sodium 132 (*)     CO2 19 (*)     Glucose 280 (*)     BUN 26 (*)     Creatinine 1.8 (*)     Albumin 2.1 (*)     Alkaline Phosphatase 141 (*)     eGFR if  31.1 (*)     eGFR if non  27.0 (*)     All other components within normal limits   URINALYSIS, REFLEX TO URINE CULTURE - Abnormal; Notable for the following components:    Appearance, UA Cloudy (*)     Protein, UA 2+ (*)     Glucose, UA 2+ (*)     Occult Blood UA 2+ (*)     Leukocytes, UA 3+ (*)     All other components within normal limits    Narrative:     Preferred Collection Type->Urine, Clean Catch  Specimen Source->Urine   URINALYSIS MICROSCOPIC - Abnormal; Notable for the following components:    RBC, UA 10 (*)     WBC, UA >100 (*)     Hyaline Casts, UA 1.9 (*)     All other components within normal limits    Narrative:     Preferred Collection Type->Urine, Clean Catch  Specimen Source->Urine   CULTURE, BLOOD   CULTURE, BLOOD   CULTURE, URINE   LACTIC ACID, PLASMA    Narrative:     Recoll. [id number] by [facility] at 06/07/2022 15:18, reason: Specimen   hemolyzed; notified [facility] (ED)          Imaging Results          CT Renal Stone Study ABD Pelvis WO (Final result)  Result time 06/07/22 16:27:41    Final result by Linden Eastman MD (06/07/22 16:27:41)                 Impression:      1. Right ureteral stent in place with chronic stable moderate right hydronephrosis, as noted on recent PET-CT of 06/03/2022.  2. Left-sided percutaneous nephrostomy tube remains in place with no hydronephrosis.  3. A large bladder mass, consistent with history of malignancy, as noted on recent PET-CT.  4. Enlarged bilateral pelvic lymph nodes, consistent with metastatic disease, as noted on recent PET-CT.  5. No acute finding.      Electronically signed by: Linden Eastman  MD  Date:    06/07/2022  Time:    16:27             Narrative:    EXAMINATION:  CT RENAL STONE STUDY ABD PELVIS WO    CLINICAL HISTORY:  Back pain, nephrostomy catheter displacement; bladder cancer    TECHNIQUE:  Axial CT images were obtained. Iterative reconstruction technique was used. CT/cardiac nuclear exam/s in prior 12 months: 3.    COMPARISON:  PET-CT 06/03/2022.    FINDINGS:  No hepatic abnormality.  Intra and extrahepatic biliary ductal dilatation.    Cholelithiasis without evidence of cholecystitis.    The spleen, pancreas, adrenal glands demonstrate no abnormality.    There is a right ureteral stent in place with stable chronic moderate hydronephrosis, as noted on prior exam.    There is a left percutaneous nephrostomy tube in place, as noted on prior exam.  No hydronephrosis.    There is no gross gastric abnormality.  There are no dilated loops of bowel.  The appendix is normal.    There is a mass in the urinary bladder, as noted on recent PET-CT.    There is no free fluid or free air.  No aortic aneurysm.  There are enlarged bilateral pelvic lymph nodes, as noted on recent PET-CT, consistent with metastatic disease.    Visualized lung bases are clear.  There is no osseous lesion.                               US Lower Extremity Veins Right (Final result)  Result time 06/07/22 16:13:17    Final result by Linden Eastman MD (06/07/22 16:13:17)                 Impression:      DVT extending from right common femoral vein to the calf veins.      Electronically signed by: Linden Eastman MD  Date:    06/07/2022  Time:    16:13             Narrative:    EXAMINATION:  US LOWER EXTREMITY VEINS RIGHT    CLINICAL HISTORY:  Right lower extremity swelling    COMPARISON:  None.    FINDINGS:  Duplex evaluation of the right lower extremity deep venous system demonstrates occlusive thrombus extending from the right common femoral, femoral, popliteal veins into the calf veins.                                  Medications   morphine injection 2 mg (2 mg Intravenous Given 6/7/22 0706)                 ED Course as of 06/07/22 1736 Tue Jun 07, 2022   1726 WBC, UA(!): >100 [RB]   1726 Patient with a UTI in a 29,000 white count.  Patient's lactic is normal, pressure is stable and CT scan of the abdomen pelvis does not reveal any worsening of her issues involving her kidneys.  Patient also does have a right lower extremity DVT [RB]      ED Course User Index  [RB] Beau Henry MD             Clinical Impression:   Final diagnoses:  [R60.9] Swelling  [N39.0] Urinary tract infection without hematuria, site unspecified (Primary)  [I82.4Y1] Acute deep vein thrombosis (DVT) of proximal vein of right lower extremity          ED Disposition Condition    Observation               Beau Henry MD  06/07/22 1736

## 2022-06-08 ENCOUNTER — PATIENT MESSAGE (OUTPATIENT)
Dept: UROLOGY | Facility: CLINIC | Age: 77
End: 2022-06-08
Payer: MEDICARE

## 2022-06-08 ENCOUNTER — HOSPITAL ENCOUNTER (INPATIENT)
Facility: HOSPITAL | Age: 77
LOS: 7 days | Discharge: HOME HEALTH | DRG: 699 | End: 2022-06-15
Attending: EMERGENCY MEDICINE | Admitting: HOSPITALIST
Payer: MEDICARE

## 2022-06-08 ENCOUNTER — PATIENT MESSAGE (OUTPATIENT)
Dept: HEMATOLOGY/ONCOLOGY | Facility: CLINIC | Age: 77
End: 2022-06-08
Payer: MEDICARE

## 2022-06-08 VITALS
OXYGEN SATURATION: 100 % | WEIGHT: 113.19 LBS | HEART RATE: 96 BPM | HEIGHT: 61 IN | BODY MASS INDEX: 21.37 KG/M2 | TEMPERATURE: 99 F | DIASTOLIC BLOOD PRESSURE: 57 MMHG | SYSTOLIC BLOOD PRESSURE: 116 MMHG | RESPIRATION RATE: 20 BRPM

## 2022-06-08 DIAGNOSIS — N13.9 URINARY OBSTRUCTION: Primary | ICD-10-CM

## 2022-06-08 DIAGNOSIS — I82.4Y1 ACUTE DEEP VEIN THROMBOSIS (DVT) OF PROXIMAL VEIN OF RIGHT LOWER EXTREMITY: ICD-10-CM

## 2022-06-08 DIAGNOSIS — Z93.6 NEPHROSTOMY STATUS: ICD-10-CM

## 2022-06-08 DIAGNOSIS — D64.9 ANEMIA, UNSPECIFIED TYPE: ICD-10-CM

## 2022-06-08 DIAGNOSIS — C67.9 MALIGNANT NEOPLASM OF URINARY BLADDER, UNSPECIFIED SITE: ICD-10-CM

## 2022-06-08 DIAGNOSIS — E11.69 TYPE 2 DIABETES MELLITUS WITH OTHER SPECIFIED COMPLICATION, UNSPECIFIED WHETHER LONG TERM INSULIN USE: Chronic | ICD-10-CM

## 2022-06-08 PROBLEM — E44.0 MALNUTRITION OF MODERATE DEGREE: Status: ACTIVE | Noted: 2022-06-08

## 2022-06-08 PROBLEM — D72.829 LEUKOCYTOSIS: Status: ACTIVE | Noted: 2022-06-08

## 2022-06-08 PROBLEM — N39.0 URINARY TRACT INFECTION WITHOUT HEMATURIA: Status: ACTIVE | Noted: 2022-06-08

## 2022-06-08 LAB
ABO + RH BLD: NORMAL
ABO + RH BLD: NORMAL
ALBUMIN SERPL BCP-MCNC: 2 G/DL (ref 3.5–5.2)
ALP SERPL-CCNC: 322 U/L (ref 55–135)
ALT SERPL W/O P-5'-P-CCNC: 37 U/L (ref 10–44)
ANION GAP SERPL CALC-SCNC: 7 MMOL/L (ref 8–16)
ANISOCYTOSIS BLD QL SMEAR: SLIGHT
APTT BLDCRRT: 31.6 SEC (ref 21–32)
AST SERPL-CCNC: 47 U/L (ref 10–40)
BACTERIA UR CULT: NORMAL
BACTERIA UR CULT: NORMAL
BASOPHILS # BLD AUTO: 0.09 K/UL (ref 0–0.2)
BASOPHILS # BLD AUTO: 0.11 K/UL (ref 0–0.2)
BASOPHILS NFR BLD: 0.4 % (ref 0–1.9)
BASOPHILS NFR BLD: 0.4 % (ref 0–1.9)
BILIRUB SERPL-MCNC: 0.9 MG/DL (ref 0.1–1)
BLD GP AB SCN CELLS X3 SERPL QL: NORMAL
BLD GP AB SCN CELLS X3 SERPL QL: NORMAL
BUN SERPL-MCNC: 25 MG/DL (ref 8–23)
CALCIUM SERPL-MCNC: 9.7 MG/DL (ref 8.7–10.5)
CHLORIDE SERPL-SCNC: 109 MMOL/L (ref 95–110)
CO2 SERPL-SCNC: 20 MMOL/L (ref 23–29)
CREAT SERPL-MCNC: 1.7 MG/DL (ref 0.5–1.4)
DIFFERENTIAL METHOD: ABNORMAL
DIFFERENTIAL METHOD: ABNORMAL
EOSINOPHIL # BLD AUTO: 0.4 K/UL (ref 0–0.5)
EOSINOPHIL # BLD AUTO: 0.4 K/UL (ref 0–0.5)
EOSINOPHIL NFR BLD: 1.4 % (ref 0–8)
EOSINOPHIL NFR BLD: 1.6 % (ref 0–8)
ERYTHROCYTE [DISTWIDTH] IN BLOOD BY AUTOMATED COUNT: 15.2 % (ref 11.5–14.5)
ERYTHROCYTE [DISTWIDTH] IN BLOOD BY AUTOMATED COUNT: 15.7 % (ref 11.5–14.5)
ERYTHROCYTE [DISTWIDTH] IN BLOOD BY AUTOMATED COUNT: 15.9 % (ref 11.5–14.5)
EST. GFR  (AFRICAN AMERICAN): 33.3 ML/MIN/1.73 M^2
EST. GFR  (NON AFRICAN AMERICAN): 28.9 ML/MIN/1.73 M^2
FERRITIN SERPL-MCNC: 763 NG/ML (ref 20–300)
GLUCOSE SERPL-MCNC: 190 MG/DL (ref 70–110)
GLUCOSE SERPL-MCNC: 238 MG/DL (ref 70–110)
HCT VFR BLD AUTO: 19.9 % (ref 37–48.5)
HCT VFR BLD AUTO: 21.8 % (ref 37–48.5)
HCT VFR BLD AUTO: 24.4 % (ref 37–48.5)
HGB BLD-MCNC: 6.2 G/DL (ref 12–16)
HGB BLD-MCNC: 6.7 G/DL (ref 12–16)
HGB BLD-MCNC: 7.6 G/DL (ref 12–16)
HYPOCHROMIA BLD QL SMEAR: ABNORMAL
IMM GRANULOCYTES # BLD AUTO: 0.25 K/UL (ref 0–0.04)
IMM GRANULOCYTES # BLD AUTO: 0.33 K/UL (ref 0–0.04)
IMM GRANULOCYTES NFR BLD AUTO: 1 % (ref 0–0.5)
IMM GRANULOCYTES NFR BLD AUTO: 1.2 % (ref 0–0.5)
INR PPP: 1.3 (ref 0.8–1.2)
IRON SATN MFR SERPL: 11 % (ref 20–50)
IRON SERPL-MCNC: 12 UG/DL (ref 30–160)
LYMPHOCYTES # BLD AUTO: 1.4 K/UL (ref 1–4.8)
LYMPHOCYTES # BLD AUTO: 1.5 K/UL (ref 1–4.8)
LYMPHOCYTES NFR BLD: 5.2 % (ref 18–48)
LYMPHOCYTES NFR BLD: 5.5 % (ref 18–48)
MAGNESIUM SERPL-MCNC: 2 MG/DL (ref 1.6–2.6)
MCH RBC QN AUTO: 26 PG (ref 27–31)
MCH RBC QN AUTO: 26.4 PG (ref 27–31)
MCH RBC QN AUTO: 27.4 PG (ref 27–31)
MCHC RBC AUTO-ENTMCNC: 30.7 G/DL (ref 32–36)
MCHC RBC AUTO-ENTMCNC: 31.1 G/DL (ref 32–36)
MCHC RBC AUTO-ENTMCNC: 31.2 G/DL (ref 32–36)
MCV RBC AUTO: 85 FL (ref 82–98)
MCV RBC AUTO: 85 FL (ref 82–98)
MCV RBC AUTO: 88 FL (ref 82–98)
MONOCYTES # BLD AUTO: 1 K/UL (ref 0.3–1)
MONOCYTES # BLD AUTO: 1.2 K/UL (ref 0.3–1)
MONOCYTES NFR BLD: 3.6 % (ref 4–15)
MONOCYTES NFR BLD: 4.8 % (ref 4–15)
NEUTROPHILS # BLD AUTO: 22 K/UL (ref 1.8–7.7)
NEUTROPHILS # BLD AUTO: 24.7 K/UL (ref 1.8–7.7)
NEUTROPHILS NFR BLD: 86.9 % (ref 38–73)
NEUTROPHILS NFR BLD: 88 % (ref 38–73)
NRBC BLD-RTO: 0 /100 WBC
NRBC BLD-RTO: 0 /100 WBC
OVALOCYTES BLD QL SMEAR: ABNORMAL
PLATELET # BLD AUTO: 373 K/UL (ref 150–450)
PLATELET # BLD AUTO: 376 K/UL (ref 150–450)
PLATELET # BLD AUTO: 399 K/UL (ref 150–450)
PLATELET BLD QL SMEAR: ABNORMAL
PMV BLD AUTO: 9.2 FL (ref 9.2–12.9)
PMV BLD AUTO: 9.3 FL (ref 9.2–12.9)
PMV BLD AUTO: 9.4 FL (ref 9.2–12.9)
POCT GLUCOSE: 307 MG/DL (ref 70–110)
POCT GLUCOSE: 385 MG/DL (ref 70–110)
POIKILOCYTOSIS BLD QL SMEAR: SLIGHT
POLYCHROMASIA BLD QL SMEAR: ABNORMAL
POTASSIUM SERPL-SCNC: 4.5 MMOL/L (ref 3.5–5.1)
PROT SERPL-MCNC: 6.8 G/DL (ref 6–8.4)
PROTHROMBIN TIME: 13.3 SEC (ref 9–12.5)
RBC # BLD AUTO: 2.35 M/UL (ref 4–5.4)
RBC # BLD AUTO: 2.58 M/UL (ref 4–5.4)
RBC # BLD AUTO: 2.77 M/UL (ref 4–5.4)
SODIUM SERPL-SCNC: 136 MMOL/L (ref 136–145)
TOTAL IRON BINDING CAPACITY: 109 UG/DL (ref 250–450)
VIT B12 SERPL-MCNC: 1519 PG/ML (ref 210–950)
WBC # BLD AUTO: 25.3 K/UL (ref 3.9–12.7)
WBC # BLD AUTO: 28.07 K/UL (ref 3.9–12.7)
WBC # BLD AUTO: 29.19 K/UL (ref 3.9–12.7)

## 2022-06-08 PROCEDURE — 82607 VITAMIN B-12: CPT | Performed by: INTERNAL MEDICINE

## 2022-06-08 PROCEDURE — 85025 COMPLETE CBC W/AUTO DIFF WBC: CPT | Performed by: INTERNAL MEDICINE

## 2022-06-08 PROCEDURE — 25000003 PHARM REV CODE 250: Performed by: INTERNAL MEDICINE

## 2022-06-08 PROCEDURE — 99285 EMERGENCY DEPT VISIT HI MDM: CPT | Mod: ,,, | Performed by: EMERGENCY MEDICINE

## 2022-06-08 PROCEDURE — 25000003 PHARM REV CODE 250: Performed by: NURSE PRACTITIONER

## 2022-06-08 PROCEDURE — 86901 BLOOD TYPING SEROLOGIC RH(D): CPT | Performed by: INTERNAL MEDICINE

## 2022-06-08 PROCEDURE — 83540 ASSAY OF IRON: CPT | Performed by: INTERNAL MEDICINE

## 2022-06-08 PROCEDURE — 85730 THROMBOPLASTIN TIME PARTIAL: CPT | Performed by: INTERNAL MEDICINE

## 2022-06-08 PROCEDURE — 99223 PR INITIAL HOSPITAL CARE,LEVL III: ICD-10-PCS | Mod: ,,, | Performed by: INTERNAL MEDICINE

## 2022-06-08 PROCEDURE — 82728 ASSAY OF FERRITIN: CPT | Performed by: INTERNAL MEDICINE

## 2022-06-08 PROCEDURE — 96361 HYDRATE IV INFUSION ADD-ON: CPT

## 2022-06-08 PROCEDURE — 86850 RBC ANTIBODY SCREEN: CPT | Mod: 91 | Performed by: INTERNAL MEDICINE

## 2022-06-08 PROCEDURE — 85610 PROTHROMBIN TIME: CPT | Performed by: INTERNAL MEDICINE

## 2022-06-08 PROCEDURE — 80053 COMPREHEN METABOLIC PANEL: CPT | Performed by: INTERNAL MEDICINE

## 2022-06-08 PROCEDURE — 51702 INSERT TEMP BLADDER CATH: CPT

## 2022-06-08 PROCEDURE — 83735 ASSAY OF MAGNESIUM: CPT | Performed by: INTERNAL MEDICINE

## 2022-06-08 PROCEDURE — 99285 EMERGENCY DEPT VISIT HI MDM: CPT | Mod: 25

## 2022-06-08 PROCEDURE — 85025 COMPLETE CBC W/AUTO DIFF WBC: CPT | Mod: 91 | Performed by: INTERNAL MEDICINE

## 2022-06-08 PROCEDURE — 63600175 PHARM REV CODE 636 W HCPCS: Performed by: INTERNAL MEDICINE

## 2022-06-08 PROCEDURE — 87086 URINE CULTURE/COLONY COUNT: CPT | Performed by: STUDENT IN AN ORGANIZED HEALTH CARE EDUCATION/TRAINING PROGRAM

## 2022-06-08 PROCEDURE — 99223 1ST HOSP IP/OBS HIGH 75: CPT | Mod: ,,, | Performed by: INTERNAL MEDICINE

## 2022-06-08 PROCEDURE — 85027 COMPLETE CBC AUTOMATED: CPT | Performed by: INTERNAL MEDICINE

## 2022-06-08 PROCEDURE — 82962 GLUCOSE BLOOD TEST: CPT

## 2022-06-08 PROCEDURE — 96367 TX/PROPH/DG ADDL SEQ IV INF: CPT

## 2022-06-08 PROCEDURE — 36415 COLL VENOUS BLD VENIPUNCTURE: CPT | Performed by: INTERNAL MEDICINE

## 2022-06-08 PROCEDURE — 81001 URINALYSIS AUTO W/SCOPE: CPT | Performed by: STUDENT IN AN ORGANIZED HEALTH CARE EDUCATION/TRAINING PROGRAM

## 2022-06-08 PROCEDURE — 86920 COMPATIBILITY TEST SPIN: CPT | Performed by: INTERNAL MEDICINE

## 2022-06-08 PROCEDURE — 96374 THER/PROPH/DIAG INJ IV PUSH: CPT | Mod: 59

## 2022-06-08 PROCEDURE — 36430 TRANSFUSION BLD/BLD COMPNT: CPT

## 2022-06-08 PROCEDURE — 99285 PR EMERGENCY DEPT VISIT,LEVEL V: ICD-10-PCS | Mod: ,,, | Performed by: EMERGENCY MEDICINE

## 2022-06-08 PROCEDURE — P9016 RBC LEUKOCYTES REDUCED: HCPCS | Performed by: INTERNAL MEDICINE

## 2022-06-08 PROCEDURE — 12000002 HC ACUTE/MED SURGE SEMI-PRIVATE ROOM

## 2022-06-08 PROCEDURE — 99223 1ST HOSP IP/OBS HIGH 75: CPT | Mod: GC,,, | Performed by: UROLOGY

## 2022-06-08 PROCEDURE — 99223 PR INITIAL HOSPITAL CARE,LEVL III: ICD-10-PCS | Mod: GC,,, | Performed by: UROLOGY

## 2022-06-08 RX ORDER — HYDROCODONE BITARTRATE AND ACETAMINOPHEN 500; 5 MG/1; MG/1
TABLET ORAL
Status: DISCONTINUED | OUTPATIENT
Start: 2022-06-08 | End: 2022-06-15 | Stop reason: HOSPADM

## 2022-06-08 RX ORDER — SODIUM CHLORIDE 9 MG/ML
INJECTION, SOLUTION INTRAVENOUS CONTINUOUS
Status: CANCELLED | OUTPATIENT
Start: 2022-06-08

## 2022-06-08 RX ORDER — SODIUM CHLORIDE 0.9 % (FLUSH) 0.9 %
10 SYRINGE (ML) INJECTION
Status: CANCELLED | OUTPATIENT
Start: 2022-06-08

## 2022-06-08 RX ORDER — TALC
6 POWDER (GRAM) TOPICAL NIGHTLY PRN
Status: DISCONTINUED | OUTPATIENT
Start: 2022-06-08 | End: 2022-06-15 | Stop reason: HOSPADM

## 2022-06-08 RX ORDER — HYDROCODONE BITARTRATE AND ACETAMINOPHEN 500; 5 MG/1; MG/1
TABLET ORAL
Status: CANCELLED | OUTPATIENT
Start: 2022-06-08

## 2022-06-08 RX ORDER — TALC
6 POWDER (GRAM) TOPICAL NIGHTLY PRN
Status: CANCELLED | OUTPATIENT
Start: 2022-06-08

## 2022-06-08 RX ORDER — GLUCAGON 1 MG
1 KIT INJECTION
Status: CANCELLED | OUTPATIENT
Start: 2022-06-08

## 2022-06-08 RX ORDER — LIDOCAINE HYDROCHLORIDE 20 MG/ML
JELLY TOPICAL
Status: DISCONTINUED | OUTPATIENT
Start: 2022-06-08 | End: 2022-06-08 | Stop reason: HOSPADM

## 2022-06-08 RX ORDER — MORPHINE SULFATE 2 MG/ML
2 INJECTION, SOLUTION INTRAMUSCULAR; INTRAVENOUS EVERY 4 HOURS PRN
Status: CANCELLED | OUTPATIENT
Start: 2022-06-08

## 2022-06-08 RX ORDER — SODIUM CHLORIDE 9 MG/ML
INJECTION, SOLUTION INTRAVENOUS CONTINUOUS
Status: DISCONTINUED | OUTPATIENT
Start: 2022-06-08 | End: 2022-06-08 | Stop reason: HOSPADM

## 2022-06-08 RX ORDER — SODIUM CHLORIDE 0.9 % (FLUSH) 0.9 %
10 SYRINGE (ML) INJECTION
Status: DISCONTINUED | OUTPATIENT
Start: 2022-06-08 | End: 2022-06-15 | Stop reason: HOSPADM

## 2022-06-08 RX ORDER — IBUPROFEN 200 MG
16 TABLET ORAL
Status: CANCELLED | OUTPATIENT
Start: 2022-06-08

## 2022-06-08 RX ORDER — IBUPROFEN 200 MG
24 TABLET ORAL
Status: DISCONTINUED | OUTPATIENT
Start: 2022-06-08 | End: 2022-06-15 | Stop reason: HOSPADM

## 2022-06-08 RX ORDER — INSULIN ASPART 100 [IU]/ML
0-5 INJECTION, SOLUTION INTRAVENOUS; SUBCUTANEOUS EVERY 6 HOURS PRN
Status: DISCONTINUED | OUTPATIENT
Start: 2022-06-08 | End: 2022-06-15 | Stop reason: HOSPADM

## 2022-06-08 RX ORDER — HEPARIN SODIUM,PORCINE/D5W 25000/250
0-40 INTRAVENOUS SOLUTION INTRAVENOUS CONTINUOUS
Status: DISCONTINUED | OUTPATIENT
Start: 2022-06-08 | End: 2022-06-11

## 2022-06-08 RX ORDER — IBUPROFEN 200 MG
16 TABLET ORAL
Status: DISCONTINUED | OUTPATIENT
Start: 2022-06-08 | End: 2022-06-15 | Stop reason: HOSPADM

## 2022-06-08 RX ORDER — IBUPROFEN 200 MG
24 TABLET ORAL
Status: CANCELLED | OUTPATIENT
Start: 2022-06-08

## 2022-06-08 RX ORDER — MORPHINE SULFATE 2 MG/ML
2 INJECTION, SOLUTION INTRAMUSCULAR; INTRAVENOUS EVERY 4 HOURS PRN
Status: DISCONTINUED | OUTPATIENT
Start: 2022-06-08 | End: 2022-06-15 | Stop reason: HOSPADM

## 2022-06-08 RX ORDER — GLUCAGON 1 MG
1 KIT INJECTION
Status: DISCONTINUED | OUTPATIENT
Start: 2022-06-08 | End: 2022-06-15 | Stop reason: HOSPADM

## 2022-06-08 RX ORDER — HYDROCODONE BITARTRATE AND ACETAMINOPHEN 5; 325 MG/1; MG/1
1 TABLET ORAL EVERY 6 HOURS PRN
Status: DISCONTINUED | OUTPATIENT
Start: 2022-06-08 | End: 2022-06-15 | Stop reason: HOSPADM

## 2022-06-08 RX ORDER — HYDROCODONE BITARTRATE AND ACETAMINOPHEN 500; 5 MG/1; MG/1
TABLET ORAL
Status: DISCONTINUED | OUTPATIENT
Start: 2022-06-08 | End: 2022-06-08 | Stop reason: HOSPADM

## 2022-06-08 RX ADMIN — MORPHINE SULFATE 2 MG: 2 INJECTION, SOLUTION INTRAMUSCULAR; INTRAVENOUS at 09:06

## 2022-06-08 RX ADMIN — APIXABAN 10 MG: 5 TABLET, FILM COATED ORAL at 10:06

## 2022-06-08 RX ADMIN — HEPARIN SODIUM 18 UNITS/KG/HR: 5000 INJECTION INTRAVENOUS; SUBCUTANEOUS at 09:06

## 2022-06-08 RX ADMIN — SODIUM CHLORIDE: 0.9 INJECTION, SOLUTION INTRAVENOUS at 10:06

## 2022-06-08 RX ADMIN — MORPHINE SULFATE 2 MG: 2 INJECTION, SOLUTION INTRAMUSCULAR; INTRAVENOUS at 06:06

## 2022-06-08 RX ADMIN — LIDOCAINE HYDROCHLORIDE: 20 JELLY TOPICAL at 05:06

## 2022-06-08 RX ADMIN — CEFTRIAXONE 1 G: 1 INJECTION, SOLUTION INTRAVENOUS at 10:06

## 2022-06-08 NOTE — PLAN OF CARE
Spoke with Dr Jones with transfer center - we will hold eliquis and keep pt npo for transfer and further eval of possible urologic interventions.  Will place dc orders in anticipation of trasnfer to main [facility].

## 2022-06-08 NOTE — CARE UPDATE
Please discuss with pt and her son (Reid Swenson - [phone]) if any procedures will be needed and or if surgery will be advised so they can make an informed decision about treatment options

## 2022-06-08 NOTE — NURSING
[facility] Report called into triage nurse Cristal DANIELS.

## 2022-06-08 NOTE — ASSESSMENT & PLAN NOTE
Spoke with pt she may need a transfusion - repeat cbc this afternoon, if h/h drops any further - pt is agreeance for transfusion

## 2022-06-08 NOTE — HPI
Patient is a 76-year-old female with past medical history significant for recent diagnosis of bladder cancer with left-sided nephrostomy tube and right-sided stent placed, diabetes who presents to the emergency department with low back pain, burning with urination and right lower extremity swelling.  Pt reports has been very weak, wasn't eating well over the last few days.  Patient denies any chest pain or shortness of breath  After ivfs overnight, pt is feeling better this am - ate all her breakfast.  Repotrs urine in nephrosotmy bag is no longer cloudy this am

## 2022-06-08 NOTE — ASSESSMENT & PLAN NOTE
Nutrition consulted. Most recent weight and BMI monitored- start glucerna bid - monitor calcium levels due to recent hypercalcemia                        Measurements:  Wt Readings from Last 1 Encounters:   06/07/22 51.3 kg (113 lb 3.2 oz)   Body mass index is 21.39 kg/m².    Recommendations:      Patient has been screened and assessed by RD. RD will follow patient.

## 2022-06-08 NOTE — PLAN OF CARE
06/08/22 0703   Medicare Message   Important Message from Medicare regarding Discharge Appeal Rights Signed/date by patient/caregiver   Date IMM was signed 06/07/22   Time IMM was signed 1800

## 2022-06-08 NOTE — ASSESSMENT & PLAN NOTE
Monitor lfts - pt was supposed to have procedure ercp done soon - now currently admitted for uti and mauricio and new dvt

## 2022-06-08 NOTE — PLAN OF CARE
Received a call from Dr. Parada. She has spoke to Dr. Hart at [facility]. Both physician's agree with transfer for higher level of care. This seems to have been initiated by the patient's daughter. Patient has physicians at [facility]. Called and spoke to Diamond with the [facility] who was already aware and working on the transfer. Jonathon has my number and the number to the nurses station if she needs anything.     Updated patient's primary nurse and charge nurse.

## 2022-06-08 NOTE — H&P
[facility]  History & Physical    Patient Name: Karoline Justice  MRN: [id number]  Patient Class: IP- Inpatient  Admission Date: 6/7/2022  Attending Physician: Alfreda Parada MD   Primary Care Provider: Dee Dee Oconnor MD         Patient information was obtained from patient, relative(s) and ER records.     Subjective:     Principal Problem:THOMAS (acute kidney injury)    Chief Complaint:   Chief Complaint   Patient presents with    Back Pain     Pt was dx with bladder cancer and was admitted to [facility] last month for 7 days. Has nephrostomy tube in place. Complaints of low back pain, mouth pain, vaginal pain, burning with urination, vaginal bleeding, foot pain.         HPI: Patient is a 76-year-old female with past medical history significant for recent diagnosis of bladder cancer with left-sided nephrostomy tube and right-sided stent placed, diabetes who presents to the emergency department with low back pain, burning with urination and right lower extremity swelling.  Pt reports has been very weak, wasn't eating well over the last few days.  Patient denies any chest pain or shortness of breath  After ivfs overnight, pt is feeling better this am - ate all her breakfast.  Repotrs urine in nephrosotmy bag is no longer cloudy this am      Past Medical History:   Diagnosis Date    Bladder mass 5/6/2022    Choledocholithiasis 5/6/2022    Diabetes mellitus     Pneumonia due to COVID-19 virus 8/6/2021       Past Surgical History:   Procedure Laterality Date    CYSTOSCOPY N/A 5/9/2022    Procedure: CYSTOSCOPY;  Surgeon: Adrianna Gutierrez MD;  Location: [address];  Service: Urology;  Laterality: N/A;    RETROGRADE PYELOGRAPHY Right 5/9/2022    Procedure: PYELOGRAM, RETROGRADE;  Surgeon: Adrianna Gutierrez MD;  Location: [facility] OR [address] [place] [address];  Service: Urology;  Laterality: Right;       Review of patient's allergies indicates:  No Known Allergies    No current facility-administered  medications on file prior to encounter.     Current Outpatient Medications on File Prior to Encounter   Medication Sig    acetaminophen (TYLENOL) 500 MG tablet Take 500 mg by mouth every 6 (six) hours as needed for Pain.    amLODIPine (NORVASC) 5 MG tablet Take 1 tablet (5 mg total) by mouth once daily. (Patient not taking: Reported on 5/27/2022)    LEVEMIR FLEXTOUCH U-100 INSULN 100 unit/mL (3 mL) InPn pen Inject 24 Units into the skin every morning.    LIDOcaine (LIDODERM) 5 % Place 1 patch onto the skin once daily. Place patch to back. Leave on for 12 hours and remove for 12 hours.    traMADoL (ULTRAM) 50 mg tablet Take 1 tablet (50 mg total) by mouth every 6 (six) hours as needed for Pain.    [DISCONTINUED] calcium carbonate (TUMS) 200 mg calcium (500 mg) chewable tablet Take 1 tablet by mouth 2 (two) times daily with meals.     Family History       Problem Relation (Age of Onset)    Diabetes Mother, Daughter, Son    Heart attack Father    Kidney cancer Son    Stomach cancer Mother    Thyroid disease Daughter          Tobacco Use    Smoking status: Never Smoker    Smokeless tobacco: Never Used   Substance and Sexual Activity    Alcohol use: No    Drug use: No    Sexual activity: Not on file     Review of Systems   Constitutional:  Positive for activity change and fatigue. Negative for chills and fever.   HENT:  Negative for nosebleeds, sneezing and sore throat.    Respiratory:  Negative for cough, choking, chest tightness and shortness of breath.    Cardiovascular:  Positive for leg swelling. Negative for chest pain and palpitations.   Gastrointestinal:  Positive for nausea. Negative for abdominal pain, diarrhea and vomiting.   Genitourinary:  Negative for dysuria.   Musculoskeletal:  Positive for back pain.   Skin:  Negative for rash.   Neurological:  Positive for weakness. Negative for seizures, syncope and numbness.   Hematological:  Does not bruise/bleed easily.   Objective:     Vital Signs (Most  Recent):  Temp: 98.6 °F (37 °C) (06/08/22 0357)  Pulse: 91 (06/08/22 0357)  Resp: 16 (06/08/22 0357)  BP: (!) 100/53 (06/08/22 0357)  SpO2: 98 % (06/08/22 0357)   Vital Signs (24h Range):  Temp:  [98.2 °F (36.8 °C)-99.7 °F (37.6 °C)] 98.6 °F (37 °C)  Pulse:  [] 91  Resp:  [16-20] 16  SpO2:  [96 %-100 %] 98 %  BP: ()/(44-74) 100/53     Weight: 51.3 kg (113 lb 3.2 oz)  Body mass index is 21.39 kg/m².    Physical Exam  Constitutional:       Appearance: Normal appearance.   HENT:      Nose: Nose normal.      Mouth/Throat:      Mouth: Mucous membranes are moist.   Eyes:      Extraocular Movements: Extraocular movements intact.      Pupils: Pupils are equal, round, and reactive to light.   Cardiovascular:      Rate and Rhythm: Normal rate and regular rhythm.   Pulmonary:      Effort: Pulmonary effort is normal.      Breath sounds: Normal breath sounds.   Abdominal:      General: Bowel sounds are normal. There is no distension.      Palpations: Abdomen is soft.      Tenderness: There is no abdominal tenderness.      Comments: Left nephrostomy tube - urine is clear in bag   Musculoskeletal:         General: Normal range of motion.      Right lower leg: Edema present.      Left lower leg: No edema.   Skin:     General: Skin is warm and dry.   Neurological:      Mental Status: She is alert and oriented to person, place, and time.   Psychiatric:         Mood and Affect: Mood normal.         CRANIAL NERVES     CN III, IV, VI   Pupils are equal, round, and reactive to light.     Significant Labs: All pertinent labs within the past 24 hours have been reviewed.  Recent Lab Results  (Last 5 results in the past 24 hours)        06/08/22  0612   06/07/22  2151   06/07/22  1602   06/07/22  1558   06/07/22  1507        Albumin 2.0       2.1         Alkaline Phosphatase 322       141         ALT 37       13         Anion Gap 7       9         Appearance, UA         Cloudy       AST 47       14         Bacteria, UA          Negative       Baso # 0.11       CANCELED  Comment: Result canceled by the ancillary.         Basophil % 0.4       0.0         Bilirubin (UA)         Negative       BILIRUBIN TOTAL 0.9  Comment: For infants and newborns, interpretation of results should be based  on gestational age, weight and in agreement with clinical  observations.    Premature Infant recommended reference ranges:  Up to 24 hours.............<8.0 mg/dL  Up to 48 hours............<12.0 mg/dL  3-5 days..................<15.0 mg/dL  6-29 days.................<15.0 mg/dL    For patients on Eltrombopag therapy, use of Dimension [place] TBIL is   not   recommended.         0.5  Comment: For infants and newborns, interpretation of results should be based  on gestational age, weight and in agreement with clinical  observations.    Premature Infant recommended reference ranges:  Up to 24 hours.............<8.0 mg/dL  Up to 48 hours............<12.0 mg/dL  3-5 days..................<15.0 mg/dL  6-29 days.................<15.0 mg/dL    For patients on Eltrombopag therapy, use of Dimension [place] TBIL is   not   recommended.           Blood Culture, Routine       No Growth to date  [P]         BUN 25       26         Calcium 9.7       9.6         Chloride 109       104         CO2 20       19         Color, UA         Yellow       Creatinine 1.7       1.8         Differential Method Automated       Manual  Comment: CORRECTED RESULT; previously reported as Automated on 06/07/2022 at   16:40.    [C]         eGFR if  33.3       31.1         eGFR if non  28.9  Comment: Calculation used to obtain the estimated glomerular filtration  rate (eGFR) is the CKD-EPI equation.          27.0  Comment: Calculation used to obtain the estimated glomerular filtration  rate (eGFR) is the CKD-EPI equation.            Eos # 0.4       CANCELED  Comment: Result canceled by the ancillary.         Eosinophil % 1.6       2.0         Glucose 190        280         Glucose, UA         2+       Gran # (ANC) 24.7               Gran % 88.0       92.0         Hematocrit 21.8       22.0         Hemoglobin 6.7       6.9         Hyaline Casts, UA         1.9       Immature Grans (Abs) 0.33  Comment: Mild elevation in immature granulocytes is non specific and   can be seen in a variety of conditions including stress response,   acute inflammation, trauma and pregnancy. Correlation with other   laboratory and clinical findings is essential.         CANCELED  Comment: Mild elevation in immature granulocytes is non specific and   can be seen in a variety of conditions including stress response,   acute inflammation, trauma and pregnancy. Correlation with other   laboratory and clinical findings is essential.    Result canceled by the ancillary.           Immature Granulocytes 1.2       CANCELED  Comment: Result canceled by the ancillary.         Ketones, UA         Negative       Lactate, Jann     1.7           Leukocytes, UA         3+       Lymph # 1.5       CANCELED  Comment: Result canceled by the ancillary.         Lymph % 5.2       5.0         Magnesium 2.0               MCH 26.0       26.4         MCHC 30.7       31.4         MCV 85       84         Microscopic Comment         SEE COMMENT  Comment: Other formed elements not mentioned in the report are not   present in the microscopic examination.          Mono # 1.0       CANCELED  Comment: Result canceled by the ancillary.         Mono % 3.6       1.0         MPV 9.3       9.2         NITRITE UA         Negative       nRBC 0       0         Occult Blood UA         2+       pH, UA         6.0       Platelets 373       380         Potassium 4.5       4.2         PROTEIN TOTAL 6.8       7.4         Protein, UA         2+  Comment: Recommend a 24 hour urine protein or a urine   protein/creatinine ratio if globulin induced proteinuria is  clinically suspected.          Acceptable   Yes             RBC 2.58        2.61         RBC, UA         10       RDW 15.7       15.7         SARS-CoV-2 RNA, Amplification, Qual   Negative             Sodium 136       132         Specific [place], UA         1.010       Specimen UA         Urine, Clean Catch       Squam Epithel, UA         1       UROBILINOGEN UA         Negative       WBC, UA         >100       WBC 28.07       29.35                                 [P] - Preliminary Result [C] - Corrected Result              Significant Imaging: I have reviewed all pertinent imaging results/findings within the past 24 hours.    Assessment/Plan:     * THOMAS (acute kidney injury)  Patient with acute kidney injury likely d/t IVVD/Dehydration Which is currently improving. Labs reviewed- Renal function/electrolytes with Estimated Creatinine Clearance: 21.2 mL/min (A) (based on SCr of 1.7 mg/dL (H)). according to latest data. Monitor urine output and serial BMP and adjust therapy as needed. Avoid nephrotoxins and renally dose meds for GFR listed above.   cotn ivfs    Malnutrition of moderate degree  Nutrition consulted. Most recent weight and BMI monitored- start glucerna bid - monitor calcium levels due to recent hypercalcemia                        Measurements:  Wt Readings from Last 1 Encounters:   06/07/22 51.3 kg (113 lb 3.2 oz)   Body mass index is 21.39 kg/m².    Recommendations:      Patient has been screened and assessed by RD. RD will follow patient.      Urinary tract infection without hematuria  Urine cx, blood cx pending - rocephin wbx slightly improved from yesterady      Acute deep vein thrombosis (DVT) of proximal vein of right lower extremity  Pt given dose of lovenox in er - changed to eliquis this am       Bladder cancer  Followed by oncology and urology at [facility] - spoke with oncology they recommend us gettign urology input to see if will need to do anythign further for her rigth hydronephrosis      Anemia  Spoke with pt she may need a transfusion - repeat cbc this  afternoon, if h/h drops any further - pt is agreeance for transfusion      Choledocholithiasis  Monitor lfts - pt was supposed to have procedure ercp done soon - now currently admitted for uti and mauricio and new dvt      Hydronephrosis  Followed by urology      Controlled Type 2 diabetes mellitus  accuchecks and ssi - will restart levemir as needed to help control bs      VTE Risk Mitigation (From admission, onward)         Ordered     apixaban tablet 5 mg  2 times daily         06/08/22 0919     apixaban tablet 10 mg  2 times daily         06/08/22 0919                   Alfreda Parada MD  Department of Hospital Medicine   [facility]

## 2022-06-08 NOTE — HOSPITAL COURSE
After speaking with pts oncologist, felt it would be beneficial for pt to be further evaluated by urology services for her right hydronephrosis.  Pt will be kept npo andwill hold her eliquis in case a procedure needs to be done.  Pt is currently in process of getting prbcs typed and crossed for transfusion due to worsenign anemia - no evidence of acute bleedign noted

## 2022-06-08 NOTE — PLAN OF CARE
[facility]  Initial Discharge Assessment       Primary Care Provider: Dee Dee Oconnor MD    Admission Diagnosis: Swelling [R60.9]  Acute deep vein thrombosis (DVT) of proximal vein of right lower extremity [I82.4Y1]  Urinary tract infection without hematuria, site unspecified [N39.0]    Admission Date: 6/7/2022  Expected Discharge Date:     Discharge Barriers Identified: None    Payor: HUMANA MANAGED MEDICARE / Plan: HUMANA SNP (SPECIAL NEEDS PLAN) / Product Type: Medicare Advantage /     Extended Emergency Contact Information  Primary Emergency Contact: REID MITCHELL  Mobile Phone: [phone]  Relation: Son  Preferred language: English   needed? No  Secondary Emergency Contact: Kenya Ford   [place]  Mobile Phone: [phone]  Relation: Daughter    Discharge Plan A: Home with family, Home Health  Discharge Plan B: Home with family, Home Health      [facility] [address] [place] [address] [address] [facility] [address]  Phone: [phone] Fax: [phone]      Initial Assessment (most recent)     Adult Discharge Assessment - 06/08/22 0815        Discharge Assessment    Assessment Type Discharge Planning Assessment     Confirmed/corrected address, phone number and insurance Yes     Confirmed Demographics Correct on Facesheet     Source of Information patient     When was your last doctors appointment? --   The patient saw her PCP two weeks ago.    Reason For Admission Swelling, Acute deep vein thrombosis (DVT) of proximal vein of right lower extremity, Urinary tract infection without hematuria, site unspecified     Lives With child(bharathi), adult;spouse     Do you expect to return to your current living situation? Yes     Do you have help at home or someone to help you manage your care at home? Yes     Who are your caregiver(s) and their phone number(s)? Reid ( son) [phone]     Prior to hospitilization cognitive status: Alert/Oriented      Current cognitive status: Alert/Oriented     Walking or Climbing Stairs Difficulty ambulation difficulty, requires equipment;ambulation difficulty, assistance 1 person;stair climbing difficulty, assistance 1 person     Mobility Management rolling waler( if needed)     Dressing/Bathing Difficulty none     Home Accessibility stairs to enter home     Number of Stairs, Main Entrance four     Stair Railings, Main Entrance railing on left side (ascending)     Home Layout Able to live on 1st floor     Equipment Currently Used at Home walker, rolling;glucometer   The patient has a rolling walker if needed.    Do you currently have service(s) that help you manage your care at home? Yes     Name and Contact number of agency [facility]- [phone]     Is the pt/caregiver preference to resume services with current agency Yes     Do you take prescription medications? Yes     Do you have prescription coverage? Yes     Coverage HUMANA MANAGED MEDICARE - HUMANA SNP (SPECIAL NEEDS PLAN     Do you have any problems affording any of your prescribed medications? No     Is the patient taking medications as prescribed? yes     Who is going to help you get home at discharge? Reid (son)     How do you get to doctors appointments? family or friend will provide     Are you on dialysis? No     Do you take coumadin? No     Discharge Plan A Home with family;Home Health     Discharge Plan B Home with family;Home Health     DME Needed Upon Discharge  other (see comments)   TBD    Discharge Plan discussed with: Patient;Adult children     Discharge Barriers Identified None                 Readmission Assessment (most recent)     Readmission Assessment - 06/08/22 0823        Readmission    Why were you hospitalized in the last 30 days? Bladder tumor     Why were you readmitted? Alarmed about signs/symptoms     When you left the hospital how did you feel? The patient stated that she was feeling good.     When you left the hospital where  did you go? Home with Home Health     When did you start not feeling well? The patient started not to feel well last Friday.     Did you try to manage your symptoms your self? No     Did you call anyone? No     Why? --   The patient just came to the hospital.    Did you try to see or did see a doctor or nurse before you came? No             Initial discharge assessment is completed. Information was obtain from the patient and her son, Reid. The patient lives in a mobile how with her spouse and son. She plans to return back home with her family upon discharge. She has a rolling walker she uses if needed. Her son, Reid, is also able to assist her as well. She currently has home health care with [facility]. She plans to resume services with this agency upon discharge. DAGOBERTO will continue to monitor.

## 2022-06-08 NOTE — ASSESSMENT & PLAN NOTE
Followed by oncology and urology at [facility] - spoke with oncology they recommend us gettign urology input to see if will need to do anythign further for her rigth hydronephrosis

## 2022-06-08 NOTE — PROVIDER TRANSFER
Outside Transfer Acceptance Note / Regional Referral Center    Referring facility: [facility]  Referring provider: MICKY ALLEN  Accepting facility: [facility]  Accepting provider: ARPIT HERMOSILLO  Reason for transfer:   Need Urology/Interventional Radiology  Transfer diagnosis: Urinary obstructin  Transfer specialty requested: Urology  Interventional Radiology  Transfer specialty notified: yes  Transfer level: NUMBER 1-5: 2  Bed type requested: ED  Isolation status: No active isolations   Admission class or status: IP- Inpatient        Narrative     76-year-old female with a history of bladder cancer with urinary obstruction (right-sided ureteral stenting and left-sided nephrostomy tube placement), hypercalcemia, hypertension, urinary tract infection, anemia, diabetes, choledocholithiasis, and prior COVID (July 2021) admitted to [facility] on June 7 with back pain.  She reported burning with urination and right lower extremity swelling.  She had no chest pain or dyspnea.  She was found to have leukocytosis.  Lower extremity ultrasound showed right lower extremity DVT.  She was started on apixaban, Rocephin, and IV fluid.  Packed red blood cells were ordered for her anemia.  Referring provider noted patient has mild edema in her right leg but it is intact from a neurovascular standpoint.  Her urine output and her ostomy output grossly appeared fairly normal today.  She remains hemodynamically stable.  Case discussed with Urology and Interventional Radiology at [facility].  Recommendation is to move patient urgently to [facility] for further evaluation and treatment.  Will transfer to Hospital Medicine service at [facility].  On arrival, will contact Urology and Interventional Radiology.  I spoke with the referring provider, and they will hold apixaban and have the patient in NPO status for now.    She had transurethral section of bladder tumor  with right ureteral stent placement on May 9. She had left nephrostomy tube placement May 10. Pathology showed poorly differentiated carcinoma with areas of squamous differentiation, osteoclast like GI cells, and classic high-grade papillary    June 8:  White blood cells 28.07, hemoglobin 6.7, hematocrit 21.8, platelets 373, sodium 136, potassium 4.5, chloride 109, CO2 20, BUN 25, creatinine 1.7, glucose 190, total bilirubin 0.9, AST 47, ALT 37, magnesium 2    Repeat CBC at white blood cells 29.19, hemoglobin 6.2, hematocrit 19.9, platelets 376    June 7:  Blood cultures no growth to date  COVID negative, white blood cells 29.35, hemoglobin 6.9, hematocrit 22, platelets 380, sodium 132, potassium 4.2, chloride 104, CO2 19, BUN 26, creatinine 1.8  Urinalysis with 2+ protein, 2+ glucose, 2+ blood, 3+ leukocytes, 10 RBC, greater than 100 WBC  -CT renal stone protocol had right ureteral stent in place with chronic stable moderate right hydronephrosis.  Left-sided percutaneous nephrostomy tube in place with no hydronephrosis.  Large bladder mass consistent with history of malignancy.  Enlarged bilateral pelvic lymph node consistent with metastatic disease.  No acute finding.  -Right lower extremity ultrasound showed DVT extending from the right common femoral vein to the calf veins.    [place] 3:  PET CT scan had irregular wall thickening with associated hypermetabolic activity at the left posterior aspect of the urinary bladder compatible with known primary neoplasm.  Enlarged hypermetabolic lymph nodes along the bilateral pelvic sidewalls suggesting megan metastatic disease.  Multiple cell by 5 mm pulmonary nodules which are below the resolution of PET but new compared with chest CT from May of 2022.     Objective     Vitals: Temp: 98.8 °F (37.1 °C) (06/08/22 1129)  Pulse: 94 (06/08/22 1129)  Resp: 18 (06/08/22 1129)  BP: 112/65 (06/08/22 1129)  SpO2: 100 % (06/08/22 1129)  Recent Labs:   CBC:   Recent Labs   Lab  06/07/22  1558 06/08/22  0612 06/08/22  1402   WBC 29.35* 28.07* 29.19*   HGB 6.9* 6.7* 6.2*   HCT 22.0* 21.8* 19.9*    373 376     CMP:   Recent Labs   Lab 06/07/22  1558 06/08/22  0612   * 136   K 4.2 4.5    109   CO2 19* 20*   * 190*   BUN 26* 25*   CREATININE 1.8* 1.7*   CALCIUM 9.6 9.7   PROT 7.4 6.8   ALBUMIN 2.1* 2.0*   BILITOT 0.5 0.9   ALKPHOS 141* 322*   AST 14 47*   ALT 13 37   ANIONGAP 9 7*   EGFRNONAA 27.0* 28.9*     Recent imaging: see above   IV access:        Peripheral IV - Single Lumen 06/07/22 1450 20 G Left Forearm (Active)   Site Assessment Clean;Dry;Intact;No redness;No swelling 06/08/22 1200   Extremity Assessment Distal to IV No abnormal discoloration;No redness;No swelling;No warmth 06/07/22 2300   Line Status Blood return noted;Flushed;Infusing 06/07/22 2300   Dressing Status Clean;Dry;Intact 06/07/22 2300   Dressing Intervention Integrity maintained 06/07/22 2300     Allergies: Review of patient's allergies indicates:  No Known Allergies   NPO: Yes      Instructions    1. Admit to Hospital Medicine  2. Consult Urology, notify on arrival      Upon patient arrival to the ED, please send SecureChat to [facility] or call extension [phone] (if no answer, do NOT leave a callback number after the beep, rather please send a SecureChat to [facility]), for Hospital Medicine admit team assignment and for additional admit orders for the patient.  Do not page the attending physician associated with the patient on arrival (this physician may not be on duty at the time of arrival).  Rather, always send a SecureChat to [facility] or call [phone] to reach the triage physician for orders and team assignment.    FAUSTO Jones MD  Hospital Medicine Staff  Cell: [phone]

## 2022-06-08 NOTE — DISCHARGE SUMMARY
[facility] Medicine  Discharge Summary      Patient Name: aKroline Justice  MRN: [id number]  Patient Class: IP- Inpatient  Admission Date: 6/7/2022  Hospital Length of Stay: 1 days  Discharge Date and Time:  06/08/2022 3:54 PM  Attending Physician: Micky Allen MD   Discharging Provider: Micky Allen MD  Primary Care Provider: Dee Dee Oconnor MD      HPI:   Patient is a 76-year-old female with past medical history significant for recent diagnosis of bladder cancer with left-sided nephrostomy tube and right-sided stent placed, diabetes who presents to the emergency department with low back pain, burning with urination and right lower extremity swelling.  Pt reports has been very weak, wasn't eating well over the last few days.  Patient denies any chest pain or shortness of breath  After ivfs overnight, pt is feeling better this am - ate all her breakfast.  Repotrs urine in nephrosotmy bag is no longer cloudy this am      * No surgery found *      Hospital Course:   After speaking with pts oncologist, felt it would be beneficial for pt to be further evaluated by urology services for her right hydronephrosis.  Pt will be kept npo andwill hold her eliquis in case a procedure needs to be done.  Pt is currently in process of getting prbcs typed and crossed for transfusion due to worsenign anemia - no evidence of acute bleedign noted     Pt would like for any procedures or surgical interventions to be discussed with her and her son (Ried Swenson [phone])  Goals of Care Treatment Preferences:  Code Status: Full Code      Consults:   Consults (From admission, onward)        Status Ordering Provider     Inpatient consult to Social Work/Case Management  Once        Provider:  (Not yet assigned)    Acknowledged MICKY ALLEN     IP consult to case management  Once        Provider:  (Not yet assigned)    Acknowledged MICKY ALLEN          No new Assessment & Plan notes have been filed  under this hospital service since the last note was generated.  Service: Hospital Medicine    Final Active Diagnoses:    Diagnosis Date Noted POA    PRINCIPAL PROBLEM:  THOMAS (acute kidney injury) [N17.9] 05/11/2022 Yes    Acute deep vein thrombosis (DVT) of proximal vein of right lower extremity [I82.4Y1] 06/08/2022 Yes    Urinary tract infection without hematuria [N39.0] 06/08/2022 Yes    Malnutrition of moderate degree [E44.0] 06/08/2022 Yes    Bladder cancer [C67.9] 05/20/2022 Yes    Controlled Type 2 diabetes mellitus [E11.9] 05/06/2022 Yes     Chronic    Hydronephrosis [N13.30] 05/06/2022 Yes    Anemia [D64.9] 05/06/2022 Yes    Choledocholithiasis [K80.50] 05/06/2022 Yes      Problems Resolved During this Admission:       Discharged Condition: stable    Disposition: Another Health Care Inst*    Follow Up:    Patient Instructions:   No discharge procedures on file.    Significant Diagnostic Studies: Labs:   CMP   Recent Labs   Lab 06/07/22  1558 06/08/22  0612   * 136   K 4.2 4.5    109   CO2 19* 20*   * 190*   BUN 26* 25*   CREATININE 1.8* 1.7*   CALCIUM 9.6 9.7   PROT 7.4 6.8   ALBUMIN 2.1* 2.0*   BILITOT 0.5 0.9   ALKPHOS 141* 322*   AST 14 47*   ALT 13 37   ANIONGAP 9 7*   ESTGFRAFRICA 31.1* 33.3*   EGFRNONAA 27.0* 28.9*   , CBC   Recent Labs   Lab 06/07/22  1558 06/08/22  0612 06/08/22  1402   WBC 29.35* 28.07* 29.19*   HGB 6.9* 6.7* 6.2*   HCT 22.0* 21.8* 19.9*    373 376    and All labs within the past 24 hours have been reviewed    Pending Diagnostic Studies:     Procedure Component Value Units Date/Time    Ferritin [[id number]] Collected: 06/08/22 1531    Order Status: Sent Lab Status: In process Updated: 06/08/22 1532    Specimen: Blood     Iron and TIBC [[id number]] Collected: 06/08/22 1531    Order Status: Sent Lab Status: In process Updated: 06/08/22 1532    Specimen: Blood     Vitamin B12 [[id number]] Collected: 06/08/22 1532    Order Status: Sent Lab Status: In  process Updated: 06/08/22 1532    Specimen: Blood          Medications:  Transfer Medications (for Discharge Readmit only):   Current Facility-Administered Medications   Medication Dose Route Frequency Provider Last Rate Last Admin    0.9%  NaCl infusion (for blood administration)   Intravenous Q24H PRN Alfreda Parada MD        0.9%  NaCl infusion   Intravenous Continuous Alfreda Parada MD 75 mL/hr at 06/08/22 1001 New Bag at 06/08/22 1001    cefTRIAXone (ROCEPHIN) 1 g/50 mL D5W IVPB  1 g Intravenous Q24H Alfreda Parada MD   Stopped at 06/08/22 1031    dextrose 10% bolus 125 mL  12.5 g Intravenous PRN Beau Henry MD        dextrose 10% bolus 250 mL  25 g Intravenous PRN Beau Henry MD        glucagon (human recombinant) injection 1 mg  1 mg Intramuscular PRN Beau Henry MD        glucose chewable tablet 16 g  16 g Oral PRN Beau Henry MD        glucose chewable tablet 24 g  24 g Oral PRN Beau Henry MD        melatonin tablet 6 mg  6 mg Oral Nightly PRN Beau Henry MD        morphine injection 2 mg  2 mg Intravenous Q4H PRN Alfreda Parada MD   2 mg at 06/07/22 2217    sodium chloride 0.9% flush 10 mL  10 mL Intravenous PRN Beau Henry MD           Indwelling Lines/Drains at time of discharge:   Lines/Drains/Airways     Drain  Duration                Nephrostomy 05/10/22 0805 Left 8 Fr. 29 days                Time spent on the discharge of patient:  minutes         Alfreda Parada MD  Department of Hospital Medicine  [facility]

## 2022-06-08 NOTE — ED NOTES
"Spoke with Dr. Parada regarding pt DVT, informed that pt has Lovenox ordered asked if MD would like to administer anything else for pt DVT.  Dr. Parada states, "No just give the pt the lovenox and that should be good."  "

## 2022-06-08 NOTE — ED NOTES
Called for report, Kalpana informed that she is unsure who is receiving pt and will have charge nurse call back

## 2022-06-08 NOTE — PROGRESS NOTES
Pharmacist Renal Dose Adjustment Note    Karoline Justice is a 76 y.o. female being treated with the medication enoxaparin.    Patient Data:    Vital Signs (Most Recent):  Temp: 98.2 °F (36.8 °C) (06/07/22 1410)  Pulse: 90 (06/07/22 1740)  Resp: 16 (06/07/22 1740)  BP: 133/74 (06/07/22 1740)  SpO2: 98 % (06/07/22 1740) Vital Signs (72h Range):  Temp:  [98.2 °F (36.8 °C)]   Pulse:  []   Resp:  [16-18]   BP: (109-139)/(54-74)   SpO2:  [98 %-100 %]      Recent Labs   Lab 06/07/22  1558   CREATININE 1.8*     Serum creatinine: 1.8 mg/dL (H) 06/07/22 1558  Estimated creatinine clearance: 20.1 mL/min (A)    Enoxaparin 50 mg (1 mg/kg) Q12H will be changed to enoxaparin 50 (1 mg/kg) Q24H    Pharmacist's Name: Susan Jaeger

## 2022-06-08 NOTE — SUBJECTIVE & OBJECTIVE
Past Medical History:   Diagnosis Date    Bladder mass 5/6/2022    Choledocholithiasis 5/6/2022    Diabetes mellitus     Pneumonia due to COVID-19 virus 8/6/2021       Past Surgical History:   Procedure Laterality Date    CYSTOSCOPY N/A 5/9/2022    Procedure: CYSTOSCOPY;  Surgeon: Adrianna Gutierrez MD;  Location: [facility] OR [address] [place] [address];  Service: Urology;  Laterality: N/A;    RETROGRADE PYELOGRAPHY Right 5/9/2022    Procedure: PYELOGRAM, RETROGRADE;  Surgeon: Adrianna Gutierrez MD;  Location: [facility] OR [address] [place] [address];  Service: Urology;  Laterality: Right;       Review of patient's allergies indicates:  No Known Allergies    No current facility-administered medications on file prior to encounter.     Current Outpatient Medications on File Prior to Encounter   Medication Sig    acetaminophen (TYLENOL) 500 MG tablet Take 500 mg by mouth every 6 (six) hours as needed for Pain.    amLODIPine (NORVASC) 5 MG tablet Take 1 tablet (5 mg total) by mouth once daily. (Patient not taking: Reported on 5/27/2022)    LEVEMIR FLEXTOUCH U-100 INSULN 100 unit/mL (3 mL) InPn pen Inject 24 Units into the skin every morning.    LIDOcaine (LIDODERM) 5 % Place 1 patch onto the skin once daily. Place patch to back. Leave on for 12 hours and remove for 12 hours.    traMADoL (ULTRAM) 50 mg tablet Take 1 tablet (50 mg total) by mouth every 6 (six) hours as needed for Pain.    [DISCONTINUED] calcium carbonate (TUMS) 200 mg calcium (500 mg) chewable tablet Take 1 tablet by mouth 2 (two) times daily with meals.     Family History       Problem Relation (Age of Onset)    Diabetes Mother, Daughter, Son    Heart attack Father    Kidney cancer Son    Stomach cancer Mother    Thyroid disease Daughter          Tobacco Use    Smoking status: Never Smoker    Smokeless tobacco: Never Used   Substance and Sexual Activity    Alcohol use: No    Drug use: No    Sexual activity: Not on file     Review of Systems   Constitutional:  Positive for activity change and fatigue.  Negative for chills and fever.   HENT:  Negative for nosebleeds, sneezing and sore throat.    Respiratory:  Negative for cough, choking, chest tightness and shortness of breath.    Cardiovascular:  Positive for leg swelling. Negative for chest pain and palpitations.   Gastrointestinal:  Positive for nausea. Negative for abdominal pain, diarrhea and vomiting.   Genitourinary:  Negative for dysuria.   Musculoskeletal:  Positive for back pain.   Skin:  Negative for rash.   Neurological:  Positive for weakness. Negative for seizures, syncope and numbness.   Hematological:  Does not bruise/bleed easily.   Objective:     Vital Signs (Most Recent):  Temp: 98.6 °F (37 °C) (06/08/22 0357)  Pulse: 91 (06/08/22 0357)  Resp: 16 (06/08/22 0357)  BP: (!) 100/53 (06/08/22 0357)  SpO2: 98 % (06/08/22 0357)   Vital Signs (24h Range):  Temp:  [98.2 °F (36.8 °C)-99.7 °F (37.6 °C)] 98.6 °F (37 °C)  Pulse:  [] 91  Resp:  [16-20] 16  SpO2:  [96 %-100 %] 98 %  BP: ()/(44-74) 100/53     Weight: 51.3 kg (113 lb 3.2 oz)  Body mass index is 21.39 kg/m².    Physical Exam  Constitutional:       Appearance: Normal appearance.   HENT:      Nose: Nose normal.      Mouth/Throat:      Mouth: Mucous membranes are moist.   Eyes:      Extraocular Movements: Extraocular movements intact.      Pupils: Pupils are equal, round, and reactive to light.   Cardiovascular:      Rate and Rhythm: Normal rate and regular rhythm.   Pulmonary:      Effort: Pulmonary effort is normal.      Breath sounds: Normal breath sounds.   Abdominal:      General: Bowel sounds are normal. There is no distension.      Palpations: Abdomen is soft.      Tenderness: There is no abdominal tenderness.      Comments: Left nephrostomy tube - urine is clear in bag   Musculoskeletal:         General: Normal range of motion.      Right lower leg: Edema present.      Left lower leg: No edema.   Skin:     General: Skin is warm and dry.   Neurological:      Mental Status: She is  alert and oriented to person, place, and time.   Psychiatric:         Mood and Affect: Mood normal.         CRANIAL NERVES     CN III, IV, VI   Pupils are equal, round, and reactive to light.     Significant Labs: All pertinent labs within the past 24 hours have been reviewed.  Recent Lab Results  (Last 5 results in the past 24 hours)        06/08/22  0612   06/07/22  2151   06/07/22  1602   06/07/22  1558   06/07/22  1507        Albumin 2.0       2.1         Alkaline Phosphatase 322       141         ALT 37       13         Anion Gap 7       9         Appearance, UA         Cloudy       AST 47       14         Bacteria, UA         Negative       Baso # 0.11       CANCELED  Comment: Result canceled by the ancillary.         Basophil % 0.4       0.0         Bilirubin (UA)         Negative       BILIRUBIN TOTAL 0.9  Comment: For infants and newborns, interpretation of results should be based  on gestational age, weight and in agreement with clinical  observations.    Premature Infant recommended reference ranges:  Up to 24 hours.............<8.0 mg/dL  Up to 48 hours............<12.0 mg/dL  3-5 days..................<15.0 mg/dL  6-29 days.................<15.0 mg/dL    For patients on Eltrombopag therapy, use of Dimension [place] TBIL is   not   recommended.         0.5  Comment: For infants and newborns, interpretation of results should be based  on gestational age, weight and in agreement with clinical  observations.    Premature Infant recommended reference ranges:  Up to 24 hours.............<8.0 mg/dL  Up to 48 hours............<12.0 mg/dL  3-5 days..................<15.0 mg/dL  6-29 days.................<15.0 mg/dL    For patients on Eltrombopag therapy, use of Dimension [place] TBIL is   not   recommended.           Blood Culture, Routine       No Growth to date  [P]         BUN 25       26         Calcium 9.7       9.6         Chloride 109       104         CO2 20       19         Color, UA         Yellow        Creatinine 1.7       1.8         Differential Method Automated       Manual  Comment: CORRECTED RESULT; previously reported as Automated on 06/07/2022 at   16:40.    [C]         eGFR if  33.3       31.1         eGFR if non  28.9  Comment: Calculation used to obtain the estimated glomerular filtration  rate (eGFR) is the CKD-EPI equation.          27.0  Comment: Calculation used to obtain the estimated glomerular filtration  rate (eGFR) is the CKD-EPI equation.            Eos # 0.4       CANCELED  Comment: Result canceled by the ancillary.         Eosinophil % 1.6       2.0         Glucose 190       280         Glucose, UA         2+       Gran # (ANC) 24.7               Gran % 88.0       92.0         Hematocrit 21.8       22.0         Hemoglobin 6.7       6.9         Hyaline Casts, UA         1.9       Immature Grans (Abs) 0.33  Comment: Mild elevation in immature granulocytes is non specific and   can be seen in a variety of conditions including stress response,   acute inflammation, trauma and pregnancy. Correlation with other   laboratory and clinical findings is essential.         CANCELED  Comment: Mild elevation in immature granulocytes is non specific and   can be seen in a variety of conditions including stress response,   acute inflammation, trauma and pregnancy. Correlation with other   laboratory and clinical findings is essential.    Result canceled by the ancillary.           Immature Granulocytes 1.2       CANCELED  Comment: Result canceled by the ancillary.         Ketones, UA         Negative       Lactate, Jann     1.7           Leukocytes, UA         3+       Lymph # 1.5       CANCELED  Comment: Result canceled by the ancillary.         Lymph % 5.2       5.0         Magnesium 2.0               MCH 26.0       26.4         MCHC 30.7       31.4         MCV 85       84         Microscopic Comment         SEE COMMENT  Comment: Other formed elements not mentioned in the  report are not   present in the microscopic examination.          Mono # 1.0       CANCELED  Comment: Result canceled by the ancillary.         Mono % 3.6       1.0         MPV 9.3       9.2         NITRITE UA         Negative       nRBC 0       0         Occult Blood UA         2+       pH, UA         6.0       Platelets 373       380         Potassium 4.5       4.2         PROTEIN TOTAL 6.8       7.4         Protein, UA         2+  Comment: Recommend a 24 hour urine protein or a urine   protein/creatinine ratio if globulin induced proteinuria is  clinically suspected.          Acceptable   Yes             RBC 2.58       2.61         RBC, UA         10       RDW 15.7       15.7         SARS-CoV-2 RNA, Amplification, Qual   Negative             Sodium 136       132         Specific [place], UA         1.010       Specimen UA         Urine, Clean Catch       Squam Epithel, UA         1       UROBILINOGEN UA         Negative       WBC, UA         >100       WBC 28.07       29.35                                 [P] - Preliminary Result [C] - Corrected Result              Significant Imaging: I have reviewed all pertinent imaging results/findings within the past 24 hours.

## 2022-06-08 NOTE — NURSING
Pt arrived to [facility] via stretcher per MYRANDA Marino. Pt is AAO x 4 with c/o pain to her lower back and RLE. Pt rates pain 8/10 on the number scale. Notified Dr. Parada, new orders Morphine 2mg IVP Q 4 hrs. Initial assessment and vitals obtained see flow sheet. IVF's infusing per MD orders without adverse reactions. Pt denies needs at this time, will continue to monitor.

## 2022-06-09 ENCOUNTER — PATIENT MESSAGE (OUTPATIENT)
Dept: HEMATOLOGY/ONCOLOGY | Facility: CLINIC | Age: 77
End: 2022-06-09
Payer: MEDICARE

## 2022-06-09 LAB
ALBUMIN SERPL BCP-MCNC: 1.9 G/DL (ref 3.5–5.2)
ALP SERPL-CCNC: 265 U/L (ref 55–135)
ALT SERPL W/O P-5'-P-CCNC: 18 U/L (ref 10–44)
ANION GAP SERPL CALC-SCNC: 8 MMOL/L (ref 8–16)
APTT BLDCRRT: 32.9 SEC (ref 21–32)
APTT BLDCRRT: 39 SEC (ref 21–32)
AST SERPL-CCNC: 13 U/L (ref 10–40)
BACTERIA #/AREA URNS AUTO: ABNORMAL /HPF
BASOPHILS # BLD AUTO: 0.1 K/UL (ref 0–0.2)
BASOPHILS # BLD AUTO: 0.1 K/UL (ref 0–0.2)
BASOPHILS NFR BLD: 0.4 % (ref 0–1.9)
BASOPHILS NFR BLD: 0.4 % (ref 0–1.9)
BILIRUB SERPL-MCNC: 0.7 MG/DL (ref 0.1–1)
BILIRUB UR QL STRIP: NEGATIVE
BLD PROD TYP BPU: NORMAL
BLD PROD TYP BPU: NORMAL
BLOOD UNIT EXPIRATION DATE: NORMAL
BLOOD UNIT EXPIRATION DATE: NORMAL
BLOOD UNIT TYPE CODE: 5100
BLOOD UNIT TYPE CODE: 5100
BLOOD UNIT TYPE: NORMAL
BLOOD UNIT TYPE: NORMAL
BUN SERPL-MCNC: 21 MG/DL (ref 8–23)
CALCIUM SERPL-MCNC: 9.9 MG/DL (ref 8.7–10.5)
CHLORIDE SERPL-SCNC: 110 MMOL/L (ref 95–110)
CLARITY UR REFRACT.AUTO: ABNORMAL
CO2 SERPL-SCNC: 17 MMOL/L (ref 23–29)
CODING SYSTEM: NORMAL
CODING SYSTEM: NORMAL
COLOR UR AUTO: YELLOW
CREAT SERPL-MCNC: 1.5 MG/DL (ref 0.5–1.4)
DIFFERENTIAL METHOD: ABNORMAL
DIFFERENTIAL METHOD: ABNORMAL
DISPENSE STATUS: NORMAL
DISPENSE STATUS: NORMAL
EOSINOPHIL # BLD AUTO: 0.3 K/UL (ref 0–0.5)
EOSINOPHIL # BLD AUTO: 0.3 K/UL (ref 0–0.5)
EOSINOPHIL NFR BLD: 1.2 % (ref 0–8)
EOSINOPHIL NFR BLD: 1.2 % (ref 0–8)
ERYTHROCYTE [DISTWIDTH] IN BLOOD BY AUTOMATED COUNT: 15.4 % (ref 11.5–14.5)
ERYTHROCYTE [DISTWIDTH] IN BLOOD BY AUTOMATED COUNT: 15.4 % (ref 11.5–14.5)
EST. GFR  (AFRICAN AMERICAN): 38.7 ML/MIN/1.73 M^2
EST. GFR  (NON AFRICAN AMERICAN): 33.6 ML/MIN/1.73 M^2
GLUCOSE SERPL-MCNC: 211 MG/DL (ref 70–110)
GLUCOSE UR QL STRIP: NEGATIVE
HCT VFR BLD AUTO: 25.6 % (ref 37–48.5)
HCT VFR BLD AUTO: 25.6 % (ref 37–48.5)
HGB BLD-MCNC: 7.9 G/DL (ref 12–16)
HGB BLD-MCNC: 7.9 G/DL (ref 12–16)
HGB UR QL STRIP: ABNORMAL
HYALINE CASTS UR QL AUTO: 0 /LPF
IMM GRANULOCYTES # BLD AUTO: 0.26 K/UL (ref 0–0.04)
IMM GRANULOCYTES # BLD AUTO: 0.26 K/UL (ref 0–0.04)
IMM GRANULOCYTES NFR BLD AUTO: 1 % (ref 0–0.5)
IMM GRANULOCYTES NFR BLD AUTO: 1 % (ref 0–0.5)
KETONES UR QL STRIP: NEGATIVE
LEUKOCYTE ESTERASE UR QL STRIP: ABNORMAL
LYMPHOCYTES # BLD AUTO: 1.4 K/UL (ref 1–4.8)
LYMPHOCYTES # BLD AUTO: 1.4 K/UL (ref 1–4.8)
LYMPHOCYTES NFR BLD: 5.5 % (ref 18–48)
LYMPHOCYTES NFR BLD: 5.5 % (ref 18–48)
MAGNESIUM SERPL-MCNC: 1.6 MG/DL (ref 1.6–2.6)
MCH RBC QN AUTO: 27 PG (ref 27–31)
MCH RBC QN AUTO: 27 PG (ref 27–31)
MCHC RBC AUTO-ENTMCNC: 30.9 G/DL (ref 32–36)
MCHC RBC AUTO-ENTMCNC: 30.9 G/DL (ref 32–36)
MCV RBC AUTO: 87 FL (ref 82–98)
MCV RBC AUTO: 87 FL (ref 82–98)
MICROSCOPIC COMMENT: ABNORMAL
MONOCYTES # BLD AUTO: 1.1 K/UL (ref 0.3–1)
MONOCYTES # BLD AUTO: 1.1 K/UL (ref 0.3–1)
MONOCYTES NFR BLD: 4.1 % (ref 4–15)
MONOCYTES NFR BLD: 4.1 % (ref 4–15)
NEUTROPHILS # BLD AUTO: 23 K/UL (ref 1.8–7.7)
NEUTROPHILS # BLD AUTO: 23 K/UL (ref 1.8–7.7)
NEUTROPHILS NFR BLD: 87.8 % (ref 38–73)
NEUTROPHILS NFR BLD: 87.8 % (ref 38–73)
NITRITE UR QL STRIP: NEGATIVE
NRBC BLD-RTO: 0 /100 WBC
NRBC BLD-RTO: 0 /100 WBC
NUM UNITS TRANS PACKED RBC: NORMAL
NUM UNITS TRANS PACKED RBC: NORMAL
PH UR STRIP: 5 [PH] (ref 5–8)
PLATELET # BLD AUTO: 429 K/UL (ref 150–450)
PLATELET # BLD AUTO: 429 K/UL (ref 150–450)
PMV BLD AUTO: 9.7 FL (ref 9.2–12.9)
PMV BLD AUTO: 9.7 FL (ref 9.2–12.9)
POCT GLUCOSE: 148 MG/DL (ref 70–110)
POCT GLUCOSE: 173 MG/DL (ref 70–110)
POCT GLUCOSE: 238 MG/DL (ref 70–110)
POCT GLUCOSE: 249 MG/DL (ref 70–110)
POTASSIUM SERPL-SCNC: 4.5 MMOL/L (ref 3.5–5.1)
PROT SERPL-MCNC: 6.4 G/DL (ref 6–8.4)
PROT UR QL STRIP: ABNORMAL
RBC # BLD AUTO: 2.93 M/UL (ref 4–5.4)
RBC # BLD AUTO: 2.93 M/UL (ref 4–5.4)
RBC #/AREA URNS AUTO: 38 /HPF (ref 0–4)
SODIUM SERPL-SCNC: 135 MMOL/L (ref 136–145)
SP GR UR STRIP: 1.01 (ref 1–1.03)
SQUAMOUS #/AREA URNS AUTO: 1 /HPF
URN SPEC COLLECT METH UR: ABNORMAL
WBC # BLD AUTO: 26.15 K/UL (ref 3.9–12.7)
WBC # BLD AUTO: 26.15 K/UL (ref 3.9–12.7)
WBC #/AREA URNS AUTO: >100 /HPF (ref 0–5)
WBC CLUMPS UR QL AUTO: ABNORMAL

## 2022-06-09 PROCEDURE — 99233 SBSQ HOSP IP/OBS HIGH 50: CPT | Mod: ,,, | Performed by: STUDENT IN AN ORGANIZED HEALTH CARE EDUCATION/TRAINING PROGRAM

## 2022-06-09 PROCEDURE — 63600175 PHARM REV CODE 636 W HCPCS: Performed by: STUDENT IN AN ORGANIZED HEALTH CARE EDUCATION/TRAINING PROGRAM

## 2022-06-09 PROCEDURE — 63600175 PHARM REV CODE 636 W HCPCS: Performed by: INTERNAL MEDICINE

## 2022-06-09 PROCEDURE — 85730 THROMBOPLASTIN TIME PARTIAL: CPT | Performed by: INTERNAL MEDICINE

## 2022-06-09 PROCEDURE — 85025 COMPLETE CBC W/AUTO DIFF WBC: CPT | Performed by: INTERNAL MEDICINE

## 2022-06-09 PROCEDURE — C9399 UNCLASSIFIED DRUGS OR BIOLOG: HCPCS | Performed by: INTERNAL MEDICINE

## 2022-06-09 PROCEDURE — 99233 PR SUBSEQUENT HOSPITAL CARE,LEVL III: ICD-10-PCS | Mod: ,,, | Performed by: STUDENT IN AN ORGANIZED HEALTH CARE EDUCATION/TRAINING PROGRAM

## 2022-06-09 PROCEDURE — 63600175 PHARM REV CODE 636 W HCPCS: Performed by: RADIOLOGY

## 2022-06-09 PROCEDURE — 25000003 PHARM REV CODE 250: Performed by: INTERNAL MEDICINE

## 2022-06-09 PROCEDURE — 85730 THROMBOPLASTIN TIME PARTIAL: CPT | Mod: 91 | Performed by: STUDENT IN AN ORGANIZED HEALTH CARE EDUCATION/TRAINING PROGRAM

## 2022-06-09 PROCEDURE — 83735 ASSAY OF MAGNESIUM: CPT | Performed by: INTERNAL MEDICINE

## 2022-06-09 PROCEDURE — 25500020 PHARM REV CODE 255: Performed by: STUDENT IN AN ORGANIZED HEALTH CARE EDUCATION/TRAINING PROGRAM

## 2022-06-09 PROCEDURE — 80053 COMPREHEN METABOLIC PANEL: CPT | Performed by: INTERNAL MEDICINE

## 2022-06-09 PROCEDURE — 36415 COLL VENOUS BLD VENIPUNCTURE: CPT | Performed by: INTERNAL MEDICINE

## 2022-06-09 PROCEDURE — 11000001 HC ACUTE MED/SURG PRIVATE ROOM

## 2022-06-09 RX ORDER — FENTANYL CITRATE 50 UG/ML
INJECTION, SOLUTION INTRAMUSCULAR; INTRAVENOUS CODE/TRAUMA/SEDATION MEDICATION
Status: COMPLETED | OUTPATIENT
Start: 2022-06-09 | End: 2022-06-09

## 2022-06-09 RX ORDER — MIDAZOLAM HYDROCHLORIDE 1 MG/ML
INJECTION INTRAMUSCULAR; INTRAVENOUS CODE/TRAUMA/SEDATION MEDICATION
Status: COMPLETED | OUTPATIENT
Start: 2022-06-09 | End: 2022-06-09

## 2022-06-09 RX ORDER — CEFTRIAXONE 2 G/1
INJECTION, POWDER, FOR SOLUTION INTRAMUSCULAR; INTRAVENOUS CODE/TRAUMA/SEDATION MEDICATION
Status: COMPLETED | OUTPATIENT
Start: 2022-06-09 | End: 2022-06-09

## 2022-06-09 RX ORDER — MUPIROCIN 20 MG/G
OINTMENT TOPICAL 2 TIMES DAILY
Status: DISPENSED | OUTPATIENT
Start: 2022-06-09 | End: 2022-06-14

## 2022-06-09 RX ADMIN — IOHEXOL 10 ML: 300 INJECTION, SOLUTION INTRAVENOUS at 10:06

## 2022-06-09 RX ADMIN — MIDAZOLAM HYDROCHLORIDE 1 MG: 1 INJECTION, SOLUTION INTRAMUSCULAR; INTRAVENOUS at 10:06

## 2022-06-09 RX ADMIN — INSULIN DETEMIR 10 UNITS: 100 INJECTION, SOLUTION SUBCUTANEOUS at 09:06

## 2022-06-09 RX ADMIN — FENTANYL CITRATE 50 MCG: 0.05 INJECTION, SOLUTION INTRAMUSCULAR; INTRAVENOUS at 10:06

## 2022-06-09 RX ADMIN — HEPARIN SODIUM 18 UNITS/KG/HR: 5000 INJECTION INTRAVENOUS; SUBCUTANEOUS at 12:06

## 2022-06-09 RX ADMIN — INSULIN ASPART 2 UNITS: 100 INJECTION, SOLUTION INTRAVENOUS; SUBCUTANEOUS at 09:06

## 2022-06-09 RX ADMIN — CEFTRIAXONE 1 G: 1 INJECTION, SOLUTION INTRAVENOUS at 09:06

## 2022-06-09 RX ADMIN — HEPARIN SODIUM 20 UNITS/KG/HR: 5000 INJECTION INTRAVENOUS; SUBCUTANEOUS at 10:06

## 2022-06-09 RX ADMIN — HYDROCODONE BITARTRATE AND ACETAMINOPHEN 1 TABLET: 5; 325 TABLET ORAL at 05:06

## 2022-06-09 RX ADMIN — CEFTRIAXONE SODIUM 1 G: 2 INJECTION, POWDER, FOR SOLUTION INTRAMUSCULAR; INTRAVENOUS at 10:06

## 2022-06-09 NOTE — PLAN OF CARE
[facility] - Med Surg  Initial Discharge Assessment       Primary Care Provider: Dee Dee Oconnor MD    Admission Diagnosis: Nephrostomy status [Z93.6]  Urinary obstruction [N13.9]  Acute deep vein thrombosis (DVT) of proximal vein of right lower extremity [I82.4Y1]  Anemia, unspecified type [D64.9]    Admission Date: 6/8/2022  Expected Discharge Date:     Discharge Barriers Identified: None    Payor: HU[employer] MANAGED MEDICARE / Plan: HU[employer]NA SNP (SPECIAL NEEDS PLAN) / Product Type: Medicare Advantage /     Extended Emergency Contact Information  Primary Emergency Contact: REID SWENSON  Mobile Phone: [phone]  Relation: Son  Preferred language: English   needed? No  Secondary Emergency Contact: Kenya Ford   [place]  Mobile Phone: [phone]  Relation: Daughter    Discharge Plan A: Home Health  Discharge Plan B: Home with family      [facility] [address] [place] [address]  [facility] [address] [facility] [address]  Phone: [phone] Fax: [phone]      Initial Assessment (most recent)     Adult Discharge Assessment - 06/09/22 1435        Discharge Assessment    Assessment Type Discharge Planning Assessment     Confirmed/corrected address, phone number and insurance Yes     Confirmed Demographics Correct on Facesheet     Source of Information patient     When was your last doctors appointment? 06/02/22     Does patient/caregiver understand observation status Yes     Communicated GET with patient/caregiver Yes     Reason For Admission Low back pain     Lives With spouse;child(bharathi), adult     Facility Arrived From: Home     Do you expect to return to your current living situation? Yes     Do you have help at home or someone to help you manage your care at home? Yes     Who are your caregiver(s) and their phone number(s)? Reid Swenson, son, spouse     Prior to hospitilization cognitive status: Alert/Oriented;No Deficits     Current cognitive status: No  Deficits;Alert/Oriented     Equipment Currently Used at Home walker, rolling     Readmission within 30 days? No     Patient currently being followed by outpatient case management? No     Do you currently have service(s) that help you manage your care at home? Yes     Name and Contact number of agency [facility]     Is the pt/caregiver preference to resume services with current agency Yes     Do you take prescription medications? Yes     Do you have prescription coverage? Yes     Do you have any problems affording any of your prescribed medications? No     Is the patient taking medications as prescribed? yes     Who is going to help you get home at discharge? Son/spouse     How do you get to doctors appointments? family or friend will provide     Are you on dialysis? No     Do you take coumadin? No     Discharge Plan A Home Health     Discharge Plan B Home with family     DME Needed Upon Discharge  none     Discharge Plan discussed with: Patient     Discharge Barriers Identified None

## 2022-06-09 NOTE — PLAN OF CARE
Pt downstairs at procedure when Sw attempted dc assessment. Sw consulted from ER to facilitate transfer to Urology services for Pt. Sw will inform IM team that IM team and Urology services must initiate and complete transfer request with in hospital Pt, Jose to follow.

## 2022-06-09 NOTE — SUBJECTIVE & OBJECTIVE
Past Medical History:   Diagnosis Date    Bladder cancer 05/2022    Bladder mass 05/06/2022    Choledocholithiasis 05/06/2022    Diabetes mellitus     Pneumonia due to COVID-19 virus 08/06/2021       Past Surgical History:   Procedure Laterality Date    CYSTOSCOPY N/A 05/09/2022    Procedure: CYSTOSCOPY;  Surgeon: Adrianna Gutierrez MD;  Location: [facility] OR [address] [place] [address];  Service: Urology;  Laterality: N/A;    NEPHROSTOMY Left 05/2022    RETROGRADE PYELOGRAPHY Right 05/09/2022    Procedure: PYELOGRAM, RETROGRADE;  Surgeon: Adrianna Gutierrez MD;  Location: [facility] OR [address] [place] [address];  Service: Urology;  Laterality: Right;       Review of patient's allergies indicates:  No Known Allergies    Current Facility-Administered Medications on File Prior to Encounter   Medication    [DISCONTINUED] 0.9%  NaCl infusion (for blood administration)    [DISCONTINUED] 0.9%  NaCl infusion    [DISCONTINUED] apixaban tablet 10 mg    [DISCONTINUED] apixaban tablet 5 mg    [DISCONTINUED] cefTRIAXone (ROCEPHIN) 1 g/50 mL D5W IVPB    [DISCONTINUED] dextrose 10% bolus 125 mL    [DISCONTINUED] dextrose 10% bolus 250 mL    [DISCONTINUED] enoxaparin injection 50 mg    [DISCONTINUED] glucagon (human recombinant) injection 1 mg    [DISCONTINUED] glucose chewable tablet 16 g    [DISCONTINUED] glucose chewable tablet 24 g    [DISCONTINUED] lactated ringers infusion    [DISCONTINUED] LIDOcaine HCL 2% jelly    [DISCONTINUED] melatonin tablet 6 mg    [DISCONTINUED] morphine injection 2 mg    [DISCONTINUED] sodium chloride 0.9% flush 10 mL     Current Outpatient Medications on File Prior to Encounter   Medication Sig    acetaminophen (TYLENOL) 500 MG tablet Take 500 mg by mouth every 6 (six) hours as needed for Pain.    amLODIPine (NORVASC) 5 MG tablet Take 1 tablet (5 mg total) by mouth once daily. (Patient not taking: Reported on 5/27/2022)    LEVEMIR FLEXTOUCH U-100 INSULN 100 unit/mL (3 mL) InPn pen Inject 24 Units into the skin every morning.    LIDOcaine  (LIDODERM) 5 % Place 1 patch onto the skin once daily. Place patch to back. Leave on for 12 hours and remove for 12 hours.    traMADoL (ULTRAM) 50 mg tablet Take 1 tablet (50 mg total) by mouth every 6 (six) hours as needed for Pain.    [DISCONTINUED] calcium carbonate (TUMS) 200 mg calcium (500 mg) chewable tablet Take 1 tablet by mouth 2 (two) times daily with meals.     Family History       Problem Relation (Age of Onset)    Diabetes Mother, Daughter, Son    Heart attack Father    Kidney cancer Son    Stomach cancer Mother    Thyroid disease Daughter          Tobacco Use    Smoking status: Never Smoker    Smokeless tobacco: Never Used   Substance and Sexual Activity    Alcohol use: No    Drug use: No    Sexual activity: Not on file     Review of Systems   Constitutional:  Negative for activity change, appetite change, chills and fever.   HENT:  Negative for congestion, hearing loss and rhinorrhea.    Eyes:  Negative for discharge, itching and visual disturbance.   Respiratory:  Negative for apnea, cough and shortness of breath.    Cardiovascular:  Negative for chest pain, palpitations and leg swelling.   Gastrointestinal:  Negative for abdominal distention, abdominal pain, constipation, diarrhea, nausea and vomiting.   Endocrine: Negative for cold intolerance and heat intolerance.   Genitourinary:  Positive for dysuria. Negative for hematuria.   Musculoskeletal:  Positive for back pain. Negative for neck pain and neck stiffness.   Skin:  Negative for rash and wound.   Neurological:  Negative for dizziness, seizures, light-headedness and headaches.   Psychiatric/Behavioral:  Negative for agitation, confusion and suicidal ideas.    Objective:     Vital Signs (Most Recent):  Temp: 99.5 °F (37.5 °C) (06/08/22 2014)  Pulse: 104 (06/08/22 2014)  Resp: 16 (06/08/22 2014)  BP: (!) 120/48 (06/08/22 2014)  SpO2: 99 % (06/08/22 2014)   Vital Signs (24h Range):  Temp:  [98.6 °F (37 °C)-100.1 °F (37.8 °C)] 99.5 °F (37.5  °C)  Pulse:  [] 104  Resp:  [16-20] 16  SpO2:  [96 %-100 %] 99 %  BP: ()/(44-65) 120/48        There is no height or weight on file to calculate BMI.    Physical Exam  Vitals reviewed.   Constitutional:       General: She is not in acute distress.     Appearance: She is well-developed.   HENT:      Head: Normocephalic and atraumatic.      Nose: Nose normal. No rhinorrhea.      Mouth/Throat:      Mouth: Mucous membranes are moist.   Eyes:      General: No scleral icterus.        Right eye: No discharge.         Left eye: No discharge.      Pupils: Pupils are equal, round, and reactive to light.   Neck:      Vascular: No JVD.   Cardiovascular:      Rate and Rhythm: Normal rate and regular rhythm.      Heart sounds: Normal heart sounds. No murmur heard.    No friction rub.   Pulmonary:      Effort: Pulmonary effort is normal. No respiratory distress.      Breath sounds: Normal breath sounds. No wheezing.   Abdominal:      General: Bowel sounds are normal. There is no distension.      Palpations: Abdomen is soft.      Tenderness: There is no abdominal tenderness.   Musculoskeletal:         General: No deformity. Normal range of motion.      Cervical back: Normal range of motion and neck supple.   Skin:     General: Skin is warm and dry.   Neurological:      General: No focal deficit present.      Mental Status: She is alert and oriented to person, place, and time.   Psychiatric:         Mood and Affect: Mood normal.         Behavior: Behavior normal.         CRANIAL NERVES     CN III, IV, VI   Pupils are equal, round, and reactive to light.     Significant Labs: All pertinent labs within the past 24 hours have been reviewed.  CBC:   Recent Labs   Lab 06/07/22  1558 06/08/22  0612 06/08/22  1402   WBC 29.35* 28.07* 29.19*   HGB 6.9* 6.7* 6.2*   HCT 22.0* 21.8* 19.9*    373 376     CMP:   Recent Labs   Lab 06/07/22  1558 06/08/22  0612   * 136   K 4.2 4.5    109   CO2 19* 20*   * 190*    BUN 26* 25*   CREATININE 1.8* 1.7*   CALCIUM 9.6 9.7   PROT 7.4 6.8   ALBUMIN 2.1* 2.0*   BILITOT 0.5 0.9   ALKPHOS 141* 322*   AST 14 47*   ALT 13 37   ANIONGAP 9 7*   EGFRNONAA 27.0* 28.9*       Significant Imaging: I have reviewed all pertinent imaging results/findings within the past 24 hours.

## 2022-06-09 NOTE — NURSING
Patient off of the floor for tube placement.  1145: Patient back to the room. No complaints noted. Nephrostome tube to the (R) back. Dressing clean, dry, and intact. Heparin restarted, Dr. Simpson notified monitoring ongoing  1800: Patient c/o pain to the perineum area and nausea. Patient stated it is irritated and was before the petersen was inserted. Patient's daughter here and did not want the patient to have the cream. Said she needs something oral. Patient medicated for the pain and Dr. Shanks notified of the other.

## 2022-06-09 NOTE — SUBJECTIVE & OBJECTIVE
Past Medical History:   Diagnosis Date    Bladder cancer 05/2022    Bladder mass 05/06/2022    Choledocholithiasis 05/06/2022    Diabetes mellitus     Pneumonia due to COVID-19 virus 08/06/2021       Past Surgical History:   Procedure Laterality Date    CYSTOSCOPY N/A 05/09/2022    Procedure: CYSTOSCOPY;  Surgeon: Adrianna Gutierrez MD;  Location: [facility] OR [address] [place] [address];  Service: Urology;  Laterality: N/A;    NEPHROSTOMY Left 05/2022    RETROGRADE PYELOGRAPHY Right 05/09/2022    Procedure: PYELOGRAM, RETROGRADE;  Surgeon: Adrianna Gutierrez MD;  Location: [facility] OR [address] [place] [address];  Service: Urology;  Laterality: Right;       Review of patient's allergies indicates:  No Known Allergies    Family History       Problem Relation (Age of Onset)    Diabetes Mother, Daughter, Son    Heart attack Father    Kidney cancer Son    Stomach cancer Mother    Thyroid disease Daughter            Tobacco Use    Smoking status: Never Smoker    Smokeless tobacco: Never Used   Substance and Sexual Activity    Alcohol use: No    Drug use: No    Sexual activity: Not on file       Review of Systems   Constitutional:  Negative for activity change, appetite change, chills, fever and unexpected weight change.   Respiratory:  Negative for shortness of breath.    Cardiovascular:  Negative for chest pain.   Gastrointestinal:  Positive for abdominal pain and nausea. Negative for constipation, diarrhea and vomiting.   Genitourinary:  Positive for dysuria and flank pain. Negative for difficulty urinating and hematuria.   Musculoskeletal:  Negative for back pain.   Skin:  Negative for wound.   Neurological:  Negative for headaches.   Psychiatric/Behavioral:  Negative for confusion.      Objective:     Temp:  [98.6 °F (37 °C)-100.1 °F (37.8 °C)] 99.5 °F (37.5 °C)  Pulse:  [] 95  Resp:  [16-20] 18  SpO2:  [97 %-100 %] 100 %  BP: ()/(44-65) 140/63     Body mass index is 22.3 kg/m².           Drains       Drain  Duration                  Nephrostomy 05/10/22  0805 Left 8 Fr. 29 days                    Physical Exam  Constitutional:       General: She is not in acute distress.     Appearance: She is not diaphoretic.   HENT:      Head: Normocephalic and atraumatic.      Nose: Nose normal.   Eyes:      Conjunctiva/sclera: Conjunctivae normal.   Cardiovascular:      Rate and Rhythm: Normal rate.   Pulmonary:      Effort: Pulmonary effort is normal. No respiratory distress.   Abdominal:      General: There is no distension.      Comments: + suprapubic tenderness   Genitourinary:     Comments: Left PCN in place draining clear yellow urine, draining well. Left PCN insertion site clean with no purulent drainage or signs of infection. There is right CVA tenderness.   Musculoskeletal:         General: Normal range of motion.      Cervical back: Normal range of motion.   Skin:     General: Skin is dry.   Neurological:      Mental Status: She is alert.   Psychiatric:         Behavior: Behavior normal.         Thought Content: Thought content normal.       Significant Labs:    BMP:  Recent Labs   Lab 06/07/22  1558 06/08/22  0612   * 136   K 4.2 4.5    109   CO2 19* 20*   BUN 26* 25*   CREATININE 1.8* 1.7*   CALCIUM 9.6 9.7       CBC:  Recent Labs   Lab 06/08/22  0612 06/08/22  1402 06/08/22  2150   WBC 28.07* 29.19* 25.30*   HGB 6.7* 6.2* 7.6*   HCT 21.8* 19.9* 24.4*    376 399       All pertinent labs results from the past 24 hours have been reviewed.    Significant Imaging:  All pertinent imaging results/findings from the past 24 hours have been reviewed.

## 2022-06-09 NOTE — ASSESSMENT & PLAN NOTE
Noted at outside hospital  On heparin drip 2/2 possibility of surgery in the AM  Can likely switch to oral once clear from surgery standpoint

## 2022-06-09 NOTE — PLAN OF CARE
Pt arrived to Interventional Radiology room 190 via stretcher for R neph tube placement.  Plan of care reviewed with patient, patient verbalized understanding.  Name verified using two identifiers.  Allergies verified.  Labs, orders and consent reviewed on chart.  Pt oriented to unit and staff.  See flow sheet for documentation, monitoring and medication administration. Will continue to monitor.

## 2022-06-09 NOTE — H&P
"[facility] - Emergency Dept  [facility] Medicine  History & Physical    Patient Name: Karoline Justice  MRN: [id number]  Patient Class: IP- Inpatient  Admission Date: 6/8/2022  Attending Physician: Mila Henry MD   Primary Care Provider: Dee Dee Oconnor MD    Patient information was obtained from patient, past medical records and ER records.     Subjective:     Principal Problem:Hydronephrosis    Chief Complaint:   Chief Complaint   Patient presents with    Transfer     Transfer from [place]. Dx with THOMAS. Hx of bladder cancer. Needs urology consult.         HPI: 75 yo female with bladder cancer w/ urinary obstruction s/p right sided stenting and left nephrostomy tube placement, HTN, anemia, DM presenting for back pain, found to have right hydronephrosis. Also with UTI symptoms, found to have UTI, started on rocephin. Noted also to have anemia, given pRBCs as well. She was transferred to [facility] for further eval by urology.     Per  note,  "76-year-old female with a history of bladder cancer with urinary obstruction (right-sided ureteral stenting and left-sided nephrostomy tube placement), hypercalcemia, hypertension, urinary tract infection, anemia, diabetes, choledocholithiasis, and prior COVID (July 2021) admitted to [facility] on June 7 with back pain.  She reported burning with urination and right lower extremity swelling.  She had no chest pain or dyspnea.  She was found to have leukocytosis.  Lower extremity ultrasound showed right lower extremity DVT.  She was started on apixaban, Rocephin, and IV fluid.  Packed red blood cells were ordered for her anemia.  Referring provider noted patient has mild edema in her right leg but it is intact from a neurovascular standpoint.  Her urine output and her ostomy output grossly appeared fairly normal today.  She remains hemodynamically stable.  Case discussed with Urology and Interventional Radiology at [facility].  Recommendation is to move patient " urgently to [facility] for further evaluation and treatment.  Will transfer to Hospital Medicine service at [facility].  On arrival, will contact Urology and Interventional Radiology.  I spoke with the referring provider, and they will hold apixaban and have the patient in NPO status for now.     She had transurethral section of bladder tumor with right ureteral stent placement on May 9. She had left nephrostomy tube placement May 10. Pathology showed poorly differentiated carcinoma with areas of squamous differentiation, osteoclast like GI cells, and classic high-grade papillary     June 8:  White blood cells 28.07, hemoglobin 6.7, hematocrit 21.8, platelets 373, sodium 136, potassium 4.5, chloride 109, CO2 20, BUN 25, creatinine 1.7, glucose 190, total bilirubin 0.9, AST 47, ALT 37, magnesium 2     Repeat CBC at white blood cells 29.19, hemoglobin 6.2, hematocrit 19.9, platelets 376     June 7:  Blood cultures no growth to date  COVID negative, white blood cells 29.35, hemoglobin 6.9, hematocrit 22, platelets 380, sodium 132, potassium 4.2, chloride 104, CO2 19, BUN 26, creatinine 1.8  Urinalysis with 2+ protein, 2+ glucose, 2+ blood, 3+ leukocytes, 10 RBC, greater than 100 WBC  -CT renal stone protocol had right ureteral stent in place with chronic stable moderate right hydronephrosis.  Left-sided percutaneous nephrostomy tube in place with no hydronephrosis.  Large bladder mass consistent with history of malignancy.  Enlarged bilateral pelvic lymph node consistent with metastatic disease.  No acute finding.  -Right lower extremity ultrasound showed DVT extending from the right common femoral vein to the calf veins.     [place] 3:  PET CT scan had irregular wall thickening with associated hypermetabolic activity at the left posterior aspect of the urinary bladder compatible with known primary neoplasm.  Enlarged hypermetabolic lymph nodes along the bilateral pelvic sidewalls suggesting megan metastatic  "disease.  Multiple cell by 5 mm pulmonary nodules which are below the resolution of PET but new compared with chest CT from May of 2022. "      Past Medical History:   Diagnosis Date    Bladder cancer 05/2022    Bladder mass 05/06/2022    Choledocholithiasis 05/06/2022    Diabetes mellitus     Pneumonia due to COVID-19 virus 08/06/2021       Past Surgical History:   Procedure Laterality Date    CYSTOSCOPY N/A 05/09/2022    Procedure: CYSTOSCOPY;  Surgeon: Adrianna Gutierrez MD;  Location: [facility] OR [address] [place] [address];  Service: Urology;  Laterality: N/A;    NEPHROSTOMY Left 05/2022    RETROGRADE PYELOGRAPHY Right 05/09/2022    Procedure: PYELOGRAM, RETROGRADE;  Surgeon: Adrianna Gutierrez MD;  Location: [facility] OR [address] [place] [address];  Service: Urology;  Laterality: Right;       Review of patient's allergies indicates:  No Known Allergies    Current Facility-Administered Medications on File Prior to Encounter   Medication    [DISCONTINUED] 0.9%  NaCl infusion (for blood administration)    [DISCONTINUED] 0.9%  NaCl infusion    [DISCONTINUED] apixaban tablet 10 mg    [DISCONTINUED] apixaban tablet 5 mg    [DISCONTINUED] cefTRIAXone (ROCEPHIN) 1 g/50 mL D5W IVPB    [DISCONTINUED] dextrose 10% bolus 125 mL    [DISCONTINUED] dextrose 10% bolus 250 mL    [DISCONTINUED] enoxaparin injection 50 mg    [DISCONTINUED] glucagon (human recombinant) injection 1 mg    [DISCONTINUED] glucose chewable tablet 16 g    [DISCONTINUED] glucose chewable tablet 24 g    [DISCONTINUED] lactated ringers infusion    [DISCONTINUED] LIDOcaine HCL 2% jelly    [DISCONTINUED] melatonin tablet 6 mg    [DISCONTINUED] morphine injection 2 mg    [DISCONTINUED] sodium chloride 0.9% flush 10 mL     Current Outpatient Medications on File Prior to Encounter   Medication Sig    acetaminophen (TYLENOL) 500 MG tablet Take 500 mg by mouth every 6 (six) hours as needed for Pain.    amLODIPine (NORVASC) 5 MG tablet Take 1 tablet (5 mg total) by mouth once daily. " (Patient not taking: Reported on 5/27/2022)    LEVEMIR FLEXTOUCH U-100 INSULN 100 unit/mL (3 mL) InPn pen Inject 24 Units into the skin every morning.    LIDOcaine (LIDODERM) 5 % Place 1 patch onto the skin once daily. Place patch to back. Leave on for 12 hours and remove for 12 hours.    traMADoL (ULTRAM) 50 mg tablet Take 1 tablet (50 mg total) by mouth every 6 (six) hours as needed for Pain.    [DISCONTINUED] calcium carbonate (TUMS) 200 mg calcium (500 mg) chewable tablet Take 1 tablet by mouth 2 (two) times daily with meals.     Family History       Problem Relation (Age of Onset)    Diabetes Mother, Daughter, Son    Heart attack Father    Kidney cancer Son    Stomach cancer Mother    Thyroid disease Daughter          Tobacco Use    Smoking status: Never Smoker    Smokeless tobacco: Never Used   Substance and Sexual Activity    Alcohol use: No    Drug use: No    Sexual activity: Not on file     Review of Systems   Constitutional:  Negative for activity change, appetite change, chills and fever.   HENT:  Negative for congestion, hearing loss and rhinorrhea.    Eyes:  Negative for discharge, itching and visual disturbance.   Respiratory:  Negative for apnea, cough and shortness of breath.    Cardiovascular:  Negative for chest pain, palpitations and leg swelling.   Gastrointestinal:  Negative for abdominal distention, abdominal pain, constipation, diarrhea, nausea and vomiting.   Endocrine: Negative for cold intolerance and heat intolerance.   Genitourinary:  Positive for dysuria. Negative for hematuria.   Musculoskeletal:  Positive for back pain. Negative for neck pain and neck stiffness.   Skin:  Negative for rash and wound.   Neurological:  Negative for dizziness, seizures, light-headedness and headaches.   Psychiatric/Behavioral:  Negative for agitation, confusion and suicidal ideas.    Objective:     Vital Signs (Most Recent):  Temp: 99.5 °F (37.5 °C) (06/08/22 2014)  Pulse: 104 (06/08/22  2014)  Resp: 16 (06/08/22 2014)  BP: (!) 120/48 (06/08/22 2014)  SpO2: 99 % (06/08/22 2014)   Vital Signs (24h Range):  Temp:  [98.6 °F (37 °C)-100.1 °F (37.8 °C)] 99.5 °F (37.5 °C)  Pulse:  [] 104  Resp:  [16-20] 16  SpO2:  [96 %-100 %] 99 %  BP: ()/(44-65) 120/48        There is no height or weight on file to calculate BMI.    Physical Exam  Vitals reviewed.   Constitutional:       General: She is not in acute distress.     Appearance: She is well-developed.   HENT:      Head: Normocephalic and atraumatic.      Nose: Nose normal. No rhinorrhea.      Mouth/Throat:      Mouth: Mucous membranes are moist.   Eyes:      General: No scleral icterus.        Right eye: No discharge.         Left eye: No discharge.      Pupils: Pupils are equal, round, and reactive to light.   Neck:      Vascular: No JVD.   Cardiovascular:      Rate and Rhythm: Normal rate and regular rhythm.      Heart sounds: Normal heart sounds. No murmur heard.    No friction rub.   Pulmonary:      Effort: Pulmonary effort is normal. No respiratory distress.      Breath sounds: Normal breath sounds. No wheezing.   Abdominal:      General: Bowel sounds are normal. There is no distension.      Palpations: Abdomen is soft.      Tenderness: There is no abdominal tenderness.   Musculoskeletal:         General: No deformity. Normal range of motion.      Cervical back: Normal range of motion and neck supple.   Skin:     General: Skin is warm and dry.   Neurological:      General: No focal deficit present.      Mental Status: She is alert and oriented to person, place, and time.   Psychiatric:         Mood and Affect: Mood normal.         Behavior: Behavior normal.         CRANIAL NERVES     CN III, IV, VI   Pupils are equal, round, and reactive to light.     Significant Labs: All pertinent labs within the past 24 hours have been reviewed.  CBC:   Recent Labs   Lab 06/07/22  1558 06/08/22  0612 06/08/22  1402   WBC 29.35* 28.07* 29.19*   HGB 6.9*  6.7* 6.2*   HCT 22.0* 21.8* 19.9*    373 376     CMP:   Recent Labs   Lab 06/07/22  1558 06/08/22  0612   * 136   K 4.2 4.5    109   CO2 19* 20*   * 190*   BUN 26* 25*   CREATININE 1.8* 1.7*   CALCIUM 9.6 9.7   PROT 7.4 6.8   ALBUMIN 2.1* 2.0*   BILITOT 0.5 0.9   ALKPHOS 141* 322*   AST 14 47*   ALT 13 37   ANIONGAP 9 7*   EGFRNONAA 27.0* 28.9*       Significant Imaging: I have reviewed all pertinent imaging results/findings within the past 24 hours.    Assessment/Plan:     * Hydronephrosis  On CT scan  Urology consulted, will see appreciate recs  Currently stable; will need to emergently consult IR if becomes septic or unstable  Monitor creatinine closely      Leukocytosis  Likely from UTI, but also chronically elevated  Will continue to monitor closely      Urinary tract infection without hematuria  Continue rocephin for UTI   Monitor closely  Does have multiple sites for infection      Acute deep vein thrombosis (DVT) of proximal vein of right lower extremity  Noted at outside hospital  On heparin drip 2/2 possibility of surgery in the AM  Can likely switch to oral once clear from surgery standpoint      Anemia  Replaced at outside hospital, likely from hematuria  Type and screen, on heparin drip  Consent form signed by author in ED      HTN (hypertension)  Chronic, controlled, reportedly not taking amlodipine      Controlled Type 2 diabetes mellitus  On 20 units levemir at home  Will reduced to 10 while NPO and not eating  SSI and accuchecks  Lab Results   Component Value Date    HGBA1C 6.9 (H) 05/06/2022           Bladder mass  Urology consulted, chronic, continue to monitor        VTE Risk Mitigation (From admission, onward)         Ordered     heparin 25,000 units in dextrose 5% (100 units/ml) IV bolus from bag - ADDITIONAL PRN BOLUS - 60 units/kg  As needed (PRN)        Question:  Heparin Infusion Adjustment (DO NOT MODIFY ANSWER)  Answer:   [email or web address]\epic\Images\Pharmacy\HeparinInfusions\heparin HIGH INTENSITY nomogram for [facility] [id number]    06/08/22 2124     heparin 25,000 units in dextrose 5% (100 units/ml) IV bolus from bag - ADDITIONAL PRN BOLUS - 30 units/kg  As needed (PRN)        Question:  Heparin Infusion Adjustment (DO NOT MODIFY ANSWER)  Answer:  [email or web address]\epic\Images\Pharmacy\HeparinInfusions\heparin HIGH INTENSITY nomogram for [facility] [id number]    06/08/22 2124     heparin 25,000 units in dextrose 5% 250 mL (100 units/mL) infusion HIGH INTENSITY nomogram - [facility]  Continuous        Question Answer Comment   Heparin Infusion Adjustment (DO NOT MODIFY ANSWER) [email or web address]\epic\Images\Pharmacy\HeparinInfusions\heparin HIGH INTENSITY nomogram for [facility] [id number]    Begin at (in units/kg/hr) 18        06/08/22 2124     heparin 25,000 units in dextrose 5% (100 units/ml) IV bolus from bag INITIAL BOLUS  Once        Question:  Heparin Infusion Adjustment (DO NOT MODIFY ANSWER)  Answer:  [email or web address]\epic\Images\Pharmacy\HeparinInfusions\heparin HIGH INTENSITY nomogram for [facility] [id number].pdf    06/08/22 2123                   Aj Sánchez MD  Department of Hospital Medicine   [facility] - Emergency Dept

## 2022-06-09 NOTE — ED TRIAGE NOTES
Pt transferred from [facility] for urology consult. Pt with THOMAS and right lower pain. Pt with HX of bladder CA. Pt c/o left flank pain where drain is placed and pain in right leg where U/S done at [facility] shows blood clot. Pt states pain is tolerable at this time. Pt denies C/P or SOB.

## 2022-06-09 NOTE — HPI
Patient is a 75 yo F with PMH of bladder cancer, CKD3, HTN, HLD, UTI, anemia, DM, choledocholithiasis, and prior COVID (July 2021) was admitted to [facility] on June 7 with back pain, dysuria, RLE swelling. Workup revealed RLE DVT extending from the right common femoral vein to the calf veins, WBC 30, anemia to 6.2. She was started on Eliquis (last dose was 10mg Eliquis given at 10am on 6/8), Rocephin, and IV fluid, 2U pRBC given this afternoon after hgb of 6.2. Cr 1.7 (which is baseline). UA nitrite negative, 10 RBC, >100 WBC, no bacteria, 1 SEC. Patient transferred to [facility] for Hospital Medicine admission and IR consult for right nephrostomy tube placement. Prior to transfer, Eliquis is being held and she will remain NPO. She reports nausea, bilateral flank pain, dysuria, urinary incontinence. She denies fevers, chills, vomiting, gross hematuria, difficulty voiding, suprapubic pain. In the ED she is afebrile with tachycardia to 100, otherwise normal vitals on assessment and in no acute distress. Current ED labs pending. Bedside bladder scan performed showing >800cc in bladder.     CT RSS obtained 6/7/22 shows right ureteral stent in place with severe hydronephrosis. Left PCN in place with no left hydronephrosis. There is a known large bladder mass on the left posterolateral wall. consistent with history of malignancy as well as a distended bladder.  Enlarged bilateral pelvic lymph nodes.      PET CT scan obtained 6/3/22 shows irregular wall thickening with associated hypermetabolic activity at the left posterior aspect of the urinary bladder compatible with known primary neoplasm.  Enlarged hypermetabolic lymph nodes along the bilateral pelvic sidewalls suggesting megan metastatic disease.  Multiple cell by 5 mm pulmonary nodules which are below the resolution of PET but new compared with chest CT from May of 2022.     Urologic history:  Patient originally presented to [facility] with generalized  weakness, back pain, and intermittent fevers for 2 weeks with dysuria, foul-smelling urine, and confusion. Imaging showed a 6.5cm mass anterior bladder wall c/f neoplasm and severe bilateral hydronephrosis. She was transferred to [facility].  Underwent TURBT 5/9/22. Right RGP showed hydronephrosis, no filling defects. Right ureteral stent placed. Unable to find left UO. Underwent placement of L neph tube.  Chest CT negative  Pathology - showed show poorly differentiated carcinoma (80%) with areas of squamous differentiation (15%), osteoclast like giant cells (4%) and classic high grade papillary urothelial carcinoma (1%). The carcinoma extensively invades the muscularis propria.   Stage IIIA, T3b N0 M0  PMH: HLD, CKD 3   Cr 1.7, GFR 28.9  Plan: Will obtain bladder MRI, PET CT and bone scan to further evaluate for metastatic disease. Seeing medical oncology to discuss systemic therapy. Will refer to radiation oncology to discuss chemoRT as bladder sparing option.

## 2022-06-09 NOTE — PLAN OF CARE
Admitted to unit from the ED. Welcomed and oriented to unit. Vital signs taken and recorded. No complaint of pain. Assisted in her needs. Made comfortable in bed. Admission procedures done. Will continue to monitor.

## 2022-06-09 NOTE — ASSESSMENT & PLAN NOTE
75 yo F with history of bladder cancer. Recently underwent TURBT and L PCN placement and right ureteral stent placement. Transferred from OSH due to DVT, anemia to 6.2, significant leukocytosis to 30, and CT showing worsening right hydronephrosis.    - Patient has failed her right ureteral stent and needs a right nephrostomy tube.   - She recently developed DVT and was started on Eliquis. The Eliquis is being held (last dose was 10mg Eliquis given at 10am on 6/8) and she is being started on Heparin gtt.  - Nephrostomy tube today with IR.  - If patient decompensates overnight, right nephrostomy tube will become emergent. This was discussed with the IR resident on call as well as the hospital medicine team.   - Bedside bladder scan performed showing >800cc in bladder. 22Fr 2-way Soto catheter with 10cc in balloon placed with return of 900cc of milky yellow urine. Urine continues to have milky yellow urine  - F/u urine culture.  - Agree with empiric antibiotics.  - Maintain Soto catheter.  - F/u labs from this am   - Recommend to transfuse if does not respond to 2U pRBC given at OSH.  - Rest of care per primary.

## 2022-06-09 NOTE — SUBJECTIVE & OBJECTIVE
Interval History: AF, HDS.  overnight from petersen. In the petersen bag the urine appears turbulent and yellow. Clear layering of pus. No complaints at this time.    Review of Systems  Objective:     Temp:  [98.8 °F (37.1 °C)-100.1 °F (37.8 °C)] 98.8 °F (37.1 °C)  Pulse:  [] 91  Resp:  [16-21] 17  SpO2:  [97 %-100 %] 99 %  BP: (109-148)/(48-70) 136/70     Body mass index is 22.3 kg/m².           Drains       Drain  Duration                  Nephrostomy 05/10/22 0805 Left 8 Fr. 29 days         Urethral Catheter 06/08/22 2305 Double-lumen 20 Fr. <1 day                    Physical Exam  Constitutional:       General: She is not in acute distress.     Appearance: She is not diaphoretic.   HENT:      Head: Normocephalic and atraumatic.      Nose: Nose normal.   Eyes:      Conjunctiva/sclera: Conjunctivae normal.   Cardiovascular:      Rate and Rhythm: Normal rate.   Pulmonary:      Effort: Pulmonary effort is normal. No respiratory distress.   Abdominal:      General: There is no distension.      Comments: + suprapubic tenderness   Genitourinary:     Comments: Left PCN in place draining clear yellow urine, draining well. Left PCN insertion site clean with no purulent drainage or signs of infection. There is right CVA tenderness.   Musculoskeletal:         General: Normal range of motion.      Cervical back: Normal range of motion.   Skin:     General: Skin is dry.   Neurological:      Mental Status: She is alert.   Psychiatric:         Behavior: Behavior normal.         Thought Content: Thought content normal.       Significant Labs:    BMP:  Recent Labs   Lab 06/07/22  1558 06/08/22  0612   * 136   K 4.2 4.5    109   CO2 19* 20*   BUN 26* 25*   CREATININE 1.8* 1.7*   CALCIUM 9.6 9.7       CBC:   Recent Labs   Lab 06/08/22  0612 06/08/22  1402 06/08/22  2150   WBC 28.07* 29.19* 25.30*   HGB 6.7* 6.2* 7.6*   HCT 21.8* 19.9* 24.4*    376 399       All pertinent labs results from the past 24 hours  have been reviewed.    Significant Imaging:  All pertinent imaging results/findings from the past 24 hours have been reviewed.

## 2022-06-09 NOTE — PROGRESS NOTES
[facility] Surg  Urology  Progress Note    Patient Name: Karoline Justice  MRN: [id number]  Admission Date: 6/8/2022  Hospital Length of Stay: 1 days  Code Status: Full Code   Attending Provider: Katey Shanks MD   Primary Care Physician: Dee Dee Oconnor MD    Subjective:     HPI:  Patient is a 75 yo F with PMH of bladder cancer, CKD3, HTN, HLD, UTI, anemia, DM, choledocholithiasis, and prior COVID (July 2021) was admitted to [facility] on June 7 with back pain, dysuria, RLE swelling. Workup revealed RLE DVT extending from the right common femoral vein to the calf veins, WBC 30, anemia to 6.2. She was started on Eliquis (last dose was 10mg Eliquis given at 10am on 6/8), Rocephin, and IV fluid, 2U pRBC given this afternoon after hgb of 6.2. Cr 1.7 (which is baseline). UA nitrite negative, 10 RBC, >100 WBC, no bacteria, 1 SEC. Patient transferred to [facility] for Hospital Medicine admission and IR consult for right nephrostomy tube placement. Prior to transfer, Eliquis is being held and she will remain NPO. She reports nausea, bilateral flank pain, dysuria, urinary incontinence. She denies fevers, chills, vomiting, gross hematuria, difficulty voiding, suprapubic pain. In the ED she is afebrile with tachycardia to 100, otherwise normal vitals on assessment and in no acute distress. Current ED labs pending. Bedside bladder scan performed showing >800cc in bladder.     CT RSS obtained 6/7/22 shows right ureteral stent in place with severe hydronephrosis. Left PCN in place with no left hydronephrosis. There is a known large bladder mass on the left posterolateral wall. consistent with history of malignancy as well as a distended bladder.  Enlarged bilateral pelvic lymph nodes.      PET CT scan obtained 6/3/22 shows irregular wall thickening with associated hypermetabolic activity at the left posterior aspect of the urinary bladder compatible with known primary neoplasm.  Enlarged hypermetabolic lymph nodes along the  bilateral pelvic sidewalls suggesting megan metastatic disease.  Multiple cell by 5 mm pulmonary nodules which are below the resolution of PET but new compared with chest CT from May of 2022.     Urologic history:  Patient originally presented to [facility] with generalized weakness, back pain, and intermittent fevers for 2 weeks with dysuria, foul-smelling urine, and confusion. Imaging showed a 6.5cm mass anterior bladder wall c/f neoplasm and severe bilateral hydronephrosis. She was transferred to [facility].  Underwent TURBT 5/9/22. Right RGP showed hydronephrosis, no filling defects. Right ureteral stent placed. Unable to find left UO. Underwent placement of L neph tube.  Chest CT negative  Pathology - showed show poorly differentiated carcinoma (80%) with areas of squamous differentiation (15%), osteoclast like giant cells (4%) and classic high grade papillary urothelial carcinoma (1%). The carcinoma extensively invades the muscularis propria.   Stage IIIA, T3b N0 M0  PMH: HLD, CKD 3   Cr 1.7, GFR 28.9  Plan: Will obtain bladder MRI, PET CT and bone scan to further evaluate for metastatic disease. Seeing medical oncology to discuss systemic therapy. Will refer to radiation oncology to discuss chemoRT as bladder sparing option.         Interval History: AF, HDS.  overnight from petersen. In the petersen bag the urine appears turbulent and yellow. Clear layering of pus. No complaints at this time.    Review of Systems  Objective:     Temp:  [98.8 °F (37.1 °C)-100.1 °F (37.8 °C)] 98.8 °F (37.1 °C)  Pulse:  [] 91  Resp:  [16-21] 17  SpO2:  [97 %-100 %] 99 %  BP: (109-148)/(48-70) 136/70     Body mass index is 22.3 kg/m².           Drains       Drain  Duration                  Nephrostomy 05/10/22 0805 Left 8 Fr. 29 days         Urethral Catheter 06/08/22 2305 Double-lumen 20 Fr. <1 day                    Physical Exam  Constitutional:       General: She is not in acute distress.     Appearance: She is not  diaphoretic.   HENT:      Head: Normocephalic and atraumatic.      Nose: Nose normal.   Eyes:      Conjunctiva/sclera: Conjunctivae normal.   Cardiovascular:      Rate and Rhythm: Normal rate.   Pulmonary:      Effort: Pulmonary effort is normal. No respiratory distress.   Abdominal:      General: There is no distension.      Comments: + suprapubic tenderness   Genitourinary:     Comments: Left PCN in place draining clear yellow urine, draining well. Left PCN insertion site clean with no purulent drainage or signs of infection. There is right CVA tenderness.   Musculoskeletal:         General: Normal range of motion.      Cervical back: Normal range of motion.   Skin:     General: Skin is dry.   Neurological:      Mental Status: She is alert.   Psychiatric:         Behavior: Behavior normal.         Thought Content: Thought content normal.       Significant Labs:    BMP:  Recent Labs   Lab 06/07/22  1558 06/08/22  0612   * 136   K 4.2 4.5    109   CO2 19* 20*   BUN 26* 25*   CREATININE 1.8* 1.7*   CALCIUM 9.6 9.7       CBC:   Recent Labs   Lab 06/08/22  0612 06/08/22  1402 06/08/22  2150   WBC 28.07* 29.19* 25.30*   HGB 6.7* 6.2* 7.6*   HCT 21.8* 19.9* 24.4*    [phone]       All pertinent labs results from the past 24 hours have been reviewed.    Significant Imaging:  All pertinent imaging results/findings from the past 24 hours have been reviewed.                    Assessment/Plan:     * Hydronephrosis  77 yo F with history of bladder cancer. Recently underwent TURBT and L PCN placement and right ureteral stent placement. Transferred from OSH due to DVT, anemia to 6.2, significant leukocytosis to 30, and CT showing worsening right hydronephrosis.    - Patient has failed her right ureteral stent and needs a right nephrostomy tube.   - She recently developed DVT and was started on Eliquis. The Eliquis is being held (last dose was 10mg Eliquis given at 10am on 6/8) and she is being started on  Heparin gtt.  - Recommend Nephrostomy tube with IR today  - If patient decompensates overnight, right nephrostomy tube will become emergent. This was discussed with the IR resident on call as well as the hospital medicine team.   - Bedside bladder scan performed showing >800cc in bladder. 22Fr 2-way Soto catheter with 10cc in balloon placed with return of 900cc of milky yellow urine. Urine continues to have milky yellow urine  - F/u urine culture.  - Agree with empiric antibiotics.  - NPO  - Maintain Soto catheter.  - F/u labs from this am   - Recommend to transfuse if does not respond to 2U pRBC given at OSH.  - Rest of care per primary.        VTE Risk Mitigation (From admission, onward)         Ordered     heparin 25,000 units in dextrose 5% (100 units/ml) IV bolus from bag - ADDITIONAL PRN BOLUS - 60 units/kg  As needed (PRN)        Question:  Heparin Infusion Adjustment (DO NOT MODIFY ANSWER)  Answer:  \[email or web address]\epic\Images\Pharmacy\HeparinInfusions\heparin HIGH INTENSITY nomogram for [facility] [id number]    06/08/22 2124     heparin 25,000 units in dextrose 5% (100 units/ml) IV bolus from bag - ADDITIONAL PRN BOLUS - 30 units/kg  As needed (PRN)        Question:  Heparin Infusion Adjustment (DO NOT MODIFY ANSWER)  Answer:  \\[email or web address]\epic\Images\Pharmacy\HeparinInfusions\heparin HIGH INTENSITY nomogram for [facility] [id number].pdf    06/08/22 2124     heparin 25,000 units in dextrose 5% 250 mL (100 units/mL) infusion HIGH INTENSITY nomogram - [facility]  Continuous        Question Answer Comment   Heparin Infusion Adjustment (DO NOT MODIFY ANSWER) [email or web address]\epic\Images\Pharmacy\HeparinInfusions\heparin HIGH INTENSITY nomogram for [facility] [id number].pdf    Begin at (in units/kg/hr) 18        06/08/22 2124                Mario Martin MD  Urology  [facility] Surg

## 2022-06-09 NOTE — H&P
Inpatient Radiology Pre-procedure Note    History of Present Illness:  Karoline Justice is a 76 y.o. female with pmhx of bladder cancer and recently underwent TURBT and L PCN placement and right ureteral stent placement. Transferred from OSH to hospital medicine admission and IR consult as she was found to have right common femoral vein to the calf veins DVT, anemia to 6.2, significant leukocytosis to 30, and CT showing worsening right hydronephrosis. Pt received Eliquis on 6/8 and on admission she was started on heparin gtt instead. Urology was consulted and they recommended right nephrostomy tube as she failed right ureteral stent. IR consulted for Rt nephrostomy tube placement.     Admission H&P reviewed.    Past Medical History:   Diagnosis Date    Bladder cancer 05/2022    Bladder mass 05/06/2022    Choledocholithiasis 05/06/2022    Diabetes mellitus     Pneumonia due to COVID-19 virus 08/06/2021     Past Surgical History:   Procedure Laterality Date    CYSTOSCOPY N/A 05/09/2022    Procedure: CYSTOSCOPY;  Surgeon: Adrianna Gutierrez MD;  Location: [facility] OR [address] [place] [address];  Service: Urology;  Laterality: N/A;    NEPHROSTOMY Left 05/2022    RETROGRADE PYELOGRAPHY Right 05/09/2022    Procedure: PYELOGRAM, RETROGRADE;  Surgeon: Adrianna Gutierrez MD;  Location: [facility] OR [address] [place] [address];  Service: Urology;  Laterality: Right;       Review of Systems:   As documented in primary team H&P    Home Meds:   Prior to Admission medications    Medication Sig Start Date End Date Taking? Authorizing Provider   acetaminophen (TYLENOL) 500 MG tablet Take 500 mg by mouth every 6 (six) hours as needed for Pain.    Historical Provider   amLODIPine (NORVASC) 5 MG tablet Take 1 tablet (5 mg total) by mouth once daily.  Patient not taking: Reported on 5/27/2022 5/11/22 5/11/23  DO OZ De León FLEXTOUCH U-100 INSULN 100 unit/mL (3 mL) InPn pen Inject 24 Units into the skin every morning. 2/25/22   Historical Provider   LIDOcaine  (LIDODERM) 5 % Place 1 patch onto the skin once daily. Place patch to back. Leave on for 12 hours and remove for 12 hours. 5/10/22   Chuy Min,    traMADoL (ULTRAM) 50 mg tablet Take 1 tablet (50 mg total) by mouth every 6 (six) hours as needed for Pain. 5/27/22   Argelia Cordova, ALBERTO   calcium carbonate (TUMS) 200 mg calcium (500 mg) chewable tablet Take 1 tablet by mouth 2 (two) times daily with meals.  5/10/22  Historical Provider     Scheduled Meds:    cefTRIAXone (ROCEPHIN) IVPB  1 g Intravenous Q24H    insulin detemir U-100  10 Units Subcutaneous Daily     Continuous Infusions:    heparin (porcine) in D5W 18 Units/kg/hr (06/08/22 2159)     PRN Meds:sodium chloride, dextrose 10%, dextrose 10%, dextrose 10%, dextrose 10%, glucagon (human recombinant), glucose, glucose, heparin (PORCINE), heparin (PORCINE), HYDROcodone-acetaminophen, insulin aspart U-100, melatonin, morphine, sodium chloride 0.9%  Anticoagulants/Antiplatelets: Heparin    Allergies: Review of patient's allergies indicates:  No Known Allergies  Sedation Hx: have not been any systemic reactions    Labs:  Recent Labs   Lab 06/08/22 2150   INR 1.3*       Recent Labs   Lab 06/08/22 2150   WBC 25.30*   HGB 7.6*   HCT 24.4*   MCV 88         Recent Labs   Lab 06/09/22  0510   *   *   K 4.5      CO2 17*   BUN 21   CREATININE 1.5*   CALCIUM 9.9   MG 1.6   ALT 18   AST 13   ALBUMIN 1.9*   BILITOT 0.7         Vitals:  Temp: 98.8 °F (37.1 °C) (06/09/22 0145)  Pulse: 91 (06/09/22 0310)  Resp: 17 (06/09/22 0145)  BP: 136/70 (06/09/22 0145)  SpO2: 99 % (06/09/22 0145)     Physical Exam:  ASA: III  Mallampati: III    General: no acute distress  Mental Status: alert and oriented to person, place and time  HEENT: normocephalic, atraumatic  Chest: unlabored breathing  Heart: regular heart rate  Abdomen: nondistended  Extremity: moves all extremities      Plan:  Sedation Plan: up to moderate.  Ideally the pt should be off eliquis  2-3 days to minimize risk of hemorrhage, if nephrostomy tube is urgent pt will undergo right nephrostomy tube placement 6/9/2022.          Mathew Kidd MD  Radiology Resident PGY- 2  [facility]

## 2022-06-09 NOTE — ASSESSMENT & PLAN NOTE
On CT scan  Urology consulted, will see appreciate recs  Currently stable; will need to emergently consult IR if becomes septic or unstable  Monitor creatinine closely

## 2022-06-09 NOTE — ASSESSMENT & PLAN NOTE
77 yo F with history of bladder cancer. Recently underwent TURBT and L PCN placement and right ureteral stent placement. Transferred from OSH due to DVT, anemia to 6.2, significant leukocytosis to 30, and CT showing worsening right hydronephrosis.    - Patient has failed her right ureteral stent and needs a right nephrostomy tube.   - She recently developed DVT and was started on Eliquis. The Eliquis is being held (last dose was 10mg Eliquis given at 10am on 6/8) and she is being started on Heparin gtt.  - Due to the concern for hemorrhage, Nephrostomy tube can wait until tomorrow as patient is currently stable.  - If patient decompensates overnight, right nephrostomy tube will become emergent. This was discussed with the IR resident on call as well as the hospital medicine team.   - Bedside bladder scan performed showing >800cc in bladder. 22Fr 2-way Soto catheter with 10cc in balloon placed with return of 900cc of milky yellow urine.   - Urine culture sent. F/u urine culture.  - Agree with empiric antibiotics.  - Maintain Soto catheter.  - F/u labs.   - Recommend to transfuse if does not respond to 2U pRBC given at OSH.  - Rest of care per primary.

## 2022-06-09 NOTE — PROCEDURES
"  Pre Op Diagnosis: hydronephrosis  Post Op Diagnosis: Same    Procedure: RIght sided nephrostomy tube placement    Procedure performed by: Johnny    Written Informed Consent Obtained: Yes  Specimen Removed: NO  Estimated Blood Loss: Minimal    Findings:   successful placement of 10 Fr PCN in right kidney.    Patient tolerated procedure well.    Dakota Turner MD ([place])  Interventional Radiology  [phone]        "

## 2022-06-09 NOTE — HPI
"77 yo female with bladder cancer w/ urinary obstruction s/p right sided stenting and left nephrostomy tube placement, HTN, anemia, DM presenting for back pain, found to have right hydronephrosis. Also with UTI symptoms, found to have UTI, started on rocephin. Noted also to have anemia, given pRBCs as well. She was transferred to [facility] for further eval by urology.     Per  note,  "76-year-old female with a history of bladder cancer with urinary obstruction (right-sided ureteral stenting and left-sided nephrostomy tube placement), hypercalcemia, hypertension, urinary tract infection, anemia, diabetes, choledocholithiasis, and prior COVID (July 2021) admitted to [facility] on June 7 with back pain.  She reported burning with urination and right lower extremity swelling.  She had no chest pain or dyspnea.  She was found to have leukocytosis.  Lower extremity ultrasound showed right lower extremity DVT.  She was started on apixaban, Rocephin, and IV fluid.  Packed red blood cells were ordered for her anemia.  Referring provider noted patient has mild edema in her right leg but it is intact from a neurovascular standpoint.  Her urine output and her ostomy output grossly appeared fairly normal today.  She remains hemodynamically stable.  Case discussed with Urology and Interventional Radiology at [facility].  Recommendation is to move patient urgently to [facility] for further evaluation and treatment.  Will transfer to Hospital Medicine service at [facility].  On arrival, will contact Urology and Interventional Radiology.  I spoke with the referring provider, and they will hold apixaban and have the patient in NPO status for now.     She had transurethral section of bladder tumor with right ureteral stent placement on May 9. She had left nephrostomy tube placement May 10. Pathology showed poorly differentiated carcinoma with areas of squamous differentiation, osteoclast like GI cells, " "and classic high-grade papillary     June 8:  White blood cells 28.07, hemoglobin 6.7, hematocrit 21.8, platelets 373, sodium 136, potassium 4.5, chloride 109, CO2 20, BUN 25, creatinine 1.7, glucose 190, total bilirubin 0.9, AST 47, ALT 37, magnesium 2     Repeat CBC at white blood cells 29.19, hemoglobin 6.2, hematocrit 19.9, platelets 376     June 7:  Blood cultures no growth to date  COVID negative, white blood cells 29.35, hemoglobin 6.9, hematocrit 22, platelets 380, sodium 132, potassium 4.2, chloride 104, CO2 19, BUN 26, creatinine 1.8  Urinalysis with 2+ protein, 2+ glucose, 2+ blood, 3+ leukocytes, 10 RBC, greater than 100 WBC  -CT renal stone protocol had right ureteral stent in place with chronic stable moderate right hydronephrosis.  Left-sided percutaneous nephrostomy tube in place with no hydronephrosis.  Large bladder mass consistent with history of malignancy.  Enlarged bilateral pelvic lymph node consistent with metastatic disease.  No acute finding.  -Right lower extremity ultrasound showed DVT extending from the right common femoral vein to the calf veins.     [place] 3:  PET CT scan had irregular wall thickening with associated hypermetabolic activity at the left posterior aspect of the urinary bladder compatible with known primary neoplasm.  Enlarged hypermetabolic lymph nodes along the bilateral pelvic sidewalls suggesting megan metastatic disease.  Multiple cell by 5 mm pulmonary nodules which are below the resolution of PET but new compared with chest CT from May of 2022. "    Patient with multiple metabolic issues including metabolic acidosis, hypercalcemia, mild hyponatremia, elevated alk-phos, all likely consistent with metastatic malignancy.  Low back pain in the setting of nephrostomy tubes  "

## 2022-06-09 NOTE — ASSESSMENT & PLAN NOTE
On 20 units levemir at home  Will reduced to 10 while NPO and not eating  SSI and accuchecks  Lab Results   Component Value Date    HGBA1C 6.9 (H) 05/06/2022

## 2022-06-09 NOTE — CONSULTS
See H&P       Mathew Kidd MD  Radiology Resident PGY- 2  [facility]   Pager: [phone]

## 2022-06-09 NOTE — ASSESSMENT & PLAN NOTE
Replaced at outside hospital, likely from hematuria  Type and screen, on heparin drip  Consent form signed by author in ED

## 2022-06-09 NOTE — ED NOTES
Telemetry Verification   Patient placed on Telemetry Box  Verified with War Room  Box #    Monitor Tech    Rate 91   Rhythm SR

## 2022-06-09 NOTE — CARE UPDATE
Patient fully recovered and doing well. Report called to MYRANDA Martinez and patient transported back to floor.

## 2022-06-09 NOTE — ED PROVIDER NOTES
Encounter Date: 6/8/2022       History     Chief Complaint   Patient presents with    Transfer     Transfer from [place]. Dx with THOMAS. Hx of bladder cancer. Needs urology consult.      VINNIE Justice is a 76-year-old female with a history of bladder cancer with urinary obstruction (right ureteral stent and left-sided nephrostomy tube placement), hypercalcemia, hypertension, history of UTI, anemia, diabetes, choledocholithiasis and prior COVID (July 2021) who was recently admitted to [facility] on June 7th with back pain.  Since then she has had burning with urination, right lower extremity swelling without chest pain or dyspnea.  She had a leukocytosis with concern for urinary sepsis.  She had a lower extremity ultrasound showing right lower extremity DVT and started on apixaban, Rocephin and IV fluids.  She was received PRBCs for her anemia.  She is neurovascular intact.  She is complaining of left-sided flank pain around her ureterostomy catheter.  She was transferred here for medicine admit and urology consult for concern for urinary obstruction, and possible IR intervention requiring a right-sided nephrostomy tube.         Review of patient's allergies indicates:  No Known Allergies  Past Medical History:   Diagnosis Date    Bladder cancer 05/2022    Bladder mass 05/06/2022    Choledocholithiasis 05/06/2022    Diabetes mellitus     Pneumonia due to COVID-19 virus 08/06/2021     Past Surgical History:   Procedure Laterality Date    CYSTOSCOPY N/A 05/09/2022    Procedure: CYSTOSCOPY;  Surgeon: Adrianna Gutierrez MD;  Location: [facility] OR [address] [place] [address];  Service: Urology;  Laterality: N/A;    NEPHROSTOMY Left 05/2022    RETROGRADE PYELOGRAPHY Right 05/09/2022    Procedure: PYELOGRAM, RETROGRADE;  Surgeon: Adrianna Gutierrez MD;  Location: [facility] OR [address] [place] [address];  Service: Urology;  Laterality: Right;     Family History   Problem Relation Age of Onset    Diabetes Mother     Stomach cancer Mother     Heart  attack Father     Diabetes Daughter     Thyroid disease Daughter     Diabetes Son     Kidney cancer Son      Social History     Tobacco Use    Smoking status: Never Smoker    Smokeless tobacco: Never Used   Substance Use Topics    Alcohol use: No    Drug use: No     Review of Systems   Constitutional: Positive for fatigue and fever. Negative for chills.   HENT: Negative for congestion and sore throat.    Eyes: Negative for photophobia and visual disturbance.   Respiratory: Negative for chest tightness and shortness of breath.    Cardiovascular: Positive for leg swelling. Negative for chest pain and palpitations.   Gastrointestinal: Negative for abdominal pain, nausea and vomiting.   Genitourinary: Positive for decreased urine volume, dysuria and flank pain. Negative for pelvic pain.   Musculoskeletal: Positive for back pain and myalgias.   Skin: Negative for color change and wound.   Neurological: Negative for facial asymmetry, light-headedness and headaches.   Psychiatric/Behavioral: Negative for confusion. The patient is not nervous/anxious.        Physical Exam     Initial Vitals [06/08/22 2014]   BP Pulse Resp Temp SpO2   (!) 120/48 104 16 99.5 °F (37.5 °C) 99 %      MAP       --         Physical Exam    Nursing note and vitals reviewed.      Constitutional: She appears well-developed and well-nourished. She is not diaphoretic. No distress.   HENT:   Head: Normocephalic and atraumatic.   Neck: Neck supple.   Normal range of motion.  Cardiovascular: Normal rate, regular rhythm, normal heart sounds and intact distal pulses.   Pulmonary/Chest: Breath sounds normal. No respiratory distress. She has no wheezes. She has no rhonchi. She has no rales.   Abdominal: Abdomen is soft. She exhibits no distension. There is abdominal tenderness.   Mild suprapubic tenderness to palpation, no rebound or guard There is no rebound and no guarding.   Musculoskeletal:         General: Edema present. Normal range of motion.       Cervical back: Normal range of motion and neck supple.      Comments: Right lower extremity edema   Neurological: She is alert and oriented to person, place, and time. She has normal strength. No cranial nerve deficit or sensory deficit. GCS score is 15. GCS eye subscore is 4. GCS verbal subscore is 5. GCS motor subscore is 6.   Skin: Skin is warm and dry.       ED Course   Procedures  Labs Reviewed   PROTIME-INR - Abnormal; Notable for the following components:       Result Value    Prothrombin Time 13.3 (*)     INR 1.3 (*)     All other components within normal limits    Narrative:     Draw baseline aPTT prior to starting the heparin bolus or  infusion  (if patient is on warfarin prior to heparin therapy)   CBC W/ AUTO DIFFERENTIAL - Abnormal; Notable for the following components:    WBC 25.30 (*)     RBC 2.77 (*)     Hemoglobin 7.6 (*)     Hematocrit 24.4 (*)     MCHC 31.1 (*)     RDW 15.2 (*)     Immature Granulocytes 1.0 (*)     Gran # (ANC) 22.0 (*)     Immature Grans (Abs) 0.25 (*)     Mono # 1.2 (*)     Gran % 86.9 (*)     Lymph % 5.5 (*)     All other components within normal limits    Narrative:     Draw baseline aPTT prior to starting the heparin bolus or  infusion  (if patient is on warfarin prior to heparin therapy)   APTT    Narrative:     Draw baseline aPTT prior to starting the heparin bolus or  infusion  (if patient is on warfarin prior to heparin therapy)   TYPE & SCREEN   POCT GLUCOSE MONITORING CONTINUOUS   POCT GLUCOSE MONITORING CONTINUOUS          Imaging Results    None          Medications   sodium chloride 0.9% flush 10 mL (has no administration in time range)   melatonin tablet 6 mg (has no administration in time range)   glucose chewable tablet 16 g (has no administration in time range)   glucose chewable tablet 24 g (has no administration in time range)   glucagon (human recombinant) injection 1 mg (has no administration in time range)   dextrose 10% bolus 125 mL (has no  administration in time range)   dextrose 10% bolus 250 mL (has no administration in time range)   morphine injection 2 mg (2 mg Intravenous Given 6/8/22 2132)   cefTRIAXone (ROCEPHIN) 1 g/50 mL D5W IVPB (0 g Intravenous Stopped 6/9/22 0955)   0.9%  NaCl infusion (for blood administration) (has no administration in time range)   heparin 25,000 units in dextrose 5% (100 units/ml) IV bolus from bag - ADDITIONAL PRN BOLUS - 60 units/kg (has no administration in time range)   heparin 25,000 units in dextrose 5% (100 units/ml) IV bolus from bag - ADDITIONAL PRN BOLUS - 30 units/kg (has no administration in time range)   heparin 25,000 units in dextrose 5% 250 mL (100 units/mL) infusion HIGH INTENSITY nomogram - [facility] (18 Units/kg/hr × 53.5 kg Intravenous Handoff 6/9/22 1939)   HYDROcodone-acetaminophen 5-325 mg per tablet 1 tablet (1 tablet Oral Given 6/9/22 1757)   insulin detemir U-100 pen 10 Units (10 Units Subcutaneous Given 6/9/22 0928)   dextrose 10% bolus 125 mL (has no administration in time range)   dextrose 10% bolus 250 mL (has no administration in time range)   insulin aspart U-100 pen 0-5 Units (2 Units Subcutaneous Given 6/9/22 0929)   mupirocin 2 % ointment (has no administration in time range)   heparin 25,000 units in dextrose 5% (100 units/ml) IV bolus from bag INITIAL BOLUS (4,280 Units Intravenous Bolus from Bag 6/8/22 2159)   cefTRIAXone injection (1 g Intravenous Given 6/9/22 1013)   midazolam (VERSED) 1 mg/mL injection (1 mg Intravenous Given 6/9/22 1022)   fentaNYL 50 mcg/mL injection (50 mcg Intravenous Given 6/9/22 1022)   iohexoL (OMNIPAQUE 300) injection 25 mL (10 mLs Other Given 6/9/22 1048)     Medical Decision Making:   History:   Old Medical Records: I decided to obtain old medical records.  Old Records Summarized: records from another hospital.       <> Summary of Records: Transfer records from [facility] for urinary obstruction, history of bladder cancer, needs urology consult  and medicine admission.  Initial Assessment:   Karoline Justice is a 76-year-old female with a history of bladder cancer with urinary obstruction (right ureteral stent and left-sided nephrostomy tube placement), hypercalcemia, hypertension, history of UTI, anemia, diabetes, choledocholithiasis and prior COVID (July 2021) who was recently admitted to [facility] on June 7th with back pain, who now has concern for urinary obstruction, requiring urology consult to evaluate for nephrostomy tube on the right side.  Differential Diagnosis:   Urinary obstruction, DVT, need for nephrostomy, THOMAS, anemia, sepsis, bacteremia  Clinical Tests:   Lab Tests: Reviewed  Radiological Study: Reviewed  Medical Tests: Reviewed  Other:   I have discussed this case with another health care provider.       <> Summary of the Discussion: Hospital medicine    Patient currently afebrile vital signs stable.  Not toxic appearing at this time, pain control with pain in her left flank.  Discussed case with hospital medicine as patient is a direct admit to the medicine team for further evaluation and management discussed case with Urology who will continue to monitor, and discussed likely intervention tomorrow as patient is currently on anticoagulation.  Patient agreeable to admission plan.    Complexity: High - level 5                      Clinical Impression:   Final diagnoses:  [N13.9] Urinary obstruction (Primary)  [Z93.6] Nephrostomy status  [I82.4Y1] Acute deep vein thrombosis (DVT) of proximal vein of right lower extremity  [D64.9] Anemia, unspecified type          ED Disposition Condition    Admit               Ozzie Qureshi DO, FAAEM  Emergency Staff Physician   Dept of Emergency Medicine   [facility]  Spectralink: [id number]        Disclaimer: This note has been generated using voice-recognition software. There may be typographical errors that have been missed during proof-reading.       Ozzie Qureshi DO  06/09/22  2009

## 2022-06-09 NOTE — CONSULTS
[facility] - Emergency Dept  Urology  Consult Note    Patient Name: Karoline Justice  MRN: [id number]  Admission Date: 6/8/2022  Hospital Length of Stay: 0   Code Status: Full Code   Attending Provider: Mila Henry MD   Consulting Provider: Miguel Angel Arriola MD  Primary Care Physician: Dee Dee Oconnor MD  Principal Problem:Hydronephrosis    Inpatient consult to Urology  Consult performed by: Miguel Angel Arriola MD  Consult ordered by: Aj Sánchez MD          Subjective:     HPI:  Patient is a 77 yo F with PMH of bladder cancer, CKD3, HTN, HLD, UTI, anemia, DM, choledocholithiasis, and prior COVID (July 2021) was admitted to [facility] on June 7 with back pain, dysuria, RLE swelling. Workup revealed RLE DVT extending from the right common femoral vein to the calf veins, WBC 30, anemia to 6.2. She was started on Eliquis (last dose was 10mg Eliquis given at 10am on 6/8), Rocephin, and IV fluid, 2U pRBC given this afternoon after hgb of 6.2. Cr 1.7 (which is baseline). UA nitrite negative, 10 RBC, >100 WBC, no bacteria, 1 SEC. Patient transferred to [facility] for Hospital Medicine admission and IR consult for right nephrostomy tube placement. Prior to transfer, Eliquis is being held and she will remain NPO. She reports nausea, bilateral flank pain, dysuria, urinary incontinence. She denies fevers, chills, vomiting, gross hematuria, difficulty voiding, suprapubic pain. In the ED she is afebrile with tachycardia to 100, otherwise normal vitals on assessment and in no acute distress. Current ED labs pending. Bedside bladder scan performed showing >800cc in bladder.     CT RSS obtained 6/7/22 shows right ureteral stent in place with severe hydronephrosis. Left PCN in place with no left hydronephrosis. There is a known large bladder mass on the left posterolateral wall. consistent with history of malignancy as well as a distended bladder.  Enlarged bilateral pelvic lymph nodes.      PET CT scan obtained 6/3/22 shows  irregular wall thickening with associated hypermetabolic activity at the left posterior aspect of the urinary bladder compatible with known primary neoplasm.  Enlarged hypermetabolic lymph nodes along the bilateral pelvic sidewalls suggesting megan metastatic disease.  Multiple cell by 5 mm pulmonary nodules which are below the resolution of PET but new compared with chest CT from May of 2022.     Urologic history:  Patient originally presented to [facility] with generalized weakness, back pain, and intermittent fevers for 2 weeks with dysuria, foul-smelling urine, and confusion. Imaging showed a 6.5cm mass anterior bladder wall c/f neoplasm and severe bilateral hydronephrosis. She was transferred to [facility].  Underwent TURBT 5/9/22. Right RGP showed hydronephrosis, no filling defects. Right ureteral stent placed. Unable to find left UO. Underwent placement of L neph tube.  Chest CT negative  Pathology - showed show poorly differentiated carcinoma (80%) with areas of squamous differentiation (15%), osteoclast like giant cells (4%) and classic high grade papillary urothelial carcinoma (1%). The carcinoma extensively invades the muscularis propria.   Stage IIIA, T3b N0 M0  PMH: HLD, CKD 3   Cr 1.7, GFR 28.9  Plan: Will obtain bladder MRI, PET CT and bone scan to further evaluate for metastatic disease. Seeing medical oncology to discuss systemic therapy. Will refer to radiation oncology to discuss chemoRT as bladder sparing option.         Past Medical History:   Diagnosis Date    Bladder cancer 05/2022    Bladder mass 05/06/2022    Choledocholithiasis 05/06/2022    Diabetes mellitus     Pneumonia due to COVID-19 virus 08/06/2021       Past Surgical History:   Procedure Laterality Date    CYSTOSCOPY N/A 05/09/2022    Procedure: CYSTOSCOPY;  Surgeon: Adrianna Gutierrez MD;  Location: [facility] OR [address] [place] [address];  Service: Urology;  Laterality: N/A;    NEPHROSTOMY Left 05/2022    RETROGRADE PYELOGRAPHY Right 05/09/2022     Procedure: PYELOGRAM, RETROGRADE;  Surgeon: Adrianna Gutierrez MD;  Location: [facility] OR [address] [place] [address];  Service: Urology;  Laterality: Right;       Review of patient's allergies indicates:  No Known Allergies    Family History       Problem Relation (Age of Onset)    Diabetes Mother, Daughter, Son    Heart attack Father    Kidney cancer Son    Stomach cancer Mother    Thyroid disease Daughter            Tobacco Use    Smoking status: Never Smoker    Smokeless tobacco: Never Used   Substance and Sexual Activity    Alcohol use: No    Drug use: No    Sexual activity: Not on file       Review of Systems   Constitutional:  Negative for activity change, appetite change, chills, fever and unexpected weight change.   Respiratory:  Negative for shortness of breath.    Cardiovascular:  Negative for chest pain.   Gastrointestinal:  Positive for abdominal pain and nausea. Negative for constipation, diarrhea and vomiting.   Genitourinary:  Positive for dysuria and flank pain. Negative for difficulty urinating and hematuria.   Musculoskeletal:  Negative for back pain.   Skin:  Negative for wound.   Neurological:  Negative for headaches.   Psychiatric/Behavioral:  Negative for confusion.      Objective:     Temp:  [98.6 °F (37 °C)-100.1 °F (37.8 °C)] 99.5 °F (37.5 °C)  Pulse:  [] 95  Resp:  [16-20] 18  SpO2:  [97 %-100 %] 100 %  BP: ()/(44-65) 140/63     Body mass index is 22.3 kg/m².           Drains       Drain  Duration                  Nephrostomy 05/10/22 0805 Left 8 Fr. 29 days                    Physical Exam  Constitutional:       General: She is not in acute distress.     Appearance: She is not diaphoretic.   HENT:      Head: Normocephalic and atraumatic.      Nose: Nose normal.   Eyes:      Conjunctiva/sclera: Conjunctivae normal.   Cardiovascular:      Rate and Rhythm: Normal rate.   Pulmonary:      Effort: Pulmonary effort is normal. No respiratory distress.   Abdominal:      General: There is no distension.       Comments: + suprapubic tenderness   Genitourinary:     Comments: Left PCN in place draining clear yellow urine, draining well. Left PCN insertion site clean with no purulent drainage or signs of infection. There is right CVA tenderness.   Musculoskeletal:         General: Normal range of motion.      Cervical back: Normal range of motion.   Skin:     General: Skin is dry.   Neurological:      Mental Status: She is alert.   Psychiatric:         Behavior: Behavior normal.         Thought Content: Thought content normal.       Significant Labs:    BMP:  Recent Labs   Lab 06/07/22  1558 06/08/22  0612   * 136   K 4.2 4.5    109   CO2 19* 20*   BUN 26* 25*   CREATININE 1.8* 1.7*   CALCIUM 9.6 9.7       CBC:  Recent Labs   Lab 06/08/22  0612 06/08/22  1402 06/08/22  2150   WBC 28.07* 29.19* 25.30*   HGB 6.7* 6.2* 7.6*   HCT 21.8* 19.9* 24.4*    376 399       All pertinent labs results from the past 24 hours have been reviewed.    Significant Imaging:  All pertinent imaging results/findings from the past 24 hours have been reviewed.                      Assessment and Plan:     * Hydronephrosis  77 yo F with history of bladder cancer. Recently underwent TURBT and L PCN placement and right ureteral stent placement. Transferred from OSH due to DVT, anemia to 6.2, significant leukocytosis to 30, and CT showing worsening right hydronephrosis.    - Patient has failed her right ureteral stent and needs a right nephrostomy tube.   - She recently developed DVT and was started on Eliquis. The Eliquis is being held (last dose was 10mg Eliquis given at 10am on 6/8) and she is being started on Heparin gtt.  - Due to the concern for hemorrhage, Nephrostomy tube can wait until tomorrow as patient is currently stable.  - If patient decompensates overnight, right nephrostomy tube will become emergent. This was discussed with the IR resident on call as well as the hospital medicine team.   - Bedside bladder scan  performed showing >800cc in bladder. 22Fr 2-way Soto catheter with 10cc in balloon placed with return of 900cc of milky yellow urine.   - Urine culture sent. F/u urine culture.  - Agree with empiric antibiotics.  - Maintain Soto catheter.  - F/u labs.   - Recommend to transfuse if does not respond to 2U pRBC given at OSH.  - Rest of care per primary.        VTE Risk Mitigation (From admission, onward)           Ordered     heparin 25,000 units in dextrose 5% (100 units/ml) IV bolus from bag - ADDITIONAL PRN BOLUS - 60 units/kg  As needed (PRN)        Question:  Heparin Infusion Adjustment (DO NOT MODIFY ANSWER)  Answer:  [email or web address]\epic\Images\Pharmacy\HeparinInfusions\heparin HIGH INTENSITY nomogram for [facility] [id number]    06/08/22 2124     heparin 25,000 units in dextrose 5% (100 units/ml) IV bolus from bag - ADDITIONAL PRN BOLUS - 30 units/kg  As needed (PRN)        Question:  Heparin Infusion Adjustment (DO NOT MODIFY ANSWER)  Answer:  [email or web address]\epic\Images\Pharmacy\HeparinInfusions\heparin HIGH INTENSITY nomogram for [facility] [id number]    06/08/22 2124     heparin 25,000 units in dextrose 5% 250 mL (100 units/mL) infusion HIGH INTENSITY nomogram - [facility]  Continuous        Question Answer Comment   Heparin Infusion Adjustment (DO NOT MODIFY ANSWER) \\[email or web address]\epic\Images\Pharmacy\HeparinInfusions\heparin HIGH INTENSITY nomogram for [facility] [id number]    Begin at (in units/kg/hr) 18        06/08/22 2124                    Thank you for your consult. I will follow-up with patient. Please contact us if you have any additional questions.    Miguel Angel Arriola MD  Urology  [facility] - Emergency Dept      I have reviewed and concur with the resident's history, physical, assessment, and plan.  I have personally interviewed and examined the patient at bedside.  See below addendum for my evaluation and additional findings.  We will discuss with Interventional Radiology and the primary medicine team the safety of holding  anticoagulation.  However, patient is currently on heparin drip which would be transient and allowed for an interventional radiology procedure.  The patient did receive Eliquis yesterday.  We will discuss timing of right nephrostomy tube placement.  Soto was placed this morning for urinary retention in a large amount of purulent urine was drained.      Urology will follow along.  Please call with any questions or concerning developments.

## 2022-06-10 LAB
ALBUMIN SERPL BCP-MCNC: 1.9 G/DL (ref 3.5–5.2)
ALP SERPL-CCNC: 216 U/L (ref 55–135)
ALT SERPL W/O P-5'-P-CCNC: 14 U/L (ref 10–44)
ANION GAP SERPL CALC-SCNC: 8 MMOL/L (ref 8–16)
ANISOCYTOSIS BLD QL SMEAR: SLIGHT
ANISOCYTOSIS BLD QL SMEAR: SLIGHT
APTT BLDCRRT: 36.3 SEC (ref 21–32)
APTT BLDCRRT: 36.3 SEC (ref 21–32)
APTT BLDCRRT: 40.4 SEC (ref 21–32)
AST SERPL-CCNC: 14 U/L (ref 10–40)
BACTERIA UR CULT: NORMAL
BACTERIA UR CULT: NORMAL
BASOPHILS # BLD AUTO: 0.07 K/UL (ref 0–0.2)
BASOPHILS # BLD AUTO: 0.07 K/UL (ref 0–0.2)
BASOPHILS NFR BLD: 0.3 % (ref 0–1.9)
BASOPHILS NFR BLD: 0.3 % (ref 0–1.9)
BILIRUB SERPL-MCNC: 0.5 MG/DL (ref 0.1–1)
BUN SERPL-MCNC: 15 MG/DL (ref 8–23)
CALCIUM SERPL-MCNC: 10.3 MG/DL (ref 8.7–10.5)
CHLORIDE SERPL-SCNC: 107 MMOL/L (ref 95–110)
CO2 SERPL-SCNC: 16 MMOL/L (ref 23–29)
CREAT SERPL-MCNC: 1.2 MG/DL (ref 0.5–1.4)
DIFFERENTIAL METHOD: ABNORMAL
DIFFERENTIAL METHOD: ABNORMAL
EOSINOPHIL # BLD AUTO: 0.3 K/UL (ref 0–0.5)
EOSINOPHIL # BLD AUTO: 0.3 K/UL (ref 0–0.5)
EOSINOPHIL NFR BLD: 1.4 % (ref 0–8)
EOSINOPHIL NFR BLD: 1.4 % (ref 0–8)
ERYTHROCYTE [DISTWIDTH] IN BLOOD BY AUTOMATED COUNT: 15.8 % (ref 11.5–14.5)
ERYTHROCYTE [DISTWIDTH] IN BLOOD BY AUTOMATED COUNT: 15.8 % (ref 11.5–14.5)
EST. GFR  (AFRICAN AMERICAN): 50.7 ML/MIN/1.73 M^2
EST. GFR  (NON AFRICAN AMERICAN): 44 ML/MIN/1.73 M^2
GLUCOSE SERPL-MCNC: 151 MG/DL (ref 70–110)
HCT VFR BLD AUTO: 24.2 % (ref 37–48.5)
HCT VFR BLD AUTO: 24.2 % (ref 37–48.5)
HGB BLD-MCNC: 7.6 G/DL (ref 12–16)
HGB BLD-MCNC: 7.6 G/DL (ref 12–16)
IMM GRANULOCYTES # BLD AUTO: 0.16 K/UL (ref 0–0.04)
IMM GRANULOCYTES # BLD AUTO: 0.16 K/UL (ref 0–0.04)
IMM GRANULOCYTES NFR BLD AUTO: 0.7 % (ref 0–0.5)
IMM GRANULOCYTES NFR BLD AUTO: 0.7 % (ref 0–0.5)
LYMPHOCYTES # BLD AUTO: 0.9 K/UL (ref 1–4.8)
LYMPHOCYTES # BLD AUTO: 0.9 K/UL (ref 1–4.8)
LYMPHOCYTES NFR BLD: 3.8 % (ref 18–48)
LYMPHOCYTES NFR BLD: 3.8 % (ref 18–48)
MAGNESIUM SERPL-MCNC: 1.7 MG/DL (ref 1.6–2.6)
MCH RBC QN AUTO: 27.3 PG (ref 27–31)
MCH RBC QN AUTO: 27.3 PG (ref 27–31)
MCHC RBC AUTO-ENTMCNC: 31.4 G/DL (ref 32–36)
MCHC RBC AUTO-ENTMCNC: 31.4 G/DL (ref 32–36)
MCV RBC AUTO: 87 FL (ref 82–98)
MCV RBC AUTO: 87 FL (ref 82–98)
MONOCYTES # BLD AUTO: 1.1 K/UL (ref 0.3–1)
MONOCYTES # BLD AUTO: 1.1 K/UL (ref 0.3–1)
MONOCYTES NFR BLD: 4.5 % (ref 4–15)
MONOCYTES NFR BLD: 4.5 % (ref 4–15)
NEUTROPHILS # BLD AUTO: 21.6 K/UL (ref 1.8–7.7)
NEUTROPHILS # BLD AUTO: 21.6 K/UL (ref 1.8–7.7)
NEUTROPHILS NFR BLD: 89.3 % (ref 38–73)
NEUTROPHILS NFR BLD: 89.3 % (ref 38–73)
NRBC BLD-RTO: 0 /100 WBC
NRBC BLD-RTO: 0 /100 WBC
OVALOCYTES BLD QL SMEAR: ABNORMAL
OVALOCYTES BLD QL SMEAR: ABNORMAL
PLATELET # BLD AUTO: 447 K/UL (ref 150–450)
PLATELET # BLD AUTO: 447 K/UL (ref 150–450)
PMV BLD AUTO: 9.6 FL (ref 9.2–12.9)
PMV BLD AUTO: 9.6 FL (ref 9.2–12.9)
POCT GLUCOSE: 164 MG/DL (ref 70–110)
POCT GLUCOSE: 169 MG/DL (ref 70–110)
POCT GLUCOSE: 199 MG/DL (ref 70–110)
POCT GLUCOSE: 306 MG/DL (ref 70–110)
POIKILOCYTOSIS BLD QL SMEAR: SLIGHT
POIKILOCYTOSIS BLD QL SMEAR: SLIGHT
POLYCHROMASIA BLD QL SMEAR: ABNORMAL
POLYCHROMASIA BLD QL SMEAR: ABNORMAL
POTASSIUM SERPL-SCNC: 4.3 MMOL/L (ref 3.5–5.1)
PROT SERPL-MCNC: 6.3 G/DL (ref 6–8.4)
RBC # BLD AUTO: 2.78 M/UL (ref 4–5.4)
RBC # BLD AUTO: 2.78 M/UL (ref 4–5.4)
SODIUM SERPL-SCNC: 131 MMOL/L (ref 136–145)
WBC # BLD AUTO: 24.24 K/UL (ref 3.9–12.7)
WBC # BLD AUTO: 24.24 K/UL (ref 3.9–12.7)

## 2022-06-10 PROCEDURE — 85730 THROMBOPLASTIN TIME PARTIAL: CPT | Mod: 91 | Performed by: STUDENT IN AN ORGANIZED HEALTH CARE EDUCATION/TRAINING PROGRAM

## 2022-06-10 PROCEDURE — 11000001 HC ACUTE MED/SURG PRIVATE ROOM

## 2022-06-10 PROCEDURE — 99233 SBSQ HOSP IP/OBS HIGH 50: CPT | Mod: ,,, | Performed by: STUDENT IN AN ORGANIZED HEALTH CARE EDUCATION/TRAINING PROGRAM

## 2022-06-10 PROCEDURE — 85025 COMPLETE CBC W/AUTO DIFF WBC: CPT | Performed by: INTERNAL MEDICINE

## 2022-06-10 PROCEDURE — 63600175 PHARM REV CODE 636 W HCPCS: Performed by: INTERNAL MEDICINE

## 2022-06-10 PROCEDURE — 36415 COLL VENOUS BLD VENIPUNCTURE: CPT | Performed by: STUDENT IN AN ORGANIZED HEALTH CARE EDUCATION/TRAINING PROGRAM

## 2022-06-10 PROCEDURE — 25000003 PHARM REV CODE 250: Performed by: INTERNAL MEDICINE

## 2022-06-10 PROCEDURE — 25000003 PHARM REV CODE 250: Performed by: STUDENT IN AN ORGANIZED HEALTH CARE EDUCATION/TRAINING PROGRAM

## 2022-06-10 PROCEDURE — 99233 PR SUBSEQUENT HOSPITAL CARE,LEVL III: ICD-10-PCS | Mod: ,,, | Performed by: STUDENT IN AN ORGANIZED HEALTH CARE EDUCATION/TRAINING PROGRAM

## 2022-06-10 PROCEDURE — 83735 ASSAY OF MAGNESIUM: CPT | Performed by: INTERNAL MEDICINE

## 2022-06-10 PROCEDURE — 80053 COMPREHEN METABOLIC PANEL: CPT | Performed by: INTERNAL MEDICINE

## 2022-06-10 RX ADMIN — INSULIN ASPART 4 UNITS: 100 INJECTION, SOLUTION INTRAVENOUS; SUBCUTANEOUS at 12:06

## 2022-06-10 RX ADMIN — INSULIN DETEMIR 10 UNITS: 100 INJECTION, SOLUTION SUBCUTANEOUS at 09:06

## 2022-06-10 RX ADMIN — HYDROCODONE BITARTRATE AND ACETAMINOPHEN 1 TABLET: 5; 325 TABLET ORAL at 09:06

## 2022-06-10 RX ADMIN — HYDROCODONE BITARTRATE AND ACETAMINOPHEN 1 TABLET: 5; 325 TABLET ORAL at 08:06

## 2022-06-10 RX ADMIN — MUPIROCIN: 20 OINTMENT TOPICAL at 09:06

## 2022-06-10 RX ADMIN — CEFTRIAXONE 1 G: 1 INJECTION, SOLUTION INTRAVENOUS at 09:06

## 2022-06-10 RX ADMIN — HEPARIN SODIUM 22 UNITS/KG/HR: 5000 INJECTION INTRAVENOUS; SUBCUTANEOUS at 08:06

## 2022-06-10 NOTE — NURSING
Notified by Telemetry Pt had 6 beat run  V tach follow by 7 beats run V tach follow by a 14 beat  V tach run follow 5 beat run V tach Notified [facility] team 5 Md came to bedside

## 2022-06-10 NOTE — SUBJECTIVE & OBJECTIVE
Interval History: AF. HDS. Soto pulled this morning. Bilateral neph tubes draining appropriately. She does not need to void and might only void minimally due to draining of the neph tubes.    Review of Systems  Objective:     Temp:  [97.2 °F (36.2 °C)-99.8 °F (37.7 °C)] 98 °F (36.7 °C)  Pulse:  [] 86  Resp:  [16-22] 17  SpO2:  [96 %-100 %] 96 %  BP: (114-167)/(56-75) 135/68     Body mass index is 22.3 kg/m².           Drains       Drain  Duration                  Nephrostomy 05/10/22 0805 Left 8 Fr. 30 days         Urethral Catheter 06/08/22 2305 Double-lumen 20 Fr. 1 day         Nephrostomy 06/09/22 1033 Right 10 Fr. <1 day                    Physical Exam  Constitutional:       General: She is not in acute distress.     Appearance: She is not diaphoretic.   HENT:      Head: Normocephalic and atraumatic.      Nose: Nose normal.   Eyes:      Conjunctiva/sclera: Conjunctivae normal.   Cardiovascular:      Rate and Rhythm: Normal rate.   Pulmonary:      Effort: Pulmonary effort is normal. No respiratory distress.   Abdominal:      General: There is no distension.      Comments: + suprapubic tenderness   Genitourinary:     Comments: Left PCN in place draining clear yellow urine, draining well. Left PCN insertion site clean with no purulent drainage or signs of infection. There is right CVA tenderness.   Right PCN in place draining clear yellow urine, draining well. Right PCN insertion site clean with no purulent drainage or signs of infection.   Soto pulled this am.   Musculoskeletal:         General: Normal range of motion.      Cervical back: Normal range of motion.   Skin:     General: Skin is dry.   Neurological:      Mental Status: She is alert.   Psychiatric:         Behavior: Behavior normal.         Thought Content: Thought content normal.       Significant Labs:    BMP:  Recent Labs   Lab 06/07/22  1558 06/08/22  0612 06/09/22  0510   * 136 135*   K 4.2 4.5 4.5    109 110   CO2 19* 20* 17*    BUN 26* 25* 21   CREATININE 1.8* 1.7* 1.5*   CALCIUM 9.6 9.7 9.9       CBC:   Recent Labs   Lab 06/08/22  1402 06/08/22  2150 06/09/22  0510   WBC 29.19* 25.30* 26.15*  26.15*   HGB 6.2* 7.6* 7.9*  7.9*   HCT 19.9* 24.4* 25.6*  25.6*    399 429  429       All pertinent labs results from the past 24 hours have been reviewed.    Significant Imaging:  All pertinent imaging results/findings from the past 24 hours have been reviewed.

## 2022-06-10 NOTE — PROGRESS NOTES
[facility] Surg  Urology  Progress Note    Patient Name: Karoline Justice  MRN: [id number]  Admission Date: 6/8/2022  Hospital Length of Stay: 2 days  Code Status: Full Code   Attending Provider: Katey Shanks MD   Primary Care Physician: Dee Dee Oconnor MD    Subjective:     HPI:  Patient is a 75 yo F with PMH of bladder cancer, CKD3, HTN, HLD, UTI, anemia, DM, choledocholithiasis, and prior COVID (July 2021) was admitted to [facility] on June 7 with back pain, dysuria, RLE swelling. Workup revealed RLE DVT extending from the right common femoral vein to the calf veins, WBC 30, anemia to 6.2. She was started on Eliquis (last dose was 10mg Eliquis given at 10am on 6/8), Rocephin, and IV fluid, 2U pRBC given this afternoon after hgb of 6.2. Cr 1.7 (which is baseline). UA nitrite negative, 10 RBC, >100 WBC, no bacteria, 1 SEC. Patient transferred to [facility] for Hospital Medicine admission and IR consult for right nephrostomy tube placement. Prior to transfer, Eliquis is being held and she will remain NPO. She reports nausea, bilateral flank pain, dysuria, urinary incontinence. She denies fevers, chills, vomiting, gross hematuria, difficulty voiding, suprapubic pain. In the ED she is afebrile with tachycardia to 100, otherwise normal vitals on assessment and in no acute distress. Current ED labs pending. Bedside bladder scan performed showing >800cc in bladder.     CT RSS obtained 6/7/22 shows right ureteral stent in place with severe hydronephrosis. Left PCN in place with no left hydronephrosis. There is a known large bladder mass on the left posterolateral wall. consistent with history of malignancy as well as a distended bladder.  Enlarged bilateral pelvic lymph nodes.      PET CT scan obtained 6/3/22 shows irregular wall thickening with associated hypermetabolic activity at the left posterior aspect of the urinary bladder compatible with known primary neoplasm.  Enlarged hypermetabolic lymph nodes along the  bilateral pelvic sidewalls suggesting megan metastatic disease.  Multiple cell by 5 mm pulmonary nodules which are below the resolution of PET but new compared with chest CT from May of 2022.     Urologic history:  Patient originally presented to [facility] with generalized weakness, back pain, and intermittent fevers for 2 weeks with dysuria, foul-smelling urine, and confusion. Imaging showed a 6.5cm mass anterior bladder wall c/f neoplasm and severe bilateral hydronephrosis. [facility] was [facility] [place] [facility]  Underwent TURBT 5/9/22. Right RGP showed hydronephrosis, no filling defects. Right ureteral stent placed. Unable to find left UO. Underwent placement of L neph tube.  Chest CT negative  Pathology - showed show poorly differentiated carcinoma (80%) with areas of squamous differentiation (15%), osteoclast like giant cells (4%) and classic high grade papillary urothelial carcinoma (1%). The carcinoma extensively invades the muscularis propria.   Stage IIIA, T3b N0 M0  PMH: HLD, CKD 3   Cr 1.7, GFR 28.9  Plan: Will obtain bladder MRI, PET CT and bone scan to further evaluate for metastatic disease. Seeing medical oncology to discuss systemic therapy. Will refer to radiation oncology to discuss chemoRT as bladder sparing option.         Interval History: AF. HDS. Soto pulled this morning. Bilateral neph tubes draining appropriately. She does not need to void and might only void minimally due to draining of the neph tubes.    Review of Systems  Objective:     Temp:  [97.2 °F (36.2 °C)-99.8 °F (37.7 °C)] 98 °F (36.7 °C)  Pulse:  [] 86  Resp:  [16-22] 17  SpO2:  [96 %-100 %] 96 %  BP: (114-167)/(56-75) 135/68     Body mass index is 22.3 kg/m².           Drains       Drain  Duration                  Nephrostomy 05/10/22 0805 Left 8 Fr. 30 days         Urethral Catheter 06/08/22 2305 Double-lumen 20 Fr. 1 day         Nephrostomy 06/09/22 1033 Right 10 Fr. <1 day                    Physical  Exam  Constitutional:       General: She is not in acute distress.     Appearance: She is not diaphoretic.   HENT:      Head: Normocephalic and atraumatic.      Nose: Nose normal.   Eyes:      Conjunctiva/sclera: Conjunctivae normal.   Cardiovascular:      Rate and Rhythm: Normal rate.   Pulmonary:      Effort: Pulmonary effort is normal. No respiratory distress.   Abdominal:      General: There is no distension.      Comments: + suprapubic tenderness   Genitourinary:     Comments: Left PCN in place draining clear yellow urine, draining well. Left PCN insertion site clean with no purulent drainage or signs of infection. There is right CVA tenderness.   Right PCN in place draining clear yellow urine, draining well. Right PCN insertion site clean with no purulent drainage or signs of infection.   Soto pulled this am.   Musculoskeletal:         General: Normal range of motion.      Cervical back: Normal range of motion.   Skin:     General: Skin is dry.   Neurological:      Mental Status: She is alert.   Psychiatric:         Behavior: Behavior normal.         Thought Content: Thought content normal.       Significant Labs:    BMP:  Recent Labs   Lab 06/07/22  1558 06/08/22  0612 06/09/22  0510   * 136 135*   K 4.2 4.5 4.5    109 110   CO2 19* 20* 17*   BUN 26* 25* 21   CREATININE 1.8* 1.7* 1.5*   CALCIUM 9.6 9.7 9.9       CBC:   Recent Labs   Lab 06/08/22  1402 06/08/22  2150 06/09/22  0510   WBC 29.19* 25.30* 26.15*  26.15*   HGB 6.2* 7.6* 7.9*  7.9*   HCT 19.9* 24.4* 25.6*  25.6*    399 429  429       All pertinent labs results from the past 24 hours have been reviewed.    Significant Imaging:  All pertinent imaging results/findings from the past 24 hours have been reviewed.                    Assessment/Plan:     * Hydronephrosis  77 yo F with history of bladder cancer. Recently underwent TURBT and L PCN placement and right ureteral stent placement. Transferred from OSH due to DVT, anemia to  6.2, significant leukocytosis to 30, and CT showing worsening right hydronephrosis.    - Patient s/p right neph tube after failed drainage with right ureteral stent.   - She recently developed DVT and was started on Eliquis. The Eliquis is being held (last dose was 10mg Eliquis given at 10am on 6/8) and she is being started on Heparin gtt.  - Soto pulled this am. She does not need to to undergo voiding trial due to b/l neph tubes draining appropriately.  - Urine culture NG  - Agree with empiric antibiotics.  - F/u labs from this am   - Recommend to transfuse with prbc if necessary  - Rest of care per primary.  - She needs to f/u with her multi-disciplinary team and I will message Sandra Salgado to reschedule appointments        VTE Risk Mitigation (From admission, onward)         Ordered     heparin 25,000 units in dextrose 5% (100 units/ml) IV bolus from bag - ADDITIONAL PRN BOLUS - 60 units/kg  As needed (PRN)        Question:  Heparin Infusion Adjustment (DO NOT MODIFY ANSWER)  Answer:  [email or web address]\epic\Images\Pharmacy\HeparinInfusions\heparin HIGH INTENSITY nomogram for [facility] [id number]    06/08/22 2124     heparin 25,000 units in dextrose 5% (100 units/ml) IV bolus from bag - ADDITIONAL PRN BOLUS - 30 units/kg  As needed (PRN)        Question:  Heparin Infusion Adjustment (DO NOT MODIFY ANSWER)  Answer:  [email or web address]\epic\Images\Pharmacy\HeparinInfusions\heparin HIGH INTENSITY nomogram for [facility] [id number]    06/08/22 2124     heparin 25,000 units in dextrose 5% 250 mL (100 units/mL) infusion HIGH INTENSITY nomogram - [facility]  Continuous        Question Answer Comment   Heparin Infusion Adjustment (DO NOT MODIFY ANSWER) [email or web address]\epic\Images\Pharmacy\HeparinInfusions\heparin HIGH INTENSITY nomogram for [facility] [id number]    Begin at (in units/kg/hr) 18        06/08/22 2124                Mario Martin MD  Urology  [facility] - Med Surg

## 2022-06-11 LAB
ALBUMIN SERPL BCP-MCNC: 1.9 G/DL (ref 3.5–5.2)
ALP SERPL-CCNC: 195 U/L (ref 55–135)
ALT SERPL W/O P-5'-P-CCNC: 12 U/L (ref 10–44)
ANION GAP SERPL CALC-SCNC: 9 MMOL/L (ref 8–16)
ANISOCYTOSIS BLD QL SMEAR: SLIGHT
ANISOCYTOSIS BLD QL SMEAR: SLIGHT
APTT BLDCRRT: 34.5 SEC (ref 21–32)
AST SERPL-CCNC: 11 U/L (ref 10–40)
BASOPHILS # BLD AUTO: 0.07 K/UL (ref 0–0.2)
BASOPHILS # BLD AUTO: 0.07 K/UL (ref 0–0.2)
BASOPHILS NFR BLD: 0.3 % (ref 0–1.9)
BASOPHILS NFR BLD: 0.3 % (ref 0–1.9)
BILIRUB SERPL-MCNC: 0.4 MG/DL (ref 0.1–1)
BUN SERPL-MCNC: 15 MG/DL (ref 8–23)
CALCIUM SERPL-MCNC: 10.7 MG/DL (ref 8.7–10.5)
CHLORIDE SERPL-SCNC: 105 MMOL/L (ref 95–110)
CO2 SERPL-SCNC: 17 MMOL/L (ref 23–29)
CREAT SERPL-MCNC: 1.3 MG/DL (ref 0.5–1.4)
DIFFERENTIAL METHOD: ABNORMAL
DIFFERENTIAL METHOD: ABNORMAL
EOSINOPHIL # BLD AUTO: 0.5 K/UL (ref 0–0.5)
EOSINOPHIL # BLD AUTO: 0.5 K/UL (ref 0–0.5)
EOSINOPHIL NFR BLD: 1.8 % (ref 0–8)
EOSINOPHIL NFR BLD: 1.8 % (ref 0–8)
ERYTHROCYTE [DISTWIDTH] IN BLOOD BY AUTOMATED COUNT: 15.6 % (ref 11.5–14.5)
ERYTHROCYTE [DISTWIDTH] IN BLOOD BY AUTOMATED COUNT: 15.6 % (ref 11.5–14.5)
EST. GFR  (AFRICAN AMERICAN): 46 ML/MIN/1.73 M^2
EST. GFR  (NON AFRICAN AMERICAN): 39.9 ML/MIN/1.73 M^2
GLUCOSE SERPL-MCNC: 131 MG/DL (ref 70–110)
HCT VFR BLD AUTO: 24.7 % (ref 37–48.5)
HCT VFR BLD AUTO: 24.7 % (ref 37–48.5)
HGB BLD-MCNC: 7.8 G/DL (ref 12–16)
HGB BLD-MCNC: 7.8 G/DL (ref 12–16)
IMM GRANULOCYTES # BLD AUTO: 0.16 K/UL (ref 0–0.04)
IMM GRANULOCYTES # BLD AUTO: 0.16 K/UL (ref 0–0.04)
IMM GRANULOCYTES NFR BLD AUTO: 0.6 % (ref 0–0.5)
IMM GRANULOCYTES NFR BLD AUTO: 0.6 % (ref 0–0.5)
LYMPHOCYTES # BLD AUTO: 1.3 K/UL (ref 1–4.8)
LYMPHOCYTES # BLD AUTO: 1.3 K/UL (ref 1–4.8)
LYMPHOCYTES NFR BLD: 4.7 % (ref 18–48)
LYMPHOCYTES NFR BLD: 4.7 % (ref 18–48)
MAGNESIUM SERPL-MCNC: 1.7 MG/DL (ref 1.6–2.6)
MCH RBC QN AUTO: 27.5 PG (ref 27–31)
MCH RBC QN AUTO: 27.5 PG (ref 27–31)
MCHC RBC AUTO-ENTMCNC: 31.6 G/DL (ref 32–36)
MCHC RBC AUTO-ENTMCNC: 31.6 G/DL (ref 32–36)
MCV RBC AUTO: 87 FL (ref 82–98)
MCV RBC AUTO: 87 FL (ref 82–98)
MONOCYTES # BLD AUTO: 1.3 K/UL (ref 0.3–1)
MONOCYTES # BLD AUTO: 1.3 K/UL (ref 0.3–1)
MONOCYTES NFR BLD: 4.7 % (ref 4–15)
MONOCYTES NFR BLD: 4.7 % (ref 4–15)
NEUTROPHILS # BLD AUTO: 23.5 K/UL (ref 1.8–7.7)
NEUTROPHILS # BLD AUTO: 23.5 K/UL (ref 1.8–7.7)
NEUTROPHILS NFR BLD: 87.9 % (ref 38–73)
NEUTROPHILS NFR BLD: 87.9 % (ref 38–73)
NRBC BLD-RTO: 0 /100 WBC
NRBC BLD-RTO: 0 /100 WBC
OVALOCYTES BLD QL SMEAR: ABNORMAL
OVALOCYTES BLD QL SMEAR: ABNORMAL
PLATELET # BLD AUTO: 486 K/UL (ref 150–450)
PLATELET # BLD AUTO: 486 K/UL (ref 150–450)
PMV BLD AUTO: 9.9 FL (ref 9.2–12.9)
PMV BLD AUTO: 9.9 FL (ref 9.2–12.9)
POCT GLUCOSE: 124 MG/DL (ref 70–110)
POCT GLUCOSE: 146 MG/DL (ref 70–110)
POCT GLUCOSE: 175 MG/DL (ref 70–110)
POCT GLUCOSE: 181 MG/DL (ref 70–110)
POCT GLUCOSE: 288 MG/DL (ref 70–110)
POIKILOCYTOSIS BLD QL SMEAR: SLIGHT
POIKILOCYTOSIS BLD QL SMEAR: SLIGHT
POLYCHROMASIA BLD QL SMEAR: ABNORMAL
POLYCHROMASIA BLD QL SMEAR: ABNORMAL
POTASSIUM SERPL-SCNC: 4.4 MMOL/L (ref 3.5–5.1)
PROT SERPL-MCNC: 6.5 G/DL (ref 6–8.4)
RBC # BLD AUTO: 2.84 M/UL (ref 4–5.4)
RBC # BLD AUTO: 2.84 M/UL (ref 4–5.4)
SODIUM SERPL-SCNC: 131 MMOL/L (ref 136–145)
WBC # BLD AUTO: 26.77 K/UL (ref 3.9–12.7)
WBC # BLD AUTO: 26.77 K/UL (ref 3.9–12.7)

## 2022-06-11 PROCEDURE — 85025 COMPLETE CBC W/AUTO DIFF WBC: CPT | Performed by: INTERNAL MEDICINE

## 2022-06-11 PROCEDURE — 36415 COLL VENOUS BLD VENIPUNCTURE: CPT | Performed by: INTERNAL MEDICINE

## 2022-06-11 PROCEDURE — 83735 ASSAY OF MAGNESIUM: CPT | Performed by: INTERNAL MEDICINE

## 2022-06-11 PROCEDURE — 25000003 PHARM REV CODE 250: Performed by: STUDENT IN AN ORGANIZED HEALTH CARE EDUCATION/TRAINING PROGRAM

## 2022-06-11 PROCEDURE — 63600175 PHARM REV CODE 636 W HCPCS: Performed by: INTERNAL MEDICINE

## 2022-06-11 PROCEDURE — 11000001 HC ACUTE MED/SURG PRIVATE ROOM

## 2022-06-11 PROCEDURE — 80053 COMPREHEN METABOLIC PANEL: CPT | Performed by: INTERNAL MEDICINE

## 2022-06-11 PROCEDURE — 25000003 PHARM REV CODE 250: Performed by: INTERNAL MEDICINE

## 2022-06-11 PROCEDURE — 85730 THROMBOPLASTIN TIME PARTIAL: CPT | Performed by: STUDENT IN AN ORGANIZED HEALTH CARE EDUCATION/TRAINING PROGRAM

## 2022-06-11 RX ORDER — ACETAMINOPHEN 325 MG/1
650 TABLET ORAL EVERY 6 HOURS PRN
Status: DISCONTINUED | OUTPATIENT
Start: 2022-06-11 | End: 2022-06-15 | Stop reason: HOSPADM

## 2022-06-11 RX ORDER — FLUCONAZOLE 150 MG/1
150 TABLET ORAL ONCE
Status: COMPLETED | OUTPATIENT
Start: 2022-06-11 | End: 2022-06-11

## 2022-06-11 RX ADMIN — INSULIN ASPART 3 UNITS: 100 INJECTION, SOLUTION INTRAVENOUS; SUBCUTANEOUS at 12:06

## 2022-06-11 RX ADMIN — CEFTRIAXONE 1 G: 1 INJECTION, SOLUTION INTRAVENOUS at 10:06

## 2022-06-11 RX ADMIN — MUPIROCIN: 20 OINTMENT TOPICAL at 09:06

## 2022-06-11 RX ADMIN — HEPARIN SODIUM 26 UNITS/KG/HR: 5000 INJECTION INTRAVENOUS; SUBCUTANEOUS at 12:06

## 2022-06-11 RX ADMIN — ACETAMINOPHEN 650 MG: 325 TABLET ORAL at 04:06

## 2022-06-11 RX ADMIN — APIXABAN 10 MG: 5 TABLET, FILM COATED ORAL at 09:06

## 2022-06-11 RX ADMIN — INSULIN DETEMIR 10 UNITS: 100 INJECTION, SOLUTION SUBCUTANEOUS at 09:06

## 2022-06-11 RX ADMIN — FLUCONAZOLE 150 MG: 150 TABLET ORAL at 05:06

## 2022-06-11 RX ADMIN — HYDROCODONE BITARTRATE AND ACETAMINOPHEN 1 TABLET: 5; 325 TABLET ORAL at 01:06

## 2022-06-11 NOTE — ASSESSMENT & PLAN NOTE
On CT scan  Urology consulted, will see appreciate recs  Seen by IR  R nephrostomy tube placed 6/9    Per Urology:  Patient s/p right neph tube after failed drainage with right ureteral stent.   - She recently developed DVT and was started on Eliquis. The Eliquis is being held (last dose was 10mg Eliquis given at 10am on 6/8) and she is being started on Heparin gtt.  - Soto pulled this am. She does not need to to undergo voiding trial due to b/l neph tubes draining appropriately.  - Urine culture NG  - Agree with empiric antibiotics.  - F/u labs from this am   - Recommend to transfuse with prbc if necessary  - Rest of care per primary.  - She needs to f/u with her multi-disciplinary team and I will message Sandra Salgado to reschedule appointments

## 2022-06-11 NOTE — SUBJECTIVE & OBJECTIVE
Interval Hx:  Patient complaining of low back pain in the setting of the bilateral nephrostomy tubes being placed.  Otherwise she feels okay.  Lymphatic that she does not want chemo Thera be but is considering radiation however would like to be closer to [facility] where she lives because it is 2 hours away    Review of Systems   Constitutional:  Negative for activity change, appetite change, chills and fever.   HENT:  Negative for congestion, hearing loss and rhinorrhea.    Eyes:  Negative for discharge, itching and visual disturbance.   Respiratory:  Negative for apnea, cough and shortness of breath.    Cardiovascular:  Negative for chest pain, palpitations and leg swelling.   Gastrointestinal:  Negative for abdominal distention, abdominal pain, constipation, diarrhea, nausea and vomiting.   Endocrine: Negative for cold intolerance and heat intolerance.   Genitourinary:  Positive for dysuria. Negative for hematuria.   Musculoskeletal:  Positive for back pain. Negative for neck pain and neck stiffness.   Skin:  Negative for rash and wound.   Neurological:  Negative for dizziness, seizures, light-headedness and headaches.   Psychiatric/Behavioral:  Negative for agitation, confusion and suicidal ideas.    Objective:     Vital Signs (Most Recent):  Temp: 100 °F (37.8 °C) (06/11/22 1612)  Pulse: 91 (06/11/22 1543)  Resp: 16 (06/11/22 1543)  BP: (!) 109/53 (06/11/22 1543)  SpO2: 96 % (06/11/22 1543)   Vital Signs (24h Range):  Temp:  [98.3 °F (36.8 °C)-100.9 °F (38.3 °C)] 100 °F (37.8 °C)  Pulse:  [] 91  Resp:  [16-18] 16  SpO2:  [95 %-98 %] 96 %  BP: (109-164)/(53-67) 109/53     Weight: 53.5 kg (118 lb)  Body mass index is 22.3 kg/m².      GENERAL:  No apparent distress. Alert and oriented times three  EYES:  EOMI. Conjunctivae intact  ENT:  Posterior pharynx clear  NECK:  Supple with no lymphadenopathy or thyromegaly  LUNGS:  No respiratory distress. Clear to auscultation bilaterally with good air  movement  CARDIAC:  RRR without murmur, rub or gallop  ABDOMEN:  diffusely tender, nephrostomy tubes in place   EXTREMITIES:  Peripheral pulses are 2+. Hands and feet are warm. No cyanosis or edema           Significant labs reviewed    Significant Imaging: I have reviewed all pertinent imaging results/findings within the past 24 hours.

## 2022-06-11 NOTE — RESPIRATORY THERAPY
RAPID RESPONSE RESPIRATORY CHART CHECK       Chart check completed, call [phone] for further concerns or assistance.

## 2022-06-11 NOTE — ASSESSMENT & PLAN NOTE
Urology consulted, chronic, continue to monitor  Patient will need outpatient urology and rad onc follow up as there is significant concern for malignancy. Wishes to follow up close to home

## 2022-06-11 NOTE — PROGRESS NOTES
"[facility] Medicine  Progress Note    Patient Name: Karoline Justice  MRN: [id number]  Patient Class: IP- Inpatient   Admission Date: 6/8/2022  Length of Stay: 3 days  Attending Physician: Katey Shanks MD  Primary Care Provider: Dee Dee Oconnor MD        Subjective:     Principal Problem:Hydronephrosis        HPI:  75 yo female with bladder cancer w/ urinary obstruction s/p right sided stenting and left nephrostomy tube placement, HTN, anemia, DM presenting for back pain, found to have right hydronephrosis. Also with UTI symptoms, found to have UTI, started on rocephin. Noted also to have anemia, given pRBCs as well. She was transferred to [facility] for further eval by urology.     Per  note,  "76-year-old female with a history of bladder cancer with urinary obstruction (right-sided ureteral stenting and left-sided nephrostomy tube placement), hypercalcemia, hypertension, urinary tract infection, anemia, diabetes, choledocholithiasis, and prior COVID (July 2021) admitted to [facility] on June 7 with back pain.  She reported burning with urination and right lower extremity swelling.  She had no chest pain or dyspnea.  She was found to have leukocytosis.  Lower extremity ultrasound showed right lower extremity DVT.  She was started on apixaban, Rocephin, and IV fluid.  Packed red blood cells were ordered for her anemia.  Referring provider noted patient has mild edema in her right leg but it is intact from a neurovascular standpoint.  Her urine output and her ostomy output grossly appeared fairly normal today.  She remains hemodynamically stable.  Case discussed with Urology and Interventional Radiology at [facility].  Recommendation is to move patient urgently to [facility] for further evaluation and treatment.  Will transfer to Hospital Medicine service at [facility].  On arrival, will contact Urology and Interventional Radiology.  I spoke with the referring provider, " "and they will hold apixaban and have the patient in NPO status for now.     She had transurethral section of bladder tumor with right ureteral stent placement on May 9. She had left nephrostomy tube placement May 10. Pathology showed poorly differentiated carcinoma with areas of squamous differentiation, osteoclast like GI cells, and classic high-grade papillary     June 8:  White blood cells 28.07, hemoglobin 6.7, hematocrit 21.8, platelets 373, sodium 136, potassium 4.5, chloride 109, CO2 20, BUN 25, creatinine 1.7, glucose 190, total bilirubin 0.9, AST 47, ALT 37, magnesium 2     Repeat CBC at white blood cells 29.19, hemoglobin 6.2, hematocrit 19.9, platelets 376     June 7:  Blood cultures no growth to date  COVID negative, white blood cells 29.35, hemoglobin 6.9, hematocrit 22, platelets 380, sodium 132, potassium 4.2, chloride 104, CO2 19, BUN 26, creatinine 1.8  Urinalysis with 2+ protein, 2+ glucose, 2+ blood, 3+ leukocytes, 10 RBC, greater than 100 WBC  -CT renal stone protocol had right ureteral stent in place with chronic stable moderate right hydronephrosis.  Left-sided percutaneous nephrostomy tube in place with no hydronephrosis.  Large bladder mass consistent with history of malignancy.  Enlarged bilateral pelvic lymph node consistent with metastatic disease.  No acute finding.  -Right lower extremity ultrasound showed DVT extending from the right common femoral vein to the calf veins.     [place] 3:  PET CT scan had irregular wall thickening with associated hypermetabolic activity at the left posterior aspect of the urinary bladder compatible with known primary neoplasm.  Enlarged hypermetabolic lymph nodes along the bilateral pelvic sidewalls suggesting megan metastatic disease.  Multiple cell by 5 mm pulmonary nodules which are below the resolution of PET but new compared with chest CT from May of 2022. "      Overview/Hospital Course:  No notes on file    Interval Hx:  Patient complaining of low " back pain in the setting of the bilateral nephrostomy tubes being placed.  Otherwise she feels okay.  Pain is poorly controlled    Review of Systems   Constitutional:  Negative for activity change, appetite change, chills and fever.   HENT:  Negative for congestion, hearing loss and rhinorrhea.    Eyes:  Negative for discharge, itching and visual disturbance.   Respiratory:  Negative for apnea, cough and shortness of breath.    Cardiovascular:  Negative for chest pain, palpitations and leg swelling.   Gastrointestinal:  Negative for abdominal distention, abdominal pain, constipation, diarrhea, nausea and vomiting.   Endocrine: Negative for cold intolerance and heat intolerance.   Genitourinary:  Positive for dysuria. Negative for hematuria.   Musculoskeletal:  Positive for back pain. Negative for neck pain and neck stiffness.   Skin:  Negative for rash and wound.   Neurological:  Negative for dizziness, seizures, light-headedness and headaches.   Psychiatric/Behavioral:  Negative for agitation, confusion and suicidal ideas.    Objective:     Vital Signs (Most Recent):  Temp: 100 °F (37.8 °C) (06/11/22 1612)  Pulse: 91 (06/11/22 1543)  Resp: 16 (06/11/22 1543)  BP: (!) 109/53 (06/11/22 1543)  SpO2: 96 % (06/11/22 1543)   Vital Signs (24h Range):  Temp:  [98.3 °F (36.8 °C)-100.9 °F (38.3 °C)] 100 °F (37.8 °C)  Pulse:  [] 91  Resp:  [16-18] 16  SpO2:  [95 %-98 %] 96 %  BP: (109-164)/(53-67) 109/53     Weight: 53.5 kg (118 lb)  Body mass index is 22.3 kg/m².      GENERAL:  No apparent distress. Alert and oriented times three  EYES:  EOMI. Conjunctivae intact  ENT:  Posterior pharynx clear  NECK:  Supple with no lymphadenopathy or thyromegaly  LUNGS:  No respiratory distress. Clear to auscultation bilaterally with good air movement  CARDIAC:  RRR without murmur, rub or gallop  ABDOMEN:  diffusely tender, bilateral nephrostomy tubes in place   EXTREMITIES:  Peripheral pulses are 2+. Hands and feet are warm. No  cyanosis or edema           Significant labs reviewed    Significant Imaging: I have reviewed all pertinent imaging results/findings within the past 24 hours.        Assessment/Plan:      * Hydronephrosis  On CT scan  Urology consulted, will see appreciate recs  Seen by IRA MONTENEGRO nephrostomy tube placed 6/9    Per Urology:  Patient s/p right neph tube after failed drainage with right ureteral stent.   - She recently developed DVT and was started on Eliquis. The Eliquis is being held (last dose was 10mg Eliquis given at 10am on 6/8) and she is being started on Heparin gtt.  - Soto pulled this am. She does not need to to undergo voiding trial due to b/l neph tubes draining appropriately.  - Urine culture NG  - Agree with empiric antibiotics.  - F/u labs from this am   - Recommend to transfuse with prbc if necessary  - Rest of care per primary.  - She needs to f/u with her multi-disciplinary team and I will message Sandra Salgado to reschedule appointments      Leukocytosis  Likely from UTI, but also chronically elevated  Will continue to monitor closely      Urinary tract infection without hematuria  Continue rocephin for UTI  (start day 6/9, will treat for 3 days as urine cx has NGTD.   Restart if febrile or symptomatic   Monitor closely  Does have multiple sites for infection      Acute deep vein thrombosis (DVT) of proximal vein of right lower extremity  Noted at outside hospital  On heparin drip 2/2 due to procedure, now will transition to eliquis and patient can f/u outpatient with heme. Suspect it is related to hypercoagulable state in setting of malignancy       Anemia  Replaced at outside hospital, likely from hematuria  Type and screen, on heparin drip  Consent form signed by author in ED      HTN (hypertension)  Chronic, controlled, reportedly not taking amlodipine      Controlled Type 2 diabetes mellitus  On 20 units levemir at home  Will reduced to 10 while NPO and not eating  SSI and accuchecks  Lab Results    Component Value Date    HGBA1C 6.9 (H) 05/06/2022           Bladder mass  Urology consulted, chronic, continue to monitor  Patient will need outpatient urology and rad onc follow up as there is significant concern for malignancy. Wishes to follow up close to home    Per radiation oncology patient should follow up [facility] with systemic therapy of Keytrada. Outpatient follow up has been arranged and would be about 3-4 weeks of treatment      VTE Risk Mitigation (From admission, onward)         Ordered     apixaban tablet 5 mg  2 times daily         06/11/22 1603                Discharge Planning   GET: 6/11/2022     Code Status: Full Code   Is the patient medically ready for discharge?:     Reason for patient still in hospital (select all that apply): Treatment  Discharge Plan A: Home Health   Discharge Delays: None known at this time              Katey Shanks MD  Department of Hospital Medicine   [facility] Surg

## 2022-06-11 NOTE — SUBJECTIVE & OBJECTIVE
Interval Hx:  Patient complaining of low back pain in the setting of the bilateral nephrostomy tubes being placed.  Otherwise she feels okay    Review of Systems   Constitutional:  Negative for activity change, appetite change, chills and fever.   HENT:  Negative for congestion, hearing loss and rhinorrhea.    Eyes:  Negative for discharge, itching and visual disturbance.   Respiratory:  Negative for apnea, cough and shortness of breath.    Cardiovascular:  Negative for chest pain, palpitations and leg swelling.   Gastrointestinal:  Negative for abdominal distention, abdominal pain, constipation, diarrhea, nausea and vomiting.   Endocrine: Negative for cold intolerance and heat intolerance.   Genitourinary:  Positive for dysuria. Negative for hematuria.   Musculoskeletal:  Positive for back pain. Negative for neck pain and neck stiffness.   Skin:  Negative for rash and wound.   Neurological:  Negative for dizziness, seizures, light-headedness and headaches.   Psychiatric/Behavioral:  Negative for agitation, confusion and suicidal ideas.    Objective:     Vital Signs (Most Recent):  Temp: 100 °F (37.8 °C) (06/11/22 1543)  Pulse: 91 (06/11/22 1543)  Resp: 16 (06/11/22 1543)  BP: (!) 109/53 (06/11/22 1543)  SpO2: 96 % (06/11/22 1543)   Vital Signs (24h Range):  Temp:  [98.3 °F (36.8 °C)-100.9 °F (38.3 °C)] 100 °F (37.8 °C)  Pulse:  [] 91  Resp:  [16-18] 16  SpO2:  [95 %-98 %] 96 %  BP: (109-164)/(53-67) 109/53     Weight: 53.5 kg (118 lb)  Body mass index is 22.3 kg/m².      GENERAL:  No apparent distress. Alert and oriented times three  EYES:  EOMI. Conjunctivae intact  ENT:  Posterior pharynx clear  NECK:  Supple with no lymphadenopathy or thyromegaly  LUNGS:  No respiratory distress. Clear to auscultation bilaterally with good air movement  CARDIAC:  RRR without murmur, rub or gallop  ABDOMEN:  diffusely tender, bilateral nephrostomy tubes in place   EXTREMITIES:  Peripheral pulses are 2+. Hands and feet are  warm. No cyanosis or edema           Significant labs reviewed    Significant Imaging: I have reviewed all pertinent imaging results/findings within the past 24 hours.

## 2022-06-11 NOTE — ASSESSMENT & PLAN NOTE
Urology consulted, chronic, continue to monitor  Patient will need outpatient urology and rad onc follow up as there is significant concern for malignancy. Wishes to follow up close to home    Per radiation oncology patient should follow up [facility] with systemic therapy of Keytrada. Outpatient follow up has been arranged and would be about 3-4 weeks of treatment

## 2022-06-11 NOTE — ASSESSMENT & PLAN NOTE
Noted at outside hospital  On heparin drip 2/2 due to procedure, now will transition to eliquis and patient can f/u outpatient with heme. Suspect it is related to hypercoagulable state in setting of malignancy

## 2022-06-11 NOTE — AI DETERIORATION ALERT
"RAPID RESPONSE NURSE AI ALERT       AI alert received.    Chart Reviewed: 06/11/2022, 1:24 PM    MRN: [id number]  Bed: 604/604 A    Dx: Hydronephrosis    Karoline Justice has a past medical history of Bladder cancer, Bladder mass, Choledocholithiasis, Diabetes mellitus, and Pneumonia due to COVID-19 virus.    Last VS: /63 (BP Location: Left arm, Patient Position: Lying)   Pulse 93   Temp 98.8 °F (37.1 °C) (Oral)   Resp 16   Ht 5' 1" (1.549 m)   Wt 53.5 kg (118 lb)   SpO2 95%   Breastfeeding No   BMI 22.30 kg/m²     24H Vital Sign Range:  Temp:  [98.3 °F (36.8 °C)-100.9 °F (38.3 °C)]   Pulse:  []   Resp:  [16-18]   BP: (131-164)/(60-67)   SpO2:  [95 %-100 %]     Level of Consciousness (AVPU): alert    Recent Labs     06/09/22  0510 06/10/22  0634 06/11/22  0613   WBC 26.15*  26.15* 24.24*  24.24* 26.77*  26.77*   HGB 7.9*  7.9* 7.6*  7.6* 7.8*  7.8*   HCT 25.6*  25.6* 24.2*  24.2* 24.7*  24.7*     429 447  447 486*  486*       Recent Labs     06/09/22  0510 06/10/22  0634 06/11/22  0613   * 131* 131*   K 4.5 4.3 4.4    107 105   CO2 17* 16* 17*   CREATININE 1.5* 1.2 1.3   * 151* 131*   MG 1.6 1.7 1.7        No results for input(s): PH, PCO2, PO2, HCO3, POCSATURATED, BE in the last 72 hours.     OXYGEN:  Flow (L/min): 2     O2 Device (Oxygen Therapy): room air    MEWS score: 2    bedside Shun WHITMORE contacted for Sepsis Alert. VSS. Pt currently on ABX. No concerns verbalized at this time. Instructed to call [phone] for further concerns or assistance.    Cristel Painting RN        "

## 2022-06-11 NOTE — ASSESSMENT & PLAN NOTE
Continue rocephin for UTI  (start day 6/9, will treat for 3 days as urine cx has NGTD.   Restart if febrile or symptomatic   Monitor closely  Does have multiple sites for infection

## 2022-06-11 NOTE — PLAN OF CARE
Problem: Adult Inpatient Plan of Care  Goal: Plan of Care Review  Outcome: Ongoing, Progressing  Goal: Patient-Specific Goal (Individualized)  Outcome: Ongoing, Progressing  Goal: Absence of Hospital-Acquired Illness or Injury  Outcome: Ongoing, Progressing  Goal: Optimal Comfort and Wellbeing  Outcome: Ongoing, Progressing  Goal: Readiness for Transition of Care  Outcome: Ongoing, Progressing     Problem: Adult Inpatient Plan of Care  Goal: Readiness for Transition of Care  Outcome: Ongoing, Progressing     Problem: Diabetes Comorbidity  Goal: Blood Glucose Level Within Targeted Range  Outcome: Ongoing, Progressing     Problem: Adult Inpatient Plan of Care  Goal: Optimal Comfort and Wellbeing  Outcome: Ongoing, Progressing

## 2022-06-11 NOTE — PROGRESS NOTES
"[facility] Medicine  Progress Note    Patient Name: Karoline Justice  MRN: [id number]  Patient Class: IP- Inpatient   Admission Date: 6/8/2022  Length of Stay: 3 days  Attending Physician: Katey Shanks MD  Primary Care Provider: Dee Dee Oconnor MD        Subjective:     Principal Problem:Hydronephrosis        HPI:  75 yo female with bladder cancer w/ urinary obstruction s/p right sided stenting and left nephrostomy tube placement, HTN, anemia, DM presenting for back pain, found to have right hydronephrosis. Also with UTI symptoms, found to have UTI, started on rocephin. Noted also to have anemia, given pRBCs as well. She was transferred to [facility] for further eval by urology.     Per  note,  "76-year-old female with a history of bladder cancer with urinary obstruction (right-sided ureteral stenting and left-sided nephrostomy tube placement), hypercalcemia, hypertension, urinary tract infection, anemia, diabetes, choledocholithiasis, and prior COVID (July 2021) admitted to [facility] on June 7 with back pain.  She reported burning with urination and right lower extremity swelling.  She had no chest pain or dyspnea.  She was found to have leukocytosis.  Lower extremity ultrasound showed right lower extremity DVT.  She was started on apixaban, Rocephin, and IV fluid.  Packed red blood cells were ordered for her anemia.  Referring provider noted patient has mild edema in her right leg but it is intact from a neurovascular standpoint.  Her urine output and her ostomy output grossly appeared fairly normal today.  She remains hemodynamically stable.  Case discussed with Urology and Interventional Radiology at [facility].  Recommendation is to move patient urgently to [facility] for further evaluation and treatment.  Will transfer to Hospital Medicine service at [facility].  On arrival, will contact Urology and Interventional Radiology.  I spoke with the referring provider, " "and they will hold apixaban and have the patient in NPO status for now.     She had transurethral section of bladder tumor with right ureteral stent placement on May 9. She had left nephrostomy tube placement May 10. Pathology showed poorly differentiated carcinoma with areas of squamous differentiation, osteoclast like GI cells, and classic high-grade papillary     June 8:  White blood cells 28.07, hemoglobin 6.7, hematocrit 21.8, platelets 373, sodium 136, potassium 4.5, chloride 109, CO2 20, BUN 25, creatinine 1.7, glucose 190, total bilirubin 0.9, AST 47, ALT 37, magnesium 2     Repeat CBC at white blood cells 29.19, hemoglobin 6.2, hematocrit 19.9, platelets 376     June 7:  Blood cultures no growth to date  COVID negative, white blood cells 29.35, hemoglobin 6.9, hematocrit 22, platelets 380, sodium 132, potassium 4.2, chloride 104, CO2 19, BUN 26, creatinine 1.8  Urinalysis with 2+ protein, 2+ glucose, 2+ blood, 3+ leukocytes, 10 RBC, greater than 100 WBC  -CT renal stone protocol had right ureteral stent in place with chronic stable moderate right hydronephrosis.  Left-sided percutaneous nephrostomy tube in place with no hydronephrosis.  Large bladder mass consistent with history of malignancy.  Enlarged bilateral pelvic lymph node consistent with metastatic disease.  No acute finding.  -Right lower extremity ultrasound showed DVT extending from the right common femoral vein to the calf veins.     [place] 3:  PET CT scan had irregular wall thickening with associated hypermetabolic activity at the left posterior aspect of the urinary bladder compatible with known primary neoplasm.  Enlarged hypermetabolic lymph nodes along the bilateral pelvic sidewalls suggesting megan metastatic disease.  Multiple cell by 5 mm pulmonary nodules which are below the resolution of PET but new compared with chest CT from May of 2022. "      Overview/Hospital Course:  No notes on file    Interval Hx:  Patient complaining of low " back pain in the setting of the bilateral nephrostomy tubes being placed.  Otherwise she feels okay    Review of Systems   Constitutional:  Negative for activity change, appetite change, chills and fever.   HENT:  Negative for congestion, hearing loss and rhinorrhea.    Eyes:  Negative for discharge, itching and visual disturbance.   Respiratory:  Negative for apnea, cough and shortness of breath.    Cardiovascular:  Negative for chest pain, palpitations and leg swelling.   Gastrointestinal:  Negative for abdominal distention, abdominal pain, constipation, diarrhea, nausea and vomiting.   Endocrine: Negative for cold intolerance and heat intolerance.   Genitourinary:  Positive for dysuria. Negative for hematuria.   Musculoskeletal:  Positive for back pain. Negative for neck pain and neck stiffness.   Skin:  Negative for rash and wound.   Neurological:  Negative for dizziness, seizures, light-headedness and headaches.   Psychiatric/Behavioral:  Negative for agitation, confusion and suicidal ideas.    Objective:     Vital Signs (Most Recent):  Temp: 100 °F (37.8 °C) (06/11/22 1543)  Pulse: 91 (06/11/22 1543)  Resp: 16 (06/11/22 1543)  BP: (!) 109/53 (06/11/22 1543)  SpO2: 96 % (06/11/22 1543)   Vital Signs (24h Range):  Temp:  [98.3 °F (36.8 °C)-100.9 °F (38.3 °C)] 100 °F (37.8 °C)  Pulse:  [] 91  Resp:  [16-18] 16  SpO2:  [95 %-98 %] 96 %  BP: (109-164)/(53-67) 109/53     Weight: 53.5 kg (118 lb)  Body mass index is 22.3 kg/m².      GENERAL:  No apparent distress. Alert and oriented times three  EYES:  EOMI. Conjunctivae intact  ENT:  Posterior pharynx clear  NECK:  Supple with no lymphadenopathy or thyromegaly  LUNGS:  No respiratory distress. Clear to auscultation bilaterally with good air movement  CARDIAC:  RRR without murmur, rub or gallop  ABDOMEN:  diffusely tender, bilateral nephrostomy tubes in place   EXTREMITIES:  Peripheral pulses are 2+. Hands and feet are warm. No cyanosis or edema            Significant labs reviewed    Significant Imaging: I have reviewed all pertinent imaging results/findings within the past 24 hours.      Assessment/Plan:      * Hydronephrosis  On CT scan  Urology consulted, will see appreciate recs  Seen by IRA MONTENEGRO nephrostomy tube placed 6/9    Per Urology:  Patient s/p right neph tube after failed drainage with right ureteral stent.   - She recently developed DVT and was started on Eliquis. The Eliquis is being held (last dose was 10mg Eliquis given at 10am on 6/8) and she is being started on Heparin gtt.  - Soto pulled this am. She does not need to to undergo voiding trial due to b/l neph tubes draining appropriately.  - Urine culture NG  - Agree with empiric antibiotics.  - F/u labs from this am   - Recommend to transfuse with prbc if necessary  - Rest of care per primary.  - She needs to f/u with her multi-disciplinary team and I will message Sandra Salgado to reschedule appointments      Leukocytosis  Likely from UTI, but also chronically elevated  Will continue to monitor closely      Urinary tract infection without hematuria  Continue rocephin for UTI  (start day 6/9, will treat for 3 days as urine cx has NGTD.   Restart if febrile or symptomatic   Monitor closely  Does have multiple sites for infection      Acute deep vein thrombosis (DVT) of proximal vein of right lower extremity  Noted at outside hospital  On heparin drip 2/2 due to procedure, now will transition to eliquis and patient can f/u outpatient with heme. Suspect it is related to hypercoagulable state in setting of malignancy       Anemia  Replaced at outside hospital, likely from hematuria  Type and screen, on heparin drip  Consent form signed by author in ED      HTN (hypertension)  Chronic, controlled, reportedly not taking amlodipine      Controlled Type 2 diabetes mellitus  On 20 units levemir at home  Will reduced to 10 while NPO and not eating  SSI and accuchecks  Lab Results   Component Value Date     HGBA1C 6.9 (H) 05/06/2022           Bladder mass  Urology consulted, chronic, continue to monitor  Patient will need outpatient urology and rad onc follow up as there is significant concern for malignancy. Wishes to follow up close to home        VTE Risk Mitigation (From admission, onward)         Ordered     apixaban tablet 5 mg  2 times daily         06/11/22 1603                Discharge Planning   GET: 6/11/2022     Code Status: Full Code   Is the patient medically ready for discharge?:     Reason for patient still in hospital (select all that apply): Treatment  Discharge Plan A: Home Health   Discharge Delays: None known at this time              Katey Shanks MD  Department of Hospital Medicine   [facility]

## 2022-06-11 NOTE — PROGRESS NOTES
"[facility] [place] Medicine  Progress Note    Patient Name: Karoline Justice  MRN: [id number]  Patient Class: IP- Inpatient   Admission Date: 6/8/2022  Length of Stay: 3 days  Attending Physician: Katey Shanks MD  Primary Care Provider: Dee Dee Oconnor MD        Subjective:     Principal Problem:Hydronephrosis        HPI:  75 yo female with bladder cancer w/ urinary obstruction s/p right sided stenting and left nephrostomy tube placement, HTN, anemia, DM presenting for back pain, found to have right hydronephrosis. Also with UTI symptoms, found to have UTI, started on rocephin. Noted also to have anemia, given pRBCs as well. She was transferred to [facility] for further eval by urology.     Per  note,  "76-year-old female with a history of bladder cancer with urinary obstruction (right-sided ureteral stenting and left-sided nephrostomy tube placement), hypercalcemia, hypertension, urinary tract infection, anemia, diabetes, choledocholithiasis, and prior COVID (July 2021) admitted to [facility] on June 7 with back pain.  She reported burning with urination and right lower extremity swelling.  She had no chest pain or dyspnea.  She was found to have leukocytosis.  Lower extremity ultrasound showed right lower extremity DVT.  She was started on apixaban, Rocephin, and IV fluid.  Packed red blood cells were ordered for her anemia.  Referring provider noted patient has mild edema in her right leg but it is intact from a neurovascular standpoint.  Her urine output and her ostomy output grossly appeared fairly normal today.  She remains hemodynamically stable.  Case discussed with Urology and Interventional Radiology at [facility].  Recommendation is to move patient urgently to [facility] for further evaluation and treatment.  Will transfer to Hospital Medicine service at [facility].  On arrival, will contact Urology and Interventional Radiology.  I spoke with the referring provider, " "and they will hold apixaban and have the patient in NPO status for now.     She had transurethral section of bladder tumor with right ureteral stent placement on May 9. She had left nephrostomy tube placement May 10. Pathology showed poorly differentiated carcinoma with areas of squamous differentiation, osteoclast like GI cells, and classic high-grade papillary     June 8:  White blood cells 28.07, hemoglobin 6.7, hematocrit 21.8, platelets 373, sodium 136, potassium 4.5, chloride 109, CO2 20, BUN 25, creatinine 1.7, glucose 190, total bilirubin 0.9, AST 47, ALT 37, magnesium 2     Repeat CBC at white blood cells 29.19, hemoglobin 6.2, hematocrit 19.9, platelets 376     June 7:  Blood cultures no growth to date  COVID negative, white blood cells 29.35, hemoglobin 6.9, hematocrit 22, platelets 380, sodium 132, potassium 4.2, chloride 104, CO2 19, BUN 26, creatinine 1.8  Urinalysis with 2+ protein, 2+ glucose, 2+ blood, 3+ leukocytes, 10 RBC, greater than 100 WBC  -CT renal stone protocol had right ureteral stent in place with chronic stable moderate right hydronephrosis.  Left-sided percutaneous nephrostomy tube in place with no hydronephrosis.  Large bladder mass consistent with history of malignancy.  Enlarged bilateral pelvic lymph node consistent with metastatic disease.  No acute finding.  -Right lower extremity ultrasound showed DVT extending from the right common femoral vein to the calf veins.     [place] 3:  PET CT scan had irregular wall thickening with associated hypermetabolic activity at the left posterior aspect of the urinary bladder compatible with known primary neoplasm.  Enlarged hypermetabolic lymph nodes along the bilateral pelvic sidewalls suggesting megan metastatic disease.  Multiple cell by 5 mm pulmonary nodules which are below the resolution of PET but new compared with chest CT from May of 2022. "      Overview/Hospital Course:  No notes on file    Interval Hx:  Patient complaining of low " back pain in the setting of the bilateral nephrostomy tubes being placed.  Otherwise she feels okay.  Lymphatic that she does not want chemo Thera be but is considering radiation however would like to be closer to [facility] where she lives because it is 2 hours away    Review of Systems   Constitutional:  Negative for activity change, appetite change, chills and fever.   HENT:  Negative for congestion, hearing loss and rhinorrhea.    Eyes:  Negative for discharge, itching and visual disturbance.   Respiratory:  Negative for apnea, cough and shortness of breath.    Cardiovascular:  Negative for chest pain, palpitations and leg swelling.   Gastrointestinal:  Negative for abdominal distention, abdominal pain, constipation, diarrhea, nausea and vomiting.   Endocrine: Negative for cold intolerance and heat intolerance.   Genitourinary:  Positive for dysuria. Negative for hematuria.   Musculoskeletal:  Positive for back pain. Negative for neck pain and neck stiffness.   Skin:  Negative for rash and wound.   Neurological:  Negative for dizziness, seizures, light-headedness and headaches.   Psychiatric/Behavioral:  Negative for agitation, confusion and suicidal ideas.    Objective:     Vital Signs (Most Recent):  Temp: 100 °F (37.8 °C) (06/11/22 1612)  Pulse: 91 (06/11/22 1543)  Resp: 16 (06/11/22 1543)  BP: (!) 109/53 (06/11/22 1543)  SpO2: 96 % (06/11/22 1543)   Vital Signs (24h Range):  Temp:  [98.3 °F (36.8 °C)-100.9 °F (38.3 °C)] 100 °F (37.8 °C)  Pulse:  [] 91  Resp:  [16-18] 16  SpO2:  [95 %-98 %] 96 %  BP: (109-164)/(53-67) 109/53     Weight: 53.5 kg (118 lb)  Body mass index is 22.3 kg/m².      GENERAL:  No apparent distress. Alert and oriented times three  EYES:  EOMI. Conjunctivae intact  ENT:  Posterior pharynx clear  NECK:  Supple with no lymphadenopathy or thyromegaly  LUNGS:  No respiratory distress. Clear to auscultation bilaterally with good air movement  CARDIAC:  RRR without murmur, rub or  gallop  ABDOMEN:  diffusely tender, nephrostomy tubes in place   EXTREMITIES:  Peripheral pulses are 2+. Hands and feet are warm. No cyanosis or edema           Significant labs reviewed    Significant Imaging: I have reviewed all pertinent imaging results/findings within the past 24 hours.        Assessment/Plan:      * Hydronephrosis  On CT scan  Urology consulted, will see appreciate recs  Seen by IR  R nephrostomy tube placed 6/9    Per Urology:  Patient s/p right neph tube after failed drainage with right ureteral stent.   - She recently developed DVT and was started on Eliquis. The Eliquis is being held (last dose was 10mg Eliquis given at 10am on 6/8) and she is being started on Heparin gtt.  - Soto pulled this am. She does not need to to undergo voiding trial due to b/l neph tubes draining appropriately.  - Urine culture NG  - Agree with empiric antibiotics.  - F/u labs from this am   - Recommend to transfuse with prbc if necessary  - Rest of care per primary.  - She needs to f/u with her multi-disciplinary team and I will message Sandra Salgado to reschedule appointments      Leukocytosis  Likely from UTI, but also chronically elevated  Will continue to monitor closely      Urinary tract infection without hematuria  Continue rocephin for UTI  (start day 6/9, will treat for 3 days as urine cx has NGTD.   Restart if febrile or symptomatic   Monitor closely  Does have multiple sites for infection      Acute deep vein thrombosis (DVT) of proximal vein of right lower extremity  Noted at outside hospital  On heparin drip 2/2 due to procedure, now will transition to eliquis and patient can f/u outpatient with heme. Suspect it is related to hypercoagulable state in setting of malignancy       Anemia  Replaced at outside hospital, likely from hematuria  Type and screen, on heparin drip  Consent form signed by author in ED      HTN (hypertension)  Chronic, controlled, reportedly not taking amlodipine      Controlled  Type 2 diabetes mellitus  On 20 units levemir at home  Will reduced to 10 while NPO and not eating  SSI and accuchecks  Lab Results   Component Value Date    HGBA1C 6.9 (H) 05/06/2022           Bladder mass  Urology consulted, chronic, continue to monitor  Patient will need outpatient urology and rad onc follow up as there is significant concern for malignancy. Wishes to follow up close to home    Per radiation oncology patient should follow up [facility] with systemic therapy of Keytrada. Outpatient follow up has been arranged and would be about 3-4 weeks of treatment      VTE Risk Mitigation (From admission, onward)         Ordered     apixaban tablet 5 mg  2 times daily         06/11/22 1603                Discharge Planning   GET: 6/11/2022     Code Status: Full Code   Is the patient medically ready for discharge?:     Reason for patient still in hospital (select all that apply): Treatment  Discharge Plan A: Home Health   Discharge Delays: None known at this time              Katey Shanks MD  Department of Hospital Medicine   [facility]

## 2022-06-11 NOTE — SUBJECTIVE & OBJECTIVE
Interval Hx:  Patient complaining of low back pain in the setting of the bilateral nephrostomy tubes being placed.  Otherwise she feels okay.  Pain is poorly controlled    Review of Systems   Constitutional:  Negative for activity change, appetite change, chills and fever.   HENT:  Negative for congestion, hearing loss and rhinorrhea.    Eyes:  Negative for discharge, itching and visual disturbance.   Respiratory:  Negative for apnea, cough and shortness of breath.    Cardiovascular:  Negative for chest pain, palpitations and leg swelling.   Gastrointestinal:  Negative for abdominal distention, abdominal pain, constipation, diarrhea, nausea and vomiting.   Endocrine: Negative for cold intolerance and heat intolerance.   Genitourinary:  Positive for dysuria. Negative for hematuria.   Musculoskeletal:  Positive for back pain. Negative for neck pain and neck stiffness.   Skin:  Negative for rash and wound.   Neurological:  Negative for dizziness, seizures, light-headedness and headaches.   Psychiatric/Behavioral:  Negative for agitation, confusion and suicidal ideas.    Objective:     Vital Signs (Most Recent):  Temp: 100 °F (37.8 °C) (06/11/22 1612)  Pulse: 91 (06/11/22 1543)  Resp: 16 (06/11/22 1543)  BP: (!) 109/53 (06/11/22 1543)  SpO2: 96 % (06/11/22 1543)   Vital Signs (24h Range):  Temp:  [98.3 °F (36.8 °C)-100.9 °F (38.3 °C)] 100 °F (37.8 °C)  Pulse:  [] 91  Resp:  [16-18] 16  SpO2:  [95 %-98 %] 96 %  BP: (109-164)/(53-67) 109/53     Weight: 53.5 kg (118 lb)  Body mass index is 22.3 kg/m².      GENERAL:  No apparent distress. Alert and oriented times three  EYES:  EOMI. Conjunctivae intact  ENT:  Posterior pharynx clear  NECK:  Supple with no lymphadenopathy or thyromegaly  LUNGS:  No respiratory distress. Clear to auscultation bilaterally with good air movement  CARDIAC:  RRR without murmur, rub or gallop  ABDOMEN:  diffusely tender, bilateral nephrostomy tubes in place   EXTREMITIES:  Peripheral pulses  are 2+. Hands and feet are warm. No cyanosis or edema           Significant labs reviewed    Significant Imaging: I have reviewed all pertinent imaging results/findings within the past 24 hours.

## 2022-06-12 LAB
ABO + RH BLD: NORMAL
ALBUMIN SERPL BCP-MCNC: 2 G/DL (ref 3.5–5.2)
ALP SERPL-CCNC: 176 U/L (ref 55–135)
ALT SERPL W/O P-5'-P-CCNC: 14 U/L (ref 10–44)
ANION GAP SERPL CALC-SCNC: 9 MMOL/L (ref 8–16)
ANISOCYTOSIS BLD QL SMEAR: SLIGHT
AST SERPL-CCNC: 16 U/L (ref 10–40)
BACTERIA BLD CULT: NORMAL
BACTERIA BLD CULT: NORMAL
BASOPHILS # BLD AUTO: 0.08 K/UL (ref 0–0.2)
BASOPHILS NFR BLD: 0.3 % (ref 0–1.9)
BILIRUB SERPL-MCNC: 0.5 MG/DL (ref 0.1–1)
BLD GP AB SCN CELLS X3 SERPL QL: NORMAL
BUN SERPL-MCNC: 17 MG/DL (ref 8–23)
CALCIUM SERPL-MCNC: 11.7 MG/DL (ref 8.7–10.5)
CHLORIDE SERPL-SCNC: 105 MMOL/L (ref 95–110)
CO2 SERPL-SCNC: 18 MMOL/L (ref 23–29)
CREAT SERPL-MCNC: 1.4 MG/DL (ref 0.5–1.4)
DIFFERENTIAL METHOD: ABNORMAL
EOSINOPHIL # BLD AUTO: 0.6 K/UL (ref 0–0.5)
EOSINOPHIL NFR BLD: 2.3 % (ref 0–8)
ERYTHROCYTE [DISTWIDTH] IN BLOOD BY AUTOMATED COUNT: 15.6 % (ref 11.5–14.5)
EST. GFR  (AFRICAN AMERICAN): 42.1 ML/MIN/1.73 M^2
EST. GFR  (NON AFRICAN AMERICAN): 36.5 ML/MIN/1.73 M^2
GLUCOSE SERPL-MCNC: 98 MG/DL (ref 70–110)
HCT VFR BLD AUTO: 24.9 % (ref 37–48.5)
HGB BLD-MCNC: 7.8 G/DL (ref 12–16)
IMM GRANULOCYTES # BLD AUTO: 0.27 K/UL (ref 0–0.04)
IMM GRANULOCYTES NFR BLD AUTO: 1 % (ref 0–0.5)
LYMPHOCYTES # BLD AUTO: 1.4 K/UL (ref 1–4.8)
LYMPHOCYTES NFR BLD: 5.1 % (ref 18–48)
MAGNESIUM SERPL-MCNC: 1.7 MG/DL (ref 1.6–2.6)
MCH RBC QN AUTO: 27 PG (ref 27–31)
MCHC RBC AUTO-ENTMCNC: 31.3 G/DL (ref 32–36)
MCV RBC AUTO: 86 FL (ref 82–98)
MONOCYTES # BLD AUTO: 1.1 K/UL (ref 0.3–1)
MONOCYTES NFR BLD: 4.1 % (ref 4–15)
NEUTROPHILS # BLD AUTO: 24.1 K/UL (ref 1.8–7.7)
NEUTROPHILS NFR BLD: 87.2 % (ref 38–73)
NRBC BLD-RTO: 0 /100 WBC
OVALOCYTES BLD QL SMEAR: ABNORMAL
PLATELET # BLD AUTO: 490 K/UL (ref 150–450)
PMV BLD AUTO: 9.4 FL (ref 9.2–12.9)
POCT GLUCOSE: 106 MG/DL (ref 70–110)
POCT GLUCOSE: 161 MG/DL (ref 70–110)
POCT GLUCOSE: 170 MG/DL (ref 70–110)
POCT GLUCOSE: 219 MG/DL (ref 70–110)
POIKILOCYTOSIS BLD QL SMEAR: SLIGHT
POLYCHROMASIA BLD QL SMEAR: ABNORMAL
POTASSIUM SERPL-SCNC: 4.9 MMOL/L (ref 3.5–5.1)
PROT SERPL-MCNC: 6.8 G/DL (ref 6–8.4)
RBC # BLD AUTO: 2.89 M/UL (ref 4–5.4)
SODIUM SERPL-SCNC: 132 MMOL/L (ref 136–145)
WBC # BLD AUTO: 27.67 K/UL (ref 3.9–12.7)

## 2022-06-12 PROCEDURE — 99232 SBSQ HOSP IP/OBS MODERATE 35: CPT | Mod: ,,, | Performed by: STUDENT IN AN ORGANIZED HEALTH CARE EDUCATION/TRAINING PROGRAM

## 2022-06-12 PROCEDURE — 85025 COMPLETE CBC W/AUTO DIFF WBC: CPT | Performed by: INTERNAL MEDICINE

## 2022-06-12 PROCEDURE — 63600175 PHARM REV CODE 636 W HCPCS: Performed by: INTERNAL MEDICINE

## 2022-06-12 PROCEDURE — 83735 ASSAY OF MAGNESIUM: CPT | Performed by: INTERNAL MEDICINE

## 2022-06-12 PROCEDURE — 36415 COLL VENOUS BLD VENIPUNCTURE: CPT | Performed by: STUDENT IN AN ORGANIZED HEALTH CARE EDUCATION/TRAINING PROGRAM

## 2022-06-12 PROCEDURE — 80053 COMPREHEN METABOLIC PANEL: CPT | Performed by: INTERNAL MEDICINE

## 2022-06-12 PROCEDURE — 99232 PR SUBSEQUENT HOSPITAL CARE,LEVL II: ICD-10-PCS | Mod: ,,, | Performed by: STUDENT IN AN ORGANIZED HEALTH CARE EDUCATION/TRAINING PROGRAM

## 2022-06-12 PROCEDURE — 86901 BLOOD TYPING SEROLOGIC RH(D): CPT | Performed by: STUDENT IN AN ORGANIZED HEALTH CARE EDUCATION/TRAINING PROGRAM

## 2022-06-12 PROCEDURE — 86900 BLOOD TYPING SEROLOGIC ABO: CPT | Performed by: STUDENT IN AN ORGANIZED HEALTH CARE EDUCATION/TRAINING PROGRAM

## 2022-06-12 PROCEDURE — 25000003 PHARM REV CODE 250: Performed by: STUDENT IN AN ORGANIZED HEALTH CARE EDUCATION/TRAINING PROGRAM

## 2022-06-12 PROCEDURE — 11000001 HC ACUTE MED/SURG PRIVATE ROOM

## 2022-06-12 RX ORDER — SODIUM BICARBONATE 650 MG/1
650 TABLET ORAL 2 TIMES DAILY
Qty: 60 TABLET | Refills: 11 | Status: ON HOLD
Start: 2022-06-12 | End: 2022-07-22 | Stop reason: HOSPADM

## 2022-06-12 RX ORDER — SODIUM BICARBONATE 650 MG/1
650 TABLET ORAL 2 TIMES DAILY
Status: DISCONTINUED | OUTPATIENT
Start: 2022-06-12 | End: 2022-06-13

## 2022-06-12 RX ORDER — PROCHLORPERAZINE EDISYLATE 5 MG/ML
5 INJECTION INTRAMUSCULAR; INTRAVENOUS EVERY 6 HOURS PRN
Status: DISCONTINUED | OUTPATIENT
Start: 2022-06-12 | End: 2022-06-15 | Stop reason: HOSPADM

## 2022-06-12 RX ORDER — FLUCONAZOLE 40 MG/ML
100 POWDER, FOR SUSPENSION ORAL DAILY
Status: DISCONTINUED | OUTPATIENT
Start: 2022-06-12 | End: 2022-06-12

## 2022-06-12 RX ORDER — HYDROCODONE BITARTRATE AND ACETAMINOPHEN 5; 325 MG/1; MG/1
1 TABLET ORAL EVERY 6 HOURS PRN
Qty: 30 TABLET | Refills: 0
Start: 2022-06-12 | End: 2022-06-15 | Stop reason: SDUPTHER

## 2022-06-12 RX ORDER — ONDANSETRON 2 MG/ML
4 INJECTION INTRAMUSCULAR; INTRAVENOUS EVERY 6 HOURS PRN
Status: DISCONTINUED | OUTPATIENT
Start: 2022-06-12 | End: 2022-06-15 | Stop reason: HOSPADM

## 2022-06-12 RX ADMIN — MUPIROCIN: 20 OINTMENT TOPICAL at 08:06

## 2022-06-12 RX ADMIN — APIXABAN 10 MG: 5 TABLET, FILM COATED ORAL at 10:06

## 2022-06-12 RX ADMIN — ALUMINUM HYDROXIDE, MAGNESIUM HYDROXIDE, AND DIMETHICONE 10 ML: 400; 400; 40 SUSPENSION ORAL at 09:06

## 2022-06-12 RX ADMIN — INSULIN DETEMIR 10 UNITS: 100 INJECTION, SOLUTION SUBCUTANEOUS at 10:06

## 2022-06-12 RX ADMIN — INSULIN ASPART 2 UNITS: 100 INJECTION, SOLUTION INTRAVENOUS; SUBCUTANEOUS at 05:06

## 2022-06-12 RX ADMIN — APIXABAN 10 MG: 5 TABLET, FILM COATED ORAL at 08:06

## 2022-06-12 RX ADMIN — ACETAMINOPHEN 650 MG: 325 TABLET ORAL at 08:06

## 2022-06-12 RX ADMIN — MUPIROCIN: 20 OINTMENT TOPICAL at 10:06

## 2022-06-12 RX ADMIN — CEFTRIAXONE 1 G: 1 INJECTION, SOLUTION INTRAVENOUS at 11:06

## 2022-06-12 RX ADMIN — SODIUM BICARBONATE 650 MG TABLET 650 MG: at 12:06

## 2022-06-12 RX ADMIN — FLUCONAZOLE 100 MG: 40 POWDER, FOR SUSPENSION ORAL at 05:06

## 2022-06-12 RX ADMIN — SODIUM BICARBONATE 650 MG TABLET 650 MG: at 08:06

## 2022-06-12 NOTE — PROGRESS NOTES
"[facility] Medicine  Progress Note    Patient Name: Karoline Justice  MRN: [id number]  Patient Class: IP- Inpatient   Admission Date: 6/8/2022  Length of Stay: 4 days  Attending Physician: Katey Shanks MD  Primary Care Provider: Dee Dee Oconnor MD        Subjective:     Principal Problem:Hydronephrosis        HPI:  77 yo female with bladder cancer w/ urinary obstruction s/p right sided stenting and left nephrostomy tube placement, HTN, anemia, DM presenting for back pain, found to have right hydronephrosis. Also with UTI symptoms, found to have UTI, started on rocephin. Noted also to have anemia, given pRBCs as well. She was transferred to [facility] for further eval by urology.     Per  note,  "76-year-old female with a history of bladder cancer with urinary obstruction (right-sided ureteral stenting and left-sided nephrostomy tube placement), hypercalcemia, hypertension, urinary tract infection, anemia, diabetes, choledocholithiasis, and prior COVID (July 2021) admitted to [facility] on June 7 with back pain.  She reported burning with urination and right lower extremity swelling.  She had no chest pain or dyspnea.  She was found to have leukocytosis.  Lower extremity ultrasound showed right lower extremity DVT.  She was started on apixaban, Rocephin, and IV fluid.  Packed red blood cells were ordered for her anemia.  Referring provider noted patient has mild edema in her right leg but it is intact from a neurovascular standpoint.  Her urine output and her ostomy output grossly appeared fairly normal today.  She remains hemodynamically stable.  Case discussed with Urology and Interventional Radiology at [facility].  Recommendation is to move patient urgently to [facility] for further evaluation and treatment.  Will transfer to Hospital Medicine service at [facility].  On arrival, will contact Urology and Interventional Radiology.  I spoke with the referring provider, " "and they will hold apixaban and have the patient in NPO status for now.     She had transurethral section of bladder tumor with right ureteral stent placement on May 9. She had left nephrostomy tube placement May 10. Pathology showed poorly differentiated carcinoma with areas of squamous differentiation, osteoclast like GI cells, and classic high-grade papillary     June 8:  White blood cells 28.07, hemoglobin 6.7, hematocrit 21.8, platelets 373, sodium 136, potassium 4.5, chloride 109, CO2 20, BUN 25, creatinine 1.7, glucose 190, total bilirubin 0.9, AST 47, ALT 37, magnesium 2     Repeat CBC at white blood cells 29.19, hemoglobin 6.2, hematocrit 19.9, platelets 376     June 7:  Blood cultures no growth to date  COVID negative, white blood cells 29.35, hemoglobin 6.9, hematocrit 22, platelets 380, sodium 132, potassium 4.2, chloride 104, CO2 19, BUN 26, creatinine 1.8  Urinalysis with 2+ protein, 2+ glucose, 2+ blood, 3+ leukocytes, 10 RBC, greater than 100 WBC  -CT renal stone protocol had right ureteral stent in place with chronic stable moderate right hydronephrosis.  Left-sided percutaneous nephrostomy tube in place with no hydronephrosis.  Large bladder mass consistent with history of malignancy.  Enlarged bilateral pelvic lymph node consistent with metastatic disease.  No acute finding.  -Right lower extremity ultrasound showed DVT extending from the right common femoral vein to the calf veins.     [place] 3:  PET CT scan had irregular wall thickening with associated hypermetabolic activity at the left posterior aspect of the urinary bladder compatible with known primary neoplasm.  Enlarged hypermetabolic lymph nodes along the bilateral pelvic sidewalls suggesting megan metastatic disease.  Multiple cell by 5 mm pulmonary nodules which are below the resolution of PET but new compared with chest CT from May of 2022. "      Overview/Hospital Course:  No notes on file    Interval Hx:  Patient complaining of low " back pain in the setting of the bilateral nephrostomy tubes being placed.  Otherwise she feels okay.  Daughter is at bedside.  Has multiple questions about the oncologic treatment of her mother however they have not had Oncology outpatient follow-up yet    Review of Systems   Constitutional:  Negative for activity change, appetite change, chills and fever.   HENT:  Negative for congestion, hearing loss and rhinorrhea.    Eyes:  Negative for discharge, itching and visual disturbance.   Respiratory:  Negative for apnea, cough and shortness of breath.    Cardiovascular:  Negative for chest pain, palpitations and leg swelling.   Gastrointestinal:  Negative for abdominal distention, abdominal pain, constipation, diarrhea, nausea and vomiting.   Endocrine: Negative for cold intolerance and heat intolerance.   Genitourinary:  Positive for dysuria. Negative for hematuria.   Musculoskeletal:  Positive for back pain. Negative for neck pain and neck stiffness.   Skin:  Negative for rash and wound.   Neurological:  Negative for dizziness, seizures, light-headedness and headaches.   Psychiatric/Behavioral:  Negative for agitation, confusion and suicidal ideas.    Objective:     Vital Signs (Most Recent):  Temp: 99 °F (37.2 °C) (06/12/22 1737)  Pulse: 97 (06/12/22 1622)  Resp: 16 (06/12/22 1622)  BP: 136/64 (06/12/22 1622)  SpO2: 96 % (06/12/22 1622)   Vital Signs (24h Range):  Temp:  [97.4 °F (36.3 °C)-100.3 °F (37.9 °C)] 99 °F (37.2 °C)  Pulse:  [] 97  Resp:  [15-18] 16  SpO2:  [95 %-98 %] 96 %  BP: (113-136)/(55-64) 136/64     Weight: 53.5 kg (118 lb)  Body mass index is 22.3 kg/m².      GENERAL:  No apparent distress. Alert and oriented times three  EYES:  EOMI. Conjunctivae intact  ENT:  Posterior pharynx clear  NECK:  Supple with no lymphadenopathy or thyromegaly  LUNGS:  No respiratory distress. Clear to auscultation bilaterally with good air movement  CARDIAC:  RRR without murmur, rub or gallop  ABDOMEN:  diffusely  tender, bilateral nephrostomy tubes in place   EXTREMITIES:  Peripheral pulses are 2+. Hands and feet are warm. No cyanosis or edema           Significant labs reviewed    Significant Imaging: I have reviewed all pertinent imaging results/findings within the past 24 hours.          Assessment/Plan:      * Hydronephrosis  On CT scan  Urology consulted, will see appreciate recs  Seen by IRA  R nephrostomy tube placed 6/9    Per Urology:  Patient s/p right neph tube after failed drainage with right ureteral stent.   - She recently developed DVT and was started on Eliquis. The Eliquis is being held (last dose was 10mg Eliquis given at 10am on 6/8) and she is being started on Heparin gtt.  - Soto pulled this am. She does not need to to undergo voiding trial due to b/l neph tubes draining appropriately.  - Urine culture NG  - Agree with empiric antibiotics.  - F/u labs from this am   - Recommend to transfuse with prbc if necessary  - Rest of care per primary.  - She needs to f/u with her multi-disciplinary team and I will message Sandra Salgado to reschedule appointments      Leukocytosis  Potentially from malignancy, as antibiotics did not improve leukocytosis  Follow-up with oncology  Completed a 3 day course of ceftriaxone  Cultures no growth to date      Urinary tract infection without hematuria  Continue rocephin for UTI  (start day 6/9, will treat for 3 days as urine cx has NGTD.   Restart if febrile or symptomatic   Monitor closely  Does have multiple sites for infection      Acute deep vein thrombosis (DVT) of proximal vein of right lower extremity  Noted at outside hospital  On heparin drip 2/2 due to procedure, now will transition to eliquis and patient can f/u outpatient with heme. Suspect it is related to hypercoagulable state in setting of malignancy       Anemia  Replaced at outside hospital, likely from hematuria  Type and screen, on heparin drip  Consent form signed by author in ED      HTN  "(hypertension)  Chronic, controlled, reportedly not taking amlodipine      Controlled Type 2 diabetes mellitus  On 20 units levemir at home  Will reduced to 10 while NPO and not eating  SSI and accuchecks  Lab Results   Component Value Date    HGBA1C 6.9 (H) 05/06/2022           Bladder mass  Urology consulted, chronic, continue to monitor  Patient will need outpatient urology and rad onc follow up as there is significant concern for malignancy. Wishes to follow up close to home    Per radiation oncology patient should follow up [facility] with systemic therapy of Keytrada. Outpatient follow up has been arranged and would be about 3-4 weeks of treatment      VTE Risk Mitigation (From admission, onward)         Ordered     apixaban tablet 5 mg  2 times daily        "Followed by" Linked Group Details    06/11/22 1638     apixaban tablet 10 mg  2 times daily        "Followed by" Linked Group Details    06/11/22 1638                Discharge Planning   GET: 6/11/2022     Code Status: Full Code   Is the patient medically ready for discharge?:     Reason for patient still in hospital (select all that apply): Pending disposition  Discharge Plan A: Home Health   Discharge Delays: None known at this time              Katey Shanks MD  Department of Hospital Medicine   [facility] - Med Surg    "

## 2022-06-12 NOTE — ASSESSMENT & PLAN NOTE
Potentially from malignancy, as antibiotics did not improve leukocytosis  Follow-up with oncology  Completed a 3 day course of ceftriaxone  Cultures no growth to date

## 2022-06-12 NOTE — PLAN OF CARE
06/12/22 1426   Post-Acute Status   Post-Acute Authorization Home Health   Home Health Status Set-up Complete/Auth obtained     Patient has been accepted by the medical team home health in [place] and will be admitted tomorrow.

## 2022-06-12 NOTE — PLAN OF CARE
[facility] - Med S[facility]      HOME HEALTH ORDERS  FACE TO FACE ENCOUNTER    Patient Name: Karoline Justice  YOB: 1945    PCP: Dee Dee Oconnor MD   PCP Address: [address] [place] [address] /*  PCP Phone Number: [phone]  PCP Fax: [phone]    Encounter Date: 6/8/22    Admit to Home Health    Diagnoses:  Active Hospital Problems    Diagnosis  POA    *Hydronephrosis [N13.30]  Yes    Acute deep vein thrombosis (DVT) of proximal vein of right lower extremity [I82.4Y1]  Yes    Urinary tract infection without hematuria [N39.0]  Yes    Leukocytosis [D72.829]  Yes    Controlled Type 2 diabetes mellitus [E11.9]  Yes     Chronic    Bladder mass [N32.89]  Yes    HTN (hypertension) [I10]  Yes    Anemia [D64.9]  Yes      Resolved Hospital Problems   No resolved problems to display.       Follow Up Appointments:  Future Appointments   Date Time Provider Department Center   6/16/2022  9:00 AM LAB, HEMONC SAME DAY [facility] LAB HO [facility]   6/16/2022 10:00 AM Reid Snider MD [facility] HEMONC3 [facility]   6/16/2022 11:00 AM Rachid Dominguez MD [facility] RAD ONC [facility]   6/16/2022  1:00 PM Trace Harvey MD [facility] UROLOG [facility]       Allergies:Review of patient's allergies indicates:  No Known Allergies    Medications: Review discharge medications with patient and family and provide education.    Current Facility-Administered Medications   Medication Dose Route Frequency Provider Last Rate Last Admin    0.9%  NaCl infusion (for blood administration)   Intravenous Q24H PRN Alfreda Parada MD        acetaminophen tablet 650 mg  650 mg Oral Q6H PRN Katey Shanks MD   650 mg at 06/11/22 1612    apixaban tablet 10 mg  10 mg Oral BID Katey Shanks MD   10 mg at 06/12/22 1049    Followed by    [START ON 6/15/2022] apixaban tablet 5 mg  5 mg Oral BID Katey Shanks MD        dextrose 10% bolus 125 mL  12.5 g Intravenous PRN Alfreda Parada MD        dextrose  10% bolus 125 mL  12.5 g Intravenous PRN Aj Sánchez MD        dextrose 10% bolus 250 mL  25 g Intravenous PRN Alfreda Parada MD        dextrose 10% bolus 250 mL  25 g Intravenous PRN MD Maura Arias's soln (benadryl 30 mL, mylanta 30 mL, LIDOcaine 30 mL, nystatin 30 mL) 120 mL  10 mL Oral QID Katey Shanks MD        glucagon (human recombinant) injection 1 mg  1 mg Intramuscular PRN Alfreda Parada MD        glucose chewable tablet 16 g  16 g Oral PRN Alfreda Parada MD        glucose chewable tablet 24 g  24 g Oral PRN Alfreda Parada MD        HYDROcodone-acetaminophen 5-325 mg per tablet 1 tablet  1 tablet Oral Q6H PRN Aj Sánchez MD   1 tablet at 06/11/22 1342    insulin aspart U-100 pen 0-5 Units  0-5 Units Subcutaneous Q6H PRN Aj Sánchez MD   2 Units at 06/12/22 1717    insulin detemir U-100 pen 10 Units  10 Units Subcutaneous Daily Aj Sánchez MD   10 Units at 06/12/22 1048    melatonin tablet 6 mg  6 mg Oral Nightly PRN Alfreda Parada MD        morphine injection 2 mg  2 mg Intravenous Q4H PRN Alfreda Parada MD   2 mg at 06/08/22 2132    mupirocin 2 % ointment   Nasal BID Katey Shanks MD   Given at 06/12/22 1050    sodium bicarbonate tablet 650 mg  650 mg Oral BID Katey Shanks MD   650 mg at 06/12/22 1230    sodium chloride 0.9% flush 10 mL  10 mL Intravenous PRN Alfreda Parada MD         Current Discharge Medication List      START taking these medications    Details   apixaban (ELIQUIS) 5 mg Tab Take 1 tablet (5 mg total) by mouth 2 (two) times daily.  Qty: 60 tablet, Refills: 2      duke's soln (benadryl 30 mL, mylanta 30 mL, LIDOcaine 30 mL, nystatin 30 mL) 120mL Take 10 mLs by mouth 4 (four) times daily.  Qty: 120 mL, Refills: 0      HYDROcodone-acetaminophen (NORCO) 5-325 mg per tablet Take 1 tablet by mouth every 6 (six) hours as needed for Pain.  Qty: 30 tablet, Refills: 0    Comments: Quantity prescribed more than 7 day supply?  Yes, quantity medically necessary      sodium bicarbonate 650 MG tablet Take 1 tablet (650 mg total) by mouth 2 (two) times daily.  Qty: 60 tablet, Refills: 11         CONTINUE these medications which have CHANGED    Details   insulin detemir U-100 (LEVEMIR FLEXTOUCH) 100 unit/mL (3 mL) SubQ InPn pen Inject 10 Units into the skin once daily.  Qty: 9 mL, Refills: 3         CONTINUE these medications which have NOT CHANGED    Details   acetaminophen (TYLENOL) 500 MG tablet Take 500 mg by mouth every 6 (six) hours as needed for Pain.      amLODIPine (NORVASC) 5 MG tablet Take 1 tablet (5 mg total) by mouth once daily.  Qty: 90 tablet, Refills: 3    Comments: .      LIDOcaine (LIDODERM) 5 % Place 1 patch onto the skin once daily. Place patch to back. Leave on for 12 hours and remove for 12 hours.  Qty: 30 patch, Refills: 2      traMADoL (ULTRAM) 50 mg tablet Take 1 tablet (50 mg total) by mouth every 6 (six) hours as needed for Pain.  Qty: 30 tablet, Refills: 0    Comments: Quantity prescribed more than 7 day supply? Yes, quantity medically necessary  Associated Diagnoses: Cancer associated pain         STOP taking these medications       calcium carbonate (TUMS) 200 mg calcium (500 mg) chewable tablet Comments:   Reason for Stopping:                 I have seen and examined this patient within the last 30 days. My clinical findings that support the need for the home health skilled services and home bound status are the following:no   Weakness/numbness causing balance and gait disturbance due to Weakness/Debility, Malignancy/Cancer and Dehydration making it taxing to leave home.  Requiring assistive device to leave home due to unsteady gait caused by  Weakness/Debility, Malignancy/Cancer and Dehydration.     Diet:   diabetic diet 2000 calorie    Labs:  Report Lab results to PCP.    Referrals/ Consults  Physical Therapy to evaluate and treat. Evaluate for home safety and equipment needs; Establish/upgrade home exercise  program. Perform / instruct on therapeutic exercises, gait training, transfer training, and Range of Motion.  Occupational Therapy to evaluate and treat. Evaluate home environment for safety and equipment needs. Perform/Instruct on transfers, ADL training, ROM, and therapeutic exercises.   to evaluate for community resources/long-range planning.  Aide to provide assistance with personal care, ADLs, and vital signs.    Activities:   activity as tolerated    Nursing:   Agency to admit patient within 24 hours of hospital discharge unless specified on physician order or at patient request    SN to complete comprehensive assessment including routine vital signs. Instruct on disease process and s/s of complications to report to MD. Review/verify medication list sent home with the patient at time of discharge  and instruct patient/caregiver as needed. Frequency may be adjusted depending on start of care date.     Skilled nurse to perform up to 3 visits PRN for symptoms related to diagnosis    Notify MD if SBP > 160 or < 90; DBP > 90 or < 50; HR > 120 or < 50; Temp > 101; O2 < 88%; Other:      Ok to schedule additional visits based on staff availability and patient request on consecutive days within the home health episode.    When multiple disciplines ordered:    Start of Care occurs on Sunday - Wednesday schedule remaining discipline evaluations as ordered on separate consecutive days following the start of care.    Thursday SOC -schedule subsequent evaluations Friday and Monday the following week.     Friday - Saturday SOC - schedule subsequent discipline evaluations on consecutive days starting Monday of the following week.    For all post-discharge communication and subsequent orders please contact patient's primary care physician. If unable to reach primary care physician or do not receive response within 30 minutes, please contact [facility] for clinical staff order clarification    Miscellaneous   Colostomy  Care:  Instruct patient/caregiver to empty bag when full and PRN.  Routine Skin for Bedridden Patients: Instruct patient/caregiver to apply moisture barrier cream to all skin folds and wet areas in perineal area daily and after baths and all bowel movements.  Diabetic Care:   SN to perform and educate Diabetic management with blood glucose monitoring:    Home Health Aide:  Nursing Twice weekly, Physical Therapy Three times weekly, Occupational Therapy Three times weekly and Home Health Aide Three times weekly    Wound Care Orders  Yes:nephrostomy tube, make sure site is clean and dry       I certify that this patient is confined to her home and needs intermittent skilled nursing care, physical therapy and occupational therapy   .

## 2022-06-12 NOTE — PLAN OF CARE
Problem: Adult Inpatient Plan of Care  Goal: Plan of Care Review  Outcome: Ongoing, Progressing  Goal: Patient-Specific Goal (Individualized)  Outcome: Ongoing, Progressing  Goal: Absence of Hospital-Acquired Illness or Injury  Outcome: Ongoing, Progressing  Goal: Optimal Comfort and Wellbeing  Outcome: Ongoing, Progressing  Goal: Readiness for Transition of Care  Outcome: Ongoing, Progressing     Problem: Adult Inpatient Plan of Care  Goal: Patient-Specific Goal (Individualized)  Outcome: Ongoing, Progressing     Problem: Adult Inpatient Plan of Care  Goal: Absence of Hospital-Acquired Illness or Injury  Outcome: Ongoing, Progressing     Problem: Adult Inpatient Plan of Care  Goal: Optimal Comfort and Wellbeing  Outcome: Ongoing, Progressing     Problem: Adult Inpatient Plan of Care  Goal: Readiness for Transition of Care  Outcome: Ongoing, Progressing     Problem: Diabetes Comorbidity  Goal: Blood Glucose Level Within Targeted Range  Outcome: Ongoing, Progressing

## 2022-06-12 NOTE — SUBJECTIVE & OBJECTIVE
Interval Hx:  Patient complaining of low back pain in the setting of the bilateral nephrostomy tubes being placed.  Otherwise she feels okay.  Daughter is at bedside.  Has multiple questions about the oncologic treatment of her mother however they have not had Oncology outpatient follow-up yet    Review of Systems   Constitutional:  Negative for activity change, appetite change, chills and fever.   HENT:  Negative for congestion, hearing loss and rhinorrhea.    Eyes:  Negative for discharge, itching and visual disturbance.   Respiratory:  Negative for apnea, cough and shortness of breath.    Cardiovascular:  Negative for chest pain, palpitations and leg swelling.   Gastrointestinal:  Negative for abdominal distention, abdominal pain, constipation, diarrhea, nausea and vomiting.   Endocrine: Negative for cold intolerance and heat intolerance.   Genitourinary:  Positive for dysuria. Negative for hematuria.   Musculoskeletal:  Positive for back pain. Negative for neck pain and neck stiffness.   Skin:  Negative for rash and wound.   Neurological:  Negative for dizziness, seizures, light-headedness and headaches.   Psychiatric/Behavioral:  Negative for agitation, confusion and suicidal ideas.    Objective:     Vital Signs (Most Recent):  Temp: 99 °F (37.2 °C) (06/12/22 1737)  Pulse: 97 (06/12/22 1622)  Resp: 16 (06/12/22 1622)  BP: 136/64 (06/12/22 1622)  SpO2: 96 % (06/12/22 1622)   Vital Signs (24h Range):  Temp:  [97.4 °F (36.3 °C)-100.3 °F (37.9 °C)] 99 °F (37.2 °C)  Pulse:  [] 97  Resp:  [15-18] 16  SpO2:  [95 %-98 %] 96 %  BP: (113-136)/(55-64) 136/64     Weight: 53.5 kg (118 lb)  Body mass index is 22.3 kg/m².      GENERAL:  No apparent distress. Alert and oriented times three  EYES:  EOMI. Conjunctivae intact  ENT:  Posterior pharynx clear  NECK:  Supple with no lymphadenopathy or thyromegaly  LUNGS:  No respiratory distress. Clear to auscultation bilaterally with good air movement  CARDIAC:  RRR without  murmur, rub or gallop  ABDOMEN:  diffusely tender, bilateral nephrostomy tubes in place   EXTREMITIES:  Peripheral pulses are 2+. Hands and feet are warm. No cyanosis or edema           Significant labs reviewed    Significant Imaging: I have reviewed all pertinent imaging results/findings within the past 24 hours.

## 2022-06-13 PROBLEM — E87.1 HYPONATREMIA: Status: ACTIVE | Noted: 2022-06-13

## 2022-06-13 PROBLEM — E87.20 METABOLIC ACIDOSIS: Status: ACTIVE | Noted: 2022-06-13

## 2022-06-13 LAB
ALBUMIN SERPL BCP-MCNC: 2 G/DL (ref 3.5–5.2)
ALBUMIN SERPL BCP-MCNC: 2.1 G/DL (ref 3.5–5.2)
ALP SERPL-CCNC: 155 U/L (ref 55–135)
ALP SERPL-CCNC: 158 U/L (ref 55–135)
ALT SERPL W/O P-5'-P-CCNC: 14 U/L (ref 10–44)
ALT SERPL W/O P-5'-P-CCNC: 14 U/L (ref 10–44)
ANION GAP SERPL CALC-SCNC: 12 MMOL/L (ref 8–16)
ANION GAP SERPL CALC-SCNC: 8 MMOL/L (ref 8–16)
AST SERPL-CCNC: 12 U/L (ref 10–40)
AST SERPL-CCNC: 14 U/L (ref 10–40)
BASOPHILS # BLD AUTO: 0.07 K/UL (ref 0–0.2)
BASOPHILS NFR BLD: 0.3 % (ref 0–1.9)
BILIRUB SERPL-MCNC: 0.3 MG/DL (ref 0.1–1)
BILIRUB SERPL-MCNC: 0.5 MG/DL (ref 0.1–1)
BUN SERPL-MCNC: 18 MG/DL (ref 8–23)
BUN SERPL-MCNC: 19 MG/DL (ref 8–23)
CA-I BLDV-SCNC: 1.77 MMOL/L (ref 1.06–1.42)
CALCIUM SERPL-MCNC: 12.1 MG/DL (ref 8.7–10.5)
CALCIUM SERPL-MCNC: 12.5 MG/DL (ref 8.7–10.5)
CHLORIDE SERPL-SCNC: 102 MMOL/L (ref 95–110)
CHLORIDE SERPL-SCNC: 104 MMOL/L (ref 95–110)
CO2 SERPL-SCNC: 18 MMOL/L (ref 23–29)
CO2 SERPL-SCNC: 19 MMOL/L (ref 23–29)
CREAT SERPL-MCNC: 1.3 MG/DL (ref 0.5–1.4)
CREAT SERPL-MCNC: 1.3 MG/DL (ref 0.5–1.4)
DIFFERENTIAL METHOD: ABNORMAL
EOSINOPHIL # BLD AUTO: 0.5 K/UL (ref 0–0.5)
EOSINOPHIL NFR BLD: 2.1 % (ref 0–8)
ERYTHROCYTE [DISTWIDTH] IN BLOOD BY AUTOMATED COUNT: 15.9 % (ref 11.5–14.5)
EST. GFR  (AFRICAN AMERICAN): 46 ML/MIN/1.73 M^2
EST. GFR  (AFRICAN AMERICAN): 46 ML/MIN/1.73 M^2
EST. GFR  (NON AFRICAN AMERICAN): 39.9 ML/MIN/1.73 M^2
EST. GFR  (NON AFRICAN AMERICAN): 39.9 ML/MIN/1.73 M^2
GLUCOSE SERPL-MCNC: 123 MG/DL (ref 70–110)
GLUCOSE SERPL-MCNC: 184 MG/DL (ref 70–110)
HCT VFR BLD AUTO: 24.2 % (ref 37–48.5)
HGB BLD-MCNC: 7.5 G/DL (ref 12–16)
HIV 1+2 AB+HIV1 P24 AG SERPL QL IA: NEGATIVE
IMM GRANULOCYTES # BLD AUTO: 0.14 K/UL (ref 0–0.04)
IMM GRANULOCYTES NFR BLD AUTO: 0.6 % (ref 0–0.5)
LYMPHOCYTES # BLD AUTO: 1.4 K/UL (ref 1–4.8)
LYMPHOCYTES NFR BLD: 5.8 % (ref 18–48)
MAGNESIUM SERPL-MCNC: 1.8 MG/DL (ref 1.6–2.6)
MCH RBC QN AUTO: 27.2 PG (ref 27–31)
MCHC RBC AUTO-ENTMCNC: 31 G/DL (ref 32–36)
MCV RBC AUTO: 88 FL (ref 82–98)
MONOCYTES # BLD AUTO: 1.1 K/UL (ref 0.3–1)
MONOCYTES NFR BLD: 4.7 % (ref 4–15)
NEUTROPHILS # BLD AUTO: 20 K/UL (ref 1.8–7.7)
NEUTROPHILS NFR BLD: 86.5 % (ref 38–73)
NRBC BLD-RTO: 0 /100 WBC
PLATELET # BLD AUTO: 515 K/UL (ref 150–450)
PMV BLD AUTO: 9.6 FL (ref 9.2–12.9)
POCT GLUCOSE: 141 MG/DL (ref 70–110)
POCT GLUCOSE: 188 MG/DL (ref 70–110)
POCT GLUCOSE: 207 MG/DL (ref 70–110)
POTASSIUM SERPL-SCNC: 4.1 MMOL/L (ref 3.5–5.1)
POTASSIUM SERPL-SCNC: 4.4 MMOL/L (ref 3.5–5.1)
PROT SERPL-MCNC: 6.5 G/DL (ref 6–8.4)
PROT SERPL-MCNC: 6.9 G/DL (ref 6–8.4)
PTH-INTACT SERPL-MCNC: <5 PG/ML (ref 9–77)
RBC # BLD AUTO: 2.76 M/UL (ref 4–5.4)
SODIUM SERPL-SCNC: 131 MMOL/L (ref 136–145)
SODIUM SERPL-SCNC: 132 MMOL/L (ref 136–145)
WBC # BLD AUTO: 23.16 K/UL (ref 3.9–12.7)

## 2022-06-13 PROCEDURE — 63600175 PHARM REV CODE 636 W HCPCS: Mod: JG | Performed by: STUDENT IN AN ORGANIZED HEALTH CARE EDUCATION/TRAINING PROGRAM

## 2022-06-13 PROCEDURE — 25000003 PHARM REV CODE 250: Performed by: INTERNAL MEDICINE

## 2022-06-13 PROCEDURE — 36415 COLL VENOUS BLD VENIPUNCTURE: CPT | Performed by: STUDENT IN AN ORGANIZED HEALTH CARE EDUCATION/TRAINING PROGRAM

## 2022-06-13 PROCEDURE — 83970 ASSAY OF PARATHORMONE: CPT | Performed by: STUDENT IN AN ORGANIZED HEALTH CARE EDUCATION/TRAINING PROGRAM

## 2022-06-13 PROCEDURE — 36415 COLL VENOUS BLD VENIPUNCTURE: CPT | Performed by: INTERNAL MEDICINE

## 2022-06-13 PROCEDURE — 87389 HIV-1 AG W/HIV-1&-2 AB AG IA: CPT | Performed by: STUDENT IN AN ORGANIZED HEALTH CARE EDUCATION/TRAINING PROGRAM

## 2022-06-13 PROCEDURE — 25000003 PHARM REV CODE 250: Performed by: STUDENT IN AN ORGANIZED HEALTH CARE EDUCATION/TRAINING PROGRAM

## 2022-06-13 PROCEDURE — 82330 ASSAY OF CALCIUM: CPT | Performed by: STUDENT IN AN ORGANIZED HEALTH CARE EDUCATION/TRAINING PROGRAM

## 2022-06-13 PROCEDURE — 99233 SBSQ HOSP IP/OBS HIGH 50: CPT | Mod: ,,, | Performed by: STUDENT IN AN ORGANIZED HEALTH CARE EDUCATION/TRAINING PROGRAM

## 2022-06-13 PROCEDURE — 11000001 HC ACUTE MED/SURG PRIVATE ROOM

## 2022-06-13 PROCEDURE — 85025 COMPLETE CBC W/AUTO DIFF WBC: CPT | Performed by: INTERNAL MEDICINE

## 2022-06-13 PROCEDURE — 83735 ASSAY OF MAGNESIUM: CPT | Performed by: INTERNAL MEDICINE

## 2022-06-13 PROCEDURE — 80053 COMPREHEN METABOLIC PANEL: CPT | Mod: 91 | Performed by: STUDENT IN AN ORGANIZED HEALTH CARE EDUCATION/TRAINING PROGRAM

## 2022-06-13 PROCEDURE — 25000003 PHARM REV CODE 250: Performed by: HOSPITALIST

## 2022-06-13 PROCEDURE — 99233 PR SUBSEQUENT HOSPITAL CARE,LEVL III: ICD-10-PCS | Mod: ,,, | Performed by: STUDENT IN AN ORGANIZED HEALTH CARE EDUCATION/TRAINING PROGRAM

## 2022-06-13 PROCEDURE — 80053 COMPREHEN METABOLIC PANEL: CPT | Performed by: INTERNAL MEDICINE

## 2022-06-13 RX ORDER — SODIUM BICARBONATE 650 MG/1
1300 TABLET ORAL 2 TIMES DAILY
Status: DISCONTINUED | OUTPATIENT
Start: 2022-06-13 | End: 2022-06-15 | Stop reason: HOSPADM

## 2022-06-13 RX ORDER — CALCITONIN SALMON 200 [USP'U]/ML
4 INJECTION, SOLUTION INTRAMUSCULAR; SUBCUTANEOUS EVERY 12 HOURS
Status: COMPLETED | OUTPATIENT
Start: 2022-06-13 | End: 2022-06-15

## 2022-06-13 RX ORDER — CALCITONIN SALMON 200 [USP'U]/ML
4 INJECTION, SOLUTION INTRAMUSCULAR; SUBCUTANEOUS EVERY 12 HOURS
Status: DISCONTINUED | OUTPATIENT
Start: 2022-06-13 | End: 2022-06-13

## 2022-06-13 RX ADMIN — ALUMINUM HYDROXIDE, MAGNESIUM HYDROXIDE, AND DIMETHICONE 10 ML: 400; 400; 40 SUSPENSION ORAL at 08:06

## 2022-06-13 RX ADMIN — HYDROCODONE BITARTRATE AND ACETAMINOPHEN 1 TABLET: 5; 325 TABLET ORAL at 06:06

## 2022-06-13 RX ADMIN — MUPIROCIN: 20 OINTMENT TOPICAL at 08:06

## 2022-06-13 RX ADMIN — SODIUM BICARBONATE 650 MG TABLET 650 MG: at 08:06

## 2022-06-13 RX ADMIN — INSULIN ASPART 2 UNITS: 100 INJECTION, SOLUTION INTRAVENOUS; SUBCUTANEOUS at 12:06

## 2022-06-13 RX ADMIN — HYDROCODONE BITARTRATE AND ACETAMINOPHEN 1 TABLET: 5; 325 TABLET ORAL at 05:06

## 2022-06-13 RX ADMIN — APIXABAN 10 MG: 5 TABLET, FILM COATED ORAL at 08:06

## 2022-06-13 RX ADMIN — SODIUM BICARBONATE 650 MG TABLET 1300 MG: at 08:06

## 2022-06-13 RX ADMIN — ALUMINUM HYDROXIDE, MAGNESIUM HYDROXIDE, AND DIMETHICONE 10 ML: 400; 400; 40 SUSPENSION ORAL at 05:06

## 2022-06-13 RX ADMIN — ALUMINUM HYDROXIDE, MAGNESIUM HYDROXIDE, AND DIMETHICONE 10 ML: 400; 400; 40 SUSPENSION ORAL at 12:06

## 2022-06-13 RX ADMIN — CALCITONIN SALMON 214 UNITS: 200 INJECTION, SOLUTION INTRAMUSCULAR; SUBCUTANEOUS at 05:06

## 2022-06-13 RX ADMIN — INSULIN DETEMIR 10 UNITS: 100 INJECTION, SOLUTION SUBCUTANEOUS at 08:06

## 2022-06-13 NOTE — ASSESSMENT & PLAN NOTE
Replaced at outside hospital, likely from hematuria  Type and screen, transition to Eliquis for DVT  Consent form signed by author in ED  B12 high, folate within normal limits, ferritin elevated

## 2022-06-13 NOTE — ASSESSMENT & PLAN NOTE
Mild hyponatremia with sodium of 132 today, likely hypovolemic hyponatremia due to the fact that patient has poor p.o. intake  Given IV fluid bolus today

## 2022-06-13 NOTE — ASSESSMENT & PLAN NOTE
Urology consulted, chronic, continue to monitor  Patient will need outpatient urology and rad onc follow up as there is significant concern for malignancy. Wishes to follow up close to home    Per radiation oncology patient should follow up [facility] with systemic therapy of Keytrada. Outpatient follow up has been arranged and would be about 3-4 weeks of treatment   2

## 2022-06-13 NOTE — SUBJECTIVE & OBJECTIVE
Interval Hx:  Patient with hypercalcemia today.  Likely related to malignancy.  Is complaining of some low back pain however this is likely related to nephrostomy tubes as she has been having back pain since being placed.    Review of Systems   Constitutional:  Negative for activity change, appetite change, chills and fever.   HENT:  Negative for congestion, hearing loss and rhinorrhea.    Eyes:  Negative for discharge, itching and visual disturbance.   Respiratory:  Negative for apnea, cough and shortness of breath.    Cardiovascular:  Negative for chest pain, palpitations and leg swelling.   Gastrointestinal:  Negative for abdominal distention, abdominal pain, constipation, diarrhea, nausea and vomiting.   Endocrine: Negative for cold intolerance and heat intolerance.   Genitourinary:  Positive for dysuria. Negative for hematuria.   Musculoskeletal:  Positive for back pain. Negative for neck pain and neck stiffness.   Skin:  Negative for rash and wound.   Neurological:  Negative for dizziness, seizures, light-headedness and headaches.   Psychiatric/Behavioral:  Negative for agitation, confusion and suicidal ideas.    Objective:     Vital Signs (Most Recent):  Temp: 97 °F (36.1 °C) (06/13/22 1520)  Pulse: 96 (06/13/22 1520)  Resp: 18 (06/13/22 1729)  BP: (!) 143/67 (06/13/22 1520)  SpO2: 100 % (06/13/22 1520)   Vital Signs (24h Range):  Temp:  [97 °F (36.1 °C)-100 °F (37.8 °C)] 97 °F (36.1 °C)  Pulse:  [] 96  Resp:  [16-20] 18  SpO2:  [97 %-100 %] 100 %  BP: (118-171)/(57-73) 143/67     Weight: 53.5 kg (118 lb)  Body mass index is 22.3 kg/m².      GENERAL:  No apparent distress. Alert and oriented times three  EYES:  EOMI. Conjunctivae intact  ENT:  Posterior pharynx clear  NECK:  Supple with no lymphadenopathy or thyromegaly  LUNGS:  No respiratory distress. Clear to auscultation bilaterally with good air movement  CARDIAC:  RRR without murmur, rub or gallop  ABDOMEN:  diffusely tender, bilateral nephrostomy  tubes in place   EXTREMITIES:  Peripheral pulses are 2+. Hands and feet are warm. No cyanosis or edema           Significant labs reviewed    Significant Imaging: I have reviewed all pertinent imaging results/findings within the past 24 hours.

## 2022-06-13 NOTE — PLAN OF CARE
Problem: Adult Inpatient Plan of Care  Goal: Plan of Care Review  Outcome: Ongoing, Progressing  Goal: Patient-Specific Goal (Individualized)  Outcome: Adequate for Care Transition  Goal: Absence of Hospital-Acquired Illness or Injury  Outcome: Adequate for Care Transition     Problem: Adult Inpatient Plan of Care  Goal: Patient-Specific Goal (Individualized)  Outcome: Adequate for Care Transition     Problem: Adult Inpatient Plan of Care  Goal: Absence of Hospital-Acquired Illness or Injury  Outcome: Adequate for Care Transition

## 2022-06-13 NOTE — ASSESSMENT & PLAN NOTE
Bicarb consistently low.  Chronic.  Likely due to active malignancy  Increased bicarb tabs to 1300 mg b.i.d.

## 2022-06-13 NOTE — PHYSICIAN QUERY
"PT Name: Karoline Justice  MR #: [id number]     Documentation Clarification      CDS/: Elizabeth Castillo RN            Contact information:[email or web address]    This form is a permanent document in the medical record.     Query Date: June 13, 2022    By submitting this query, we are merely seeking further clarification of documentation. Please utilize your independent clinical judgment when addressing the question(s) below.    The Medical Record reflects the following:    Supporting Clinical Findings Location in Medical Record   Urinary tract infection without hematuria  Continue rocephin for UTI  (start day 6/9, will treat for 3 days as urine cx has NGTD.   Restart if febrile or symptomatic   Monitor closely  Does have multiple sites for infection    77 yo female with bladder cancer w/ urinary obstruction s/p right sided stenting and left nephrostomy tube placement, HTN, anemia, DM presenting for back pain, found to have right hydronephrosis. Also with UTI symptoms, found to have UTI, started on rocephin. Noted also to have anemia, given pRBCs as well. She was transferred to [facility] for further eval by urology.     "76-year-old female with a history of bladder cancer with urinary obstruction (right-sided ureteral stenting and left-sided nephrostomy tube placement), hypercalcemia, hypertension, urinary tract infection, anemia, diabetes, choledocholithiasis, and prior COVID (July 2021) admitted to [facility] on June 7 with back pain.  She reported burning with urination and right lower extremity swelling.  She had no chest pain or dyspnea.  She was found to have leukocytosis.  Lower extremity ultrasound showed right lower extremity DVT.  She was started on apixaban, Rocephin, and IV fluid.  Packed red blood cells were ordered for her anemia.  Referring provider noted patient has mild edema in her right leg but it is intact from a neurovascular standpoint.  Her urine output and her ostomy output grossly " appeared fairly normal today.  She remains hemodynamically stable.  Case discussed with Urology and Interventional Radiology at [facility].      Soto was placed this morning for urinary retention in a large amount of purulent urine was drained.  Hosp Med PN 6/12                                                  Urology CN 6/8                                                                            Provider, please provide diagnosis or diagnoses associated with above clinical findings.    [   ] UTI due to R ureteral stent   [   ] UTI due to L nephrostomy tube   [  x ] UTI due to R ureteral stent and L nephrostomy tube   [   ] Other (please specify): ____________   [   ] Clinically undetermined

## 2022-06-13 NOTE — PLAN OF CARE
Patient in bed awake and denied any acute distress, call button and other personal items within reach. 500 mls NS bolus given, BMP and calcium levels drawn, awaiting results. R and left nephro drains in place and drained 200 mls and 100 mls cloudy fluids earlier. Will continue to monitor.  Problem: Adult Inpatient Plan of Care  Goal: Plan of Care Review  Outcome: Ongoing, Progressing  Goal: Patient-Specific Goal (Individualized)  Outcome: Ongoing, Progressing  Goal: Absence of Hospital-Acquired Illness or Injury  Outcome: Ongoing, Progressing  Goal: Optimal Comfort and Wellbeing  Outcome: Ongoing, Progressing  Goal: Readiness for Transition of Care  Outcome: Ongoing, Progressing     Problem: Diabetes Comorbidity  Goal: Blood Glucose Level Within Targeted Range  Outcome: Ongoing, Progressing     Problem: Fluid and Electrolyte Imbalance (Acute Kidney Injury/Impairment)  Goal: Fluid and Electrolyte Balance  Outcome: Ongoing, Progressing     Problem: Oral Intake Inadequate (Acute Kidney Injury/Impairment)  Goal: Optimal Nutrition Intake  Outcome: Ongoing, Progressing     Problem: Renal Function Impairment (Acute Kidney Injury/Impairment)  Goal: Effective Renal Function  Outcome: Ongoing, Progressing     Problem: Fall Injury Risk  Goal: Absence of Fall and Fall-Related Injury  Outcome: Ongoing, Progressing     Problem: Skin Injury Risk Increased  Goal: Skin Health and Integrity  Outcome: Ongoing, Progressing

## 2022-06-13 NOTE — PLAN OF CARE
Problem: Adult Inpatient Plan of Care  Goal: Plan of Care Review  Outcome: Ongoing, Progressing  Goal: Patient-Specific Goal (Individualized)  Outcome: Ongoing, Progressing  Goal: Absence of Hospital-Acquired Illness or Injury  Outcome: Ongoing, Progressing  Goal: Optimal Comfort and Wellbeing  Outcome: Ongoing, Progressing  Goal: Readiness for Transition of Care  Outcome: Ongoing, Progressing     Problem: Renal Function Impairment (Acute Kidney Injury/Impairment)  Goal: Effective Renal Function  Outcome: Ongoing, Progressing     Problem: Skin Injury Risk Increased  Goal: Skin Health and Integrity  Outcome: Ongoing, Progressing     Problem: Fall Injury Risk  Goal: Absence of Fall and Fall-Related Injury  Outcome: Ongoing, Progressing     Problem: Renal Function Impairment (Acute Kidney Injury/Impairment)  Goal: Effective Renal Function  Outcome: Ongoing, Progressing       Reviewed POC with patient with emphasis on kidney function and treatment for thrush.  Patient verbalized understanding and agreement with all treatments and interventions.

## 2022-06-13 NOTE — ASSESSMENT & PLAN NOTE
Seems the patient has had consistent problems with his intermittentty  Suspect is due to metastatic malignancy, , corrected calcium today 13.7.  Patient was complaining of low back pain however this has been the case since she had nephrostomy tube placed.  -given IV fluids and calcitonin  -will send PTHrP, cortisol, PTH, SPEP, UPEP however highly suspect that this is related to metastatic bladder cancer

## 2022-06-13 NOTE — PROGRESS NOTES
"[facility] Medicine  Progress Note    Patient Name: Karoline Justice  MRN: [id number]  Patient Class: IP- Inpatient   Admission Date: 6/8/2022  Length of Stay: 5 days  Attending Physician: Katey Shanks MD  Primary Care Provider: Dee Dee Oconnor MD        Subjective:     Principal Problem:Hydronephrosis        HPI:  75 yo female with bladder cancer w/ urinary obstruction s/p right sided stenting and left nephrostomy tube placement, HTN, anemia, DM presenting for back pain, found to have right hydronephrosis. Also with UTI symptoms, found to have UTI, started on rocephin. Noted also to have anemia, given pRBCs as well. She was transferred to [facility] for further eval by urology.     Per  note,  "76-year-old female with a history of bladder cancer with urinary obstruction (right-sided ureteral stenting and left-sided nephrostomy tube placement), hypercalcemia, hypertension, urinary tract infection, anemia, diabetes, choledocholithiasis, and prior COVID (July 2021) admitted to [facility] on June 7 with back pain.  She reported burning with urination and right lower extremity swelling.  She had no chest pain or dyspnea.  She was found to have leukocytosis.  Lower extremity ultrasound showed right lower extremity DVT.  She was started on apixaban, Rocephin, and IV fluid.  Packed red blood cells were ordered for her anemia.  Referring provider noted patient has mild edema in her right leg but it is intact from a neurovascular standpoint.  Her urine output and her ostomy output grossly appeared fairly normal today.  She remains hemodynamically stable.  Case discussed with Urology and Interventional Radiology at [facility].  Recommendation is to move patient urgently to [facility] for further evaluation and treatment.  Will transfer to Hospital Medicine service at [facility].  On arrival, will contact Urology and Interventional Radiology.  I spoke with the referring provider, " "and they will hold apixaban and have the patient in NPO status for now.     She had transurethral section of bladder tumor with right ureteral stent placement on May 9. She had left nephrostomy tube placement May 10. Pathology showed poorly differentiated carcinoma with areas of squamous differentiation, osteoclast like GI cells, and classic high-grade papillary     June 8:  White blood cells 28.07, hemoglobin 6.7, hematocrit 21.8, platelets 373, sodium 136, potassium 4.5, chloride 109, CO2 20, BUN 25, creatinine 1.7, glucose 190, total bilirubin 0.9, AST 47, ALT 37, magnesium 2     Repeat CBC at white blood cells 29.19, hemoglobin 6.2, hematocrit 19.9, platelets 376     June 7:  Blood cultures no growth to date  COVID negative, white blood cells 29.35, hemoglobin 6.9, hematocrit 22, platelets 380, sodium 132, potassium 4.2, chloride 104, CO2 19, BUN 26, creatinine 1.8  Urinalysis with 2+ protein, 2+ glucose, 2+ blood, 3+ leukocytes, 10 RBC, greater than 100 WBC  -CT renal stone protocol had right ureteral stent in place with chronic stable moderate right hydronephrosis.  Left-sided percutaneous nephrostomy tube in place with no hydronephrosis.  Large bladder mass consistent with history of malignancy.  Enlarged bilateral pelvic lymph node consistent with metastatic disease.  No acute finding.  -Right lower extremity ultrasound showed DVT extending from the right common femoral vein to the calf veins.     [place] 3:  PET CT scan had irregular wall thickening with associated hypermetabolic activity at the left posterior aspect of the urinary bladder compatible with known primary neoplasm.  Enlarged hypermetabolic lymph nodes along the bilateral pelvic sidewalls suggesting megan metastatic disease.  Multiple cell by 5 mm pulmonary nodules which are below the resolution of PET but new compared with chest CT from May of 2022. "    Patient with multiple metabolic issues including metabolic acidosis, hypercalcemia, mild " hyponatremia, elevated alk-phos, all likely consistent with metastatic malignancy.  Low back pain in the setting of nephrostomy tubes      Overview/Hospital Course:  No notes on file    Interval Hx:  Patient with hypercalcemia today.  Likely related to malignancy.  Is complaining of some low back pain however this is likely related to nephrostomy tubes as she has been having back pain since being placed.    Review of Systems   Constitutional:  Negative for activity change, appetite change, chills and fever.   HENT:  Negative for congestion, hearing loss and rhinorrhea.    Eyes:  Negative for discharge, itching and visual disturbance.   Respiratory:  Negative for apnea, cough and shortness of breath.    Cardiovascular:  Negative for chest pain, palpitations and leg swelling.   Gastrointestinal:  Negative for abdominal distention, abdominal pain, constipation, diarrhea, nausea and vomiting.   Endocrine: Negative for cold intolerance and heat intolerance.   Genitourinary:  Positive for dysuria. Negative for hematuria.   Musculoskeletal:  Positive for back pain. Negative for neck pain and neck stiffness.   Skin:  Negative for rash and wound.   Neurological:  Negative for dizziness, seizures, light-headedness and headaches.   Psychiatric/Behavioral:  Negative for agitation, confusion and suicidal ideas.    Objective:     Vital Signs (Most Recent):  Temp: 97 °F (36.1 °C) (06/13/22 1520)  Pulse: 96 (06/13/22 1520)  Resp: 18 (06/13/22 1729)  BP: (!) 143/67 (06/13/22 1520)  SpO2: 100 % (06/13/22 1520)   Vital Signs (24h Range):  Temp:  [97 °F (36.1 °C)-100 °F (37.8 °C)] 97 °F (36.1 °C)  Pulse:  [] 96  Resp:  [16-20] 18  SpO2:  [97 %-100 %] 100 %  BP: (118-171)/(57-73) 143/67     Weight: 53.5 kg (118 lb)  Body mass index is 22.3 kg/m².      GENERAL:  No apparent distress. Alert and oriented times three  EYES:  EOMI. Conjunctivae intact  ENT:  Posterior pharynx clear  NECK:  Supple with no lymphadenopathy or  thyromegaly  LUNGS:  No respiratory distress. Clear to auscultation bilaterally with good air movement  CARDIAC:  RRR without murmur, rub or gallop  ABDOMEN:  diffusely tender, bilateral nephrostomy tubes in place   EXTREMITIES:  Peripheral pulses are 2+. Hands and feet are warm. No cyanosis or edema           Significant labs reviewed    Significant Imaging: I have reviewed all pertinent imaging results/findings within the past 24 hours.            Assessment/Plan:      * Hydronephrosis  On CT scan  Urology consulted, will see appreciate recs  Seen by IR  R nephrostomy tube placed 6/9    Per Urology:  Patient s/p right neph tube after failed drainage with right ureteral stent.   - She recently developed DVT and was started on Eliquis. The Eliquis is being held (last dose was 10mg Eliquis given at 10am on 6/8) and she is being started on Heparin gtt.  - Soto pulled this am. She does not need to to undergo voiding trial due to b/l neph tubes draining appropriately.  - Urine culture NG  - Agree with empiric antibiotics.  - F/u labs from this am   - Recommend to transfuse with prbc if necessary  - Rest of care per primary.  - She needs to f/u with her multi-disciplinary team and I will message Sandra Harryfiordaliza to reschedule appointments      Hyponatremia  Mild hyponatremia with sodium of 132 today, likely hypovolemic hyponatremia due to the fact that patient has poor p.o. intake  Given IV fluid bolus today      Metabolic acidosis  Bicarb consistently low.  Chronic.  Likely due to active malignancy  Increased bicarb tabs to 1300 mg b.i.d.      Leukocytosis  Potentially from malignancy, as antibiotics did not improve leukocytosis  Follow-up with oncology  Completed a 3 day course of ceftriaxone  Cultures no growth to date      Urinary tract infection without hematuria  Continue rocephin for UTI  (start day 6/9, will treat for 3 days as urine cx has NGTD.   Restart if febrile or symptomatic   Monitor closely  Does have  "multiple sites for infection      Acute deep vein thrombosis (DVT) of proximal vein of right lower extremity  Noted at outside hospital  On heparin drip 2/2 due to procedure, now will transition to eliqu and patient can f/u outpatient with heme. Suspect it is related to hypercoagulable state in setting of malignancy       Anemia  Replaced at outside hospital, likely from hematuria  Type and screen, transition to [facility] for DVT  Consent form signed by author in ED  B12 high, folate within normal limits, ferritin elevated      HTN (hypertension)  Chronic, controlled, reportedly not taking amlodipine      Hypercalcemia  Seems the patient has had consistent problems with his intermittentty  Suspect is due to metastatic malignancy, , corrected calcium today 13.7.  Patient was complaining of low back pain however this has been the case since she had nephrostomy tube placed.  -given IV fluids and calcitonin  -will send PTHrP, cortisol, PTH, SPEP, UPEP however highly suspect that this is related to metastatic bladder cancer      Controlled Type 2 diabetes mellitus  On 20 units levemir at home  Will reduced to 10 while NPO and not eating  SSI and accuchecks  Lab Results   Component Value Date    HGBA1C 6.9 (H) 05/06/2022           Bladder mass  Urology consulted, chronic, continue to monitor  Patient will need outpatient urology and rad onc follow up as there is significant concern for malignancy. Wishes to follow up close to home    Per radiation oncology patient should follow up [facility] with systemic therapy of Keytrada. Outpatient follow up has been arranged and would be about 3-4 weeks of treatment      VTE Risk Mitigation (From admission, onward)         Ordered     apixaban tablet 5 mg  2 times daily        "Followed by" Linked Group Details    06/11/22 1638     apixaban tablet 10 mg  2 times daily        "Followed by" Linked Group Details    06/11/22 1638                Discharge Planning "   GET: 6/13/2022     Code Status: Full Code   Is the patient medically ready for discharge?: Yes    Reason for patient still in hospital (select all that apply): Treatment  Discharge Plan A: Home Health   Discharge Delays: None known at this time              Katey Shanks MD  Department of Hospital Medicine   [facility] S[facility]

## 2022-06-14 LAB
ALBUMIN SERPL BCP-MCNC: 1.9 G/DL (ref 3.5–5.2)
ALBUMIN SERPL ELPH-MCNC: 2.26 G/DL (ref 3.35–5.55)
ALP SERPL-CCNC: 157 U/L (ref 55–135)
ALPHA1 GLOB SERPL ELPH-MCNC: 0.65 G/DL (ref 0.17–0.41)
ALPHA2 GLOB SERPL ELPH-MCNC: 0.91 G/DL (ref 0.43–0.99)
ALT SERPL W/O P-5'-P-CCNC: 10 U/L (ref 10–44)
ANION GAP SERPL CALC-SCNC: 7 MMOL/L (ref 8–16)
AST SERPL-CCNC: 13 U/L (ref 10–40)
B-GLOBULIN SERPL ELPH-MCNC: 0.64 G/DL (ref 0.5–1.1)
BASOPHILS # BLD AUTO: 0.07 K/UL (ref 0–0.2)
BASOPHILS NFR BLD: 0.3 % (ref 0–1.9)
BILIRUB SERPL-MCNC: 0.5 MG/DL (ref 0.1–1)
BUN SERPL-MCNC: 17 MG/DL (ref 8–23)
CA-I BLDV-SCNC: 1.7 MMOL/L (ref 1.06–1.42)
CALCIUM SERPL-MCNC: 11.8 MG/DL (ref 8.7–10.5)
CHLORIDE SERPL-SCNC: 102 MMOL/L (ref 95–110)
CO2 SERPL-SCNC: 21 MMOL/L (ref 23–29)
CORTIS SERPL-MCNC: 16.5 UG/DL (ref 4.3–22.4)
CREAT SERPL-MCNC: 1.3 MG/DL (ref 0.5–1.4)
DIFFERENTIAL METHOD: ABNORMAL
EOSINOPHIL # BLD AUTO: 0.2 K/UL (ref 0–0.5)
EOSINOPHIL NFR BLD: 1.1 % (ref 0–8)
ERYTHROCYTE [DISTWIDTH] IN BLOOD BY AUTOMATED COUNT: 16.3 % (ref 11.5–14.5)
EST. GFR  (AFRICAN AMERICAN): 46 ML/MIN/1.73 M^2
EST. GFR  (NON AFRICAN AMERICAN): 39.9 ML/MIN/1.73 M^2
GAMMA GLOB SERPL ELPH-MCNC: 1.53 G/DL (ref 0.67–1.58)
GGT SERPL-CCNC: 125 U/L (ref 8–55)
GLUCOSE SERPL-MCNC: 189 MG/DL (ref 70–110)
HCT VFR BLD AUTO: 24.2 % (ref 37–48.5)
HGB BLD-MCNC: 7.3 G/DL (ref 12–16)
IMM GRANULOCYTES # BLD AUTO: 0.12 K/UL (ref 0–0.04)
IMM GRANULOCYTES NFR BLD AUTO: 0.5 % (ref 0–0.5)
LYMPHOCYTES # BLD AUTO: 1.5 K/UL (ref 1–4.8)
LYMPHOCYTES NFR BLD: 6.9 % (ref 18–48)
MAGNESIUM SERPL-MCNC: 1.9 MG/DL (ref 1.6–2.6)
MCH RBC QN AUTO: 27 PG (ref 27–31)
MCHC RBC AUTO-ENTMCNC: 30.2 G/DL (ref 32–36)
MCV RBC AUTO: 90 FL (ref 82–98)
MONOCYTES # BLD AUTO: 1 K/UL (ref 0.3–1)
MONOCYTES NFR BLD: 4.5 % (ref 4–15)
NEUTROPHILS # BLD AUTO: 19.1 K/UL (ref 1.8–7.7)
NEUTROPHILS NFR BLD: 86.7 % (ref 38–73)
NRBC BLD-RTO: 0 /100 WBC
PATHOLOGIST INTERPRETATION SPE: NORMAL
PLATELET # BLD AUTO: 542 K/UL (ref 150–450)
PMV BLD AUTO: 9.5 FL (ref 9.2–12.9)
POCT GLUCOSE: 160 MG/DL (ref 70–110)
POCT GLUCOSE: 186 MG/DL (ref 70–110)
POCT GLUCOSE: 207 MG/DL (ref 70–110)
POCT GLUCOSE: 228 MG/DL (ref 70–110)
POTASSIUM SERPL-SCNC: 4.4 MMOL/L (ref 3.5–5.1)
PROT SERPL-MCNC: 6 G/DL (ref 6–8.4)
PROT SERPL-MCNC: 6.4 G/DL (ref 6–8.4)
RBC # BLD AUTO: 2.7 M/UL (ref 4–5.4)
SODIUM SERPL-SCNC: 130 MMOL/L (ref 136–145)
WBC # BLD AUTO: 22.06 K/UL (ref 3.9–12.7)

## 2022-06-14 PROCEDURE — 84165 PROTEIN E-PHORESIS SERUM: CPT | Mod: 26,,, | Performed by: PATHOLOGY

## 2022-06-14 PROCEDURE — 86334 IMMUNOFIX E-PHORESIS SERUM: CPT | Mod: 26,,, | Performed by: PATHOLOGY

## 2022-06-14 PROCEDURE — 11000001 HC ACUTE MED/SURG PRIVATE ROOM

## 2022-06-14 PROCEDURE — 86334 PATHOLOGIST INTERPRETATION IFE: ICD-10-PCS | Mod: 26,,, | Performed by: PATHOLOGY

## 2022-06-14 PROCEDURE — 99233 PR SUBSEQUENT HOSPITAL CARE,LEVL III: ICD-10-PCS | Mod: ,,, | Performed by: STUDENT IN AN ORGANIZED HEALTH CARE EDUCATION/TRAINING PROGRAM

## 2022-06-14 PROCEDURE — 84165 PATHOLOGIST INTERPRETATION SPE: ICD-10-PCS | Mod: 26,,, | Performed by: PATHOLOGY

## 2022-06-14 PROCEDURE — 82533 TOTAL CORTISOL: CPT | Performed by: STUDENT IN AN ORGANIZED HEALTH CARE EDUCATION/TRAINING PROGRAM

## 2022-06-14 PROCEDURE — 25000003 PHARM REV CODE 250: Performed by: STUDENT IN AN ORGANIZED HEALTH CARE EDUCATION/TRAINING PROGRAM

## 2022-06-14 PROCEDURE — 99233 SBSQ HOSP IP/OBS HIGH 50: CPT | Mod: ,,, | Performed by: STUDENT IN AN ORGANIZED HEALTH CARE EDUCATION/TRAINING PROGRAM

## 2022-06-14 PROCEDURE — 25000003 PHARM REV CODE 250: Performed by: INTERNAL MEDICINE

## 2022-06-14 PROCEDURE — 86334 IMMUNOFIX E-PHORESIS SERUM: CPT | Performed by: STUDENT IN AN ORGANIZED HEALTH CARE EDUCATION/TRAINING PROGRAM

## 2022-06-14 PROCEDURE — 83735 ASSAY OF MAGNESIUM: CPT | Performed by: INTERNAL MEDICINE

## 2022-06-14 PROCEDURE — 85025 COMPLETE CBC W/AUTO DIFF WBC: CPT | Performed by: INTERNAL MEDICINE

## 2022-06-14 PROCEDURE — 82977 ASSAY OF GGT: CPT | Performed by: STUDENT IN AN ORGANIZED HEALTH CARE EDUCATION/TRAINING PROGRAM

## 2022-06-14 PROCEDURE — 82330 ASSAY OF CALCIUM: CPT | Performed by: STUDENT IN AN ORGANIZED HEALTH CARE EDUCATION/TRAINING PROGRAM

## 2022-06-14 PROCEDURE — 84165 PROTEIN E-PHORESIS SERUM: CPT | Performed by: STUDENT IN AN ORGANIZED HEALTH CARE EDUCATION/TRAINING PROGRAM

## 2022-06-14 PROCEDURE — 63600175 PHARM REV CODE 636 W HCPCS: Mod: JG | Performed by: STUDENT IN AN ORGANIZED HEALTH CARE EDUCATION/TRAINING PROGRAM

## 2022-06-14 PROCEDURE — 80053 COMPREHEN METABOLIC PANEL: CPT | Performed by: INTERNAL MEDICINE

## 2022-06-14 PROCEDURE — 82397 CHEMILUMINESCENT ASSAY: CPT | Performed by: STUDENT IN AN ORGANIZED HEALTH CARE EDUCATION/TRAINING PROGRAM

## 2022-06-14 RX ORDER — SODIUM CHLORIDE 9 MG/ML
INJECTION, SOLUTION INTRAVENOUS CONTINUOUS
Status: DISCONTINUED | OUTPATIENT
Start: 2022-06-14 | End: 2022-06-15 | Stop reason: HOSPADM

## 2022-06-14 RX ADMIN — SODIUM BICARBONATE 650 MG TABLET 1300 MG: at 07:06

## 2022-06-14 RX ADMIN — ALUMINUM HYDROXIDE, MAGNESIUM HYDROXIDE, AND DIMETHICONE 10 ML: 400; 400; 40 SUSPENSION ORAL at 04:06

## 2022-06-14 RX ADMIN — ALUMINUM HYDROXIDE, MAGNESIUM HYDROXIDE, AND DIMETHICONE 10 ML: 400; 400; 40 SUSPENSION ORAL at 08:06

## 2022-06-14 RX ADMIN — SODIUM CHLORIDE: 0.9 INJECTION, SOLUTION INTRAVENOUS at 04:06

## 2022-06-14 RX ADMIN — INSULIN ASPART 2 UNITS: 100 INJECTION, SOLUTION INTRAVENOUS; SUBCUTANEOUS at 08:06

## 2022-06-14 RX ADMIN — ALUMINUM HYDROXIDE, MAGNESIUM HYDROXIDE, AND DIMETHICONE 10 ML: 400; 400; 40 SUSPENSION ORAL at 12:06

## 2022-06-14 RX ADMIN — APIXABAN 10 MG: 5 TABLET, FILM COATED ORAL at 07:06

## 2022-06-14 RX ADMIN — SODIUM CHLORIDE: 0.9 INJECTION, SOLUTION INTRAVENOUS at 11:06

## 2022-06-14 RX ADMIN — CALCITONIN SALMON 214 UNITS: 200 INJECTION, SOLUTION INTRAMUSCULAR; SUBCUTANEOUS at 08:06

## 2022-06-14 RX ADMIN — INSULIN DETEMIR 10 UNITS: 100 INJECTION, SOLUTION SUBCUTANEOUS at 08:06

## 2022-06-14 RX ADMIN — INSULIN ASPART 2 UNITS: 100 INJECTION, SOLUTION INTRAVENOUS; SUBCUTANEOUS at 12:06

## 2022-06-14 RX ADMIN — APIXABAN 10 MG: 5 TABLET, FILM COATED ORAL at 08:06

## 2022-06-14 RX ADMIN — SODIUM BICARBONATE 650 MG TABLET 1300 MG: at 08:06

## 2022-06-14 RX ADMIN — HYDROCODONE BITARTRATE AND ACETAMINOPHEN 1 TABLET: 5; 325 TABLET ORAL at 08:06

## 2022-06-14 NOTE — PLAN OF CARE
[facility] - Med Surg  Discharge Final Note    Primary Care Provider: Dee Dee Oconnor MD    Expected Discharge Date: 6/14/2022    Final Discharge Note (most recent)     Final Note - 06/14/22 0846        Final Note    Assessment Type Final Discharge Note     Anticipated Discharge Disposition Home-Health Care Svc     What phone number can be called within the next 1-3 days to see how you are doing after discharge? [phone]     Hospital Resources/Appts/Education Provided Post-Acute resouces added to AVS        Post-Acute Status    Post-Acute Authorization Home Health     Home Health Status Referrals Sent     Discharge Delays None known at this time                 Important Message from Medicare  Important Message from Medicare regarding Discharge Appeal Rights: Given to patient/caregiver, Explained to patient/caregiver, Signed/date by patient/caregiver     Date IMM was signed: 06/13/22  Time IMM was signed: 0812

## 2022-06-14 NOTE — PROGRESS NOTES
"[facility] Medicine  Progress Note    Patient Name: Karoline Justice  MRN: [id number]  Patient Class: IP- Inpatient   Admission Date: 6/8/2022  Length of Stay: 6 days  Attending Physician: Katey Shanks MD  Primary Care Provider: Dee Dee Oconnor MD        Subjective:     Principal Problem:Hydronephrosis        HPI:  75 yo female with bladder cancer w/ urinary obstruction s/p right sided stenting and left nephrostomy tube placement, HTN, anemia, DM presenting for back pain, found to have right hydronephrosis. Also with UTI symptoms, found to have UTI, started on rocephin. Noted also to have anemia, given pRBCs as well. She was transferred to [facility] for further eval by urology.     Per  note,  "76-year-old female with a history of bladder cancer with urinary obstruction (right-sided ureteral stenting and left-sided nephrostomy tube placement), hypercalcemia, hypertension, urinary tract infection, anemia, diabetes, choledocholithiasis, and prior COVID (July 2021) admitted to [facility] on June 7 with back pain.  She reported burning with urination and right lower extremity swelling.  She had no chest pain or dyspnea.  She was found to have leukocytosis.  Lower extremity ultrasound showed right lower extremity DVT.  She was started on apixaban, Rocephin, and IV fluid.  Packed red blood cells were ordered for her anemia.  Referring provider noted patient has mild edema in her right leg but it is intact from a neurovascular standpoint.  Her urine output and her ostomy output grossly appeared fairly normal today.  She remains hemodynamically stable.  Case discussed with Urology and Interventional Radiology at [facility].  Recommendation is to move patient urgently to [facility] for further evaluation and treatment.  Will transfer to Hospital Medicine service at [facility].  On arrival, will contact Urology and Interventional Radiology.  I spoke with the referring provider, " "and they will hold apixaban and have the patient in NPO status for now.     She had transurethral section of bladder tumor with right ureteral stent placement on May 9. She had left nephrostomy tube placement May 10. Pathology showed poorly differentiated carcinoma with areas of squamous differentiation, osteoclast like GI cells, and classic high-grade papillary     June 8:  White blood cells 28.07, hemoglobin 6.7, hematocrit 21.8, platelets 373, sodium 136, potassium 4.5, chloride 109, CO2 20, BUN 25, creatinine 1.7, glucose 190, total bilirubin 0.9, AST 47, ALT 37, magnesium 2     Repeat CBC at white blood cells 29.19, hemoglobin 6.2, hematocrit 19.9, platelets 376     June 7:  Blood cultures no growth to date  COVID negative, white blood cells 29.35, hemoglobin 6.9, hematocrit 22, platelets 380, sodium 132, potassium 4.2, chloride 104, CO2 19, BUN 26, creatinine 1.8  Urinalysis with 2+ protein, 2+ glucose, 2+ blood, 3+ leukocytes, 10 RBC, greater than 100 WBC  -CT renal stone protocol had right ureteral stent in place with chronic stable moderate right hydronephrosis.  Left-sided percutaneous nephrostomy tube in place with no hydronephrosis.  Large bladder mass consistent with history of malignancy.  Enlarged bilateral pelvic lymph node consistent with metastatic disease.  No acute finding.  -Right lower extremity ultrasound showed DVT extending from the right common femoral vein to the calf veins.     [place] 3:  PET CT scan had irregular wall thickening with associated hypermetabolic activity at the left posterior aspect of the urinary bladder compatible with known primary neoplasm.  Enlarged hypermetabolic lymph nodes along the bilateral pelvic sidewalls suggesting megan metastatic disease.  Multiple cell by 5 mm pulmonary nodules which are below the resolution of PET but new compared with chest CT from May of 2022. "    Patient with multiple metabolic issues including metabolic acidosis, hypercalcemia, mild " hyponatremia, elevated alk-phos, all likely consistent with metastatic malignancy.  Low back pain in the setting of nephrostomy tubes      Overview/Hospital Course:  No notes on file    Interval Hx:  Patient with persistent hypercalcemia today, however still receiving calcitonin infusion with 2 more doses to complete.  Will recheck ionized calcium in the morning.  Back pain at the site of the nephrostomy tube improved with pain medications    Review of Systems   Constitutional:  Negative for activity change, appetite change, chills and fever.   HENT:  Negative for congestion, hearing loss and rhinorrhea.    Eyes:  Negative for discharge, itching and visual disturbance.   Respiratory:  Negative for apnea, cough and shortness of breath.    Cardiovascular:  Negative for chest pain, palpitations and leg swelling.   Gastrointestinal:  Negative for abdominal distention, abdominal pain, constipation, diarrhea, nausea and vomiting.   Endocrine: Negative for cold intolerance and heat intolerance.   Genitourinary:  Positive for dysuria. Negative for hematuria.   Musculoskeletal:  Positive for back pain. Negative for neck pain and neck stiffness.   Skin:  Negative for rash and wound.   Neurological:  Negative for dizziness, seizures, light-headedness and headaches.   Psychiatric/Behavioral:  Negative for agitation, confusion and suicidal ideas.    Objective:     Vital Signs (Most Recent):  Temp: 98.3 °F (36.8 °C) (06/14/22 1134)  Pulse: 84 (06/14/22 1524)  Resp: 16 (06/14/22 1134)  BP: 117/72 (06/14/22 1134)  SpO2: 98 % (06/14/22 1134)   Vital Signs (24h Range):  Temp:  [98.1 °F (36.7 °C)-99.1 °F (37.3 °C)] 98.3 °F (36.8 °C)  Pulse:  [] 84  Resp:  [16-20] 16  SpO2:  [98 %-99 %] 98 %  BP: (117-137)/(63-73) 117/72     Weight: 53.5 kg (118 lb)  Body mass index is 22.3 kg/m².      GENERAL:  No apparent distress. Alert and oriented times three  EYES:  EOMI. Conjunctivae intact  ENT:  Posterior pharynx clear  NECK:  Supple  with no lymphadenopathy or thyromegaly  LUNGS:  No respiratory distress. Clear to auscultation bilaterally with good air movement  CARDIAC:  RRR without murmur, rub or gallop  ABDOMEN:  diffusely tender, bilateral nephrostomy tubes in place   EXTREMITIES:  Peripheral pulses are 2+. Hands and feet are warm. No cyanosis or edema           Significant labs reviewed    Significant Imaging: I have reviewed all pertinent imaging results/findings within the past 24 hours.              Assessment/Plan:      * Hydronephrosis  On CT scan  Urology consulted, will see appreciate recs  Seen by IR  R nephrostomy tube placed 6/9    Per Urology:  Patient s/p right neph tube after failed drainage with right ureteral stent.   - She recently developed DVT and was started on Eliquis. The Eliquis is being held (last dose was 10mg Eliquis given at 10am on 6/8) and she is being started on Heparin gtt.  - Soto pulled this am. She does not need to to undergo voiding trial due to b/l neph tubes draining appropriately.  - Urine culture NG  - Agree with empiric antibiotics.  - F/u labs from this am   - Recommend to transfuse with prbc if necessary  - Rest of care per primary.  - She needs to f/u with her multi-disciplinary team and I will message Sandra Salgado to reschedule appointments      Hyponatremia  Mild hyponatremia with sodium of 132 today, likely hypovolemic hyponatremia due to the fact that patient has poor p.o. intake  Given IV fluid bolus today      Metabolic acidosis  Bicarb consistently low.  Chronic.  Likely due to active malignancy  Increased bicarb tabs to 1300 mg b.i.d.      Leukocytosis  Potentially from malignancy, as antibiotics did not improve leukocytosis  Follow-up with oncology  Completed a 3 day course of ceftriaxone  Cultures no growth to date      Urinary tract infection without hematuria  Continue rocephin for UTI  (start day 6/9, will treat for 3 days as urine cx has NGTD.   Restart if febrile or symptomatic  "  Monitor closely  Does have multiple sites for infection      Acute deep vein thrombosis (DVT) of proximal vein of right lower extremity  Noted at outside hospital  On heparin drip 2/2 due to procedure, now will transition to [facility] and patient can f/u outpatient with heme. Suspect it is related to hypercoagulable state in setting of malignancy       Anemia  Replaced at outside hospital, likely from hematuria  Type and screen, transition to [facility] for DVT  Consent form signed by author in ED  B12 high, folate within normal limits, ferritin elevated      HTN (hypertension)  Chronic, controlled, reportedly not taking amlodipine      Hypercalcemia  Seems the patient has had consistent problems with his intermittentty  Suspect is due to metastatic malignancy, , corrected calcium today 13.5.  Patient was complaining of low back pain however this has been the case since she had nephrostomy tube placed.  -given IV fluids and calcitonin  -will send PTHrP, cortisol, PTH, SPEP, UPEP however highly suspect that this is related to metastatic bladder cancer  -a.m. cortisol normal, SPEP without gamma spike with slightly elevated alpha 1, PTH less than 5,  PTHrP pending      Controlled Type 2 diabetes mellitus  On 20 units levemir at home  Will reduced to 10 while NPO and not eating  SSI and accuchecks  Lab Results   Component Value Date    HGBA1C 6.9 (H) 05/06/2022           Bladder mass  Urology consulted, chronic, continue to monitor  Patient will need outpatient urology and rad onc follow up as there is significant concern for malignancy. Wishes to follow up close to home    Per radiation oncology patient should follow up [place] with systemic therapy of Keytrada. Outpatient follow up has been arranged and would be about 3-4 weeks of treatment      VTE Risk Mitigation (From admission, onward)         Ordered     apixaban tablet 5 mg  2 times daily        "Followed by" Linked Group Details    06/11/22 " "1638     apixaban tablet 10 mg  2 times daily        "Followed by" Linked Group Details    06/11/22 1638                Discharge Planning   GET: 6/15/2022     Code Status: Full Code   Is the patient medically ready for discharge?: Yes    Reason for patient still in hospital (select all that apply): Treatment  Discharge Plan A: Home Health   Discharge Delays: None known at this time              Katey Shanks MD  Department of Hospital Medicine   [facility]    "

## 2022-06-14 NOTE — ASSESSMENT & PLAN NOTE
Urology consulted, chronic, continue to monitor  Patient will need outpatient urology and rad onc follow up as there is significant concern for malignancy. Wishes to follow up close to home    Per radiation oncology patient should follow up [facility] with systemic therapy of Keytrada. Outpatient follow up has been arranged and would be about 3-4 weeks of treatment

## 2022-06-14 NOTE — SUBJECTIVE & OBJECTIVE
Interval Hx:  Patient with persistent hypercalcemia today, however still receiving calcitonin infusion with 2 more doses to complete.  Will recheck ionized calcium in the morning.  Back pain at the site of the nephrostomy tube improved with pain medications    Review of Systems   Constitutional:  Negative for activity change, appetite change, chills and fever.   HENT:  Negative for congestion, hearing loss and rhinorrhea.    Eyes:  Negative for discharge, itching and visual disturbance.   Respiratory:  Negative for apnea, cough and shortness of breath.    Cardiovascular:  Negative for chest pain, palpitations and leg swelling.   Gastrointestinal:  Negative for abdominal distention, abdominal pain, constipation, diarrhea, nausea and vomiting.   Endocrine: Negative for cold intolerance and heat intolerance.   Genitourinary:  Positive for dysuria. Negative for hematuria.   Musculoskeletal:  Positive for back pain. Negative for neck pain and neck stiffness.   Skin:  Negative for rash and wound.   Neurological:  Negative for dizziness, seizures, light-headedness and headaches.   Psychiatric/Behavioral:  Negative for agitation, confusion and suicidal ideas.    Objective:     Vital Signs (Most Recent):  Temp: 98.3 °F (36.8 °C) (06/14/22 1134)  Pulse: 84 (06/14/22 1524)  Resp: 16 (06/14/22 1134)  BP: 117/72 (06/14/22 1134)  SpO2: 98 % (06/14/22 1134)   Vital Signs (24h Range):  Temp:  [98.1 °F (36.7 °C)-99.1 °F (37.3 °C)] 98.3 °F (36.8 °C)  Pulse:  [] 84  Resp:  [16-20] 16  SpO2:  [98 %-99 %] 98 %  BP: (117-137)/(63-73) 117/72     Weight: 53.5 kg (118 lb)  Body mass index is 22.3 kg/m².      GENERAL:  No apparent distress. Alert and oriented times three  EYES:  EOMI. Conjunctivae intact  ENT:  Posterior pharynx clear  NECK:  Supple with no lymphadenopathy or thyromegaly  LUNGS:  No respiratory distress. Clear to auscultation bilaterally with good air movement  CARDIAC:  RRR without murmur, rub or gallop  ABDOMEN:   diffusely tender, bilateral nephrostomy tubes in place   EXTREMITIES:  Peripheral pulses are 2+. Hands and feet are warm. No cyanosis or edema           Significant labs reviewed    Significant Imaging: I have reviewed all pertinent imaging results/findings within the past 24 hours.

## 2022-06-14 NOTE — ASSESSMENT & PLAN NOTE
Seems the patient has had consistent problems with his intermittentty  Suspect is due to metastatic malignancy, , corrected calcium today 13.5.  Patient was complaining of low back pain however this has been the case since she had nephrostomy tube placed.  -given IV fluids and calcitonin  -will send PTHrP, cortisol, PTH, SPEP, UPEP however highly suspect that this is related to metastatic bladder cancer  -a.m. cortisol normal, SPEP without gamma spike with slightly elevated alpha 1, PTH less than 5,  PTHrP pending

## 2022-06-14 NOTE — PLAN OF CARE
Patient in bed awake and denied any acute distress, call button and other personal items within reach.  Problem: Adult Inpatient Plan of Care  Goal: Plan of Care Review  Outcome: Ongoing, Progressing  Goal: Patient-Specific Goal (Individualized)  Outcome: Ongoing, Progressing  Goal: Absence of Hospital-Acquired Illness or Injury  Outcome: Ongoing, Progressing  Goal: Optimal Comfort and Wellbeing  Outcome: Ongoing, Progressing  Goal: Readiness for Transition of Care  Outcome: Ongoing, Progressing     Problem: Diabetes Comorbidity  Goal: Blood Glucose Level Within Targeted Range  Outcome: Ongoing, Progressing     Problem: Fluid and Electrolyte Imbalance (Acute Kidney Injury/Impairment)  Goal: Fluid and Electrolyte Balance  Outcome: Ongoing, Progressing     Problem: Oral Intake Inadequate (Acute Kidney Injury/Impairment)  Goal: Optimal Nutrition Intake  Outcome: Ongoing, Progressing     Problem: Renal Function Impairment (Acute Kidney Injury/Impairment)  Goal: Effective Renal Function  Outcome: Ongoing, Progressing     Problem: Fall Injury Risk  Goal: Absence of Fall and Fall-Related Injury  Outcome: Ongoing, Progressing     Problem: Skin Injury Risk Increased  Goal: Skin Health and Integrity  Outcome: Ongoing, Progressing

## 2022-06-15 ENCOUNTER — PATIENT MESSAGE (OUTPATIENT)
Dept: HEMATOLOGY/ONCOLOGY | Facility: CLINIC | Age: 77
End: 2022-06-15
Payer: MEDICARE

## 2022-06-15 VITALS
BODY MASS INDEX: 22.28 KG/M2 | TEMPERATURE: 98 F | HEIGHT: 61 IN | RESPIRATION RATE: 16 BRPM | SYSTOLIC BLOOD PRESSURE: 143 MMHG | WEIGHT: 118 LBS | OXYGEN SATURATION: 97 % | HEART RATE: 96 BPM | DIASTOLIC BLOOD PRESSURE: 63 MMHG

## 2022-06-15 LAB
ALBUMIN SERPL BCP-MCNC: 1.9 G/DL (ref 3.5–5.2)
ALP SERPL-CCNC: 132 U/L (ref 55–135)
ALT SERPL W/O P-5'-P-CCNC: 10 U/L (ref 10–44)
ANION GAP SERPL CALC-SCNC: 6 MMOL/L (ref 8–16)
ANISOCYTOSIS BLD QL SMEAR: SLIGHT
AST SERPL-CCNC: 13 U/L (ref 10–40)
BASOPHILS # BLD AUTO: 0.06 K/UL (ref 0–0.2)
BASOPHILS NFR BLD: 0.3 % (ref 0–1.9)
BILIRUB SERPL-MCNC: 0.4 MG/DL (ref 0.1–1)
BUN SERPL-MCNC: 14 MG/DL (ref 8–23)
CA-I BLDV-SCNC: 1.84 MMOL/L (ref 1.06–1.42)
CALCIUM SERPL-MCNC: 11.4 MG/DL (ref 8.7–10.5)
CHLORIDE SERPL-SCNC: 106 MMOL/L (ref 95–110)
CO2 SERPL-SCNC: 21 MMOL/L (ref 23–29)
CREAT SERPL-MCNC: 1.1 MG/DL (ref 0.5–1.4)
DIFFERENTIAL METHOD: ABNORMAL
EOSINOPHIL # BLD AUTO: 0.5 K/UL (ref 0–0.5)
EOSINOPHIL NFR BLD: 2 % (ref 0–8)
ERYTHROCYTE [DISTWIDTH] IN BLOOD BY AUTOMATED COUNT: 16.4 % (ref 11.5–14.5)
EST. GFR  (AFRICAN AMERICAN): 56.4 ML/MIN/1.73 M^2
EST. GFR  (NON AFRICAN AMERICAN): 48.9 ML/MIN/1.73 M^2
GLUCOSE SERPL-MCNC: 108 MG/DL (ref 70–110)
HCT VFR BLD AUTO: 22.9 % (ref 37–48.5)
HGB BLD-MCNC: 7 G/DL (ref 12–16)
HYPOCHROMIA BLD QL SMEAR: ABNORMAL
IMM GRANULOCYTES # BLD AUTO: 0.12 K/UL (ref 0–0.04)
IMM GRANULOCYTES NFR BLD AUTO: 0.5 % (ref 0–0.5)
INTERPRETATION SERPL IFE-IMP: NORMAL
LYMPHOCYTES # BLD AUTO: 0.9 K/UL (ref 1–4.8)
LYMPHOCYTES NFR BLD: 3.8 % (ref 18–48)
MAGNESIUM SERPL-MCNC: 1.8 MG/DL (ref 1.6–2.6)
MCH RBC QN AUTO: 27.3 PG (ref 27–31)
MCHC RBC AUTO-ENTMCNC: 30.6 G/DL (ref 32–36)
MCV RBC AUTO: 90 FL (ref 82–98)
MONOCYTES # BLD AUTO: 1 K/UL (ref 0.3–1)
MONOCYTES NFR BLD: 4.3 % (ref 4–15)
NEUTROPHILS # BLD AUTO: 20.6 K/UL (ref 1.8–7.7)
NEUTROPHILS NFR BLD: 89.1 % (ref 38–73)
NRBC BLD-RTO: 0 /100 WBC
OVALOCYTES BLD QL SMEAR: ABNORMAL
PLATELET # BLD AUTO: 500 K/UL (ref 150–450)
PLATELET BLD QL SMEAR: ABNORMAL
PMV BLD AUTO: 9.4 FL (ref 9.2–12.9)
POCT GLUCOSE: 104 MG/DL (ref 70–110)
POCT GLUCOSE: 131 MG/DL (ref 70–110)
POCT GLUCOSE: 173 MG/DL (ref 70–110)
POCT GLUCOSE: 178 MG/DL (ref 70–110)
POIKILOCYTOSIS BLD QL SMEAR: SLIGHT
POLYCHROMASIA BLD QL SMEAR: ABNORMAL
POTASSIUM SERPL-SCNC: 4.2 MMOL/L (ref 3.5–5.1)
PROT SERPL-MCNC: 5.5 G/DL (ref 6–8.4)
RBC # BLD AUTO: 2.56 M/UL (ref 4–5.4)
SODIUM SERPL-SCNC: 133 MMOL/L (ref 136–145)
WBC # BLD AUTO: 23.08 K/UL (ref 3.9–12.7)

## 2022-06-15 PROCEDURE — 1111F PR DISCHARGE MEDS RECONCILED W/ CURRENT OUTPATIENT MED LIST: ICD-10-PCS | Mod: CPTII,,, | Performed by: STUDENT IN AN ORGANIZED HEALTH CARE EDUCATION/TRAINING PROGRAM

## 2022-06-15 PROCEDURE — 63600175 PHARM REV CODE 636 W HCPCS: Performed by: INTERNAL MEDICINE

## 2022-06-15 PROCEDURE — 99239 PR HOSPITAL DISCHARGE DAY,>30 MIN: ICD-10-PCS | Mod: ,,, | Performed by: STUDENT IN AN ORGANIZED HEALTH CARE EDUCATION/TRAINING PROGRAM

## 2022-06-15 PROCEDURE — 80053 COMPREHEN METABOLIC PANEL: CPT | Performed by: INTERNAL MEDICINE

## 2022-06-15 PROCEDURE — 1111F DSCHRG MED/CURRENT MED MERGE: CPT | Mod: CPTII,,, | Performed by: STUDENT IN AN ORGANIZED HEALTH CARE EDUCATION/TRAINING PROGRAM

## 2022-06-15 PROCEDURE — 85025 COMPLETE CBC W/AUTO DIFF WBC: CPT | Performed by: INTERNAL MEDICINE

## 2022-06-15 PROCEDURE — 99239 HOSP IP/OBS DSCHRG MGMT >30: CPT | Mod: ,,, | Performed by: STUDENT IN AN ORGANIZED HEALTH CARE EDUCATION/TRAINING PROGRAM

## 2022-06-15 PROCEDURE — 63600175 PHARM REV CODE 636 W HCPCS: Performed by: STUDENT IN AN ORGANIZED HEALTH CARE EDUCATION/TRAINING PROGRAM

## 2022-06-15 PROCEDURE — 82330 ASSAY OF CALCIUM: CPT | Performed by: STUDENT IN AN ORGANIZED HEALTH CARE EDUCATION/TRAINING PROGRAM

## 2022-06-15 PROCEDURE — 83735 ASSAY OF MAGNESIUM: CPT | Performed by: INTERNAL MEDICINE

## 2022-06-15 PROCEDURE — 25000003 PHARM REV CODE 250: Performed by: STUDENT IN AN ORGANIZED HEALTH CARE EDUCATION/TRAINING PROGRAM

## 2022-06-15 PROCEDURE — 36415 COLL VENOUS BLD VENIPUNCTURE: CPT | Performed by: STUDENT IN AN ORGANIZED HEALTH CARE EDUCATION/TRAINING PROGRAM

## 2022-06-15 RX ORDER — HYDROCODONE BITARTRATE AND ACETAMINOPHEN 5; 325 MG/1; MG/1
1 TABLET ORAL EVERY 6 HOURS PRN
Qty: 30 TABLET | Refills: 0 | Status: SHIPPED | OUTPATIENT
Start: 2022-06-15 | End: 2022-06-15 | Stop reason: SDUPTHER

## 2022-06-15 RX ORDER — HYDROCODONE BITARTRATE AND ACETAMINOPHEN 5; 325 MG/1; MG/1
1 TABLET ORAL EVERY 6 HOURS PRN
Qty: 30 TABLET | Refills: 0 | Status: SHIPPED | OUTPATIENT
Start: 2022-06-15 | End: 2022-06-25

## 2022-06-15 RX ORDER — SODIUM BICARBONATE 650 MG/1
1300 TABLET ORAL 2 TIMES DAILY
Qty: 360 TABLET | Refills: 0 | Status: ON HOLD | OUTPATIENT
Start: 2022-06-15 | End: 2022-07-07 | Stop reason: HOSPADM

## 2022-06-15 RX ADMIN — ALUMINUM HYDROXIDE, MAGNESIUM HYDROXIDE, AND DIMETHICONE 10 ML: 400; 400; 40 SUSPENSION ORAL at 08:06

## 2022-06-15 RX ADMIN — MORPHINE SULFATE 2 MG: 2 INJECTION, SOLUTION INTRAMUSCULAR; INTRAVENOUS at 08:06

## 2022-06-15 RX ADMIN — ALUMINUM HYDROXIDE, MAGNESIUM HYDROXIDE, AND DIMETHICONE 10 ML: 400; 400; 40 SUSPENSION ORAL at 12:06

## 2022-06-15 RX ADMIN — CALCITONIN SALMON 214 UNITS: 200 INJECTION, SOLUTION INTRAMUSCULAR; SUBCUTANEOUS at 10:06

## 2022-06-15 RX ADMIN — SODIUM BICARBONATE 650 MG TABLET 1300 MG: at 08:06

## 2022-06-15 RX ADMIN — ZOLEDRONIC ACID 4 MG: 4 INJECTION, SOLUTION, CONCENTRATE INTRAVENOUS at 11:06

## 2022-06-15 RX ADMIN — APIXABAN 10 MG: 5 TABLET, FILM COATED ORAL at 08:06

## 2022-06-15 RX ADMIN — CALCITONIN SALMON 214 UNITS: 200 INJECTION, SOLUTION INTRAMUSCULAR; SUBCUTANEOUS at 12:06

## 2022-06-15 RX ADMIN — INSULIN DETEMIR 10 UNITS: 100 INJECTION, SOLUTION SUBCUTANEOUS at 08:06

## 2022-06-15 RX ADMIN — ALUMINUM HYDROXIDE, MAGNESIUM HYDROXIDE, AND DIMETHICONE 10 ML: 400; 400; 40 SUSPENSION ORAL at 04:06

## 2022-06-15 RX ADMIN — SODIUM CHLORIDE: 0.9 INJECTION, SOLUTION INTRAVENOUS at 07:06

## 2022-06-15 NOTE — PLAN OF CARE
[facility] Med S[facility]      HOME HEALTH ORDERS  FACE TO FACE ENCOUNTER    Patient Name: Karoline Justice  YOB: 1945    PCP: Dee Dee Oconnor MD   PCP Address: [address] [place] [address] /*  PCP Phone Number: [phone]  PCP Fax: [phone]    Encounter Date: 6/8/22    Admit to Home Health    Diagnoses:  Active Hospital Problems    Diagnosis  POA    *Hydronephrosis [N13.30]  Yes    Metabolic acidosis [E87.2]  Unknown    Hyponatremia [E87.1]  Unknown    Acute deep vein thrombosis (DVT) of proximal vein of right lower extremity [I82.4Y1]  Yes    Urinary tract infection without hematuria [N39.0]  Yes    Leukocytosis [D72.829]  Yes    Controlled Type 2 diabetes mellitus [E11.9]  Yes     Chronic    Bladder mass [N32.89]  Yes    HTN (hypertension) [I10]  Yes    Anemia [D64.9]  Yes    Hypercalcemia [E83.52]  Yes      Resolved Hospital Problems   No resolved problems to display.       Follow Up Appointments:  Future Appointments   Date Time Provider Department Center   6/16/2022  9:00 AM LAB, HEMONC SAME DAY [facility] LAB HO [facility]   6/16/2022 10:00 AM Reid Snider MD [facility] HEMONC3 [facility]   6/16/2022 11:00 AM Rachid Dominguez MD [facility] ONC [facility]   6/16/2022  1:00 PM Trace Harvey MD [facility] UROLOG [facility]       Allergies:Review of patient's allergies indicates:  No Known Allergies    Medications: Review discharge medications with patient and family and provide education.    Current Facility-Administered Medications   Medication Dose Route Frequency Provider Last Rate Last Admin    0.9%  NaCl infusion (for blood administration)   Intravenous Q24H PRN Alfreda Parada MD        0.9%  NaCl infusion   Intravenous Continuous Katey Shanks  mL/hr at 06/15/22 0732 New Bag at 06/15/22 0732    acetaminophen tablet 650 mg  650 mg Oral Q6H PRN Katey Shanks MD   650 mg at 06/12/22 2038    apixaban tablet 5 mg  5 mg Oral BID Katey CORTEZ  MD Rim[place]        dextrose 10% bolus 125 mL  12.5 g Intravenous PRN Alfreda Parada MD        dextrose 10% bolus 125 mL  12.5 g Intravenous PRN Aj Sánchez MD        dextrose 10% bolus 250 mL  25 g Intravenous PRN Alfreda Parada MD        dextrose 10% bolus 250 mL  25 g Intravenous PRN Aj Sánchez MD        duke's soln (benadryl 30 mL, mylanta 30 mL, LIDOcaine 30 mL, nystatin 30 mL) 120 mL  10 mL Oral QID Katey Shanks MD   10 mL at 06/15/22 1249    glucagon (human recombinant) injection 1 mg  1 mg Intramuscular PRN Alfreda Parada MD        glucose chewable tablet 16 g  16 g Oral PRN Alfreda Parada MD        glucose chewable tablet 24 g  24 g Oral PRN Alfreda Parada MD        HYDROcodone-acetaminophen 5-325 mg per tablet 1 tablet  1 tablet Oral Q6H PRN Aj Sánchez MD   1 tablet at 06/14/22 0813    insulin aspart U-100 pen 0-5 Units  0-5 Units Subcutaneous Q6H PRN Aj Sánchez MD   2 Units at 06/14/22 1201    insulin detemir U-100 pen 10 Units  10 Units Subcutaneous Daily Aj Sánchez MD   10 Units at 06/15/22 0834    melatonin tablet 6 mg  6 mg Oral Nightly PRN Alfreda Parada MD        morphine injection 2 mg  2 mg Intravenous Q4H PRN Alfreda Parada MD   2 mg at 06/15/22 0843    ondansetron injection 4 mg  4 mg Intravenous Q6H PRN Addison Torrez MD        prochlorperazine injection Soln 5 mg  5 mg Intravenous Q6H PRN Addison Torrez MD        sodium bicarbonate tablet 1,300 mg  1,300 mg Oral BID Katey Shanks MD   1,300 mg at 06/15/22 0833    sodium chloride 0.9% flush 10 mL  10 mL Intravenous PRN Alfreda Parada MD         Current Discharge Medication List      START taking these medications    Details   apixaban (ELIQUIS) 5 mg Tab Take 1 tablet (5 mg total) by mouth 2 (two) times daily.  Qty: 60 tablet, Refills: 2      duke's soln (benadryl 30 mL, mylanta 30 mL, LIDOcaine 30 mL, nystatin 30 mL) 120mL Take 10 mLs by mouth 4 (four) times daily.  Qty:  120 mL, Refills: 0      HYDROcodone-acetaminophen (NORCO) 5-325 mg per tablet Take 1 tablet by mouth every 6 (six) hours as needed for Pain.  Qty: 30 tablet, Refills: 0    Comments: Quantity prescribed more than 7 day supply? Yes, quantity medically necessary      !! sodium bicarbonate 650 MG tablet Take 1 tablet (650 mg total) by mouth 2 (two) times daily.  Qty: 60 tablet, Refills: 11      !! sodium bicarbonate 650 MG tablet Take 2 tablets (1,300 mg total) by mouth 2 (two) times daily.  Qty: 360 tablet, Refills: 0       !! - Potential duplicate medications found. Please discuss with provider.      CONTINUE these medications which have CHANGED    Details   insulin detemir U-100 (LEVEMIR FLEXTOUCH) 100 unit/mL (3 mL) SubQ InPn pen Inject 10 Units into the skin once daily.  Qty: 9 mL, Refills: 3         CONTINUE these medications which have NOT CHANGED    Details   acetaminophen (TYLENOL) 500 MG tablet Take 500 mg by mouth every 6 (six) hours as needed for Pain.      amLODIPine (NORVASC) 5 MG tablet Take 1 tablet (5 mg total) by mouth once daily.  Qty: 90 tablet, Refills: 3    Comments: .      LIDOcaine (LIDODERM) 5 % Place 1 patch onto the skin once daily. Place patch to back. Leave on for 12 hours and remove for 12 hours.  Qty: 30 patch, Refills: 2      traMADoL (ULTRAM) 50 mg tablet Take 1 tablet (50 mg total) by mouth every 6 (six) hours as needed for Pain.  Qty: 30 tablet, Refills: 0    Comments: Quantity prescribed more than 7 day supply? Yes, quantity medically necessary  Associated Diagnoses: Cancer associated pain         STOP taking these medications       calcium carbonate (TUMS) 200 mg calcium (500 mg) chewable tablet Comments:   Reason for Stopping:                 I have seen and examined this patient within the last 30 days. My clinical findings that support the need for the home health skilled services and home bound status are the following:no   Weakness/numbness causing balance and gait disturbance  due to Malignancy/Cancer making it taxing to leave home.  Requiring assistive device to leave home due to unsteady gait caused by  Malignancy/Cancer.     Diet:   diabetic diet 2000 calorie    Labs:  Report Lab results to PCP.    Referrals/ Consults  Physical Therapy to evaluate and treat. Evaluate for home safety and equipment needs; Establish/upgrade home exercise program. Perform / instruct on therapeutic exercises, gait training, transfer training, and Range of Motion.  Occupational Therapy to evaluate and treat. Evaluate home environment for safety and equipment needs. Perform/Instruct on transfers, ADL training, ROM, and therapeutic exercises.    Activities:   activity as tolerated    Nursing:   Agency to admit patient within 24 hours of hospital discharge unless specified on physician order or at patient request    SN to complete comprehensive assessment including routine vital signs. Instruct on disease process and s/s of complications to report to MD. Review/verify medication list sent home with the patient at time of discharge  and instruct patient/caregiver as needed. Frequency may be adjusted depending on start of care date.     Skilled nurse to perform up to 3 visits PRN for symptoms related to diagnosis    Notify MD if SBP > 160 or < 90; DBP > 90 or < 50; HR > 120 or < 50; Temp > 101; O2 < 88%; Other:     Ok to schedule additional visits based on staff availability and patient request on consecutive days within the home health episode.    When multiple disciplines ordered:    Start of Care occurs on Sunday - Wednesday schedule remaining discipline evaluations as ordered on separate consecutive days following the start of care.    Thursday SOC -schedule subsequent evaluations Friday and Monday the following week.     Friday - Saturday SOC - schedule subsequent discipline evaluations on consecutive days starting Monday of the following week.    For all post-discharge communication and subsequent orders  please contact patient's primary care physician. If unable to reach primary care physician or do not receive response within 30 minutes, please contact [facility] for clinical staff order clarification    Miscellaneous   Diabetic Care:   SN to perform and educate Diabetic management with blood glucose monitoring:    Home Health Aide:  Nursing Three times weekly, Physical Therapy Three times weekly and Occupational Therapy Three times weekly    Wound Care Orders  no    I certify that this patient is confined to her home and needs intermittent skilled nursing care, physical therapy and occupational therapy.

## 2022-06-15 NOTE — PLAN OF CARE
[facility] - Med Surg  Discharge Final Note    Primary Care Provider: Dee Dee Oconnor MD    Expected Discharge Date: 6/15/2022     Future Appointments   Date Time Provider Department Center   6/16/2022  9:00 AM LAB, HEMONC SAME DAY NOM LAB [facility]   6/16/2022 10:00 AM Reid Snider MD [facility] HEMONC3 [facility]   6/16/2022 11:00 AM Rachid Dominguez MD [facility] RAD ONC [facility]   6/16/2022  1:00 PM Trace Harvey MD [facility] UROLOG [facility]       Final Discharge Note (most recent)     Final Note - 06/15/22 1220        Final Note    Assessment Type Final Discharge Note     Anticipated Discharge Disposition Home-Health Care [facility]     What phone number can be called within the next 1-3 days to see how you are doing after discharge? [phone]     Hospital Resources/Appts/Education Provided Post-Acute resouces added to AVS;Community resources provided        Post-Acute Status    Post-Acute Authorization Home Health     Home Health Status Set-up Complete/Auth obtained     Discharge Delays None known at this time                 Important Message from Medicare  Important Message from Medicare regarding Discharge Appeal Rights: Given to patient/caregiver, Explained to patient/caregiver, Signed/date by patient/caregiver     Date IMM was signed: 06/15/22  Time IMM was signed: [id number]

## 2022-06-15 NOTE — PLAN OF CARE
Problem: Adult Inpatient Plan of Care  Goal: Plan of Care Review  6/15/2022 1602 by Eda Jordan RN  Outcome: Adequate for Care Transition  6/15/2022 1443 by Eda Jordan RN  Outcome: Ongoing, Progressing  Goal: Patient-Specific Goal (Individualized)  6/15/2022 1602 by Eda Jordan RN  Outcome: Adequate for Care Transition  6/15/2022 1443 by Eda Jordan RN  Outcome: Ongoing, Progressing  Goal: Absence of Hospital-Acquired Illness or Injury  6/15/2022 1602 by Eda Jordan RN  Outcome: Adequate for Care Transition  6/15/2022 1443 by Eda Jordan RN  Outcome: Ongoing, Progressing  Goal: Optimal Comfort and Wellbeing  6/15/2022 1602 by Eda Jordan RN  Outcome: Adequate for Care Transition  6/15/2022 1443 by Eda Jordan RN  Outcome: Ongoing, Progressing  Goal: Readiness for Transition of Care  6/15/2022 1602 by Eda Jordan RN  Outcome: Adequate for Care Transition  6/15/2022 1443 by Eda Jordan RN  Outcome: Ongoing, Progressing     Problem: Diabetes Comorbidity  Goal: Blood Glucose Level Within Targeted Range  6/15/2022 1602 by Eda Jordan RN  Outcome: Adequate for Care Transition  6/15/2022 1443 by Eda Jordan RN  Outcome: Ongoing, Progressing     Problem: Fluid and Electrolyte Imbalance (Acute Kidney Injury/Impairment)  Goal: Fluid and Electrolyte Balance  6/15/2022 1602 by Eda Jordan RN  Outcome: Adequate for Care Transition  6/15/2022 1443 by Eda Jordan RN  Outcome: Ongoing, Progressing     Problem: Oral Intake Inadequate (Acute Kidney Injury/Impairment)  Goal: Optimal Nutrition Intake  6/15/2022 1602 by Eda Jordan RN  Outcome: Adequate for Care Transition  6/15/2022 1443 by Eda Jordan RN  Outcome: Ongoing, Progressing     Problem: Renal Function Impairment (Acute Kidney Injury/Impairment)  Goal: Effective Renal Function  6/15/2022 1602 by Eda Jordan RN  Outcome: Adequate for Care Transition  6/15/2022 1443 by  Eda Jordan RN  Outcome: Ongoing, Progressing     Problem: Fall Injury Risk  Goal: Absence of Fall and Fall-Related Injury  6/15/2022 1602 by Eda Jordan RN  Outcome: Adequate for Care Transition  6/15/2022 1443 by Eda Jordan RN  Outcome: Ongoing, Progressing     Problem: Skin Injury Risk Increased  Goal: Skin Health and Integrity  6/15/2022 1602 by Eda Jordan RN  Outcome: Adequate for Care Transition  6/15/2022 1443 by Eda Jordan RN  Outcome: Ongoing, Progressing

## 2022-06-15 NOTE — PROGRESS NOTES
Pharmacist Renal Dose Adjustment Note    Karoline Justice is a 76 y.o. female being treated with the medication zoledronic acid    Patient Data:    Vital Signs (Most Recent):  Temp: 98.5 °F (36.9 °C) (06/15/22 0734)  Pulse: 80 (06/15/22 0803)  Resp: 20 (06/15/22 0843)  BP: (!) 152/75 (06/15/22 0734)  SpO2: (!) 94 % (06/15/22 0734)   Vital Signs (72h Range):  Temp:  [97 °F (36.1 °C)-100.3 °F (37.9 °C)]   Pulse:  []   Resp:  [16-20]   BP: (117-171)/(57-75)   SpO2:  [94 %-100 %]      Recent Labs   Lab 06/13/22  1534 06/14/22  0420 06/15/22  0440   CREATININE 1.3 1.3 1.1     Serum creatinine: 1.1 mg/dL 06/15/22 0440  Estimated creatinine clearance: 32.8 mL/min    Zoledronic acid 4 mg IV once will be changed to zoledronic acid 3 mg IV once based on the patients renal function    Pharmacist's Name: Baljinder Wood, PharmD, BCPS   Pharmacist's Extension: [phone]

## 2022-06-15 NOTE — PLAN OF CARE
Problem: Adult Inpatient Plan of Care  Goal: Plan of Care Review  Outcome: Ongoing, Progressing  Goal: Patient-Specific Goal (Individualized)  Outcome: Ongoing, Progressing  Goal: Absence of Hospital-Acquired Illness or Injury  Outcome: Ongoing, Progressing  Goal: Optimal Comfort and Wellbeing  Outcome: Ongoing, Progressing     Problem: Adult Inpatient Plan of Care  Goal: Plan of Care Review  Outcome: Ongoing, Progressing     Problem: Adult Inpatient Plan of Care  Goal: Patient-Specific Goal (Individualized)  Outcome: Ongoing, Progressing     Problem: Adult Inpatient Plan of Care  Goal: Absence of Hospital-Acquired Illness or Injury  Outcome: Ongoing, Progressing     Problem: Adult Inpatient Plan of Care  Goal: Optimal Comfort and Wellbeing  Outcome: Ongoing, Progressing

## 2022-06-15 NOTE — PLAN OF CARE
Patient in bed awake and reported some relief from pain after receiving pain medication earlier, call button and other personal items within reach.  Problem: Adult Inpatient Plan of Care  Goal: Plan of Care Review  Outcome: Ongoing, Progressing  Goal: Patient-Specific Goal (Individualized)  Outcome: Ongoing, Progressing  Goal: Absence of Hospital-Acquired Illness or Injury  Outcome: Ongoing, Progressing  Goal: Optimal Comfort and Wellbeing  Outcome: Ongoing, Progressing  Goal: Readiness for Transition of Care  Outcome: Ongoing, Progressing     Problem: Diabetes Comorbidity  Goal: Blood Glucose Level Within Targeted Range  Outcome: Ongoing, Progressing     Problem: Fluid and Electrolyte Imbalance (Acute Kidney Injury/Impairment)  Goal: Fluid and Electrolyte Balance  Outcome: Ongoing, Progressing     Problem: Oral Intake Inadequate (Acute Kidney Injury/Impairment)  Goal: Optimal Nutrition Intake  Outcome: Ongoing, Progressing     Problem: Renal Function Impairment (Acute Kidney Injury/Impairment)  Goal: Effective Renal Function  Outcome: Ongoing, Progressing     Problem: Fall Injury Risk  Goal: Absence of Fall and Fall-Related Injury  Outcome: Ongoing, Progressing     Problem: Skin Injury Risk Increased  Goal: Skin Health and Integrity  Outcome: Ongoing, Progressing

## 2022-06-15 NOTE — DISCHARGE INSTRUCTIONS
Patient and daughter reminded of the need to take ordered medications and keep appointments, understanding verbalized

## 2022-06-16 ENCOUNTER — OFFICE VISIT (OUTPATIENT)
Dept: RADIATION ONCOLOGY | Facility: CLINIC | Age: 77
End: 2022-06-16
Payer: MEDICARE

## 2022-06-16 ENCOUNTER — OFFICE VISIT (OUTPATIENT)
Dept: HEMATOLOGY/ONCOLOGY | Facility: CLINIC | Age: 77
End: 2022-06-16
Payer: MEDICARE

## 2022-06-16 ENCOUNTER — PATIENT OUTREACH (OUTPATIENT)
Dept: ADMINISTRATIVE | Facility: CLINIC | Age: 77
End: 2022-06-16
Payer: MEDICARE

## 2022-06-16 DIAGNOSIS — N12 PYELONEPHRITIS: ICD-10-CM

## 2022-06-16 DIAGNOSIS — E83.52 HYPERCALCEMIA: ICD-10-CM

## 2022-06-16 DIAGNOSIS — N13.30 HYDRONEPHROSIS, UNSPECIFIED HYDRONEPHROSIS TYPE: ICD-10-CM

## 2022-06-16 DIAGNOSIS — C67.9 MALIGNANT NEOPLASM OF URINARY BLADDER, UNSPECIFIED SITE: Primary | ICD-10-CM

## 2022-06-16 DIAGNOSIS — C67.9 MALIGNANT NEOPLASM OF URINARY BLADDER, UNSPECIFIED SITE: ICD-10-CM

## 2022-06-16 DIAGNOSIS — N17.9 AKI (ACUTE KIDNEY INJURY): ICD-10-CM

## 2022-06-16 PROCEDURE — 99205 OFFICE O/P NEW HI 60 MIN: CPT | Mod: 95,,, | Performed by: STUDENT IN AN ORGANIZED HEALTH CARE EDUCATION/TRAINING PROGRAM

## 2022-06-16 PROCEDURE — 1111F PR DISCHARGE MEDS RECONCILED W/ CURRENT OUTPATIENT MED LIST: ICD-10-PCS | Mod: CPTII,95,, | Performed by: STUDENT IN AN ORGANIZED HEALTH CARE EDUCATION/TRAINING PROGRAM

## 2022-06-16 PROCEDURE — 99214 PR OFFICE/OUTPT VISIT, EST, LEVL IV, 30-39 MIN: ICD-10-PCS | Mod: 95,,, | Performed by: INTERNAL MEDICINE

## 2022-06-16 PROCEDURE — 1111F PR DISCHARGE MEDS RECONCILED W/ CURRENT OUTPATIENT MED LIST: ICD-10-PCS | Mod: CPTII,95,, | Performed by: INTERNAL MEDICINE

## 2022-06-16 PROCEDURE — 1157F ADVNC CARE PLAN IN RCRD: CPT | Mod: CPTII,95,, | Performed by: INTERNAL MEDICINE

## 2022-06-16 PROCEDURE — 1111F DSCHRG MED/CURRENT MED MERGE: CPT | Mod: CPTII,95,, | Performed by: INTERNAL MEDICINE

## 2022-06-16 PROCEDURE — 99214 OFFICE O/P EST MOD 30 MIN: CPT | Mod: 95,,, | Performed by: INTERNAL MEDICINE

## 2022-06-16 PROCEDURE — 99205 PR OFFICE/OUTPT VISIT, NEW, LEVL V, 60-74 MIN: ICD-10-PCS | Mod: 95,,, | Performed by: STUDENT IN AN ORGANIZED HEALTH CARE EDUCATION/TRAINING PROGRAM

## 2022-06-16 PROCEDURE — 1157F PR ADVANCE CARE PLAN OR EQUIV PRESENT IN MEDICAL RECORD: ICD-10-PCS | Mod: CPTII,95,, | Performed by: STUDENT IN AN ORGANIZED HEALTH CARE EDUCATION/TRAINING PROGRAM

## 2022-06-16 PROCEDURE — 1157F ADVNC CARE PLAN IN RCRD: CPT | Mod: CPTII,95,, | Performed by: STUDENT IN AN ORGANIZED HEALTH CARE EDUCATION/TRAINING PROGRAM

## 2022-06-16 PROCEDURE — 1157F PR ADVANCE CARE PLAN OR EQUIV PRESENT IN MEDICAL RECORD: ICD-10-PCS | Mod: CPTII,95,, | Performed by: INTERNAL MEDICINE

## 2022-06-16 PROCEDURE — 1111F DSCHRG MED/CURRENT MED MERGE: CPT | Mod: CPTII,95,, | Performed by: STUDENT IN AN ORGANIZED HEALTH CARE EDUCATION/TRAINING PROGRAM

## 2022-06-16 NOTE — PROGRESS NOTES
Radiation Oncology Consult Note (virtual)        Date of Service: 06/16/2022    Chief Complaint: bladder cancer     Reason for visit: consideration for radiation to the pelvis     Referring Physician: Dr Snider ([facility])     Implantable devices: denies     Therapy to Date:  No radiation      Diagnosis/Assessment:   Karoline Justice is a 76 y.o. woman with Stage IVB (cT3, cN3, cM1b) bladder cancer (8.6cm left lateral bladder wall and floor) complicated by b/l hydronephrosis s/p b/l PNT, as well as RLE DVT on eliquis    PMH of CKD3, HTN, and HLD    ECOG 2  She is still on ABX, has no/minimal pain, not on systemic therapy (pembro) yet    Plan   There is no indication of radiation at the moment since patient is almost asymptomatic of the bladder cancerI explain that patient or Dr Snider are free to reach out to me if they see a need to palliate symptoms    - pembro per Dr Snider q6w  - Return to [facility] PRN        HPI:   Karoline Justice is a 76 y.o. woman with recent diagnosis of bladder cancer after presenting with generalized weakness, high blood glucose and foul smelling urine    Oncologic history:  5/5/202 CT chest: no mets    5/5/2022 CT A/P :    Severe distention of urinary bladder with soft tissue mass 6.5 cm along inferior and left lateral wall   Severe bilateral hydronephrosis   Choledocholithiasis     05/09/2022 cysto and TURBT (Dr Gutierrez)   Large, nodular, high-grade appearing bladder tumor overlying left bladder wall and floor, including expected location of left ureteral orifice; grossly invading muscle   Right retrograde pyelogram with J-hooking of ureter, hydronephrosis, no filling defects in upper tract. Stent placed. Unable to visualize left ureteral orifice during the case, even after administration of indigo carmine   Bimanual exam post-TURBT showed apical and anterior prolapse  Pathology:    Poorly differentiated carcinoma (80%) with areas of squamous differentiation (15%), osteoclast like  giant cells  (4%) and classic high grade papillary  urothelial carcinoma (1%).  extensively invades the muscularis propria    5/10/2022: IR placement of Left nephrostomy tube     5/27/2022 MRI bladder   8.6cm mass in Left lateral bladder wall and floor, VIRADS 5,    Involvement of bilateral ureteral orifices, likely distal ureters ; proximal urethra.     Multiple enlarged LN within bilateral pelvic sidewalls and iliac chains    No bone lesions   Multi fibroid uterus    5/27/2022 established care with Dr Snider ([facility])    6/3/2022 PET/CT   SUVm 8.1 at left posterior urinary bladder    enlarged hypermetabolic LN along the bilateral pelvic sidewall   right ureteral stent in place   left-sided percutaneous nephrostomy tube   New 3 x sub 5 mm nodular densities developed within right upper lobe since previous chest CT    New sub 5 mm nodule at periphery of right middle lobe and at superior left lower lobe    6/7/2022 CT renal   Right ureteral stent with chronic stable moderate right hydronephrosis   Left-sided percutaneous nephrostomy tube in place with no hydronephrosis   Enlarged bilateral pelvic lymph     6/9/2022: IR placement of Right nephrostomy tube       Subjective:   During the virtual visit she is accompanied by her son Reid.    Overall she feels that she is regaining her strength little by little.  She is still producing dark yellow cloudy urine via her b/l PNTs.  She has no/minimal pain to the bladder. She has Norco if needed  She has loose stools attributed to her ABX. She drinks a lot of water to prevent dehydration.    Her right leg swelling has improved a little since diagnosis. She takes Eliquis.      The patient denies other major complaints    The patient denies any history of radiation therapy, implantable cardiac devices, or connective tissue disease.    Social history  Lives in [place] [place] [place] with  Bernardo Justice and her son Reid Mathews has a liver and kidney transplant in the  past  During her life she worked as  in [employer].  She never smoked tobacco.  She stopped drinking alcohol about 5 years ago    Primary Emergency Contact: Kenya Ford,  [phone],  Daughter  Secondary Emergency Contact: Valentine Swenson , [phone],  Daughter      Family history  Mother Stomach cancer    Review of patient's allergies indicates:  No Known Allergies    Current Outpatient Medications on File Prior to Visit   Medication Sig Dispense Refill    acetaminophen (TYLENOL) 500 MG tablet Take 500 mg by mouth every 6 (six) hours as needed for Pain.      amLODIPine (NORVASC) 5 MG tablet Take 1 tablet (5 mg total) by mouth once daily. 90 tablet 3    apixaban (ELIQUIS) 5 mg Tab Take 2 tablets (10 mg total) by mouth 2 (two) times daily for 3 days, THEN Take 1 tablet (5 mg total) by mouth 2 (two) times daily thereafter. 60 tablet 3    apixaban (ELIQUIS) 5 mg Tab Take 1 tablet (5 mg total) by mouth 2 (two) times daily. (Patient not taking: Reported on 6/16/2022) 60 tablet 2    duke's soln (benadryl 30 mL, mylanta 30 mL, LIDOcaine 30 mL, nystatin 30 mL) 120mL Take 10 mLs by mouth 4 (four) times daily. 120 mL 0    HYDROcodone-acetaminophen (NORCO) 5-325 mg per tablet Take 1 tablet by mouth every 6 (six) hours as needed for Pain. 30 tablet 0    insulin detemir U-100 (LEVEMIR FLEXTOUCH) 100 unit/mL (3 mL) SubQ InPn pen Inject 10 Units into the skin once daily. 9 mL 3    LIDOcaine (LIDODERM) 5 % Place 1 patch onto the skin once daily. Place patch to back. Leave on for 12 hours and remove for 12 hours. 30 patch 2    sodium bicarbonate 650 MG tablet Take 1 tablet (650 mg total) by mouth 2 (two) times daily. 60 tablet 11    sodium bicarbonate 650 MG tablet Take 2 tablets (1,300 mg total) by mouth 2 (two) times daily. 360 tablet 0    traMADoL (ULTRAM) 50 mg tablet Take 1 tablet (50 mg total) by mouth every 6 (six) hours as needed for Pain. 30 tablet 0    [DISCONTINUED] calcium carbonate (TUMS) 200 mg  calcium (500 mg) chewable tablet Take 1 tablet by mouth 2 (two) times daily with meals.       Current Facility-Administered Medications on File Prior to Visit   Medication Dose Route Frequency Provider Last Rate Last Admin    [DISCONTINUED] 0.9%  NaCl infusion (for blood administration)   Intravenous Q24H PRN Alfreda Parada MD        [DISCONTINUED] 0.9%  NaCl infusion   Intravenous Continuous Katey Shanks  mL/hr at 06/15/22 0732 New Bag at 06/15/22 0732    [DISCONTINUED] acetaminophen tablet 650 mg  650 mg Oral Q6H PRN Katey Shanks MD   650 mg at 06/12/22 2038    [DISCONTINUED] apixaban tablet 5 mg  5 mg Oral BID Katey Shanks MD        [DISCONTINUED] dextrose 10% bolus 125 mL  12.5 g Intravenous PRN Alfreda Parada MD        [DISCONTINUED] dextrose 10% bolus 125 mL  12.5 g Intravenous PRN Aj Sánchez MD        [DISCONTINUED] dextrose 10% bolus 250 mL  25 g Intravenous PRN Alfreda Parada MD        [DISCONTINUED] dextrose 10% bolus 250 mL  25 g Intravenous PRN Aj Sánchez MD        [DISCONTINUED] duke's soln (benadryl 30 mL, mylanta 30 mL, LIDOcaine 30 mL, nystatin 30 mL) 120 mL  10 mL Oral QID Katey Shanks MD   10 mL at 06/15/22 1645    [DISCONTINUED] glucagon (human recombinant) injection 1 mg  1 mg Intramuscular PRN Alfreda Parada MD        [DISCONTINUED] glucose chewable tablet 16 g  16 g Oral PRN Alfreda Parada MD        [DISCONTINUED] glucose chewable tablet 24 g  24 g Oral PRN Alfreda Parada MD        [DISCONTINUED] HYDROcodone-acetaminophen 5-325 mg per tablet 1 tablet  1 tablet Oral Q6H PRN Aj Sánchez MD   1 tablet at 06/14/22 0813    [DISCONTINUED] insulin aspart U-100 pen 0-5 Units  0-5 Units Subcutaneous Q6H PRN Aj Sánchez MD   2 Units at 06/14/22 1201    [DISCONTINUED] insulin detemir U-100 pen 10 Units  10 Units Subcutaneous Daily Aj Sánchez MD   10 Units at 06/15/22 0834    [DISCONTINUED] melatonin tablet 6 mg  6 mg Oral Nightly PRN  Alfreda Parada MD        [DISCONTINUED] morphine injection 2 mg  2 mg Intravenous Q4H PRN Alfreda Parada MD   2 mg at 06/15/22 0843    [DISCONTINUED] ondansetron injection 4 mg  4 mg Intravenous Q6H PRN Addison Torrez MD        [DISCONTINUED] prochlorperazine injection Soln 5 mg  5 mg Intravenous Q6H PRN Addison Torrez MD        [DISCONTINUED] sodium bicarbonate tablet 1,300 mg  1,300 mg Oral BID Katey Shanks MD   1,300 mg at 06/15/22 0833    [DISCONTINUED] sodium chloride 0.9% flush 10 mL  10 mL Intravenous PRN Alfreda Parada MD             Past Medical History:   Diagnosis Date    Bladder cancer 05/2022    Bladder mass 05/06/2022    Choledocholithiasis 05/06/2022    Diabetes mellitus     Pneumonia due to COVID-19 virus 08/06/2021       Past Surgical History:   Procedure Laterality Date    CYSTOSCOPY N/A 05/09/2022    Procedure: CYSTOSCOPY;  Surgeon: Adrianna Gutierrez MD;  Location: [facility] OR [address] [place] [address];  Service: Urology;  Laterality: N/A;    NEPHROSTOMY Left 05/2022    RETROGRADE PYELOGRAPHY Right 05/09/2022    Procedure: PYELOGRAM, RETROGRADE;  Surgeon: Adrianna Gutierrez MD;  Location: [facility] OR [address] [place] [address];  Service: Urology;  Laterality: Right;         Review of Systems   Constitutional: Positive for fatigue. Negative for fever and unexpected weight change.   HENT: Negative for hearing loss.    Eyes: Negative for visual disturbance.   Respiratory: Negative for cough, shortness of breath and wheezing.    Cardiovascular: Positive for leg swelling (R>L). Negative for chest pain and palpitations.   Gastrointestinal: Positive for diarrhea. Negative for anal bleeding, constipation, nausea and vomiting.   Genitourinary: Positive for decreased urine volume (b/l PNTs). Negative for hematuria and pelvic pain.   Musculoskeletal: Negative for arthralgias, back pain and neck pain.   Integumentary:  Negative for rash.   Neurological: Positive for weakness. Negative for dizziness, seizures, speech  difficulty, numbness, headaches and memory loss.   Psychiatric/Behavioral: Negative for dysphoric mood. The patient is not nervous/anxious.          Objective:      Physical Exam  Constitutional:       Appearance: Normal appearance.   HENT:      Head: Normocephalic and atraumatic.   Eyes:      Conjunctiva/sclera: Conjunctivae normal.   Pulmonary:      Effort: Pulmonary effort is normal.   Abdominal:      General: There is no distension.   Genitourinary:     Comments: b/l PNTs with dark urine  Musculoskeletal:         General: Normal range of motion.      Cervical back: Normal range of motion.      Right lower leg: Edema present.      Left lower leg: No edema.   Neurological:      General: No focal deficit present.      Mental Status: She is alert and oriented to person, place, and time.   Psychiatric:         Mood and Affect: Mood normal.         Behavior: Behavior normal.             Imaging: I have personally reviewed the patient's available images and reports and summarized pertinent findings above in HPI.      Pathology: I have personally reviewed the patient's available pathology and summarized pertinent findings above in HPI.       I spent approximately 60 minutes reviewing the available records and evaluating the patient, out of which over 50% of the time was spent face to face with the patient in counseling and coordinating this patient's care.     Thank you for the opportunity to care for this patient. Please do not hesitate to contact me with any questions.     Rachid Dominguez MD/PhD

## 2022-06-16 NOTE — PROGRESS NOTES
ONCOLOGY FOLLOW UP VISIT.     The patient location is: home  The chief complaint leading to consultation is: treatment planning for metastatic bladder cancer    Visit type: audiovisual    Face to Face time with patient: 20  30 minutes of total time spent on the encounter, which includes face to face time and non-face to face time preparing to see the patient (eg, review of tests), Obtaining and/or reviewing separately obtained history, Documenting clinical information in the electronic or other health record, Independently interpreting results (not separately reported) and communicating results to the patient/family/caregiver, or Care coordination (not separately reported).         Each patient to whom he or she provides medical services by telemedicine is:  (1) informed of the relationship between the physician and patient and the respective role of any other health care provider with respect to management of the patient; and (2) notified that he or she may decline to receive medical services by telemedicine and may withdraw from such care at any time.    Cancer/Stage/TNM:   Cancer Staging  Bladder cancer  Staging form: Urinary Bladder, AJCC 8th Edition  - Clinical: cT3, cM1 - Unsigned  - Pathologic: No stage assigned - Unsigned       Oncology History   Bladder cancer   5/20/2022 Initial Diagnosis    Bladder cancer      Tumor Conference    Presenting Hospital / Clinic: [facility] [address]  Virtual Tumor Board Conference: Virtual  Presenter: Sylvia Escobar  Date Presented to Tumor Board: 5/26/2022  Specialties Present: Pathology; Urology; Radiation Oncology  Cancer Type: Bladder cancer  Recommended Plan Note: Will obtain bladder MRI, PET CT and bone scan to further evaluate for metastatic disease. Seeing medical oncology to discuss systemic therapy.         6/16/2022 -  Chemotherapy    Treatment Summary   Plan Name: OP PEMBROLIZUMAB 400MG Q6W  Treatment Goal: Control  Status: Active  Start Date: 6/16/2022  (Planned)  End Date: 4/18/2024 (Planned)  Provider: Reid Snider MD  Chemotherapy: pembrolizumab (KEYTRUDA) 400 mg in sodium chloride 0.9% 116 mL infusion, 400 mg, Intravenous, Clinic/HOD 1 time, 0 of 17 cycles          Interval History:   Since last visit, patient was admitted with a urinary tract infection and obstructed ureteral stent.  She is now s/p nephrostomy tube with drainage of purulent material.  Also found to have hypercalcemia, now s/p zometa x 1 in hospital.  Today patient reports fatigue and deconditioning, but improving slowly.  Denies confusion, back pain has improved, denies fevers/chills     Practical Problems Physical Problems   : (P) No Appearance: (P) Yes   Housing: (P) No Bathing / Dressing: (P) No   Insurance / Financial: (P) No Breathing: (P) No    Transportation: (P) No  Changes in Urination: (P) No    Work / School: (P) No  Constipation: (P) No   Treatment Decisions: (P) No  Diarrhea: (P) No     Eating: (P) Yes    Family Problems Fatigue: (P) Yes    Dealing with Children: (P) No Feeling Swollen: (P) No    Dealing with Partner: (P) No Fevers: (P) No    Ability to Have Children: (P) No  Getting Around: (P) Yes       Indigestion: (P) No     Memory / Concentration: (P) No   Emotional Problems Mouth Sores: (P) Yes    Depression: (P) No  Nausea: (P) Yes    Fears: (P) No  Nose Dry / Congested: (P) No    Nervousness: (P) No  Pain: (P) Yes    Sadness: (P) No Sexual: (P) No    Worry: (P) Yes Skin Dry / Itchy: (P) No    Loss of Interest in Usual Activities: (P) No Sleep: (P) No     Substance Abuse: (P) No    Spiritual/Religions Concerns Tingling in Hands / Feet: (P) No   Spritual / Samaritan Concerns: (P) No         Other Problems            ROS:  Review of Systems   Constitutional: Positive for activity change and fatigue. Negative for chills, fever and unexpected weight change.   HENT: Negative for mouth sores, nosebleeds and sore throat.    Respiratory: Negative for cough,  shortness of breath and wheezing.    Cardiovascular: Negative for chest pain, palpitations and leg swelling.   Gastrointestinal: Negative for abdominal distention, abdominal pain and blood in stool.   Endocrine: Negative for cold intolerance and heat intolerance.   Genitourinary: Negative for dysuria, flank pain and frequency.   Musculoskeletal: Positive for back pain. Negative for arthralgias, joint swelling and myalgias.   Skin: Negative for color change, rash and wound.   Neurological: Positive for weakness and light-headedness. Negative for dizziness and headaches.   Hematological: Negative for adenopathy. Does not bruise/bleed easily.      A complete 12-point review of systems was reviewed and is negative except as mentioned above.     Past Medical History:   Past Medical History:   Diagnosis Date    Bladder cancer 05/2022    Bladder mass 05/06/2022    Choledocholithiasis 05/06/2022    Diabetes mellitus     Pneumonia due to COVID-19 virus 08/06/2021        Allergies:   Review of patient's allergies indicates:  No Known Allergies     Medications:   Current Outpatient Medications   Medication Sig Dispense Refill    acetaminophen (TYLENOL) 500 MG tablet Take 500 mg by mouth every 6 (six) hours as needed for Pain.      amLODIPine (NORVASC) 5 MG tablet Take 1 tablet (5 mg total) by mouth once daily. 90 tablet 3    apixaban (ELIQUIS) 5 mg Tab Take 2 tablets (10 mg total) by mouth 2 (two) times daily for 3 days, THEN Take 1 tablet (5 mg total) by mouth 2 (two) times daily thereafter. 60 tablet 3    apixaban (ELIQUIS) 5 mg Tab Take 1 tablet (5 mg total) by mouth 2 (two) times daily. (Patient not taking: Reported on 6/16/2022) 60 tablet 2    duke's soln (benadryl 30 mL, mylanta 30 mL, LIDOcaine 30 mL, nystatin 30 mL) 120mL Take 10 mLs by mouth 4 (four) times daily. 120 mL 0    HYDROcodone-acetaminophen (NORCO) 5-325 mg per tablet Take 1 tablet by mouth every 6 (six) hours as needed for Pain. 30 tablet 0     insulin detemir U-100 (LEVEMIR FLEXTOUCH) 100 unit/mL (3 mL) SubQ InPn pen Inject 10 Units into the skin once daily. 9 mL 3    LIDOcaine (LIDODERM) 5 % Place 1 patch onto the skin once daily. Place patch to back. Leave on for 12 hours and remove for 12 hours. 30 patch 2    sodium bicarbonate 650 MG tablet Take 1 tablet (650 mg total) by mouth 2 (two) times daily. 60 tablet 11    sodium bicarbonate 650 MG tablet Take 2 tablets (1,300 mg total) by mouth 2 (two) times daily. 360 tablet 0    traMADoL (ULTRAM) 50 mg tablet Take 1 tablet (50 mg total) by mouth every 6 (six) hours as needed for Pain. 30 tablet 0     No current facility-administered medications for this visit.        Physical Exam:   There were no vitals taken for this visit.     ECOG Performance Status: (foot note - ECOG PS provided by Eastern Cooperative Oncology Group) 2 - Symptomatic, <50% confined to bed    Physical Exam      Labs:   Recent Results (from the past 48 hour(s))   POCT glucose    Collection Time: 06/14/22  3:58 PM   Result Value Ref Range    POCT Glucose 160 (H) 70 - 110 mg/dL   POCT glucose    Collection Time: 06/14/22  8:33 PM   Result Value Ref Range    POCT Glucose 131 (H) 70 - 110 mg/dL   CBC Auto Differential    Collection Time: 06/15/22  4:40 AM   Result Value Ref Range    WBC 23.08 (H) 3.90 - 12.70 K/uL    RBC 2.56 (L) 4.00 - 5.40 M/uL    Hemoglobin 7.0 (L) 12.0 - 16.0 g/dL    Hematocrit 22.9 (L) 37.0 - 48.5 %    MCV 90 82 - 98 fL    MCH 27.3 27.0 - 31.0 pg    MCHC 30.6 (L) 32.0 - 36.0 g/dL    RDW 16.4 (H) 11.5 - 14.5 %    Platelets 500 (H) 150 - 450 K/uL    MPV 9.4 9.2 - 12.9 fL    Immature Granulocytes 0.5 0.0 - 0.5 %    Gran # (ANC) 20.6 (H) 1.8 - 7.7 K/uL    Immature Grans (Abs) 0.12 (H) 0.00 - 0.04 K/uL    Lymph # 0.9 (L) 1.0 - 4.8 K/uL    Mono # 1.0 0.3 - 1.0 K/uL    Eos # 0.5 0.0 - 0.5 K/uL    Baso # 0.06 0.00 - 0.20 K/uL    nRBC 0 0 /100 WBC    Gran % 89.1 (H) 38.0 - 73.0 %    Lymph % 3.8 (L) 18.0 - 48.0 %    Mono % 4.3  4.0 - 15.0 %    Eosinophil % 2.0 0.0 - 8.0 %    Basophil % 0.3 0.0 - 1.9 %    Platelet Estimate Increased (A)     Aniso Slight     Poik Slight     Poly Occasional     Hypo Occasional     Ovalocytes Occasional     Differential Method Automated    Comprehensive Metabolic Panel    Collection Time: 06/15/22  4:40 AM   Result Value Ref Range    Sodium 133 (L) 136 - 145 mmol/L    Potassium 4.2 3.5 - 5.1 mmol/L    Chloride 106 95 - 110 mmol/L    CO2 21 (L) 23 - 29 mmol/L    Glucose 108 70 - 110 mg/dL    BUN 14 8 - 23 mg/dL    Creatinine 1.1 0.5 - 1.4 mg/dL    Calcium 11.4 (H) 8.7 - 10.5 mg/dL    Total Protein 5.5 (L) 6.0 - 8.4 g/dL    Albumin 1.9 (L) 3.5 - 5.2 g/dL    Total Bilirubin 0.4 0.1 - 1.0 mg/dL    Alkaline Phosphatase 132 55 - 135 U/L    AST 13 10 - 40 U/L    ALT 10 10 - 44 U/L    Anion Gap 6 (L) 8 - 16 mmol/L    eGFR if African American 56.4 (A) >60 mL/min/1.73 m^2    eGFR if non  48.9 (A) >60 mL/min/1.73 m^2   Magnesium    Collection Time: 06/15/22  4:40 AM   Result Value Ref Range    Magnesium 1.8 1.6 - 2.6 mg/dL   Calcium, ionized    Collection Time: 06/15/22  4:40 AM   Result Value Ref Range    Ionized Calcium 1.84 (H) 1.06 - 1.42 mmol/L   POCT glucose    Collection Time: 06/15/22  7:47 AM   Result Value Ref Range    POCT Glucose 178 (H) 70 - 110 mg/dL   POCT glucose    Collection Time: 06/15/22 11:27 AM   Result Value Ref Range    POCT Glucose 173 (H) 70 - 110 mg/dL   POCT glucose    Collection Time: 06/15/22  3:45 PM   Result Value Ref Range    POCT Glucose 104 70 - 110 mg/dL        Imaging: I have personally reviewed patient's PETCT imaging with patient showing metastatic disease in pelvic lymph nodes.  Results for orders placed or performed during the hospital encounter of 06/03/22 (from the past 2160 hour(s))   NM PET CT Routine [facility]    Impression    Irregular wall thickening with associated hypermetabolic activity at the left posterior aspect of the urinary bladder, compatible with the  known primary neoplasm.    Enlarged hypermetabolic lymph nodes along the bilateral pelvic sidewalls, suggesting megan metastatic disease.    Multiple sub 5 mm pulmonary nodules which are below the resolution of PET but new when compared to the chest CT from 05/05/2022, concerning for metastatic lesions.    Additional observations as above.      Electronically signed by: Alex Pang MD  Date:    06/03/2022  Time:    14:04       IR Nephrostomy Tube Placement  Narrative: EXAMINATION:  Genitourinary catheter placement    Procedural Personnel    Attending physician(s): Johnny    Fellow physician(s): None    Resident physician(s): None    Advanced practice provider(s): None    Pre-procedure diagnosis: Right-sided hydronephrosis    Post-procedure diagnosis: Same    Indication: Urinary drainage (infected)    Catheter(s) placed because the previous catheter(s) became dislodged within 30 days of placement (QCDR): No    Complications: No immediate complications.    FINDINGS:  Pre-procedure    Consent: Informed consent for the procedure was obtained and time-out was performed prior to the procedure.    Preparation: The site was prepared and draped using maximal sterile barrier technique including cutaneous antisepsis.    Antibiotic administered: Prophylactic dose within 1 hour of procedure start time or 2 hours for vancomycin or fluoroquinolones    Anesthesia/sedation    Level of anesthesia/sedation: Moderate sedation (conscious sedation)    Anesthesia/sedation administered by: Independent trained observer under attending supervision with continuous monitoring of the patient's level of consciousness and physiologic status    Total intra-service sedation time (minutes): 15    Right genitourinary catheter placement    Local anesthesia was administered. A needle was advanced into the renal collecting system of the native kidney under ultrasound and fluoroscopy guidance. A wire was advanced  the tract was serially dilated and a  nephrostomy tube was placed. Contrast injection was performed.    Catheter placed: was performed    Final catheter position: Pigtail in the renal pelvis    External catheter securement: Non-absorbable suture    Additional findings: None    Contrast    Contrast agent: Omnipaque 350    Contrast volume (mL): 10    Radiation Dose    Fluoroscopy time ( minutes): 1.9    Reference air kerma: 7 mGy    Kerma area product: 155 cGy-cm2    Additional Details    Additional description of procedure: None    Additional findings: None    Equipment details: None    Specimens removed: None. A sample was sent for analysis.    Estimated blood loss (mL): Less than 10    Standardized report: [email or web address]    Attestation    Signer name: Dakota Turner    I attest that I was present for the entire procedure. I reviewed the stored images and agree with the report as written.  Impression: Right nephrostomy tube placement.    Plan:    Drain can be exchanged in 6-8  weeks    _______________________________________________________________    PROCEDURE SUMMARY    - Image-guided placement of genitourinary catheter(s)    - Additional procedures: None    Electronically signed by: Dakota Turner  Date:    06/09/2022  Time:    13:10            Diagnoses:       1. Malignant neoplasm of urinary bladder, unspecified site    2. THOMAS (acute kidney injury)    3. Hydronephrosis, unspecified hydronephrosis type    4. Pyelonephritis    5. Hypercalcemia          Assessment and Plan:         1. Metastatic Bladder Cancer:   Discussed with patient her PETCT results.  Due to finding of metastatic disease, she is not a candidate for definitive treatment.  Due to performance status and patient preference, she is not a candidate for chemotherapy.  [facility]us NGS sent and pending.  Will plan for pembrolizumab since patient is not a candidate for chemotherapy.    2-4 Improved after nephrostomy.  Patient has completed abx.    5 Improving slowly.  Given  calcitonin, zometa, and IVF in hospital.  Currently asymptomatic.          she wishes to be treated in [place], so we will try to set her up with an oncologist there for treatment, but knows to call in the interim if symptoms change or should a problem arise.    Reid Snider MD  Medical Oncology   [facility]  [facility]

## 2022-06-16 NOTE — PROGRESS NOTES
C3 nurse spoke with Karoline Justice  for a TCC post hospital discharge follow up call. The patient does not have a scheduled HOSFU appointment with Dee Dee Oconnor MD  within 7-14 days post hospital discharge date 06/15/2022 . C3 nurse was unable to schedule HOSFU appointment in [facility].      Non [facility] PCP; out of NP area

## 2022-06-16 NOTE — Clinical Note
Nitesh Flores,  We had talked about this patient before with bladder cancer, who wanted to be set up in [place] with an oncologist.  She is not a candidate for chemoRT due to having lymph node involvement.  Plan will be for immunotherapy, Keytruda.  I will get treatment plan in.  Could you try to set her up in [place] with an oncologist for treatment?  Yi Malik

## 2022-06-16 NOTE — Clinical Note
Taryn,  I'm not sure if Sandra is out on maternity leave yet, but I sent her below message about this patient:  We had talked about this patient before with bladder cancer, who wanted to be set up in [place] with an oncologist.  She is not a candidate for chemoRT due to having lymph node involvement.  Plan will be for immunotherapy, Keytruda.  I will get treatment plan in.  Could you try to set her up in [place] with an oncologist for treatment?   Thanks, King

## 2022-06-16 NOTE — Clinical Note
- patient wishes to get treatment in [place], please work to schedule with oncologist there for treatment. - Keytruda in, please start auth for [place].

## 2022-06-17 ENCOUNTER — TELEPHONE (OUTPATIENT)
Dept: UROLOGY | Facility: CLINIC | Age: 77
End: 2022-06-17
Payer: MEDICARE

## 2022-06-17 ENCOUNTER — PATIENT MESSAGE (OUTPATIENT)
Dept: HEMATOLOGY/ONCOLOGY | Facility: CLINIC | Age: 77
End: 2022-06-17
Payer: MEDICARE

## 2022-06-17 DIAGNOSIS — C67.8 MALIGNANT NEOPLASM OF OVERLAPPING SITES OF BLADDER: Primary | ICD-10-CM

## 2022-06-17 LAB — PATHOLOGIST INTERPRETATION IFE: NORMAL

## 2022-06-17 NOTE — TELEPHONE ENCOUNTER
I spoke with patient, who agreed to 1 month f/u appt with urology residents.    ----- Message from Nolan Greene MD sent at 6/17/2022  1:51 PM CDT -----  Please schedule this patient for an office visit in resident clinic in 1 month for follow up bladder cancer. Thanks.    Nolan Greene

## 2022-06-20 ENCOUNTER — TELEPHONE (OUTPATIENT)
Dept: HEMATOLOGY/ONCOLOGY | Facility: CLINIC | Age: 77
End: 2022-06-20
Payer: MEDICARE

## 2022-06-20 DIAGNOSIS — C67.9 MALIGNANT NEOPLASM OF URINARY BLADDER, UNSPECIFIED SITE: Primary | ICD-10-CM

## 2022-06-21 ENCOUNTER — PATIENT MESSAGE (OUTPATIENT)
Dept: UROLOGY | Facility: CLINIC | Age: 77
End: 2022-06-21
Payer: MEDICARE

## 2022-06-21 LAB — PTH RELATED PROT SERPL-SCNC: 12 PMOL/L

## 2022-06-27 ENCOUNTER — PATIENT MESSAGE (OUTPATIENT)
Dept: HEMATOLOGY/ONCOLOGY | Facility: CLINIC | Age: 77
End: 2022-06-27
Payer: MEDICARE

## 2022-07-02 ENCOUNTER — PATIENT MESSAGE (OUTPATIENT)
Dept: HEMATOLOGY/ONCOLOGY | Facility: CLINIC | Age: 77
End: 2022-07-02
Payer: MEDICARE

## 2022-07-04 ENCOUNTER — HOSPITAL ENCOUNTER (EMERGENCY)
Facility: HOSPITAL | Age: 77
Discharge: ANOTHER HOSPITAL | End: 2022-07-05
Attending: EMERGENCY MEDICINE
Payer: MEDICARE

## 2022-07-04 DIAGNOSIS — T83.512A URINARY TRACT INFECTION ASSOCIATED WITH NEPHROSTOMY CATHETER, INITIAL ENCOUNTER: ICD-10-CM

## 2022-07-04 DIAGNOSIS — N17.9 AKI (ACUTE KIDNEY INJURY): ICD-10-CM

## 2022-07-04 DIAGNOSIS — N39.0 URINARY TRACT INFECTION ASSOCIATED WITH NEPHROSTOMY CATHETER, INITIAL ENCOUNTER: ICD-10-CM

## 2022-07-04 DIAGNOSIS — E83.52 HYPERCALCEMIA: Primary | ICD-10-CM

## 2022-07-04 LAB
ABO + RH BLD: NORMAL
ALBUMIN SERPL BCP-MCNC: 2.1 G/DL (ref 3.5–5.2)
ALP SERPL-CCNC: 162 U/L (ref 55–135)
ALT SERPL W/O P-5'-P-CCNC: 19 U/L (ref 10–44)
ANION GAP SERPL CALC-SCNC: 9 MMOL/L (ref 8–16)
AST SERPL-CCNC: 16 U/L (ref 10–40)
BACTERIA #/AREA URNS HPF: ABNORMAL /HPF
BASOPHILS # BLD AUTO: ABNORMAL K/UL (ref 0–0.2)
BASOPHILS NFR BLD: 0 % (ref 0–1.9)
BILIRUB SERPL-MCNC: 0.6 MG/DL (ref 0.1–1)
BILIRUB UR QL STRIP: NEGATIVE
BLD GP AB SCN CELLS X3 SERPL QL: NORMAL
BLD PROD TYP BPU: NORMAL
BLOOD UNIT EXPIRATION DATE: NORMAL
BLOOD UNIT TYPE CODE: 5100
BLOOD UNIT TYPE: NORMAL
BUN SERPL-MCNC: 25 MG/DL (ref 8–23)
CALCIUM SERPL-MCNC: 13 MG/DL (ref 8.7–10.5)
CHLORIDE SERPL-SCNC: 100 MMOL/L (ref 95–110)
CLARITY UR: ABNORMAL
CO2 SERPL-SCNC: 19 MMOL/L (ref 23–29)
CODING SYSTEM: NORMAL
COLOR UR: YELLOW
CREAT SERPL-MCNC: 1.7 MG/DL (ref 0.5–1.4)
CTP QC/QA: YES
DIFFERENTIAL METHOD: ABNORMAL
DISPENSE STATUS: NORMAL
EOSINOPHIL # BLD AUTO: ABNORMAL K/UL (ref 0–0.5)
EOSINOPHIL NFR BLD: 4 % (ref 0–8)
ERYTHROCYTE [DISTWIDTH] IN BLOOD BY AUTOMATED COUNT: 16.5 % (ref 11.5–14.5)
EST. GFR  (AFRICAN AMERICAN): 33.3 ML/MIN/1.73 M^2
EST. GFR  (NON AFRICAN AMERICAN): 28.9 ML/MIN/1.73 M^2
GLUCOSE SERPL-MCNC: 231 MG/DL (ref 70–110)
GLUCOSE UR QL STRIP: NEGATIVE
HCT VFR BLD AUTO: 21 % (ref 37–48.5)
HGB BLD-MCNC: 6.4 G/DL (ref 12–16)
HGB UR QL STRIP: ABNORMAL
HYALINE CASTS #/AREA URNS LPF: >87 /LPF
IMM GRANULOCYTES # BLD AUTO: ABNORMAL K/UL (ref 0–0.04)
IMM GRANULOCYTES NFR BLD AUTO: ABNORMAL % (ref 0–0.5)
KETONES UR QL STRIP: NEGATIVE
LEUKOCYTE ESTERASE UR QL STRIP: ABNORMAL
LYMPHOCYTES # BLD AUTO: ABNORMAL K/UL (ref 1–4.8)
LYMPHOCYTES NFR BLD: 8 % (ref 18–48)
MCH RBC QN AUTO: 26.3 PG (ref 27–31)
MCHC RBC AUTO-ENTMCNC: 30.5 G/DL (ref 32–36)
MCV RBC AUTO: 86 FL (ref 82–98)
MICROSCOPIC COMMENT: ABNORMAL
MONOCYTES # BLD AUTO: ABNORMAL K/UL (ref 0.3–1)
MONOCYTES NFR BLD: 2 % (ref 4–15)
NEUTROPHILS NFR BLD: 86 % (ref 38–73)
NITRITE UR QL STRIP: NEGATIVE
NRBC BLD-RTO: 0 /100 WBC
NUM UNITS TRANS PACKED RBC: NORMAL
PH UR STRIP: 5 [PH] (ref 5–8)
PLATELET # BLD AUTO: 540 K/UL (ref 150–450)
PMV BLD AUTO: 9.3 FL (ref 9.2–12.9)
POCT GLUCOSE: 250 MG/DL (ref 70–110)
POTASSIUM SERPL-SCNC: 4.4 MMOL/L (ref 3.5–5.1)
PROT SERPL-MCNC: 7.4 G/DL (ref 6–8.4)
PROT UR QL STRIP: ABNORMAL
RBC # BLD AUTO: 2.43 M/UL (ref 4–5.4)
RBC #/AREA URNS HPF: >100 /HPF (ref 0–4)
SARS-COV-2 RDRP RESP QL NAA+PROBE: NEGATIVE
SODIUM SERPL-SCNC: 128 MMOL/L (ref 136–145)
SP GR UR STRIP: 1.02 (ref 1–1.03)
SQUAMOUS #/AREA URNS HPF: 22 /HPF
URN SPEC COLLECT METH UR: ABNORMAL
UROBILINOGEN UR STRIP-ACNC: 1 EU/DL
WBC # BLD AUTO: 25.23 K/UL (ref 3.9–12.7)
WBC #/AREA URNS HPF: >100 /HPF (ref 0–5)
YEAST URNS QL MICRO: ABNORMAL

## 2022-07-04 PROCEDURE — P9016 RBC LEUKOCYTES REDUCED: HCPCS | Performed by: EMERGENCY MEDICINE

## 2022-07-04 PROCEDURE — 96372 THER/PROPH/DIAG INJ SC/IM: CPT | Mod: 59 | Performed by: STUDENT IN AN ORGANIZED HEALTH CARE EDUCATION/TRAINING PROGRAM

## 2022-07-04 PROCEDURE — 82962 GLUCOSE BLOOD TEST: CPT

## 2022-07-04 PROCEDURE — 85027 COMPLETE CBC AUTOMATED: CPT | Performed by: EMERGENCY MEDICINE

## 2022-07-04 PROCEDURE — 81000 URINALYSIS NONAUTO W/SCOPE: CPT | Performed by: EMERGENCY MEDICINE

## 2022-07-04 PROCEDURE — 96374 THER/PROPH/DIAG INJ IV PUSH: CPT

## 2022-07-04 PROCEDURE — 99291 CRITICAL CARE FIRST HOUR: CPT | Mod: 25

## 2022-07-04 PROCEDURE — 25000003 PHARM REV CODE 250: Performed by: EMERGENCY MEDICINE

## 2022-07-04 PROCEDURE — 86920 COMPATIBILITY TEST SPIN: CPT | Performed by: EMERGENCY MEDICINE

## 2022-07-04 PROCEDURE — 63600175 PHARM REV CODE 636 W HCPCS: Performed by: STUDENT IN AN ORGANIZED HEALTH CARE EDUCATION/TRAINING PROGRAM

## 2022-07-04 PROCEDURE — 87086 URINE CULTURE/COLONY COUNT: CPT | Performed by: EMERGENCY MEDICINE

## 2022-07-04 PROCEDURE — 87040 BLOOD CULTURE FOR BACTERIA: CPT | Performed by: EMERGENCY MEDICINE

## 2022-07-04 PROCEDURE — U0002 COVID-19 LAB TEST NON-CDC: HCPCS | Performed by: EMERGENCY MEDICINE

## 2022-07-04 PROCEDURE — 63600175 PHARM REV CODE 636 W HCPCS: Performed by: EMERGENCY MEDICINE

## 2022-07-04 PROCEDURE — 85007 BL SMEAR W/DIFF WBC COUNT: CPT | Performed by: EMERGENCY MEDICINE

## 2022-07-04 PROCEDURE — 96361 HYDRATE IV INFUSION ADD-ON: CPT

## 2022-07-04 PROCEDURE — 86901 BLOOD TYPING SEROLOGIC RH(D): CPT | Performed by: EMERGENCY MEDICINE

## 2022-07-04 PROCEDURE — 36415 COLL VENOUS BLD VENIPUNCTURE: CPT | Performed by: EMERGENCY MEDICINE

## 2022-07-04 PROCEDURE — 36430 TRANSFUSION BLD/BLD COMPNT: CPT

## 2022-07-04 PROCEDURE — 96375 TX/PRO/DX INJ NEW DRUG ADDON: CPT

## 2022-07-04 PROCEDURE — 80053 COMPREHEN METABOLIC PANEL: CPT | Performed by: EMERGENCY MEDICINE

## 2022-07-04 RX ORDER — CEFEPIME HYDROCHLORIDE 1 G/50ML
2 INJECTION, SOLUTION INTRAVENOUS
Status: COMPLETED | OUTPATIENT
Start: 2022-07-04 | End: 2022-07-04

## 2022-07-04 RX ORDER — MORPHINE SULFATE 4 MG/ML
4 INJECTION, SOLUTION INTRAMUSCULAR; INTRAVENOUS
Status: COMPLETED | OUTPATIENT
Start: 2022-07-04 | End: 2022-07-04

## 2022-07-04 RX ORDER — ONDANSETRON 2 MG/ML
4 INJECTION INTRAMUSCULAR; INTRAVENOUS
Status: COMPLETED | OUTPATIENT
Start: 2022-07-04 | End: 2022-07-04

## 2022-07-04 RX ORDER — SODIUM CHLORIDE, SODIUM LACTATE, POTASSIUM CHLORIDE, CALCIUM CHLORIDE 600; 310; 30; 20 MG/100ML; MG/100ML; MG/100ML; MG/100ML
1000 INJECTION, SOLUTION INTRAVENOUS
Status: COMPLETED | OUTPATIENT
Start: 2022-07-04 | End: 2022-07-04

## 2022-07-04 RX ORDER — CALCITONIN SALMON 200 [USP'U]/ML
4 INJECTION, SOLUTION INTRAMUSCULAR; SUBCUTANEOUS ONCE
Status: COMPLETED | OUTPATIENT
Start: 2022-07-04 | End: 2022-07-04

## 2022-07-04 RX ORDER — HYDROCODONE BITARTRATE AND ACETAMINOPHEN 500; 5 MG/1; MG/1
TABLET ORAL
Status: DISCONTINUED | OUTPATIENT
Start: 2022-07-04 | End: 2022-07-05 | Stop reason: HOSPADM

## 2022-07-04 RX ADMIN — SODIUM CHLORIDE, SODIUM LACTATE, POTASSIUM CHLORIDE, AND CALCIUM CHLORIDE 1000 ML: .6; .31; .03; .02 INJECTION, SOLUTION INTRAVENOUS at 07:07

## 2022-07-04 RX ADMIN — CALCITONIN SALMON 212 UNITS: 200 INJECTION, SOLUTION INTRAMUSCULAR; SUBCUTANEOUS at 08:07

## 2022-07-04 RX ADMIN — CEFEPIME HYDROCHLORIDE 2 G: 1 INJECTION, SOLUTION INTRAVENOUS at 06:07

## 2022-07-04 RX ADMIN — SODIUM CHLORIDE 1000 ML: 0.9 INJECTION, SOLUTION INTRAVENOUS at 05:07

## 2022-07-04 RX ADMIN — MORPHINE SULFATE 4 MG: 4 INJECTION INTRAVENOUS at 06:07

## 2022-07-04 RX ADMIN — ONDANSETRON 4 MG: 2 INJECTION INTRAMUSCULAR; INTRAVENOUS at 06:07

## 2022-07-04 NOTE — ED PROVIDER NOTES
"EMERGENCY DEPARTMENT HISTORY AND PHYSICAL EXAM          Date: 7/4/2022   Patient Name: Karoline Justice       History of Presenting Illness           Chief Complaint   Patient presents with    Weakness     "Can't get my strength back." Reports onset about a week ago. Hx of active bladder cancer. Sons reports AMS and confusion onset yesterday.         History Provided By: Patient, Children and Family Member    1700   Karoline Justice is a 76 y.o. female with PMHX of metastatic bladder cancer, bilateral nephrostomy tubes, hypercalcemia, hypertension, DVT who presents to the emergency department C/O altered mental status.    Patient brought in due to hallucinations and increased confusion since yesterday.  In the emergency department patient reports she has been seen that family members.  Reports she feels confused.  She reports generalized abdominal pain and chronic back pain.      Family reports decreased urine output from the nephrostomy tubes today.  Son reports she typically puts out most of her urine overnight    She has been followed at [facility] for cancer and for nephrology.  Per their documentation patient's cancer is on operable and she is not a candidate for chemotherapy.  Family tells me she is currently receiving palliative treatment right now however she is transitioning her care over to [facility] for oncology. She has not yet established there yet.    Daughter's concern on the past when patient has gotten this way she has become more ill and septic.      PCP: Dee Dee Oconnor MD        Current Facility-Administered Medications   Medication Dose Route Frequency Provider Last Rate Last Admin    0.9%  NaCl infusion (for blood administration)   Intravenous Q24H PRN Perico Hernández MD        calcitonin injection 212 Units  4 Units/kg Intramuscular Once Aniket Sullivan MD        cefepime in dextrose 5 % 1 gram/50 mL IVPB 2 g  2 g Intravenous ED 1 Time Perico Hernández MD        lactated ringers " infusion  1,000 mL Intravenous ED 1 Time Perico Hernández MD         Current Outpatient Medications   Medication Sig Dispense Refill    acetaminophen (TYLENOL) 500 MG tablet Take 500 mg by mouth every 6 (six) hours as needed for Pain.      amLODIPine (NORVASC) 5 MG tablet Take 1 tablet (5 mg total) by mouth once daily. 90 tablet 3    apixaban (ELIQUIS) 5 mg Tab Take 2 tablets (10 mg total) by mouth 2 (two) times daily for 3 days, THEN Take 1 tablet (5 mg total) by mouth 2 (two) times daily thereafter. 60 tablet 3    apixaban (ELIQUIS) 5 mg Tab Take 1 tablet (5 mg total) by mouth 2 (two) times daily. (Patient not taking: Reported on 6/16/2022) 60 tablet 2    duke's soln (benadryl 30 mL, mylanta 30 mL, LIDOcaine 30 mL, nystatin 30 mL) 120mL Take 10 mLs by mouth 4 (four) times daily. 120 mL 0    insulin detemir U-100 (LEVEMIR FLEXTOUCH) 100 unit/mL (3 mL) SubQ InPn pen Inject 10 Units into the skin once daily. 9 mL 3    LIDOcaine (LIDODERM) 5 % Place 1 patch onto the skin once daily. Place patch to back. Leave on for 12 hours and remove for 12 hours. 30 patch 2    sodium bicarbonate 650 MG tablet Take 1 tablet (650 mg total) by mouth 2 (two) times daily. 60 tablet 11    sodium bicarbonate 650 MG tablet Take 2 tablets (1,300 mg total) by mouth 2 (two) times daily. 360 tablet 0    traMADoL (ULTRAM) 50 mg tablet Take 1 tablet (50 mg total) by mouth every 6 (six) hours as needed for Pain. 30 tablet 0           Past History     Past Medical History:   Past Medical History:   Diagnosis Date    Bladder cancer 05/2022    Bladder mass 05/06/2022    Choledocholithiasis 05/06/2022    Diabetes mellitus     Pneumonia due to COVID-19 virus 08/06/2021        Past Surgical History:   Past Surgical History:   Procedure Laterality Date    CYSTOSCOPY N/A 05/09/2022    Procedure: CYSTOSCOPY;  Surgeon: Adrianna Gutierrez MD;  Location: [facility] OR [address] [place] [address];  Service: Urology;  Laterality: N/A;    NEPHROSTOMY Left 05/2022     "RETROGRADE PYELOGRAPHY Right 05/09/2022    Procedure: PYELOGRAM, RETROGRADE;  Surgeon: Adrianna Gutierrez MD;  Location: [facility] OR [address] [place] [address];  Service: Urology;  Laterality: Right;        Family History:   Family History   Problem Relation Age of Onset    Diabetes Mother     Stomach cancer Mother     Heart attack Father     Diabetes Daughter     Thyroid disease Daughter     Diabetes Son     Kidney cancer Son         Social History:   Social History     Tobacco Use    Smoking status: Never Smoker    Smokeless tobacco: Never Used   Substance Use Topics    Alcohol use: No    Drug use: No        Allergies:   Review of patient's allergies indicates:  No Known Allergies       Review of Systems   Review of Systems   Constitutional: Negative for fever.   HENT: Negative for sore throat.    Respiratory: Negative for shortness of breath.    Cardiovascular: Negative for chest pain.   Gastrointestinal: Positive for abdominal pain. Negative for nausea and vomiting.   Genitourinary: Negative for dysuria.   Musculoskeletal: Positive for arthralgias and back pain.   Skin: Negative for rash.   Neurological: Negative for weakness.   Hematological: Does not bruise/bleed easily.   Psychiatric/Behavioral: Positive for hallucinations and sleep disturbance.                  Physical Exam     Vitals:    07/04/22 1602 07/04/22 1604 07/04/22 1706   BP:  (!) 112/58 135/65   BP Location:  Left arm    Patient Position:  Sitting    Pulse:  98 97   Resp:  18    Temp:  97.6 °F (36.4 °C)    TempSrc:  Oral    SpO2:  100% 100%   Weight: 53.1 kg (117 lb)     Height: 5' 1" (1.549 m)        Physical Exam  Vitals and nursing note reviewed.   Constitutional:       General: She is not in acute distress.     Appearance: She is ill-appearing (chroniclly ill appearing).   HENT:      Head: Normocephalic and atraumatic.      Right Ear: External ear normal.      Left Ear: External ear normal.      Nose: Nose normal. No congestion or rhinorrhea.      " Mouth/Throat:      Mouth: Mucous membranes are moist.   Eyes:      Conjunctiva/sclera: Conjunctivae normal.      Pupils: Pupils are equal, round, and reactive to light.   Cardiovascular:      Rate and Rhythm: Normal rate.      Pulses: Normal pulses.   Pulmonary:      Effort: Pulmonary effort is normal. No respiratory distress.      Breath sounds: Normal breath sounds.   Abdominal:      General: There is no distension.      Palpations: Abdomen is soft.      Tenderness: There is abdominal tenderness.      Comments: Nephrostomy sites on back clean dry intact draining cloudy thick yellow urine   Musculoskeletal:         General: No deformity.      Cervical back: Normal range of motion. No rigidity.      Right lower leg: No edema.      Left lower leg: No edema.   Skin:     General: Skin is warm and dry.      Findings: No rash.   Neurological:      Mental Status: She is alert and oriented to person, place, and time.      Motor: No weakness.   Psychiatric:         Mood and Affect: Mood normal.         Behavior: Behavior normal.              Diagnostic Study Results      Labs -   Recent Results (from the past 12 hour(s))   CBC auto differential    Collection Time: 07/04/22  4:18 PM   Result Value Ref Range    WBC 25.23 (H) 3.90 - 12.70 K/uL    RBC 2.43 (L) 4.00 - 5.40 M/uL    Hemoglobin 6.4 (L) 12.0 - 16.0 g/dL    Hematocrit 21.0 (L) 37.0 - 48.5 %    MCV 86 82 - 98 fL    MCH 26.3 (L) 27.0 - 31.0 pg    MCHC 30.5 (L) 32.0 - 36.0 g/dL    RDW 16.5 (H) 11.5 - 14.5 %    Platelets 540 (H) 150 - 450 K/uL    MPV 9.3 9.2 - 12.9 fL    Immature Granulocytes CANCELED 0.0 - 0.5 %    Immature Grans (Abs) CANCELED 0.00 - 0.04 K/uL    Lymph # CANCELED 1.0 - 4.8 K/uL    Mono # CANCELED 0.3 - 1.0 K/uL    Eos # CANCELED 0.0 - 0.5 K/uL    Baso # CANCELED 0.00 - 0.20 K/uL    nRBC 0 0 /100 WBC    Gran % 86.0 (H) 38.0 - 73.0 %    Lymph % 8.0 (L) 18.0 - 48.0 %    Mono % 2.0 (L) 4.0 - 15.0 %    Eosinophil % 4.0 0.0 - 8.0 %    Basophil % 0.0 0.0 -  1.9 %    Differential Method Manual    Comprehensive metabolic panel    Collection Time: 07/04/22  4:18 PM   Result Value Ref Range    Sodium 128 (L) 136 - 145 mmol/L    Potassium 4.4 3.5 - 5.1 mmol/L    Chloride 100 95 - 110 mmol/L    CO2 19 (L) 23 - 29 mmol/L    Glucose 231 (H) 70 - 110 mg/dL    BUN 25 (H) 8 - 23 mg/dL    Creatinine 1.7 (H) 0.5 - 1.4 mg/dL    Calcium 13.0 (HH) 8.7 - 10.5 mg/dL    Total Protein 7.4 6.0 - 8.4 g/dL    Albumin 2.1 (L) 3.5 - 5.2 g/dL    Total Bilirubin 0.6 0.1 - 1.0 mg/dL    Alkaline Phosphatase 162 (H) 55 - 135 U/L    AST 16 10 - 40 U/L    ALT 19 10 - 44 U/L    Anion Gap 9 8 - 16 mmol/L    eGFR if African American 33.3 (A) >60 mL/min/1.73 m^2    eGFR if non  28.9 (A) >60 mL/min/1.73 m^2   POCT glucose    Collection Time: 07/04/22  4:18 PM   Result Value Ref Range    POCT Glucose 250 (H) 70 - 110 mg/dL   Urinalysis    Collection Time: 07/04/22  5:01 PM   Result Value Ref Range    Specimen UA Urine, Clean Catch     Color, UA Yellow Yellow, Straw, Katie    Appearance, UA Cloudy (A) Clear    pH, UA 5.0 5.0 - 8.0    Specific Gravity, UA 1.020 1.005 - 1.030    Protein, UA 3+ (A) Negative    Glucose, UA Negative Negative    Ketones, UA Negative Negative    Bilirubin (UA) Negative Negative    Occult Blood UA 3+ (A) Negative    Nitrite, UA Negative Negative    Urobilinogen, UA 1.0 <2.0 EU/dL    Leukocytes, UA 3+ (A) Negative   Urinalysis Microscopic    Collection Time: 07/04/22  5:01 PM   Result Value Ref Range    RBC, UA >100 (H) 0 - 4 /hpf    WBC, UA >100 (H) 0 - 5 /hpf    Bacteria Moderate (A) None-Occ /hpf    Yeast, UA Occasional (A) None    Squam Epithel, UA 22 /hpf    Hyaline Casts, UA >87 (A) 0-1/lpf /lpf    Microscopic Comment SEE COMMENT    Type & Screen    Collection Time: 07/04/22  5:02 PM   Result Value Ref Range    Group & Rh O POS     Indirect Santo NEG    Prepare RBC 1 Unit    Collection Time: 07/04/22  5:02 PM   Result Value Ref Range    UNIT NUMBER  [id number]     Product Code V0162R28     DISPENSE STATUS CROSSMATCHED     CODING SYSTEM XLSW385     Unit Blood Type Code 5100     Unit Blood Type O POS     Unit Expiration [id number]         Radiologic Studies -    No orders to display        Medications given in the ED-   Medications   0.9%  NaCl infusion (for blood administration) (has no administration in time range)   cefepime in dextrose 5 % 1 gram/50 mL IVPB 2 g (has no administration in time range)   lactated ringers infusion (has no administration in time range)   calcitonin injection 212 Units (has no administration in time range)   sodium chloride 0.9% bolus 1,000 mL (1,000 mLs Intravenous New Bag 7/4/22 8073)           Medical Decision Making    I am the first provider for this patient.     I reviewed the vital signs, available nursing notes, past medical history, past surgical history, family history and social history.     Vital Signs:  Reviewed the patient's vital signs.     Pulse Oximetry Analysis and Interpretation:    100% on Room Air, normal        Records Reviewed: Old medical records.  Nursing notes.        Provider Notes (Medical Decision Making): Karoline Justice is a 76 y.o. female here with increased confusion           Procedures:   Procedures      ED Course:    5:51 PM  Labs demonstrate leukocytosis which is baseline.  Minor normocytic anemia, will order transfusion.  Patient multiple like to light derangements including hypercalcemia, elevated creatinine, and hyponatremia although patient noted to have elevated blood glucose  Urine is consistent with urinary tract infection.  I am also concerned about reduced urine output.  Will treat with antibiotics and urine culture and blood cultures will be sent.  I have ordered a crossmatch blood noting that this will likely worsens patient's hyper calcemia.  Will plan on IV fluids.    Plan is to transfer patient to tertiary care center where she is followed by Nephrology as well as Oncology    ED  Course as of 07/04/22 1831 Mon Jul 04, 2022   1829 Patient will be transferred to [facility] where her heme Onc physician as.  Heme Onc physician recommended placing patient on calcitonin or zoledronate. Plan to transfer patient and further hydrate [HD]      ED Course User Index  [HD] Aniket [facility] MD     6:04 PM  Spoke with transfer center, waiting to here from . Patient would be stable for floor at this time.    Diagnosis and Disposition     6:04 PM      I have spent 46 minutes of critical care time involved in lab review, consultations with specialist, family decision-making, and documentation.  During this entire length of time I was immediately available to the patient.     Critical Care:  The reason for providing this level of medical care for this critically ill patient was due a critical illness that impaired one or more vital organ systems such that there was a high probability of imminent or life threatening deterioration in the patients condition. This care involved high complexity decision making to assess, manipulate, and support vital system functions, to treat this degreee vital organ system failure and to prevent further life threatening deterioration of the patients condition.             CLINICAL IMPRESSION:         1. Hypercalcemia    2. THOMAS (acute kidney injury)    3. Urinary tract infection associated with nephrostomy catheter, initial encounter              PLAN:   1. Transfer  2.      Medication List      ASK your doctor about these medications    acetaminophen 500 MG tablet  Commonly known as: TYLENOL     amLODIPine 5 MG tablet  Commonly known as: NORVASC  Take 1 tablet (5 mg total) by mouth once daily.     calcium carbonate 200 mg calcium (500 mg) chewable tablet  Commonly known as: TUMS     DUKE'S SOLUTION (BENADRYL 30 ML, MYLANTA 30 ML, LIDOCAINE 30 ML, NYSTATIN 30 ML)  Take 10 mLs by mouth 4 (four) times daily.     * ELIQUIS 5 mg Tab  Generic drug: apixaban  Take 2 tablets  (10 mg total) by mouth 2 (two) times daily for 3 days, THEN Take 1 tablet (5 mg total) by mouth 2 (two) times daily thereafter.     * apixaban 5 mg Tab  Commonly known as: ELIQUIS  Take 1 tablet (5 mg total) by mouth 2 (two) times daily.     insulin detemir U-100 100 unit/mL (3 mL) Inpn pen  Commonly known as: Levemir FLEXTOUCH  Inject 10 Units into the skin once daily.     LIDOcaine 5 %  Commonly known as: LIDODERM  Place 1 patch onto the skin once daily. Place patch to back. Leave on for 12 hours and remove for 12 hours.     * sodium bicarbonate 650 MG tablet  Take 1 tablet (650 mg total) by mouth 2 (two) times daily.     * sodium bicarbonate 650 MG tablet  Take 2 tablets (1,300 mg total) by mouth 2 (two) times daily.     traMADoL 50 mg tablet  Commonly known as: ULTRAM  Take 1 tablet (50 mg total) by mouth every 6 (six) hours as needed for Pain.         * This list has 4 medication(s) that are the same as other medications prescribed for you. Read the directions carefully, and ask your doctor or other care provider to review them with you.               3. No follow-up provider specified.     _______________________________     Please note that this dictation was completed with [employer], the computer voice recognition software.  Quite often unanticipated grammatical, syntax, homophones, and other interpretive errors are inadvertently transcribed by the computer software.  Please disregard these errors.  Please excuse any errors that have escaped final proofreading.             Aniket [facility] MD  07/04/22 2616

## 2022-07-05 ENCOUNTER — PATIENT MESSAGE (OUTPATIENT)
Dept: HEMATOLOGY/ONCOLOGY | Facility: CLINIC | Age: 77
End: 2022-07-05

## 2022-07-05 ENCOUNTER — HOSPITAL ENCOUNTER (INPATIENT)
Facility: HOSPITAL | Age: 77
LOS: 2 days | Discharge: HOME HEALTH | DRG: 689 | End: 2022-07-07
Attending: INTERNAL MEDICINE | Admitting: HOSPITALIST
Payer: MEDICARE

## 2022-07-05 ENCOUNTER — PATIENT MESSAGE (OUTPATIENT)
Dept: HEMATOLOGY/ONCOLOGY | Facility: CLINIC | Age: 77
End: 2022-07-05
Payer: MEDICARE

## 2022-07-05 VITALS
HEART RATE: 74 BPM | DIASTOLIC BLOOD PRESSURE: 68 MMHG | TEMPERATURE: 98 F | BODY MASS INDEX: 22.09 KG/M2 | WEIGHT: 117 LBS | SYSTOLIC BLOOD PRESSURE: 113 MMHG | HEIGHT: 61 IN | OXYGEN SATURATION: 100 % | RESPIRATION RATE: 16 BRPM

## 2022-07-05 DIAGNOSIS — N32.89 BLADDER MASS: ICD-10-CM

## 2022-07-05 DIAGNOSIS — R73.9 HYPERGLYCEMIA: ICD-10-CM

## 2022-07-05 DIAGNOSIS — C67.8 MALIGNANT NEOPLASM OF OVERLAPPING SITES OF BLADDER: ICD-10-CM

## 2022-07-05 DIAGNOSIS — Z51.5 ENCOUNTER FOR PALLIATIVE CARE: Primary | ICD-10-CM

## 2022-07-05 PROBLEM — N39.0 UTI (URINARY TRACT INFECTION): Status: ACTIVE | Noted: 2022-07-05

## 2022-07-05 LAB
ALBUMIN SERPL BCP-MCNC: 1.9 G/DL (ref 3.5–5.2)
ALP SERPL-CCNC: 137 U/L (ref 55–135)
ALT SERPL W/O P-5'-P-CCNC: 11 U/L (ref 10–44)
ANION GAP SERPL CALC-SCNC: 6 MMOL/L (ref 8–16)
ANION GAP SERPL CALC-SCNC: 6 MMOL/L (ref 8–16)
AST SERPL-CCNC: 9 U/L (ref 10–40)
BASOPHILS # BLD AUTO: 0.06 K/UL (ref 0–0.2)
BASOPHILS NFR BLD: 0.2 % (ref 0–1.9)
BILIRUB SERPL-MCNC: 1.1 MG/DL (ref 0.1–1)
BUN SERPL-MCNC: 17 MG/DL (ref 8–23)
BUN SERPL-MCNC: 18 MG/DL (ref 8–23)
CALCIUM SERPL-MCNC: 11.5 MG/DL (ref 8.7–10.5)
CALCIUM SERPL-MCNC: 12.2 MG/DL (ref 8.7–10.5)
CHLORIDE SERPL-SCNC: 108 MMOL/L (ref 95–110)
CHLORIDE SERPL-SCNC: 110 MMOL/L (ref 95–110)
CO2 SERPL-SCNC: 20 MMOL/L (ref 23–29)
CO2 SERPL-SCNC: 21 MMOL/L (ref 23–29)
CREAT SERPL-MCNC: 1 MG/DL (ref 0.5–1.4)
CREAT SERPL-MCNC: 1.2 MG/DL (ref 0.5–1.4)
DIFFERENTIAL METHOD: ABNORMAL
EOSINOPHIL # BLD AUTO: 0.1 K/UL (ref 0–0.5)
EOSINOPHIL NFR BLD: 0.5 % (ref 0–8)
ERYTHROCYTE [DISTWIDTH] IN BLOOD BY AUTOMATED COUNT: 15.8 % (ref 11.5–14.5)
EST. GFR  (AFRICAN AMERICAN): 50.7 ML/MIN/1.73 M^2
EST. GFR  (AFRICAN AMERICAN): >60 ML/MIN/1.73 M^2
EST. GFR  (NON AFRICAN AMERICAN): 44 ML/MIN/1.73 M^2
EST. GFR  (NON AFRICAN AMERICAN): 54.9 ML/MIN/1.73 M^2
GLUCOSE SERPL-MCNC: 120 MG/DL (ref 70–110)
GLUCOSE SERPL-MCNC: 198 MG/DL (ref 70–110)
HCT VFR BLD AUTO: 25.9 % (ref 37–48.5)
HGB BLD-MCNC: 8.1 G/DL (ref 12–16)
IMM GRANULOCYTES # BLD AUTO: 0.23 K/UL (ref 0–0.04)
IMM GRANULOCYTES NFR BLD AUTO: 0.9 % (ref 0–0.5)
LYMPHOCYTES # BLD AUTO: 1.2 K/UL (ref 1–4.8)
LYMPHOCYTES NFR BLD: 4.5 % (ref 18–48)
MAGNESIUM SERPL-MCNC: 1.7 MG/DL (ref 1.6–2.6)
MCH RBC QN AUTO: 28.1 PG (ref 27–31)
MCHC RBC AUTO-ENTMCNC: 31.3 G/DL (ref 32–36)
MCV RBC AUTO: 90 FL (ref 82–98)
MONOCYTES # BLD AUTO: 1 K/UL (ref 0.3–1)
MONOCYTES NFR BLD: 3.9 % (ref 4–15)
NEUTROPHILS # BLD AUTO: 24 K/UL (ref 1.8–7.7)
NEUTROPHILS NFR BLD: 90 % (ref 38–73)
NRBC BLD-RTO: 0 /100 WBC
PHOSPHATE SERPL-MCNC: 2 MG/DL (ref 2.7–4.5)
PLATELET # BLD AUTO: 499 K/UL (ref 150–450)
PMV BLD AUTO: 9.6 FL (ref 9.2–12.9)
POCT GLUCOSE: 55 MG/DL (ref 70–110)
POCT GLUCOSE: 59 MG/DL (ref 70–110)
POTASSIUM SERPL-SCNC: 4 MMOL/L (ref 3.5–5.1)
POTASSIUM SERPL-SCNC: 4.4 MMOL/L (ref 3.5–5.1)
PROT SERPL-MCNC: 6.5 G/DL (ref 6–8.4)
RBC # BLD AUTO: 2.88 M/UL (ref 4–5.4)
SODIUM SERPL-SCNC: 135 MMOL/L (ref 136–145)
SODIUM SERPL-SCNC: 136 MMOL/L (ref 136–145)
WBC # BLD AUTO: 26.6 K/UL (ref 3.9–12.7)

## 2022-07-05 PROCEDURE — 25000003 PHARM REV CODE 250

## 2022-07-05 PROCEDURE — C9399 UNCLASSIFIED DRUGS OR BIOLOG: HCPCS

## 2022-07-05 PROCEDURE — 80048 BASIC METABOLIC PNL TOTAL CA: CPT | Mod: XB

## 2022-07-05 PROCEDURE — 25000003 PHARM REV CODE 250: Performed by: INTERNAL MEDICINE

## 2022-07-05 PROCEDURE — 36415 COLL VENOUS BLD VENIPUNCTURE: CPT

## 2022-07-05 PROCEDURE — 85025 COMPLETE CBC W/AUTO DIFF WBC: CPT | Performed by: STUDENT IN AN ORGANIZED HEALTH CARE EDUCATION/TRAINING PROGRAM

## 2022-07-05 PROCEDURE — 84100 ASSAY OF PHOSPHORUS: CPT | Performed by: STUDENT IN AN ORGANIZED HEALTH CARE EDUCATION/TRAINING PROGRAM

## 2022-07-05 PROCEDURE — 25000003 PHARM REV CODE 250: Performed by: STUDENT IN AN ORGANIZED HEALTH CARE EDUCATION/TRAINING PROGRAM

## 2022-07-05 PROCEDURE — 20600001 HC STEP DOWN PRIVATE ROOM

## 2022-07-05 PROCEDURE — 36415 COLL VENOUS BLD VENIPUNCTURE: CPT | Performed by: STUDENT IN AN ORGANIZED HEALTH CARE EDUCATION/TRAINING PROGRAM

## 2022-07-05 PROCEDURE — 63600175 PHARM REV CODE 636 W HCPCS: Performed by: STUDENT IN AN ORGANIZED HEALTH CARE EDUCATION/TRAINING PROGRAM

## 2022-07-05 PROCEDURE — 99223 1ST HOSP IP/OBS HIGH 75: CPT | Mod: ,,, | Performed by: INTERNAL MEDICINE

## 2022-07-05 PROCEDURE — 99223 PR INITIAL HOSPITAL CARE,LEVL III: ICD-10-PCS | Mod: ,,, | Performed by: INTERNAL MEDICINE

## 2022-07-05 PROCEDURE — 80053 COMPREHEN METABOLIC PANEL: CPT | Performed by: STUDENT IN AN ORGANIZED HEALTH CARE EDUCATION/TRAINING PROGRAM

## 2022-07-05 PROCEDURE — 83735 ASSAY OF MAGNESIUM: CPT | Performed by: STUDENT IN AN ORGANIZED HEALTH CARE EDUCATION/TRAINING PROGRAM

## 2022-07-05 RX ORDER — IBUPROFEN 200 MG
16 TABLET ORAL
Status: DISCONTINUED | OUTPATIENT
Start: 2022-07-05 | End: 2022-07-07 | Stop reason: HOSPADM

## 2022-07-05 RX ORDER — MUPIROCIN 20 MG/G
OINTMENT TOPICAL 2 TIMES DAILY
Status: DISCONTINUED | OUTPATIENT
Start: 2022-07-05 | End: 2022-07-07 | Stop reason: HOSPADM

## 2022-07-05 RX ORDER — INSULIN ASPART 100 [IU]/ML
0-5 INJECTION, SOLUTION INTRAVENOUS; SUBCUTANEOUS
Status: DISCONTINUED | OUTPATIENT
Start: 2022-07-05 | End: 2022-07-06

## 2022-07-05 RX ORDER — IBUPROFEN 200 MG
24 TABLET ORAL
Status: DISCONTINUED | OUTPATIENT
Start: 2022-07-05 | End: 2022-07-07 | Stop reason: HOSPADM

## 2022-07-05 RX ORDER — OXYCODONE HYDROCHLORIDE 5 MG/1
5 CAPSULE ORAL EVERY 4 HOURS PRN
Status: ON HOLD | COMMUNITY
End: 2022-07-07 | Stop reason: HOSPADM

## 2022-07-05 RX ORDER — CALCITONIN SALMON 200 [USP'U]/ML
4 INJECTION, SOLUTION INTRAMUSCULAR; SUBCUTANEOUS EVERY 12 HOURS
Status: COMPLETED | OUTPATIENT
Start: 2022-07-05 | End: 2022-07-06

## 2022-07-05 RX ORDER — OXYCODONE HYDROCHLORIDE 5 MG/1
5 TABLET ORAL EVERY 4 HOURS PRN
Status: DISCONTINUED | OUTPATIENT
Start: 2022-07-05 | End: 2022-07-07 | Stop reason: HOSPADM

## 2022-07-05 RX ORDER — ONDANSETRON 2 MG/ML
8 INJECTION INTRAMUSCULAR; INTRAVENOUS EVERY 8 HOURS PRN
Status: DISCONTINUED | OUTPATIENT
Start: 2022-07-05 | End: 2022-07-07 | Stop reason: HOSPADM

## 2022-07-05 RX ORDER — TALC
6 POWDER (GRAM) TOPICAL NIGHTLY PRN
Status: DISCONTINUED | OUTPATIENT
Start: 2022-07-05 | End: 2022-07-07 | Stop reason: HOSPADM

## 2022-07-05 RX ORDER — SODIUM CHLORIDE 9 MG/ML
INJECTION, SOLUTION INTRAVENOUS CONTINUOUS
Status: DISCONTINUED | OUTPATIENT
Start: 2022-07-05 | End: 2022-07-07 | Stop reason: HOSPADM

## 2022-07-05 RX ORDER — ACETAMINOPHEN 325 MG/1
650 TABLET ORAL EVERY 6 HOURS PRN
Status: DISCONTINUED | OUTPATIENT
Start: 2022-07-05 | End: 2022-07-07 | Stop reason: HOSPADM

## 2022-07-05 RX ORDER — GLUCAGON 1 MG
1 KIT INJECTION
Status: DISCONTINUED | OUTPATIENT
Start: 2022-07-05 | End: 2022-07-07 | Stop reason: HOSPADM

## 2022-07-05 RX ADMIN — APIXABAN 5 MG: 5 TABLET, FILM COATED ORAL at 08:07

## 2022-07-05 RX ADMIN — SODIUM CHLORIDE: 0.9 INJECTION, SOLUTION INTRAVENOUS at 10:07

## 2022-07-05 RX ADMIN — MUPIROCIN: 20 OINTMENT TOPICAL at 01:07

## 2022-07-05 RX ADMIN — MUPIROCIN: 20 OINTMENT TOPICAL at 09:07

## 2022-07-05 RX ADMIN — CALCITONIN SALMON 190 UNITS: 200 INJECTION, SOLUTION INTRAMUSCULAR; SUBCUTANEOUS at 02:07

## 2022-07-05 RX ADMIN — SODIUM CHLORIDE: 0.9 INJECTION, SOLUTION INTRAVENOUS at 04:07

## 2022-07-05 RX ADMIN — INSULIN DETEMIR 10 UNITS: 100 INJECTION, SOLUTION SUBCUTANEOUS at 01:07

## 2022-07-05 RX ADMIN — OXYCODONE 5 MG: 5 TABLET ORAL at 08:07

## 2022-07-05 RX ADMIN — Medication 16 G: at 08:07

## 2022-07-05 RX ADMIN — PAMIDRONATE DISODIUM 90 MG: 9 INJECTION INTRAVENOUS at 01:07

## 2022-07-05 RX ADMIN — CALCITONIN SALMON 190 UNITS: 200 INJECTION, SOLUTION INTRAMUSCULAR; SUBCUTANEOUS at 09:07

## 2022-07-05 RX ADMIN — CEFTRIAXONE 1 G: 1 INJECTION, SOLUTION INTRAVENOUS at 10:07

## 2022-07-05 NOTE — SUBJECTIVE & OBJECTIVE
Oncology Treatment Plan:   OP PEMBROLIZUMAB 400MG Q6W    Medications:  Continuous Infusions:   sodium chloride 0.9% 150 mL/hr at 07/05/22 1026     Scheduled Meds:   calcitonin  4 Units/kg Subcutaneous Q12H    cefTRIAXone (ROCEPHIN) IVPB  1 g Intravenous Q24H    insulin detemir U-100  10 Units Subcutaneous Daily    mupirocin   Nasal BID    pamidronate (AREDIA) IVPB  90 mg Intravenous Once     PRN Meds:acetaminophen, dextrose 10%, dextrose 10%, glucagon (human recombinant), glucose, glucose, insulin aspart U-100, melatonin, ondansetron     Review of patient's allergies indicates:  No Known Allergies     Past Medical History:   Diagnosis Date    Bladder cancer 05/2022    Bladder mass 05/06/2022    Choledocholithiasis 05/06/2022    Diabetes mellitus     Pneumonia due to COVID-19 virus 08/06/2021     Past Surgical History:   Procedure Laterality Date    CYSTOSCOPY N/A 05/09/2022    Procedure: CYSTOSCOPY;  Surgeon: Adrianna Gutierrez MD;  Location: [facility] OR [address] [place] [address];  Service: Urology;  Laterality: N/A;    NEPHROSTOMY Left 05/2022    RETROGRADE PYELOGRAPHY Right 05/09/2022    Procedure: PYELOGRAM, RETROGRADE;  Surgeon: Adrianna Gutierrez MD;  Location: [facility] OR [address] [place] [address];  Service: Urology;  Laterality: Right;     Family History       Problem Relation (Age of Onset)    Diabetes Mother, Daughter, Son    Heart attack Father    Kidney cancer Son    Stomach cancer Mother    Thyroid disease Daughter          Tobacco Use    Smoking status: Never Smoker    Smokeless tobacco: Never Used   Substance and Sexual Activity    Alcohol use: No    Drug use: No    Sexual activity: Not on file       Review of Systems   Constitutional:  Positive for fatigue. Negative for fever.   Respiratory:  Negative for chest tightness, shortness of breath and wheezing.    Cardiovascular:  Negative for chest pain and leg swelling.   Gastrointestinal:  Negative for abdominal pain, nausea and vomiting.   Genitourinary:  Positive for decreased urine volume,  difficulty urinating and dysuria.   Musculoskeletal:  Positive for back pain.   Skin:  Positive for wound (open wound on buttock).   Neurological:  Positive for weakness.   Psychiatric/Behavioral:  Positive for confusion and hallucinations.    Objective:     Vital Signs (Most Recent):  Temp: 97.9 °F (36.6 °C) (07/05/22 1232)  Pulse: 79 (07/05/22 1232)  Resp: 18 (07/05/22 1232)  BP: (!) 146/65 (07/05/22 1232)  SpO2: 97 % (07/05/22 1232)   Vital Signs (24h Range):  Temp:  [97.6 °F (36.4 °C)-98.5 °F (36.9 °C)] 97.9 °F (36.6 °C)  Pulse:  [73-98] 79  Resp:  [14-18] 18  SpO2:  [97 %-100 %] 97 %  BP: (112-155)/(58-85) 146/65     Weight: 47.5 kg (104 lb 11.5 oz)  Body mass index is 19.8 kg/m².  Body surface area is 1.43 meters squared.    No intake or output data in the 24 hours ending 07/05/22 1521    Physical Exam  Vitals and nursing note reviewed.   Constitutional:       Appearance: She is ill-appearing.      Comments: Sleepy   HENT:      Head: Normocephalic.   Eyes:      General:         Right eye: No discharge.         Left eye: No discharge.   Cardiovascular:      Rate and Rhythm: Normal rate and regular rhythm.      Heart sounds: No murmur heard.  Pulmonary:      Effort: Pulmonary effort is normal. No respiratory distress.      Breath sounds: Normal breath sounds. No wheezing.   Neurological:      Mental Status: She is disoriented.      Motor: Weakness present.       Significant Labs:   CBC:   Recent Labs   Lab 07/04/22  1618 07/05/22  1011   WBC 25.23* 26.60*   HGB 6.4* 8.1*   HCT 21.0* 25.9*   * 499*   , CMP:   Recent Labs   Lab 07/04/22  1618 07/05/22  1011   * 135*   K 4.4 4.4    108   CO2 19* 21*   * 198*   BUN 25* 18   CREATININE 1.7* 1.2   CALCIUM 13.0* 12.2*   PROT 7.4 6.5   ALBUMIN 2.1* 1.9*   BILITOT 0.6 1.1*   ALKPHOS 162* 137*   AST 16 9*   ALT 19 11   ANIONGAP 9 6*   EGFRNONAA 28.9* 44.0*   , and Urine Studies:   Recent Labs   Lab 07/04/22  1701   COLORU Yellow   APPEARANCEUA  Cloudy*   PHUR 5.0   SPECGRAV 1.020   PROTEINUA 3+*   GLUCUA Negative   KETONESU Negative   BILIRUBINUA Negative   OCCULTUA 3+*   NITRITE Negative   UROBILINOGEN 1.0   LEUKOCYTESUR 3+*   RBCUA >100*   WBCUA >100*   BACTERIA Moderate*   SQUAMEPITHEL 22   HYALINECASTS >87*       Diagnostic Results:  None

## 2022-07-05 NOTE — ED NOTES
[facility] arrived and loaded pt.  Report given to medic without questions.  Paperwork and belongings given to [facility] medics  Pt stable.

## 2022-07-05 NOTE — NURSING
"L-nephrostomy tube: 190ml yellow cloudy output  R " tube: 200ml yellow, cloudy output    Pt attempted to urinate in toilet and thick, white sediment was excreted w/o apparent urine accompanying it. MDs aware.  "

## 2022-07-05 NOTE — ASSESSMENT & PLAN NOTE
Bladder cancer - cT3, cM1. Followed by Dr. Snider. Diagnosed in 5/2022. Planned Keytruda 400mg q6 weeks to start

## 2022-07-05 NOTE — NURSING
Pt admitted to Oncology floor. Arrived via ambulance. IV to left forearm with LR infusing. Pt is alert and orientated. Daughter at bedside

## 2022-07-05 NOTE — HPI
76 year old female with PMH urinary bladder cancer, treated by Dr. Snider, comlicated by b/l hydronephrosis s/p bilateral PNT, recent RLE DVT on Eliquis, T2DM, hypercalcemia, htn presenting for hallucinations, confusion, and decreased UOP. Of note, she had her left PNT placed May 2022 after presenting to ED with dysuria and decreased UOP. She was then hospitalized again 6/8/2022 for UTI and urinary obstruction requiring right PNT placement. History gathered from patient and daughter at bedside. She initially presented to an outside facility 7/4, and transfered to [facility] for further treatment. Daughter notes that pts urine output has been cloudy intermittently since PNT placement, and she noticed blood in the urine on Saturday 7/2. Since then, patient become more disoriented and confused. Daughter notes today patient's urine was purulent. Patient has been complaining of back pain, unclear if this is orignating from nephrostomy tubes or an open sore on the buttocks. No fevers at home.     AF, regular heart rate, slightly hypertensive at 133/60. Labs pertinent for WBC 26.6, hgb 8.1, BUN 18, Cr 1.2, glucose 198, Ca 12.2 13.88 corrected. UA performed in outside facility pertinent for UT - cloudy, 3+ blood, >100 RBCs and WBCs. UA pending.  Of note, urine culture collected 5/5/2022 positive for viridans strep.  Blood cultures no growth to date.    Oncologic History  Bladder cancer - cT3, cM1. Followed by Dr. Snider. Diagnosed in 5/2022. Planned Keytruda 400mg q6, has not begun yet

## 2022-07-05 NOTE — ASSESSMENT & PLAN NOTE
S/p bilateral nephrostomy tubes  -retroperitoneal US to evaluate for progression of hydronephrosis and rule out renal abscess

## 2022-07-05 NOTE — H&P
[facility] - Oncology ([facility])  Hematology/Oncology  H&P    Patient Name: Karoline Justice  MRN: [id number]  Admission Date: 7/5/2022  Code Status: Full Code   Attending Provider: Reid Snider MD  Primary Care Physician: Dee Dee Oconnor MD  Principal Problem:<principal problem not specified>    Subjective:     HPI: 76 year old female with PMH urinary bladder cancer, treated by Dr. Snider, comlicated by b/l hydronephrosis s/p bilateral PNT, recent RLE DVT on Eliquis, T2DM, hypercalcemia, htn presenting for hallucinations, confusion, and decreased UOP. Of note, she had her left PNT placed May 2022 after presenting to ED with dysuria and decreased UOP. She was then hospitalized again 6/8/2022 for UTI and urinary obstruction requiring right PNT placement. History gathered from patient and daughter at bedside. She initially presented to an outside facility 7/4, and transfered to [facility] for further treatment. Daughter notes that pts urine output has been cloudy intermittently since PNT placement, and she noticed blood in the urine on Saturday 7/2. Since then, patient become more disoriented and confused. Daughter notes today patient's urine was purulent. Patient has been complaining of back pain, unclear if this is orignating from nephrostomy tubes or an open sore on the buttocks. No fevers at home.     AF, regular heart rate, slightly hypertensive at 133/60. Labs pertinent for WBC 26.6, hgb 8.1, BUN 18, Cr 1.2, glucose 198, Ca 12.2 13.88 corrected. UA performed in outside facility pertinent for UT - cloudy, 3+ blood, >100 RBCs and WBCs. UA pending.  Of note, urine culture collected 5/5/2022 positive for viridans strep.  Blood cultures no growth to date.    Oncologic History  Bladder cancer - cT3, cM1. Followed by Dr. Snider. Diagnosed in 5/2022. Planned Keytruda 400mg q6 weeks to start           Oncology Treatment Plan:   OP PEMBROLIZUMAB 400MG Q6W    Medications:  Continuous Infusions:   sodium chloride 0.9% 150  mL/hr at 07/05/22 1026     Scheduled Meds:   calcitonin  4 Units/kg Subcutaneous Q12H    cefTRIAXone (ROCEPHIN) IVPB  1 g Intravenous Q24H    insulin detemir U-100  10 Units Subcutaneous Daily    mupirocin   Nasal BID    pamidronate (AREDIA) IVPB  90 mg Intravenous Once     PRN Meds:acetaminophen, dextrose 10%, dextrose 10%, glucagon (human recombinant), glucose, glucose, insulin aspart U-100, melatonin, ondansetron     Review of patient's allergies indicates:  No Known Allergies     Past Medical History:   Diagnosis Date    Bladder cancer 05/2022    Bladder mass 05/06/2022    Choledocholithiasis 05/06/2022    Diabetes mellitus     Pneumonia due to COVID-19 virus 08/06/2021     Past Surgical History:   Procedure Laterality Date    CYSTOSCOPY N/A 05/09/2022    Procedure: CYSTOSCOPY;  Surgeon: Adrianna Gutierrez MD;  Location: [facility] OR [address] [place] [address];  Service: Urology;  Laterality: N/A;    NEPHROSTOMY Left 05/2022    RETROGRADE PYELOGRAPHY Right 05/09/2022    Procedure: PYELOGRAM, RETROGRADE;  Surgeon: Adrianna Gutierrez MD;  Location: [facility] OR [address] [place] [address];  Service: Urology;  Laterality: Right;     Family History       Problem Relation (Age of Onset)    Diabetes Mother, Daughter, Son    Heart attack Father    Kidney cancer Son    Stomach cancer Mother    Thyroid disease Daughter          Tobacco Use    Smoking status: Never Smoker    Smokeless tobacco: Never Used   Substance and Sexual Activity    Alcohol use: No    Drug use: No    Sexual activity: Not on file       Review of Systems   Constitutional:  Positive for fatigue. Negative for fever.   Respiratory:  Negative for chest tightness, shortness of breath and wheezing.    Cardiovascular:  Negative for chest pain and leg swelling.   Gastrointestinal:  Negative for abdominal pain, nausea and vomiting.   Genitourinary:  Positive for decreased urine volume, difficulty urinating and dysuria.   Musculoskeletal:  Positive for back pain.   Skin:  Positive for wound  (open wound on buttock).   Neurological:  Positive for weakness.   Psychiatric/Behavioral:  Positive for confusion and hallucinations.    Objective:     Vital Signs (Most Recent):  Temp: 97.9 °F (36.6 °C) (07/05/22 1232)  Pulse: 79 (07/05/22 1232)  Resp: 18 (07/05/22 1232)  BP: (!) 146/65 (07/05/22 1232)  SpO2: 97 % (07/05/22 1232)   Vital Signs (24h Range):  Temp:  [97.6 °F (36.4 °C)-98.5 °F (36.9 °C)] 97.9 °F (36.6 °C)  Pulse:  [73-98] 79  Resp:  [14-18] 18  SpO2:  [97 %-100 %] 97 %  BP: (112-155)/(58-85) 146/65     Weight: 47.5 kg (104 lb 11.5 oz)  Body mass index is 19.8 kg/m².  Body surface area is 1.43 meters squared.    No intake or output data in the 24 hours ending 07/05/22 1521    Physical Exam  Vitals and nursing note reviewed.   Constitutional:       Appearance: She is ill-appearing.      Comments: Sleepy   HENT:      Head: Normocephalic.   Eyes:      General:         Right eye: No discharge.         Left eye: No discharge.   Cardiovascular:      Rate and Rhythm: Normal rate and regular rhythm.      Heart sounds: No murmur heard.  Pulmonary:      Effort: Pulmonary effort is normal. No respiratory distress.      Breath sounds: Normal breath sounds. No wheezing.   Neurological:      Mental Status: She is disoriented.      Motor: Weakness present.       Significant Labs:   CBC:   Recent Labs   Lab 07/04/22  1618 07/05/22  1011   WBC 25.23* 26.60*   HGB 6.4* 8.1*   HCT 21.0* 25.9*   * 499*   , CMP:   Recent Labs   Lab 07/04/22  1618 07/05/22  1011   * 135*   K 4.4 4.4    108   CO2 19* 21*   * 198*   BUN 25* 18   CREATININE 1.7* 1.2   CALCIUM 13.0* 12.2*   PROT 7.4 6.5   ALBUMIN 2.1* 1.9*   BILITOT 0.6 1.1*   ALKPHOS 162* 137*   AST 16 9*   ALT 19 11   ANIONGAP 9 6*   EGFRNONAA 28.9* 44.0*   , and Urine Studies:   Recent Labs   Lab 07/04/22  1701   COLORU Yellow   APPEARANCEUA Cloudy*   PHUR 5.0   SPECGRAV 1.020   PROTEINUA 3+*   GLUCUA Negative   KETONESU Negative   BILIRUBINUA  Negative   OCCULTUA 3+*   NITRITE Negative   UROBILINOGEN 1.0   LEUKOCYTESUR 3+*   RBCUA >100*   WBCUA >100*   BACTERIA Moderate*   SQUAMEPITHEL 22   HYALINECASTS >87*       Diagnostic Results:  None    Assessment/Plan:     UTI (urinary tract infection)  --Urine culture pending  --Empiric Ceftriaxone 1g  --Monitor vitals daily     Bladder cancer  Bladder cancer - cT3, cM1. Followed by Dr. Snider. Diagnosed in 5/2022. Planned Keytruda 400mg q6 weeks to start      THOMAS (acute kidney injury)  --IV  ml/hr  --See hydronephrosis  -Daily CMP. Monitor    HTN (hypertension)  Currently holding home amlodipine for prevention of hypotension. Monitor daily vitals    Hydronephrosis  S/p bilateral nephrostomy tubes  -retroperitoneal US to evaluate for progression of hydronephrosis and rule out renal abscess     Hypercalcemia  12.2 measured, 13.88 corrected  --IV NS 150cc/hr  --Pamidronate 90mg IV  --Calcitonin q12hrs    Controlled Type 2 diabetes mellitus  --10 units basal bolus daily, SSI        Riya Kelsey DO  Hematology/Oncology  [facility] - Oncology ([facility])

## 2022-07-05 NOTE — PLAN OF CARE
Plan of care reviewed with Patient and daughter on pt arrival to the unit.  Pt afebrile. Nephrostomy tubes draining cloudy yellow urine. Pt having pain to both nephrostomy tube sites. Open areas x2 to buttocks. Pt repositioned to her right side and pillows used for support.

## 2022-07-06 ENCOUNTER — PATIENT MESSAGE (OUTPATIENT)
Dept: HEMATOLOGY/ONCOLOGY | Facility: CLINIC | Age: 77
End: 2022-07-06
Payer: MEDICARE

## 2022-07-06 PROBLEM — Z51.5 ENCOUNTER FOR PALLIATIVE CARE: Status: ACTIVE | Noted: 2022-07-06

## 2022-07-06 LAB
ALBUMIN SERPL BCP-MCNC: 1.7 G/DL (ref 3.5–5.2)
ALP SERPL-CCNC: 148 U/L (ref 55–135)
ALT SERPL W/O P-5'-P-CCNC: 10 U/L (ref 10–44)
ANION GAP SERPL CALC-SCNC: 10 MMOL/L (ref 8–16)
ANION GAP SERPL CALC-SCNC: 5 MMOL/L (ref 8–16)
AST SERPL-CCNC: 14 U/L (ref 10–40)
BACTERIA UR CULT: NORMAL
BACTERIA UR CULT: NORMAL
BASOPHILS # BLD AUTO: 0.06 K/UL (ref 0–0.2)
BASOPHILS NFR BLD: 0.3 % (ref 0–1.9)
BILIRUB SERPL-MCNC: 0.5 MG/DL (ref 0.1–1)
BUN SERPL-MCNC: 10 MG/DL (ref 8–23)
BUN SERPL-MCNC: 13 MG/DL (ref 8–23)
CALCIUM SERPL-MCNC: 10.8 MG/DL (ref 8.7–10.5)
CALCIUM SERPL-MCNC: 9.7 MG/DL (ref 8.7–10.5)
CHLORIDE SERPL-SCNC: 109 MMOL/L (ref 95–110)
CHLORIDE SERPL-SCNC: 111 MMOL/L (ref 95–110)
CO2 SERPL-SCNC: 15 MMOL/L (ref 23–29)
CO2 SERPL-SCNC: 20 MMOL/L (ref 23–29)
CREAT SERPL-MCNC: 0.8 MG/DL (ref 0.5–1.4)
CREAT SERPL-MCNC: 0.9 MG/DL (ref 0.5–1.4)
DIFFERENTIAL METHOD: ABNORMAL
EOSINOPHIL # BLD AUTO: 0.3 K/UL (ref 0–0.5)
EOSINOPHIL NFR BLD: 1.5 % (ref 0–8)
ERYTHROCYTE [DISTWIDTH] IN BLOOD BY AUTOMATED COUNT: 16.2 % (ref 11.5–14.5)
EST. GFR  (AFRICAN AMERICAN): >60 ML/MIN/1.73 M^2
EST. GFR  (AFRICAN AMERICAN): >60 ML/MIN/1.73 M^2
EST. GFR  (NON AFRICAN AMERICAN): >60 ML/MIN/1.73 M^2
EST. GFR  (NON AFRICAN AMERICAN): >60 ML/MIN/1.73 M^2
GLUCOSE SERPL-MCNC: 103 MG/DL (ref 70–110)
GLUCOSE SERPL-MCNC: 67 MG/DL (ref 70–110)
HCT VFR BLD AUTO: 22.6 % (ref 37–48.5)
HGB BLD-MCNC: 7 G/DL (ref 12–16)
IMM GRANULOCYTES # BLD AUTO: 0.13 K/UL (ref 0–0.04)
IMM GRANULOCYTES NFR BLD AUTO: 0.6 % (ref 0–0.5)
LYMPHOCYTES # BLD AUTO: 1 K/UL (ref 1–4.8)
LYMPHOCYTES NFR BLD: 4.7 % (ref 18–48)
MAGNESIUM SERPL-MCNC: 1.5 MG/DL (ref 1.6–2.6)
MCH RBC QN AUTO: 27.6 PG (ref 27–31)
MCHC RBC AUTO-ENTMCNC: 31 G/DL (ref 32–36)
MCV RBC AUTO: 89 FL (ref 82–98)
MONOCYTES # BLD AUTO: 0.9 K/UL (ref 0.3–1)
MONOCYTES NFR BLD: 4.2 % (ref 4–15)
NEUTROPHILS # BLD AUTO: 19.8 K/UL (ref 1.8–7.7)
NEUTROPHILS NFR BLD: 88.7 % (ref 38–73)
NRBC BLD-RTO: 0 /100 WBC
PHOSPHATE SERPL-MCNC: 1.2 MG/DL (ref 2.7–4.5)
PLATELET # BLD AUTO: 430 K/UL (ref 150–450)
PMV BLD AUTO: 9.3 FL (ref 9.2–12.9)
POCT GLUCOSE: 105 MG/DL (ref 70–110)
POCT GLUCOSE: 113 MG/DL (ref 70–110)
POCT GLUCOSE: 113 MG/DL (ref 70–110)
POCT GLUCOSE: 84 MG/DL (ref 70–110)
POCT GLUCOSE: 98 MG/DL (ref 70–110)
POTASSIUM SERPL-SCNC: 3.5 MMOL/L (ref 3.5–5.1)
POTASSIUM SERPL-SCNC: 4.1 MMOL/L (ref 3.5–5.1)
PROT SERPL-MCNC: 5.8 G/DL (ref 6–8.4)
RBC # BLD AUTO: 2.54 M/UL (ref 4–5.4)
SODIUM SERPL-SCNC: 134 MMOL/L (ref 136–145)
SODIUM SERPL-SCNC: 136 MMOL/L (ref 136–145)
WBC # BLD AUTO: 22.26 K/UL (ref 3.9–12.7)

## 2022-07-06 PROCEDURE — 36415 COLL VENOUS BLD VENIPUNCTURE: CPT | Performed by: INTERNAL MEDICINE

## 2022-07-06 PROCEDURE — 80048 BASIC METABOLIC PNL TOTAL CA: CPT | Mod: XB | Performed by: INTERNAL MEDICINE

## 2022-07-06 PROCEDURE — 20600001 HC STEP DOWN PRIVATE ROOM

## 2022-07-06 PROCEDURE — 25000003 PHARM REV CODE 250: Performed by: STUDENT IN AN ORGANIZED HEALTH CARE EDUCATION/TRAINING PROGRAM

## 2022-07-06 PROCEDURE — 99233 PR SUBSEQUENT HOSPITAL CARE,LEVL III: ICD-10-PCS | Mod: ,,, | Performed by: INTERNAL MEDICINE

## 2022-07-06 PROCEDURE — 83735 ASSAY OF MAGNESIUM: CPT | Performed by: STUDENT IN AN ORGANIZED HEALTH CARE EDUCATION/TRAINING PROGRAM

## 2022-07-06 PROCEDURE — 36415 COLL VENOUS BLD VENIPUNCTURE: CPT | Performed by: STUDENT IN AN ORGANIZED HEALTH CARE EDUCATION/TRAINING PROGRAM

## 2022-07-06 PROCEDURE — 99223 PR INITIAL HOSPITAL CARE,LEVL III: ICD-10-PCS | Mod: GC,,, | Performed by: INTERNAL MEDICINE

## 2022-07-06 PROCEDURE — 63600175 PHARM REV CODE 636 W HCPCS

## 2022-07-06 PROCEDURE — 80053 COMPREHEN METABOLIC PANEL: CPT | Performed by: STUDENT IN AN ORGANIZED HEALTH CARE EDUCATION/TRAINING PROGRAM

## 2022-07-06 PROCEDURE — 84100 ASSAY OF PHOSPHORUS: CPT | Performed by: STUDENT IN AN ORGANIZED HEALTH CARE EDUCATION/TRAINING PROGRAM

## 2022-07-06 PROCEDURE — 85025 COMPLETE CBC W/AUTO DIFF WBC: CPT | Performed by: STUDENT IN AN ORGANIZED HEALTH CARE EDUCATION/TRAINING PROGRAM

## 2022-07-06 PROCEDURE — 63600175 PHARM REV CODE 636 W HCPCS: Mod: JG | Performed by: STUDENT IN AN ORGANIZED HEALTH CARE EDUCATION/TRAINING PROGRAM

## 2022-07-06 PROCEDURE — 99233 SBSQ HOSP IP/OBS HIGH 50: CPT | Mod: ,,, | Performed by: INTERNAL MEDICINE

## 2022-07-06 PROCEDURE — 99223 1ST HOSP IP/OBS HIGH 75: CPT | Mod: GC,,, | Performed by: INTERNAL MEDICINE

## 2022-07-06 PROCEDURE — 25000003 PHARM REV CODE 250: Performed by: INTERNAL MEDICINE

## 2022-07-06 PROCEDURE — 25000003 PHARM REV CODE 250

## 2022-07-06 RX ORDER — SODIUM,POTASSIUM PHOSPHATES 280-250MG
1 POWDER IN PACKET (EA) ORAL
Status: COMPLETED | OUTPATIENT
Start: 2022-07-06 | End: 2022-07-06

## 2022-07-06 RX ORDER — MORPHINE SULFATE 15 MG/1
15 TABLET, FILM COATED, EXTENDED RELEASE ORAL EVERY 12 HOURS
Status: DISCONTINUED | OUTPATIENT
Start: 2022-07-06 | End: 2022-07-07 | Stop reason: HOSPADM

## 2022-07-06 RX ORDER — FUROSEMIDE 20 MG/1
20 TABLET ORAL DAILY
Status: DISCONTINUED | OUTPATIENT
Start: 2022-07-06 | End: 2022-07-07 | Stop reason: HOSPADM

## 2022-07-06 RX ORDER — SODIUM,POTASSIUM PHOSPHATES 280-250MG
1 POWDER IN PACKET (EA) ORAL ONCE
Status: DISCONTINUED | OUTPATIENT
Start: 2022-07-06 | End: 2022-07-06

## 2022-07-06 RX ORDER — MAGNESIUM SULFATE HEPTAHYDRATE 40 MG/ML
2 INJECTION, SOLUTION INTRAVENOUS ONCE
Status: COMPLETED | OUTPATIENT
Start: 2022-07-06 | End: 2022-07-06

## 2022-07-06 RX ORDER — POTASSIUM CHLORIDE 20 MEQ/1
20 TABLET, EXTENDED RELEASE ORAL ONCE
Status: COMPLETED | OUTPATIENT
Start: 2022-07-06 | End: 2022-07-06

## 2022-07-06 RX ADMIN — POTASSIUM & SODIUM PHOSPHATES POWDER PACK 280-160-250 MG 1 PACKET: 280-160-250 PACK at 05:07

## 2022-07-06 RX ADMIN — APIXABAN 5 MG: 5 TABLET, FILM COATED ORAL at 08:07

## 2022-07-06 RX ADMIN — CALCITONIN SALMON 190 UNITS: 200 INJECTION, SOLUTION INTRAMUSCULAR; SUBCUTANEOUS at 09:07

## 2022-07-06 RX ADMIN — MUPIROCIN: 20 OINTMENT TOPICAL at 09:07

## 2022-07-06 RX ADMIN — MORPHINE SULFATE 15 MG: 15 TABLET, EXTENDED RELEASE ORAL at 08:07

## 2022-07-06 RX ADMIN — FUROSEMIDE 20 MG: 20 TABLET ORAL at 09:07

## 2022-07-06 RX ADMIN — OXYCODONE 5 MG: 5 TABLET ORAL at 05:07

## 2022-07-06 RX ADMIN — OXYCODONE 5 MG: 5 TABLET ORAL at 09:07

## 2022-07-06 RX ADMIN — MAGNESIUM SULFATE HEPTAHYDRATE 2 G: 40 INJECTION, SOLUTION INTRAVENOUS at 07:07

## 2022-07-06 RX ADMIN — APIXABAN 5 MG: 5 TABLET, FILM COATED ORAL at 09:07

## 2022-07-06 RX ADMIN — SODIUM CHLORIDE: 0.9 INJECTION, SOLUTION INTRAVENOUS at 02:07

## 2022-07-06 RX ADMIN — POTASSIUM & SODIUM PHOSPHATES POWDER PACK 280-160-250 MG 1 PACKET: 280-160-250 PACK at 11:07

## 2022-07-06 RX ADMIN — CEFTRIAXONE 1 G: 1 INJECTION, SOLUTION INTRAVENOUS at 12:07

## 2022-07-06 RX ADMIN — POTASSIUM & SODIUM PHOSPHATES POWDER PACK 280-160-250 MG 1 PACKET: 280-160-250 PACK at 07:07

## 2022-07-06 RX ADMIN — POTASSIUM CHLORIDE 20 MEQ: 1500 TABLET, EXTENDED RELEASE ORAL at 07:07

## 2022-07-06 RX ADMIN — POTASSIUM & SODIUM PHOSPHATES POWDER PACK 280-160-250 MG 1 PACKET: 280-160-250 PACK at 08:07

## 2022-07-06 NOTE — ASSESSMENT & PLAN NOTE
Trending down --> 7/5 13.88 corrected, 7/6 12.64 corrected  --Continue IV NS 150cc/hr. Add Lasix 20mg daily to prevent fluid overload  --Pamidronate 90mg IV 7/5  --Calcitonin q12hrs x 4 doses. One more dose to be administered today   --Repeat BMP at 1600 and recheck Ca

## 2022-07-06 NOTE — PROGRESS NOTES
07/06/22 1015        Altered Skin Integrity 07/05/22 0730 posterior Sacral spine #1 Ulceration Full thickness tissue loss. Subcutaneous fat may be visible but bone, tendon or muscle are not exposed   Date First Assessed/Time First Assessed: 07/05/22 0730   Altered Skin Integrity Present on Admission: yes  Orientation: posterior  Location: Sacral spine  Wound Number: #1  Is this injury device related?: No  Primary Wound Type: Ulceration  Descriptio...   Wound Image    Description of Altered Skin Integrity Full thickness tissue loss. Subcutaneous fat may be visible but bone, tendon or muscle are not exposed   Dressing Appearance No dressing   Drainage Amount Small   Drainage Characteristics/Odor Serous   Appearance Pink;Yellow   Tissue loss description Full thickness   Red (%), Wound Tissue Color 75 %   Yellow (%), Wound Tissue Color 25 %   Wound Edges Rolled/closed   Wound Length (cm) 8 cm   Wound Width (cm) 5 cm   Wound Depth (cm) 0.1 cm   Wound Volume (cm^3) 4 cm^3   Wound Surface Area (cm^2) 40 cm^2   Care Sterile normal saline;Skin Barrier   Dressing Foam   Dressing Change Due 07/06/22   Wound consult received.  Recommendation:  Clean with NS, pat dry, apply Triad covered by foam drsg  change BID and prn soiling.  Turn pt every 2 hours while in bed.  Primary nurse made aware.  Positioned for comfort.  Safety maintained.

## 2022-07-06 NOTE — HPI
Karoline Justice is a 76-year-old female with PMH urinary bladder cancer, treated by Dr. Snider, complicated by b/l hydronephrosis s/p bilateral PNT, recent RLE DVT on Eliquis, T2DM, hypercalcemia, and HTN who presented on 7/5/22 for hallucinations, confusion, and decreased UOP.  Of note, she had her left PNT placed May 2022 after presenting to ED with dysuria and decreased UOP.  She was then hospitalized again 6/8/2022 for UTI and urinary obstruction requiring right PNT placement.  The patient initially presented to an outside facility 7/4/22 and then transfered to [facility] for further treatment.  The patient's daughter, Valentine, noted that the patient had become more disoriented and confused with associated cloudy and bloody urine output.  The current treatment plan is to initiate Keytruda.    Palliative Medicine was consulted at the request of the daughter for [facility] discussion.

## 2022-07-06 NOTE — SUBJECTIVE & OBJECTIVE
Past Medical History:   Diagnosis Date    Bladder cancer 05/2022    Bladder mass 05/06/2022    Choledocholithiasis 05/06/2022    Diabetes mellitus     Pneumonia due to COVID-19 virus 08/06/2021       Past Surgical History:   Procedure Laterality Date    CYSTOSCOPY N/A 05/09/2022    Procedure: CYSTOSCOPY;  Surgeon: Adrianna Gutierrez MD;  Location: [facility] OR [address] [place] [address];  Service: Urology;  Laterality: N/A;    NEPHROSTOMY Left 05/2022    RETROGRADE PYELOGRAPHY Right 05/09/2022    Procedure: PYELOGRAM, RETROGRADE;  Surgeon: Adrianna Gutierrez MD;  Location: [facility] OR [address] [place] [address];  Service: Urology;  Laterality: Right;       Review of patient's allergies indicates:  No Known Allergies    Medications:  Continuous Infusions:   sodium chloride 0.9% 150 mL/hr at 07/06/22 0221     Scheduled Meds:   apixaban  5 mg Oral BID    cefTRIAXone (ROCEPHIN) IVPB  1 g Intravenous Q24H    furosemide  20 mg Oral Daily    morphine  15 mg Oral Q12H    mupirocin   Nasal BID    potassium, sodium phosphates  1 packet Oral QID (WM & HS)     PRN Meds:acetaminophen, dextrose 10%, dextrose 10%, glucagon (human recombinant), glucose, glucose, melatonin, ondansetron, oxyCODONE    Family History       Problem Relation (Age of Onset)    Diabetes Mother, Daughter, Son    Heart attack Father    Kidney cancer Son    Stomach cancer Mother    Thyroid disease Daughter          Tobacco Use    Smoking status: Never Smoker    Smokeless tobacco: Never Used   Substance and Sexual Activity    Alcohol use: No    Drug use: No    Sexual activity: Not on file       Review of Systems   Unable to perform ROS: Mental status change (Very morose and despondent)   Objective:     Vital Signs (Most Recent):  Temp: 99.4 °F (37.4 °C) (07/06/22 1519)  Pulse: 95 (07/06/22 1519)  Resp: 16 (07/06/22 1519)  BP: (!) 149/67 (07/06/22 1519)  SpO2: 95 % (07/06/22 1519)   Vital Signs (24h Range):  Temp:  [98.5 °F (36.9 °C)-99.4 °F (37.4 °C)] 99.4 °F (37.4 °C)  Pulse:  [83-95] 95  Resp:  [15-18]  "16  SpO2:  [94 %-99 %] 95 %  BP: (112-149)/(47-67) 149/67     Weight: 47.2 kg (104 lb 0.9 oz)  Body mass index is 19.67 kg/m².    Physical Exam  Vitals and nursing note reviewed.   Constitutional:       General: She is not in acute distress.     Appearance: She is ill-appearing. She is not toxic-appearing.      Comments: Laying on her side and tearful   HENT:      Head: Normocephalic and atraumatic.      Right Ear: External ear normal.      Left Ear: External ear normal.      Nose: Nose normal.   Eyes:      Extraocular Movements: Extraocular movements intact.      Conjunctiva/sclera: Conjunctivae normal.   Cardiovascular:      Rate and Rhythm: Normal rate and regular rhythm.      Pulses: Normal pulses.      Heart sounds: Normal heart sounds.   Pulmonary:      Effort: Pulmonary effort is normal.      Breath sounds: Normal breath sounds.   Skin:     General: Skin is warm and dry.   Neurological:      General: No focal deficit present.      Mental Status: She is alert and oriented to person, place, and time.   Psychiatric:      Comments: "I'm just going to die anyway what's the point of treatment." Morose and tearful. Poverty of speech.       Review of Symptoms      Symptom Assessment (ESAS 0-10 Scale)  Pain:  0  Dyspnea:  0  Anxiety:  0  Nausea:  0  Depression:  0  Anorexia:  0  Fatigue:  0  Insomnia:  0  Restlessness:  0  Agitation:  0  Unable to complete assessment       Pain Assessment:  OME in 24 hours:  5  Location(s): buttock    Buttock       Location: left        Quantity: 7/10 in intensity    Pain Assessment in Advanced Demential Scale (PAINAD)   Breathing - Independent of vocalization:  0  Negative vocalization:  0  Facial expression:  1  Body language:  0  Consolability:  2  Total:  3    Living Arrangements:  Lives with spouse      Advance Care Planning   Advance Care Planning       Significant Labs: CBC:   Recent Labs   Lab 07/05/22  1011 07/06/22  0449   WBC 26.60* 22.26*   HGB 8.1* 7.0*   HCT 25.9* 22.6* "   * 430     CMP:   Recent Labs   Lab 07/05/22  1011 07/05/22  1712 07/06/22  0449   * 136 136   K 4.4 4.0 3.5    110 111*   CO2 21* 20* 20*   * 120* 67*   BUN 18 17 13   CREATININE 1.2 1.0 0.9   CALCIUM 12.2* 11.5* 10.8*   PROT 6.5  --  5.8*   ALBUMIN 1.9*  --  1.7*   BILITOT 1.1*  --  0.5   ALKPHOS 137*  --  148*   AST 9*  --  14   ALT 11  --  10   ANIONGAP 6* 6* 5*   EGFRNONAA 44.0* 54.9* >60.0     All pertinent labs within the past 24 hours have been reviewed.  CBC:   Recent Labs   Lab 07/06/22  0449   WBC 22.26*   HGB 7.0*   HCT 22.6*   MCV 89        BMP:  Recent Labs   Lab 07/06/22  0449   GLU 67*      K 3.5   *   CO2 20*   BUN 13   CREATININE 0.9   CALCIUM 10.8*   MG 1.5*     LFT:  Lab Results   Component Value Date    AST 14 07/06/2022     (H) 06/14/2022    ALKPHOS 148 (H) 07/06/2022    BILITOT 0.5 07/06/2022     Albumin:   Albumin   Date Value Ref Range Status   07/06/2022 1.7 (L) 3.5 - 5.2 g/dL Final     Protein:   Total Protein   Date Value Ref Range Status   07/06/2022 5.8 (L) 6.0 - 8.4 g/dL Final     Lactic acid:   Lab Results   Component Value Date    LACTATE 1.7 06/07/2022    LACTATE 1.8 05/05/2022       Significant Imaging: I have reviewed all pertinent imaging results/findings within the past 24 hours.

## 2022-07-06 NOTE — CONSULTS
[facility] - Oncology ([facility])  Palliative Medicine  Consult Note    Patient Name: Karoline Justice  MRN: [id number]  Admission Date: 7/5/2022  Hospital Length of Stay: 1 days  Code Status: Full Code   Attending Provider: Reid Snider MD  Consulting Provider: Brandon Thomson MD  Primary Care Physician: Dee Dee Oconnor MD  Principal Problem:UTI (urinary tract infection)    Patient information was obtained from patient, relative(s), past medical records and primary team.      Consults  Assessment/Plan:     Encounter for palliative care  Met with patient at the request of her daughter, Valentine.  During my exam, the patient was minimally verbal, morose, despondent and visibly tearful.  When I asked about treatment, the patient stated that the cancer would cause her death so there was no point.  Additionally, she was experiencing pain in her buttock.  She did not want to speak very much and asked me to speak to her son, Reid.    I spoke to Reid via [employer] who asked me to speak to his sister, Valentine, as she is HPOA.  I spoke to Valentine who confirmed that she wants a family meeting to discuss treatment options for the patient.  She is unsure what the best approach would be for her mother.  She is also concerned with pain management.  She stated that during the last few admissions the patient has questioned various interventions and seems to be focused on quality of life.  However, the patient's preferences can change depending on her mental status. I informed her I would call back tomorrow in order to allow her to speak to various family members which she wants present during the meeting.  The meeting will be this Thursday or Friday depending on family availability.        Thank you for your consult. I will follow-up with patient. Please contact us if you have any additional questions.    Subjective:     HPI:   Karoline Justice is a 76-year-old female with PMH urinary bladder cancer, treated by Dr. Snider, complicated by  b/l hydronephrosis s/p bilateral PNT, recent RLE DVT on Eliquis, T2DM, hypercalcemia, and HTN who presented on 7/5/22 for hallucinations, confusion, and decreased UOP.  Of note, she had her left PNT placed May 2022 after presenting to ED with dysuria and decreased UOP.  She was then hospitalized again 6/8/2022 for UTI and urinary obstruction requiring right PNT placement.  The patient initially presented to an outside facility 7/4/22 and then transfered to [facility] for further treatment.  The patient's daughter, Valentine, noted that the patient had become more disoriented and confused with associated cloudy and bloody urine output.  The current treatment plan is to initiate Keytruda.    Palliative Medicine was consulted at the request of the daughter for [facility] discussion.          Past Medical History:   Diagnosis Date    Bladder cancer 05/2022    Bladder mass 05/06/2022    Choledocholithiasis 05/06/2022    Diabetes mellitus     Pneumonia due to COVID-19 virus 08/06/2021       Past Surgical History:   Procedure Laterality Date    CYSTOSCOPY N/A 05/09/2022    Procedure: CYSTOSCOPY;  Surgeon: Adrianna Gutierrez MD;  Location: [facility] OR [address] [place] [address];  Service: Urology;  Laterality: N/A;    NEPHROSTOMY Left 05/2022    RETROGRADE PYELOGRAPHY Right 05/09/2022    Procedure: PYELOGRAM, RETROGRADE;  Surgeon: Adrianna Gutierrez MD;  Location: [facility] OR [address] [place] [address];  Service: Urology;  Laterality: Right;       Review of patient's allergies indicates:  No Known Allergies    Medications:  Continuous Infusions:   sodium chloride 0.9% 150 mL/hr at 07/06/22 0221     Scheduled Meds:   apixaban  5 mg Oral BID    cefTRIAXone (ROCEPHIN) IVPB  1 g Intravenous Q24H    furosemide  20 mg Oral Daily    morphine  15 mg Oral Q12H    mupirocin   Nasal BID    potassium, sodium phosphates  1 packet Oral QID (WM & HS)     PRN Meds:acetaminophen, dextrose 10%, dextrose 10%, glucagon (human recombinant), glucose, glucose, melatonin, ondansetron, oxyCODONE    Family  "History       Problem Relation (Age of Onset)    Diabetes Mother, Daughter, Son    Heart attack Father    Kidney cancer Son    Stomach cancer Mother    Thyroid disease Daughter          Tobacco Use    Smoking status: Never Smoker    Smokeless tobacco: Never Used   Substance and Sexual Activity    Alcohol use: No    Drug use: No    Sexual activity: Not on file       Review of Systems   Unable to perform ROS: Mental status change (Very morose and despondent)   Objective:     Vital Signs (Most Recent):  Temp: 99.4 °F (37.4 °C) (07/06/22 1519)  Pulse: 95 (07/06/22 1519)  Resp: 16 (07/06/22 1519)  BP: (!) 149/67 (07/06/22 1519)  SpO2: 95 % (07/06/22 1519)   Vital Signs (24h Range):  Temp:  [98.5 °F (36.9 °C)-99.4 °F (37.4 °C)] 99.4 °F (37.4 °C)  Pulse:  [83-95] 95  Resp:  [15-18] 16  SpO2:  [94 %-99 %] 95 %  BP: (112-149)/(47-67) 149/67     Weight: 47.2 kg (104 lb 0.9 oz)  Body mass index is 19.67 kg/m².    Physical Exam  Vitals and nursing note reviewed.   Constitutional:       General: She is not in acute distress.     Appearance: She is ill-appearing. She is not toxic-appearing.      Comments: Laying on her side and tearful   HENT:      Head: Normocephalic and atraumatic.      Right Ear: External ear normal.      Left Ear: External ear normal.      Nose: Nose normal.   Eyes:      Extraocular Movements: Extraocular movements intact.      Conjunctiva/sclera: Conjunctivae normal.   Cardiovascular:      Rate and Rhythm: Normal rate and regular rhythm.      Pulses: Normal pulses.      Heart sounds: Normal heart sounds.   Pulmonary:      Effort: Pulmonary effort is normal.      Breath sounds: Normal breath sounds.   Skin:     General: Skin is warm and dry.   Neurological:      General: No focal deficit present.      Mental Status: She is alert and oriented to person, place, and time.   Psychiatric:      Comments: "I'm just going to die anyway what's the point of treatment." Morose and tearful. Poverty of speech. "       Review of Symptoms      Symptom Assessment (ESAS 0-10 Scale)  Pain:  0  Dyspnea:  0  Anxiety:  0  Nausea:  0  Depression:  0  Anorexia:  0  Fatigue:  0  Insomnia:  0  Restlessness:  0  Agitation:  0  Unable to complete assessment       Pain Assessment:  OME in 24 hours:  5  Location(s): buttock    Buttock       Location: left        Quantity: 7/10 in intensity    Pain Assessment in Advanced Demential Scale (PAINAD)   Breathing - Independent of vocalization:  0  Negative vocalization:  0  Facial expression:  1  Body language:  0  Consolability:  2  Total:  3    Living Arrangements:  Lives with spouse      Advance Care Planning   Advance Care Planning       Significant Labs: CBC:   Recent Labs   Lab 07/05/22  1011 07/06/22 0449   WBC 26.60* 22.26*   HGB 8.1* 7.0*   HCT 25.9* 22.6*   * 430     CMP:   Recent Labs   Lab 07/05/22  1011 07/05/22  1712 07/06/22 0449   * 136 136   K 4.4 4.0 3.5    110 111*   CO2 21* 20* 20*   * 120* 67*   BUN 18 17 13   CREATININE 1.2 1.0 0.9   CALCIUM 12.2* 11.5* 10.8*   PROT 6.5  --  5.8*   ALBUMIN 1.9*  --  1.7*   BILITOT 1.1*  --  0.5   ALKPHOS 137*  --  148*   AST 9*  --  14   ALT 11  --  10   ANIONGAP 6* 6* 5*   EGFRNONAA 44.0* 54.9* >60.0     All pertinent labs within the past 24 hours have been reviewed.  CBC:   Recent Labs   Lab 07/06/22 0449   WBC 22.26*   HGB 7.0*   HCT 22.6*   MCV 89        BMP:  Recent Labs   Lab 07/06/22 0449   GLU 67*      K 3.5   *   CO2 20*   BUN 13   CREATININE 0.9   CALCIUM 10.8*   MG 1.5*     LFT:  Lab Results   Component Value Date    AST 14 07/06/2022     (H) 06/14/2022    ALKPHOS 148 (H) 07/06/2022    BILITOT 0.5 07/06/2022     Albumin:   Albumin   Date Value Ref Range Status   07/06/2022 1.7 (L) 3.5 - 5.2 g/dL Final     Protein:   Total Protein   Date Value Ref Range Status   07/06/2022 5.8 (L) 6.0 - 8.4 g/dL Final     Lactic acid:   Lab Results   Component Value Date    LACTATE 1.7  06/07/2022    LACTATE 1.8 05/05/2022       Significant Imaging: I have reviewed all pertinent imaging results/findings within the past 24 hours.          Brandon Thomson MD  Palliative Medicine  [facility] - Oncology ([facility])    I have reviewed the notes, assessments, and/or procedures performed by Dr. Thomson, I agree with her/his documentation of Karoline Justice.    Plan for family meeting. Date/time TBD     > 50% of  75 min visit spent in chart review, face to face discussion of goals of care,  symptom assessment, coordination of care and emotional support

## 2022-07-06 NOTE — CONSULTS
Palliative consult received. Patient seen and examined. Daughter working on arranging family meeting for GOC. Full consult to follow

## 2022-07-06 NOTE — PT/OT/SLP PROGRESS
Physical Therapy  Not Seen  Patient Name:  Karoline Justice   MRN:  [id number]  Admitting Diagnosis:  <principal problem not specified>   Recent Surgery: * No surgery found *    Admit Date: 7/5/2022  Length of Stay: 1 days    Patient not seen on this date for PT evaluation - patient declined this morning due to increased pain and fatigue. PT returned this afternoon, but nursing staff was in room for patient care. PT will check back tomorrow to complete evaluation.    Lorna Hernández, PT, DPT  7/6/2022

## 2022-07-06 NOTE — PT/OT/SLP PROGRESS
Occupational Therapy      Patient Name:  Karoline Justice   MRN:  [id number]    Patient not seen today secondary to Patient fatigue across 2 trials: 1st in morning, 2nd in afternoon. Will follow-up 7/7/22.    7/6/2022

## 2022-07-06 NOTE — SUBJECTIVE & OBJECTIVE
Interval History: Patient more oriented this morning, still a bit slow to respond to questioning. Of note, patient is hard of hearing. States that back/flank pain is well managed, but complains of pain from the sores on her buttocks and abdomen. Has no urinary complaints.    Oncology Treatment Plan:   OP PEMBROLIZUMAB 400MG Q6W    Medications:  Continuous Infusions:   sodium chloride 0.9% 150 mL/hr at 07/06/22 0221     Scheduled Meds:   apixaban  5 mg Oral BID    calcitonin  4 Units/kg Subcutaneous Q12H    cefTRIAXone (ROCEPHIN) IVPB  1 g Intravenous Q24H    magnesium sulfate IVPB  2 g Intravenous Once    mupirocin   Nasal BID    potassium, sodium phosphates  1 packet Oral QID (WM & HS)     PRN Meds:acetaminophen, dextrose 10%, dextrose 10%, glucagon (human recombinant), glucose, glucose, insulin aspart U-100, melatonin, ondansetron, oxyCODONE     Review of Systems   Constitutional:  Positive for fatigue. Negative for fever.   Respiratory:  Negative for chest tightness, shortness of breath and wheezing.    Cardiovascular:  Negative for chest pain and leg swelling.   Gastrointestinal:  Negative for abdominal pain, nausea and vomiting.   Genitourinary:  Positive for decreased urine volume. Negative for difficulty urinating and dysuria.   Musculoskeletal:  Positive for back pain.   Skin:  Positive for wound (open wound on buttock).   Neurological:  Positive for weakness.   Psychiatric/Behavioral:  Positive for confusion and hallucinations.    Objective:     Vital Signs (Most Recent):  Temp: 98.9 °F (37.2 °C) (07/06/22 0737)  Pulse: 91 (07/06/22 0737)  Resp: 16 (07/06/22 0737)  BP: (!) 142/63 (07/06/22 0737)  SpO2: 95 % (07/06/22 0737)   Vital Signs (24h Range):  Temp:  [97.7 °F (36.5 °C)-98.9 °F (37.2 °C)] 98.9 °F (37.2 °C)  Pulse:  [73-94] 91  Resp:  [14-18] 16  SpO2:  [95 %-99 %] 95 %  BP: (112-147)/(47-65) 142/63     Weight: 47.2 kg (104 lb 0.9 oz)  Body mass index is 19.67 kg/m².  Body surface area is 1.43 meters  squared.      Intake/Output Summary (Last 24 hours) at 7/6/2022 0759  Last data filed at 7/6/2022 0400  Gross per 24 hour   Intake 1690.64 ml   Output 1250 ml   Net 440.64 ml       Physical Exam  Vitals and nursing note reviewed.   Constitutional:       General: She is not in acute distress.     Appearance: She is not ill-appearing.      Comments: Sleepy   HENT:      Head: Normocephalic.   Eyes:      General:         Right eye: No discharge.         Left eye: No discharge.   Cardiovascular:      Rate and Rhythm: Normal rate and regular rhythm.      Heart sounds: Normal heart sounds. No murmur heard.  Pulmonary:      Effort: Pulmonary effort is normal. No respiratory distress.      Breath sounds: Normal breath sounds. No wheezing.   Abdominal:      General: Abdomen is flat.      Palpations: Abdomen is soft.   Musculoskeletal:      Right lower leg: No edema.      Left lower leg: No edema.   Skin:     Findings: Lesion (Grade 2 pressure wounds on left buttock) present.   Neurological:      Mental Status: She is disoriented.      Motor: Weakness present.   Psychiatric:         Mood and Affect: Mood normal.       Significant Labs:   CBC:   Recent Labs   Lab 07/04/22  1618 07/05/22  1011 07/06/22  0449   WBC 25.23* 26.60* 22.26*   HGB 6.4* 8.1* 7.0*   HCT 21.0* 25.9* 22.6*   * 499* 430   , CMP:   Recent Labs   Lab 07/04/22  1618 07/05/22  1011 07/05/22  1712 07/06/22  0449   * 135* 136 136   K 4.4 4.4 4.0 3.5    108 110 111*   CO2 19* 21* 20* 20*   * 198* 120* 67*   BUN 25* 18 17 13   CREATININE 1.7* 1.2 1.0 0.9   CALCIUM 13.0* 12.2* 11.5* 10.8*   PROT 7.4 6.5  --  5.8*   ALBUMIN 2.1* 1.9*  --  1.7*   BILITOT 0.6 1.1*  --  0.5   ALKPHOS 162* 137*  --  148*   AST 16 9*  --  14   ALT 19 11  --  10   ANIONGAP 9 6* 6* 5*   EGFRNONAA 28.9* 44.0* 54.9* >60.0   , and Urine Studies:   Recent Labs   Lab 07/04/22  1701   COLORU Yellow   APPEARANCEUA Cloudy*   PHUR 5.0   SPECGRAV 1.020   PROTEINUA 3+*    GLUCUA Negative   KETONESU Negative   BILIRUBINUA Negative   OCCULTUA 3+*   NITRITE Negative   UROBILINOGEN 1.0   LEUKOCYTESUR 3+*   RBCUA >100*   WBCUA >100*   BACTERIA Moderate*   SQUAMEPITHEL 22   HYALINECASTS >87*       Diagnostic Results:  U/S: Retroperitoneal US 7/5 b/l nephrostomy tubes in place with mild right-sided hydronephrosis, redemonstration of bladder mass measuring up to 10.1 cm    Blood cx NG to date  Urine cx pending

## 2022-07-06 NOTE — PLAN OF CARE
Plan of care reviewed with patient and her daughter at start of shift. Pt alert to person but confused to place and situation. Pt becoming irritable with staff and refusing to have her vital signs taken and refusing to reposition every 2 hours. Pt hypoglycemic; responded to intervention. Pt afebrile. Nephrostomy tube dressings changed - cream colored drainage to old dressings. Pt afebrile

## 2022-07-06 NOTE — ASSESSMENT & PLAN NOTE
Met with patient at the request of her daughter, Valentine.  During my exam, the patient was minimally verbal, morose, despondent and visibly tearful.  When I asked about treatment, the patient stated that the cancer would cause her death so there was no point.  Additionally, she was experiencing pain in her buttock.  She did not want to speak very much and asked me to speak to her son, Reid.    I spoke to Reid via telaphone who asked me to speak to his sister, Valentine, as she is HPOA.  I spoke to Valentine who confirmed that she wants a family meeting to discuss treatment options for the patient.  She is unsure what the best approach would be for her mother.  She is also concerned with pain management.  She stated that during the last few admissions the patient has questioned various interventions and seems to be focused on quality of life.  However, the patient's preferences can change depending on her mental status. I informed her I would call back tomorrow in order to allow her to speak to various family members which she wants present during the meeting.  The meeting will be this Thursday or Friday depending on family availability.

## 2022-07-06 NOTE — PROGRESS NOTES
[facility] - On[facility] ([facility])  Hematology/Oncology  Progress Note    Patient Name: Karoline Justice  Admission Date: 7/5/2022  Hospital Length of Stay: 1 days  Code Status: Full Code     Subjective:     HPI:  76 year old female with PMH urinary bladder cancer, treated by Dr. Snider, comlicated by b/l hydronephrosis s/p bilateral PNT, recent RLE DVT on Eliquis, T2DM, hypercalcemia, htn presenting for hallucinations, confusion, and decreased UOP. Of note, she had her left PNT placed May 2022 after presenting to ED with dysuria and decreased UOP. She was then hospitalized again 6/8/2022 for UTI and urinary obstruction requiring right PNT placement. History gathered from patient and daughter at bedside. She initially presented to an outside facility 7/4, and transfered to [facility] for further treatment. Daughter notes that pts urine output has been cloudy intermittently since PNT placement, and she noticed blood in the urine on Saturday 7/2. Since then, patient become more disoriented and confused. Daughter notes today patient's urine was purulent. Patient has been complaining of back pain, unclear if this is orignating from nephrostomy tubes or an open sore on the buttocks. No fevers at home.     AF, regular heart rate, slightly hypertensive at 133/60. Labs pertinent for WBC 26.6, hgb 8.1, BUN 18, Cr 1.2, glucose 198, Ca 12.2 13.88 corrected. UA performed in outside facility pertinent for UT - cloudy, 3+ blood, >100 RBCs and WBCs. UA pending.  Of note, urine culture collected 5/5/2022 positive for viridans strep.  Blood cultures no growth to date.    Oncologic History  Bladder cancer - cT3, cM1. Followed by Dr. Snider. Diagnosed in 5/2022. Planned Keytruda 400mg q6, has not begun yet         Interval History: Patient more oriented this morning, still a bit slow to respond to questioning. Of note, patient is hard of hearing. States that back/flank pain is well managed, but complains of pain from the sores on her  buttocks and abdomen. Has no urinary complaints.    Oncology Treatment Plan:   OP PEMBROLIZUMAB 400MG Q6W    Medications:  Continuous Infusions:   sodium chloride 0.9% 150 mL/hr at 07/06/22 0221     Scheduled Meds:   apixaban  5 mg Oral BID    calcitonin  4 Units/kg Subcutaneous Q12H    cefTRIAXone (ROCEPHIN) IVPB  1 g Intravenous Q24H    magnesium sulfate IVPB  2 g Intravenous Once    mupirocin   Nasal BID    potassium, sodium phosphates  1 packet Oral QID (WM & HS)     PRN Meds:acetaminophen, dextrose 10%, dextrose 10%, glucagon (human recombinant), glucose, glucose, insulin aspart U-100, melatonin, ondansetron, oxyCODONE     Review of Systems   Constitutional:  Positive for fatigue. Negative for fever.   Respiratory:  Negative for chest tightness, shortness of breath and wheezing.    Cardiovascular:  Negative for chest pain and leg swelling.   Gastrointestinal:  Negative for abdominal pain, nausea and vomiting.   Genitourinary:  Positive for decreased urine volume. Negative for difficulty urinating and dysuria.   Musculoskeletal:  Positive for back pain.   Skin:  Positive for wound (open wound on buttock).   Neurological:  Positive for weakness.   Psychiatric/Behavioral:  Positive for confusion and hallucinations.    Objective:     Vital Signs (Most Recent):  Temp: 98.9 °F (37.2 °C) (07/06/22 0737)  Pulse: 91 (07/06/22 0737)  Resp: 16 (07/06/22 0737)  BP: (!) 142/63 (07/06/22 0737)  SpO2: 95 % (07/06/22 0737)   Vital Signs (24h Range):  Temp:  [97.7 °F (36.5 °C)-98.9 °F (37.2 °C)] 98.9 °F (37.2 °C)  Pulse:  [73-94] 91  Resp:  [14-18] 16  SpO2:  [95 %-99 %] 95 %  BP: (112-147)/(47-65) 142/63     Weight: 47.2 kg (104 lb 0.9 oz)  Body mass index is 19.67 kg/m².  Body surface area is 1.43 meters squared.      Intake/Output Summary (Last 24 hours) at 7/6/2022 0759  Last data filed at 7/6/2022 0400  Gross per 24 hour   Intake 1690.64 ml   Output 1250 ml   Net 440.64 ml       Physical Exam  Vitals and nursing  note reviewed.   Constitutional:       General: She is not in acute distress.     Appearance: She is not ill-appearing.      Comments: Sleepy   HENT:      Head: Normocephalic.   Eyes:      General:         Right eye: No discharge.         Left eye: No discharge.   Cardiovascular:      Rate and Rhythm: Normal rate and regular rhythm.      Heart sounds: Normal heart sounds. No murmur heard.  Pulmonary:      Effort: Pulmonary effort is normal. No respiratory distress.      Breath sounds: Normal breath sounds. No wheezing.   Abdominal:      General: Abdomen is flat.      Palpations: Abdomen is soft.   Musculoskeletal:      Right lower leg: No edema.      Left lower leg: No edema.   Skin:     Findings: Lesion (Grade 2 pressure wounds on left buttock) present.   Neurological:      Mental Status: She is disoriented.      Motor: Weakness present.   Psychiatric:         Mood and Affect: Mood normal.       Significant Labs:   CBC:   Recent Labs   Lab 07/04/22  1618 07/05/22  1011 07/06/22  0449   WBC 25.23* 26.60* 22.26*   HGB 6.4* 8.1* 7.0*   HCT 21.0* 25.9* 22.6*   * 499* 430   , CMP:   Recent Labs   Lab 07/04/22  1618 07/05/22  1011 07/05/22  1712 07/06/22  0449   * 135* 136 136   K 4.4 4.4 4.0 3.5    108 110 111*   CO2 19* 21* 20* 20*   * 198* 120* 67*   BUN 25* 18 17 13   CREATININE 1.7* 1.2 1.0 0.9   CALCIUM 13.0* 12.2* 11.5* 10.8*   PROT 7.4 6.5  --  5.8*   ALBUMIN 2.1* 1.9*  --  1.7*   BILITOT 0.6 1.1*  --  0.5   ALKPHOS 162* 137*  --  148*   AST 16 9*  --  14   ALT 19 11  --  10   ANIONGAP 9 6* 6* 5*   EGFRNONAA 28.9* 44.0* 54.9* >60.0   , and Urine Studies:   Recent Labs   Lab 07/04/22  1701   COLORU Yellow   APPEARANCEUA Cloudy*   PHUR 5.0   SPECGRAV 1.020   PROTEINUA 3+*   GLUCUA Negative   KETONESU Negative   BILIRUBINUA Negative   OCCULTUA 3+*   NITRITE Negative   UROBILINOGEN 1.0   LEUKOCYTESUR 3+*   RBCUA >100*   WBCUA >100*   BACTERIA Moderate*   SQUAMEPITHEL 22   HYALINECASTS >87*        Diagnostic Results:  U/S: Retroperitoneal US 7/5 b/l nephrostomy tubes in place with mild right-sided hydronephrosis, redemonstration of bladder mass measuring up to 10.1 cm    Blood cx NG to date  Urine cx pending     Assessment/Plan:     UTI (urinary tract infection)  --Urine culture pending  --Empiric Ceftriaxone 1g  --Monitor vitals daily. Currently AF    Bladder cancer  Bladder cancer - cT3, cM1. Followed by Dr. Snider. Diagnosed in 5/2022. Planned Keytruda 400mg q6 weeks to start      THOMAS (acute kidney injury)  --IV  ml/hr  --See hydronephrosis  --Daily CMP. Monitor    HTN (hypertension)  Currently holding home amlodipine for prevention of hypotension. Monitor daily vitals    Hydronephrosis  S/p bilateral nephrostomy tubes  -retroperitoneal U pertinent for mild right hydronephrosis    Hypercalcemia  Trending down --> 7/5 13.88 corrected, 7/6 12.64 corrected  --Continue IV NS 150cc/hr. Add Lasix 20mg daily to prevent fluid overload  --Pamidronate 90mg IV 7/5  --Calcitonin q12hrs x 4 doses. One more dose to be administered today   --Repeat BMP at 1600 and recheck Ca    Controlled Type 2 diabetes mellitus  --Hypoglycemic to 50's overnight. Insulin discontinued. Continue POC glucose checks             Riya Kelsey DO  Hematology/Oncology  [facility] - [facility] ([facility])

## 2022-07-07 VITALS
SYSTOLIC BLOOD PRESSURE: 144 MMHG | HEIGHT: 61 IN | OXYGEN SATURATION: 96 % | HEART RATE: 98 BPM | RESPIRATION RATE: 16 BRPM | DIASTOLIC BLOOD PRESSURE: 63 MMHG | TEMPERATURE: 99 F | WEIGHT: 104.06 LBS | BODY MASS INDEX: 19.65 KG/M2

## 2022-07-07 LAB
ALBUMIN SERPL BCP-MCNC: 1.6 G/DL (ref 3.5–5.2)
ALP SERPL-CCNC: 153 U/L (ref 55–135)
ALT SERPL W/O P-5'-P-CCNC: 16 U/L (ref 10–44)
ANION GAP SERPL CALC-SCNC: 8 MMOL/L (ref 8–16)
AST SERPL-CCNC: 20 U/L (ref 10–40)
BASOPHILS # BLD AUTO: 0.04 K/UL (ref 0–0.2)
BASOPHILS NFR BLD: 0.2 % (ref 0–1.9)
BILIRUB SERPL-MCNC: 0.4 MG/DL (ref 0.1–1)
BUN SERPL-MCNC: 10 MG/DL (ref 8–23)
BURR CELLS BLD QL SMEAR: ABNORMAL
CALCIUM SERPL-MCNC: 9.7 MG/DL (ref 8.7–10.5)
CHLORIDE SERPL-SCNC: 109 MMOL/L (ref 95–110)
CO2 SERPL-SCNC: 17 MMOL/L (ref 23–29)
CREAT SERPL-MCNC: 0.8 MG/DL (ref 0.5–1.4)
DIFFERENTIAL METHOD: ABNORMAL
EOSINOPHIL # BLD AUTO: 0.3 K/UL (ref 0–0.5)
EOSINOPHIL NFR BLD: 1.2 % (ref 0–8)
ERYTHROCYTE [DISTWIDTH] IN BLOOD BY AUTOMATED COUNT: 16.8 % (ref 11.5–14.5)
EST. GFR  (AFRICAN AMERICAN): >60 ML/MIN/1.73 M^2
EST. GFR  (NON AFRICAN AMERICAN): >60 ML/MIN/1.73 M^2
GLUCOSE SERPL-MCNC: 81 MG/DL (ref 70–110)
HCT VFR BLD AUTO: 22.2 % (ref 37–48.5)
HGB BLD-MCNC: 7.1 G/DL (ref 12–16)
IMM GRANULOCYTES # BLD AUTO: 0.16 K/UL (ref 0–0.04)
IMM GRANULOCYTES NFR BLD AUTO: 0.6 % (ref 0–0.5)
LYMPHOCYTES # BLD AUTO: 1.1 K/UL (ref 1–4.8)
LYMPHOCYTES NFR BLD: 4.1 % (ref 18–48)
MAGNESIUM SERPL-MCNC: 1.7 MG/DL (ref 1.6–2.6)
MCH RBC QN AUTO: 29.1 PG (ref 27–31)
MCHC RBC AUTO-ENTMCNC: 32 G/DL (ref 32–36)
MCV RBC AUTO: 91 FL (ref 82–98)
MONOCYTES # BLD AUTO: 1 K/UL (ref 0.3–1)
MONOCYTES NFR BLD: 3.8 % (ref 4–15)
NEUTROPHILS # BLD AUTO: 23.7 K/UL (ref 1.8–7.7)
NEUTROPHILS NFR BLD: 90.1 % (ref 38–73)
NRBC BLD-RTO: 0 /100 WBC
PHOSPHATE SERPL-MCNC: 2.1 MG/DL (ref 2.7–4.5)
PLATELET # BLD AUTO: 414 K/UL (ref 150–450)
PLATELET BLD QL SMEAR: ABNORMAL
PMV BLD AUTO: 9.2 FL (ref 9.2–12.9)
POCT GLUCOSE: 142 MG/DL (ref 70–110)
POCT GLUCOSE: 149 MG/DL (ref 70–110)
POCT GLUCOSE: 72 MG/DL (ref 70–110)
POLYCHROMASIA BLD QL SMEAR: ABNORMAL
POTASSIUM SERPL-SCNC: 3.7 MMOL/L (ref 3.5–5.1)
PROT SERPL-MCNC: 5.6 G/DL (ref 6–8.4)
RBC # BLD AUTO: 2.44 M/UL (ref 4–5.4)
SODIUM SERPL-SCNC: 134 MMOL/L (ref 136–145)
WBC # BLD AUTO: 26.34 K/UL (ref 3.9–12.7)

## 2022-07-07 PROCEDURE — 99238 PR HOSPITAL DISCHARGE DAY,<30 MIN: ICD-10-PCS | Mod: ,,, | Performed by: INTERNAL MEDICINE

## 2022-07-07 PROCEDURE — 99238 HOSP IP/OBS DSCHRG MGMT 30/<: CPT | Mod: ,,, | Performed by: INTERNAL MEDICINE

## 2022-07-07 PROCEDURE — 36415 COLL VENOUS BLD VENIPUNCTURE: CPT | Performed by: STUDENT IN AN ORGANIZED HEALTH CARE EDUCATION/TRAINING PROGRAM

## 2022-07-07 PROCEDURE — 84100 ASSAY OF PHOSPHORUS: CPT | Performed by: STUDENT IN AN ORGANIZED HEALTH CARE EDUCATION/TRAINING PROGRAM

## 2022-07-07 PROCEDURE — 85025 COMPLETE CBC W/AUTO DIFF WBC: CPT | Performed by: STUDENT IN AN ORGANIZED HEALTH CARE EDUCATION/TRAINING PROGRAM

## 2022-07-07 PROCEDURE — 1111F PR DISCHARGE MEDS RECONCILED W/ CURRENT OUTPATIENT MED LIST: ICD-10-PCS | Mod: CPTII,,, | Performed by: INTERNAL MEDICINE

## 2022-07-07 PROCEDURE — 25000003 PHARM REV CODE 250: Performed by: STUDENT IN AN ORGANIZED HEALTH CARE EDUCATION/TRAINING PROGRAM

## 2022-07-07 PROCEDURE — 1111F DSCHRG MED/CURRENT MED MERGE: CPT | Mod: CPTII,,, | Performed by: INTERNAL MEDICINE

## 2022-07-07 PROCEDURE — 63600175 PHARM REV CODE 636 W HCPCS: Performed by: STUDENT IN AN ORGANIZED HEALTH CARE EDUCATION/TRAINING PROGRAM

## 2022-07-07 PROCEDURE — 83735 ASSAY OF MAGNESIUM: CPT | Performed by: STUDENT IN AN ORGANIZED HEALTH CARE EDUCATION/TRAINING PROGRAM

## 2022-07-07 PROCEDURE — 97161 PT EVAL LOW COMPLEX 20 MIN: CPT

## 2022-07-07 PROCEDURE — 97112 NEUROMUSCULAR REEDUCATION: CPT

## 2022-07-07 PROCEDURE — 97165 OT EVAL LOW COMPLEX 30 MIN: CPT

## 2022-07-07 PROCEDURE — 97535 SELF CARE MNGMENT TRAINING: CPT

## 2022-07-07 PROCEDURE — 25000003 PHARM REV CODE 250: Performed by: INTERNAL MEDICINE

## 2022-07-07 PROCEDURE — 25000003 PHARM REV CODE 250

## 2022-07-07 PROCEDURE — 80053 COMPREHEN METABOLIC PANEL: CPT | Performed by: STUDENT IN AN ORGANIZED HEALTH CARE EDUCATION/TRAINING PROGRAM

## 2022-07-07 RX ORDER — ACETAMINOPHEN 325 MG/1
650 TABLET ORAL EVERY 6 HOURS PRN
Qty: 30 TABLET | Refills: 0 | Status: ON HOLD | OUTPATIENT
Start: 2022-07-07 | End: 2022-07-22 | Stop reason: HOSPADM

## 2022-07-07 RX ORDER — OXYCODONE HYDROCHLORIDE 5 MG/1
5 TABLET ORAL EVERY 4 HOURS PRN
Qty: 30 TABLET | Refills: 0 | Status: SHIPPED | OUTPATIENT
Start: 2022-07-07 | End: 2022-07-07 | Stop reason: SDUPTHER

## 2022-07-07 RX ORDER — SODIUM,POTASSIUM PHOSPHATES 280-250MG
1 POWDER IN PACKET (EA) ORAL
Status: DISCONTINUED | OUTPATIENT
Start: 2022-07-07 | End: 2022-07-07 | Stop reason: HOSPADM

## 2022-07-07 RX ORDER — POTASSIUM CHLORIDE 750 MG/1
30 CAPSULE, EXTENDED RELEASE ORAL ONCE
Status: COMPLETED | OUTPATIENT
Start: 2022-07-07 | End: 2022-07-07

## 2022-07-07 RX ORDER — MORPHINE SULFATE 15 MG/1
15 TABLET, FILM COATED, EXTENDED RELEASE ORAL EVERY 12 HOURS
Qty: 30 TABLET | Refills: 0 | Status: SHIPPED | OUTPATIENT
Start: 2022-07-07 | End: 2022-07-07 | Stop reason: SDUPTHER

## 2022-07-07 RX ORDER — CEFPODOXIME PROXETIL 200 MG/1
200 TABLET, FILM COATED ORAL 2 TIMES DAILY
Qty: 22 TABLET | Refills: 0 | Status: ON HOLD | OUTPATIENT
Start: 2022-07-07 | End: 2022-07-22 | Stop reason: HOSPADM

## 2022-07-07 RX ORDER — OXYCODONE HYDROCHLORIDE 5 MG/1
5 TABLET ORAL EVERY 4 HOURS PRN
Qty: 30 TABLET | Refills: 0 | Status: SHIPPED | OUTPATIENT
Start: 2022-07-07 | End: 2022-07-22

## 2022-07-07 RX ORDER — MORPHINE SULFATE 15 MG/1
15 TABLET, FILM COATED, EXTENDED RELEASE ORAL EVERY 12 HOURS
Qty: 30 TABLET | Refills: 0 | Status: SHIPPED | OUTPATIENT
Start: 2022-07-07 | End: 2022-07-22

## 2022-07-07 RX ADMIN — POTASSIUM CHLORIDE 30 MEQ: 10 CAPSULE, COATED, EXTENDED RELEASE ORAL at 06:07

## 2022-07-07 RX ADMIN — MORPHINE SULFATE 15 MG: 15 TABLET, EXTENDED RELEASE ORAL at 08:07

## 2022-07-07 RX ADMIN — POTASSIUM & SODIUM PHOSPHATES POWDER PACK 280-160-250 MG 1 PACKET: 280-160-250 PACK at 12:07

## 2022-07-07 RX ADMIN — POTASSIUM & SODIUM PHOSPHATES POWDER PACK 280-160-250 MG 1 PACKET: 280-160-250 PACK at 06:07

## 2022-07-07 RX ADMIN — APIXABAN 5 MG: 5 TABLET, FILM COATED ORAL at 08:07

## 2022-07-07 RX ADMIN — SODIUM CHLORIDE: 0.9 INJECTION, SOLUTION INTRAVENOUS at 06:07

## 2022-07-07 RX ADMIN — FUROSEMIDE 20 MG: 20 TABLET ORAL at 08:07

## 2022-07-07 RX ADMIN — MUPIROCIN: 20 OINTMENT TOPICAL at 08:07

## 2022-07-07 RX ADMIN — CEFTRIAXONE 1 G: 1 INJECTION, SOLUTION INTRAVENOUS at 12:07

## 2022-07-07 NOTE — PLAN OF CARE
Pt tolerated therapy session well this date, with good motivation     Problem: Occupational Therapy  Goal: Occupational Therapy Goal  Description: Goals to be met by: 8/5/22     Patient will increase functional independence with ADLs by performing:    UE Dressing with Stand-by Assistance.  LE Dressing with Stand-by Assistance.  Toileting from bedside commode with Stand-by Assistance for hygiene and clothing management.   Supine to sit with Supervision.  Step transfer with Contact Guard Assistance     Outcome: Ongoing, Progressing

## 2022-07-07 NOTE — PLAN OF CARE
I spoke with the patient's daughter, Valentine, via telephone to discuss arrangement of a family meeting.  She informed me that the current plan is to take the patient home and start treatment for cancer pending a re-evaluation by physical therapy.  Valentine informed me that as of now a family meeting is not required.    Should the patient and/or family wish to meet with Palliative Medicine in the future we would be happy to arrange outpatient follow-up.    Thank you for allowing us to care for the patient and family.  We will sign off at this time.  Please reach out with any questions.    Brandon Thomson MD  Internal Medicine PGY-2  [facility]

## 2022-07-07 NOTE — DISCHARGE INSTRUCTIONS
:  The Medical Team, [phone], to provide skilled nursing, physical and occupational therapy services. Services will begin with the nursing re-admission assessment visit. Staff will call to schedule the visits.    ABRAHAN Ly, LCSW  [phone]

## 2022-07-07 NOTE — PT/OT/SLP EVAL
"Occupational Therapy   Co-Evaluation    Name: Karoline Justice  MRN: [id number]  Admitting Diagnosis:  UTI (urinary tract infection)  Recent Surgery: * No surgery found *      Co-evaluation/treatment performed due to patient's multiple deficits requiring two skilled therapists to appropriately and safely assess patient's strength and endurance while facilitating functional tasks in addition to accommodating for patient's activity tolerance.     Recommendations:     Discharge Recommendations: home with home health, home health OT  Discharge Equipment Recommendations:  shower chair  Barriers to discharge:   skilled assistance needed    Assessment:     Karoline Justice is a 76 y.o. female with a medical diagnosis of UTI (urinary tract infection). Pt tolerated session well, with pleasant demeanor and good motivation despite some anxiety.  Pt able to complete bed mobility with CGA, sat EOB with SBA and good balance, and tolerated 1 sit to stand transfer with CGA x2, HHA.  Pt able to scoot laterally while sitting EOB with SBA and maximum encouragement.     She presents with performance deficits affecting function: weakness, impaired endurance, impaired sensation, impaired self care skills, impaired functional mobilty, gait instability, impaired balance, decreased coordination, decreased lower extremity function, decreased safety awareness, pain, decreased ROM, impaired cardiopulmonary response to activity.      Discharge Recommendation: Home Health with OT    Rehab Prognosis: Good; patient would benefit from acute skilled OT services to address these deficits and reach maximum level of function.       Plan:     Patient to be seen 3 x/week to address the above listed problems via self-care/home management, therapeutic activities, therapeutic exercises  · Plan of Care Expires: 08/05/22  · Plan of Care Reviewed with: patient    Subjective     Chief Complaint: "You know I have a sore, and it just hurts so much"   Patient/Family " Comments/goals: Get better, return home    Occupational Profile:  Living Environment: Pt lives with  and son in a mobile home with 4STE, pt's  is building a ramp.  Pt has WIS with small threshold and grab bars  Previous level of function: Pt needs A with dressing, and walks with RW and CGA prn  Equipment Used at Home:  grab bar, walker, rolling  Assistance upon Discharge: family    Pain/Comfort:  Pain Rating 1: 8/10  Location - Orientation 1: generalized  Location 1: sacral spine (wound)  Pain Addressed 1: Reposition, Pre-medicate for activity, Distraction  Pain Rating Post-Intervention 1:  (not rated, but grimaced in bed mobility)    Patients cultural, spiritual, Episcopal conflicts given the current situation: no    Objective:     Communicated with: MYRANDA Rossi prior to session.  Patient found supine with nephrostomy, bed alarm, peripheral IV upon OT entry to room.      General Precautions: Standard, fall   Orthopedic Precautions:N/A   Braces: N/A  Respiratory Status: Room air    Occupational Performance:    Bed Mobility:    · Patient completed Rolling/Turning to Left with  SBA  · Patient completed Rolling/Turning to Right with SBA  · Patient completed Scooting/Bridging   · While sitting to EOB: CGA  · While sitting to HOB: CGA, 5 left lateral  · While supine to HOB: Total x2  · Patient completed Supine to Sit with contact guard assistance  · Patient completed Sit to Supine with minimum assistance    Functional Mobility/Transfers:  · Patient completed Sit <> Stand Transfer with contact guard assistance and of 2 persons  with  HHA  · Pt sat EOB ~10min with good balance, SBA    Activities of Daily Living:  · Upper Body Dressing: moderate assistance managing hospital gown with nephrostomy bags and lines throughout session, mod A managing bed clothes  · Lower Body Dressing: maximal assistance donning bilateral socks     Cognitive/Visual Perceptual:  Cognitive/Psychosocial Skills:     -       Oriented to:  AOx4   -       Follows Commands/attention:Follows multistep  commands  -       Communication: clear/fluent  -       Memory: No Deficits noted  -       Safety awareness/insight to disability: impaired   -       Mood/Affect/Coping skills/emotional control: Appropriate to situation  Visual/Perceptual:      -Intact      Physical Exam:  Balance:    -       Impaired  Postural examination/scapula alignment:    -       Forward head  Upper Extremity Range of Motion:     -       Right Upper Extremity: WFL  -       Left Upper Extremity: WFL  Upper Extremity Strength:    -       Right Upper Extremity: WFL  -       Left Upper Extremity: WFL   Strength:    -       Right Upper Extremity: WFL  -       Left Upper Extremity: WFL  Fine Motor Coordination:    -       Intact  Gross motor coordination:   WFL  Neurological:    -       Intact    AMPAC 6 Click ADL:  AMPAC Total Score: 13    Treatment & Education:   Pt educated on role of OT, POC, and goals for therapy.     POC was dicussed with patient/caregiver, who was included in its development and is in agreement with the identified goals and treatment plan.    Patient and family aware of patient's deficits and therapy progression.    Time provided for therapeutic counseling and discussion of health disposition.    Educated on importance of EOB/OOB mobility, maintaining routine, sitting up in chair, and maximizing independence with ADLs during admission    Pt completed ADLs and functional mobility for treatment session as noted above    Pt/caregiver verbalized understanding and expressed no further concerns/questions.   Updated communication board with level of assist required     Education:    Patient left supine with all lines intact, call button in reach and RN notified    GOALS:   Multidisciplinary Problems     Occupational Therapy Goals        Problem: Occupational Therapy    Goal Priority Disciplines Outcome Interventions   Occupational Therapy Goal     OT, PT/OT  Ongoing, Progressing    Description: Goals to be met by: 8/5/22     Patient will increase functional independence with ADLs by performing:    UE Dressing with Stand-by Assistance.  LE Dressing with Stand-by Assistance.  Toileting from bedside commode with Stand-by Assistance for hygiene and clothing management.   Supine to sit with Supervision.  Step transfer with Contact Guard Assistance                      History:     Past Medical History:   Diagnosis Date    Bladder cancer 05/2022    Bladder mass 05/06/2022    Choledocholithiasis 05/06/2022    Diabetes mellitus     Pneumonia due to COVID-19 virus 08/06/2021         Past Surgical History:   Procedure Laterality Date    CYSTOSCOPY N/A 05/09/2022    Procedure: CYSTOSCOPY;  Surgeon: Adrianna Gutierrez MD;  [place] [facility] [address] [place] [address];  Service: Urology;  Laterality: N/A;    NEPHROSTOMY Left 05/2022    RETROGRADE PYELOGRAPHY Right 05/09/2022    Procedure: PYELOGRAM, RETROGRADE;  Surgeon: Adrianna Gutierrez MD;  [place] [facility] [place] [address] [place] [address];  Service: Urology;  Laterality: Right;       Time Tracking:     OT Date of Treatment: 07/07/22  OT Start Time: 1049  OT Stop Time: 1117  OT Total Time (min): 28 min    Billable Minutes:Evaluation 10  Self Care/Home Management 18    7/7/2022

## 2022-07-07 NOTE — PLAN OF CARE
No acute events overnight. Blood glucose monitored before bed. Pain controlled with scheduled medication. Nephrostomy tube, CDI, emptied as needed. Remains afebrile. No reports of nausea, remains afebrile. Reports dry mouth, provided pt with oral swabs. Turned q2.     Problem: Adult Inpatient Plan of Care  Goal: Plan of Care Review  Outcome: Ongoing, Progressing  Goal: Patient-Specific Goal (Individualized)  Outcome: Ongoing, Progressing  Goal: Absence of Hospital-Acquired Illness or Injury  Outcome: Ongoing, Progressing  Goal: Optimal Comfort and Wellbeing  Outcome: Ongoing, Progressing     Problem: Diabetes Comorbidity  Goal: Blood Glucose Level Within Targeted Range  Outcome: Ongoing, Progressing

## 2022-07-07 NOTE — PT/OT/SLP EVAL
Physical Therapy Co Evaluation and Tx    Patient Name:  Karoline Justice   MRN:  [id number]  Admit Date: 7/5/2022  Admitting Diagnosis:  UTI (urinary tract infection)  Length of Stay: 2 days  Recent Surgery: * No surgery found *    *Co treatment of 2 skilled therapists indicated in order to maximize function and safety of Pt.  Recommendations:   Discharge Recommendations:  home health PT   Discharge Equipment Recommendations:     Barriers to discharge: requires extensive assit      Assessment:     Karoline Justice is a 76 y.o. female admitted with a medical diagnosis of UTI (urinary tract infection).  Pt presents with significant functional mobility deficits and is functioning below baseline. Pt tolerated session well but was limited by dizziness light headedness on this date. Unable to laura mobilize. Pt will continue to benefit from acute PT services in order to maximize safety and (I) with functional mobility.      Problem List: weakness, impaired endurance, impaired sensation, impaired self care skills, impaired functional mobilty, gait instability, impaired balance, decreased safety awareness, decreased lower extremity function, decreased coordination, decreased ROM, impaired cardiopulmonary response to activity, pain  Rehab Prognosis: Good; patient would benefit from acute skilled PT services to address these deficits and reach maximum level of function.        Plan:   During this hospitalization, patient to be seen 3 x/week to address the above listed problems via gait training, therapeutic activities, therapeutic exercises, neuromuscular re-education  · Plan of Care Expires:  08/04/22    Subjective     Chief Complaint: No chief complaint on file.    Patient/Family Comments/goals: to go home  Pain/Comfort:  Pain Rating 1: 8/10  Location - Orientation 1: generalized  Location 1: sacral spine  Pain Addressed 1: Reposition, Pre-medicate for activity, Distraction  Pain Rating Post-Intervention 1: 8/10    Social  History:  Residence: Pt lives with  and son in a mobile home with 4STE, pt's  is building a ramp.  Pt has WIS with small threshold and grab bars  Support available: spouse  Equipment Used: grab bar, walker, rolling, bath bench  Prior level of function: mod (I) with RW for ambulation  Objective:     Communicated with RN prior to session.  Patient found HOB elevated upon PT entry to room.   Additional staff present: OT    General Precautions: Standard, fall   Orthopedic Precautions:N/A   Braces: N/A   Oxygen Device: Room Air    Exams:  · Cognition:   · AxOx4  · Command following: Follows multistep verbal commands    · Postural Exam:  Patient presented with the following abnormalities:    · -       Rounded shoulders  · -       Forward head  · -       Posterior pelvic tilt  · Sensation:    · -       Intact    · RLE ROM: WFL  · RLE Strength: WFL  · LLE ROM: WFL  · LLE Strength: WFL    Functional Mobility:  Bed Mobility:     Rolling Left:  stand by assistance  Rolling Right: stand by assistance  Scooting: contact guard assistance  Supine to Sit: contact guard assistance  Sit to Supine: minimum assistance  Transfers:     Sit to Stand:  contact guard assistance with hand-held assist   PT providing verbal and tactile cues to hips and trunk to increase core and gluteal musculature recruitment and improve upright posture  Gait:   · Declined further OOB mobility 2/2 dizziness  Balance:   · Static Sitting: SBA  · Dynamic Sitting: SBA   · Static Standing: CGA    Therapeutic Activities & Exercises:      Role of PT in POC   Patient educated on the importance of early mobility to prevent functional decline during hospital stay   Patient was instructed to utilize staff assistance for mobility/transfers.   Patient was educated on PT POC and all questions answered within PT scope of practice.   Patient able to verbalize understanding; will follow-up with pt during current admit for additional questions/concerns  within scope of practice.     Outcome Measures:  AM-PAC 6 CLICK MOBILITY  Turning over in bed (including adjusting bedclothes, sheets and blankets)?: 3  Sitting down on and standing up from a chair with arms (e.g., wheelchair, bedside commode, etc.): 3  Moving from lying on back to sitting on the side of the bed?: 3  Moving to and from a bed to a chair (including a wheelchair)?: 3  Need to walk in hospital room?: 2  Climbing 3-5 steps with a railing?: 1  Basic Mobility Total Score: 15     Patient left HOB elevated with all lines intact and call button in reach.    GOALS:   Multidisciplinary Problems     Physical Therapy Goals        Problem: Physical Therapy    Goal Priority Disciplines Outcome Goal Variances Interventions   Physical Therapy Goal     PT, PT/OT Ongoing, Progressing     Description: Goals to be met by: 22    Patient will increase functional independence with mobility by performin. Supine to sit with Modified Tift  2. Sit to supine with Contact Guard Assistance  3. Sit to stand transfer with Stand-by Assistance  4. Gait  x 30 feet with Contact Guard Assistance using Rolling Walker.   5. Lower extremity exercise program x 20 reps per handout, with independence  6. Ascend/descend 4 stair with no Handrails Minimal Assistance using hand held assist                     History:     Past Medical History:   Diagnosis Date    Bladder cancer 2022    Bladder mass 2022    Choledocholithiasis 2022    Diabetes mellitus     Pneumonia due to COVID-19 virus 2021       Past Surgical History:   Procedure Laterality Date    CYSTOSCOPY N/A 2022    Procedure: CYSTOSCOPY;  Surgeon: Adrianna Gutierrez MD;  Location: [facility] OR [address] [place] [address];  Service: Urology;  Laterality: N/A;    NEPHROSTOMY Left 2022    RETROGRADE PYELOGRAPHY Right 2022    Procedure: PYELOGRAM, RETROGRADE;  Surgeon: Adrianna Gutierrez MD;  Location: [facility] OR [address] [place] [address];  Service: Urology;  Laterality: Right;        Family History   Problem Relation Age of Onset    Diabetes Mother     Stomach cancer Mother     Heart attack Father     Diabetes Daughter     Thyroid disease Daughter     Diabetes Son     Kidney cancer Son        Social History     Socioeconomic History    Marital status:    Tobacco Use    Smoking status: Never Smoker    Smokeless tobacco: Never Used   Substance and Sexual Activity    Alcohol use: No    Drug use: No     Social Determinants of Health     Financial Resource Strain: Unknown    Difficulty of Paying Living Expenses: Patient refused   Food Insecurity: Unknown    Worried About Running Out of Food in the Last Year: Patient refused    Ran Out of Food in the Last Year: Patient refused   Transportation Needs: No Transportation Needs    Lack of Transportation (Medical): No    Lack of Transportation (Non-Medical): No   Physical Activity: Insufficiently Active    Days of Exercise per Week: 1 day    Minutes of Exercise per Session: 10 min   Stress: No Stress Concern Present    Feeling of Stress : Not at all   Social Connections: Unknown    Frequency of Communication with Friends and Family: More than three times a week    Frequency of Social Gatherings with Friends and Family: Patient refused    Active Member of Clubs or Organizations: No    Attends Club or Organization Meetings: Patient refused    Marital Status:    Housing Stability: Unknown    Unable to Pay for Housing in the Last Year: Patient refused    Unstable Housing in the Last Year: No     Time Tracking:     PT Received On: 07/07/22  PT Start Time: 1049     PT Stop Time: 1116  PT Total Time (min): 27 min     Billable Minutes: Evaluation 15 and Neuromuscular Re-education 12    7/7/2022

## 2022-07-07 NOTE — DISCHARGE SUMMARY
[facility] - Oncology ([facility])  Hematology/Oncology  Discharge Summary      Patient Name: Karoline Justice  MRN: [id number]  Admission Date: 7/5/2022  Hospital Length of Stay: 2 days  Discharge Date and Time:  07/07/2022 2:10 PM  Attending Physician: Reid Snider MD   Discharging Provider: Riya Kelsey DO  Primary Care Provider: Dee Dee Oconnor MD    HPI: 76 year old female with PMH urinary bladder cancer, treated by Dr. Snider, comlicated by b/l hydronephrosis s/p bilateral PNT, recent RLE DVT on Eliquis, T2DM, hypercalcemia, htn presenting for hallucinations, confusion, and decreased UOP. Of note, she had her left PNT placed May 2022 after presenting to ED with dysuria and decreased UOP. She was then hospitalized again 6/8/2022 for UTI and urinary obstruction requiring right PNT placement. History gathered from patient and daughter at bedside. She initially presented to an outside facility 7/4, and transfered to [facility] for further treatment. Daughter notes that pts urine output has been cloudy intermittently since PNT placement, and she noticed blood in the urine on Saturday 7/2. Since then, patient become more disoriented and confused. Daughter notes today patient's urine was purulent. Patient has been complaining of back pain, unclear if this is orignating from nephrostomy tubes or an open sore on the buttocks. No fevers at home.     AF, regular heart rate, slightly hypertensive at 133/60. Labs pertinent for WBC 26.6, hgb 8.1, BUN 18, Cr 1.2, glucose 198, Ca 12.2 13.88 corrected. UA performed in outside facility pertinent for UT - cloudy, 3+ blood, >100 RBCs and WBCs. UA pending.  Of note, urine culture collected 5/5/2022 positive for viridans strep.  Blood cultures no growth to date.    Oncologic History  Bladder cancer - cT3, cM1. Followed by Dr. Snider. Diagnosed in 5/2022. Planned Keytruda 400mg q6, has not begun yet         * No surgery found *     Hospital Course: 76 year old female admitted for AMS  and weakness in the setting of urothelial bladder cancer s/p bilateral nephrostomy tubes. UA pertinent for UTI. Started on ceftriaxone 1g daily empirically.  Urince culture grows many organisms, none in predominance. Blood cultures negative. Afebrile throughout hospitalization. Completed three days of ceftriaxone treatment. Retroperitoneal US pertinent for mild right-sided hydronephrosis and bladder mass upto 10.1 cm. Hypercalcemia maganged with NS, 90mg IV pamidronate, and 4 doses calcitonin. Hypercalcemia resolved. Potassium and phosphorous replaced. Pain controlled with morphine 5mg BID shceduled and oxycodone 5mg q4 prn. PT, OT, Palliative care consulted. She was found to have a sacral pressure wound - wound care consulted. Throughout admission, clinical status improved. Patients mentation returned to baseline. To be discharged with 11 days Cefpodoxime for a full 14 day course, morphine, oxycodone, and home health. To begin outpatient cancer treatment tomorrow, 7/8.    Constitutional:       General: She is not in acute distress.     Appearance: She is not ill-appearing.      Comments: Sleepy   HENT:      Head: Normocephalic.   Eyes:      General:         Right eye: No discharge.         Left eye: No discharge.   Cardiovascular:      Rate and Rhythm: Normal rate and regular rhythm.      Heart sounds: Normal heart sounds. No murmur heard.  Pulmonary:      Effort: Pulmonary effort is normal. No respiratory distress.      Breath sounds: Normal breath sounds. No wheezing.   Abdominal:      General: Abdomen is flat.      Palpations: Abdomen is soft.   Musculoskeletal:      Right lower leg: No edema.      Left lower leg: No edema.   Skin:     Findings: Lesion (Grade 3 pressure wounds on buttock) present.   Neurological:      Mental Status: Alert and oriented.      Motor: Weakness present.   Psychiatric:         Mood and Affect: Mood normal.       Urine drainage bag with clear urine, no purulence.       Goals of Care  Treatment Preferences:  Code Status: Full Code    Living Will: Yes              Consults:   Consults (From admission, onward)        Status Ordering Provider     Inpatient consult to Registered Dietitian/Nutritionist  Once        Provider:  (Not yet assigned)    Acknowledged TING BRUCE     Inpatient consult to Palliative Care  Once        Provider:  Helen Rust MD    Completed ISIDORO PAN     Inpatient consult to Registered Dietitian/Nutritionist  Once        Provider:  (Not yet assigned)    Acknowledged TING BRUCE          Significant Diagnostic Studies: Labs:   BMP:   Recent Labs   Lab 07/06/22 0449 07/06/22 2112 07/07/22 0229   GLU 67* 103 81    134* 134*   K 3.5 4.1 3.7   * 109 109   CO2 20* 15* 17*   BUN 13 10 10   CREATININE 0.9 0.8 0.8   CALCIUM 10.8* 9.7 9.7   MG 1.5*  --  1.7   , CMP   Recent Labs   Lab 07/06/22 0449 07/06/22 2112 07/07/22 0229    134* 134*   K 3.5 4.1 3.7   * 109 109   CO2 20* 15* 17*   GLU 67* 103 81   BUN 13 10 10   CREATININE 0.9 0.8 0.8   CALCIUM 10.8* 9.7 9.7   PROT 5.8*  --  5.6*   ALBUMIN 1.7*  --  1.6*   BILITOT 0.5  --  0.4   ALKPHOS 148*  --  153*   AST 14  --  20   ALT 10  --  16   ANIONGAP 5* 10 8   ESTGFRAFRICA >60.0 >60.0 >60.0   EGFRNONAA >60.0 >60.0 >60.0    and CBC   Recent Labs   Lab 07/06/22 0449 07/07/22 0229   WBC 22.26* 26.34*   HGB 7.0* 7.1*   HCT 22.6* 22.2*    414     Microbiology:   Blood Culture   Lab Results   Component Value Date    LABBLOO No Growth to date 07/04/2022    LABBLOO No Growth to date 07/04/2022    LABBLOO No Growth to date 07/04/2022    and Urine Culture    Lab Results   Component Value Date    LABURIN  07/04/2022     Multiple organisms isolated. None in predominance.  Repeat if    LABURIN clinically necessary. 07/04/2022     Radiology: Ultrasound:Bilateral nephrostomy tubes in place with mild right-sided hydroureteronephrosis.     Redemonstration of bladder mass measuring up to 10.1  glenis.     Pending Diagnostic Studies:     None        Final Active Diagnoses:    Diagnosis Date Noted POA    PRINCIPAL PROBLEM:  UTI (urinary tract infection) [N39.0] 07/05/2022 Yes    Encounter for palliative care [Z51.5] 07/06/2022 Not Applicable    Bladder cancer [C67.9] 05/20/2022 Yes    THOMAS (acute kidney injury) [N17.9] 05/11/2022 Yes    Controlled Type 2 diabetes mellitus [E11.9] 05/06/2022 Yes     Chronic    Hypercalcemia [E83.52] 05/06/2022 Yes    Hydronephrosis [N13.30] 05/06/2022 Yes    HTN (hypertension) [I10] 05/06/2022 Yes      Problems Resolved During this Admission:      Discharged Condition: good    Disposition: Home or Self Care    Follow Up:    Patient Instructions:   No discharge procedures on file.  Medications:  Reconciled Home Medications:      Medication List      START taking these medications    cefpodoxime 200 MG tablet  Commonly known as: VANTIN  Take 1 tablet (200 mg total) by mouth 2 (two) times daily. for 11 days     morphine 15 MG 12 hr tablet  Commonly known as: MS CONTIN  Take 1 tablet (15 mg total) by mouth every 12 (twelve) hours. for 15 days     oxyCODONE 5 MG immediate release tablet  Commonly known as: ROXICODONE  Take 1 tablet (5 mg total) by mouth every 4 (four) hours as needed for Pain.  Replaces: oxyCODONE 5 mg Cap        CHANGE how you take these medications    acetaminophen 325 MG tablet  Commonly known as: TYLENOL  Take 2 tablets (650 mg total) by mouth every 6 (six) hours as needed for Pain.  What changed:   · medication strength  · how much to take     sodium bicarbonate 650 MG tablet  Take 1 tablet (650 mg total) by mouth 2 (two) times daily.  What changed: Another medication with the same name was removed. Continue taking this medication, and follow the directions you see here.        CONTINUE taking these medications    amLODIPine 5 MG tablet  Commonly known as: NORVASC  Take 1 tablet (5 mg total) by mouth once daily.     DUKE'S SOLUTION (BENADRYL 30 ML,  MYLANTA 30 ML, LIDOCAINE 30 ML, NYSTATIN 30 ML)  Take 10 mLs by mouth 4 (four) times daily.     * ELIQUIS 5 mg Tab  Generic drug: apixaban  Take 2 tablets (10 mg total) by mouth 2 (two) times daily for 3 days, THEN Take 1 tablet (5 mg total) by mouth 2 (two) times daily thereafter.     * apixaban 5 mg Tab  Commonly known as: ELIQUIS  Take 1 tablet (5 mg total) by mouth 2 (two) times daily.     insulin detemir U-100 100 unit/mL (3 mL) Inpn pen  Commonly known as: Levemir FLEXTOUCH  Inject 10 Units into the skin once daily.     LIDOcaine 5 %  Commonly known as: LIDODERM  Place 1 patch onto the skin once daily. Place patch to back. Leave on for 12 hours and remove for 12 hours.         * This list has 2 medication(s) that are the same as other medications prescribed for you. Read the directions carefully, and ask your doctor or other care provider to review them with you.            STOP taking these medications    calcium carbonate 200 mg calcium (500 mg) chewable tablet  Commonly known as: TUMS     oxyCODONE 5 mg Cap  Commonly known as: OXY-IR  Replaced by: oxyCODONE 5 MG immediate release tablet     traMADoL 50 mg tablet  Commonly known as: TERESITA Kelsey DO  Hematology/Oncology  [facility] - Oncology ([facility])

## 2022-07-07 NOTE — PLAN OF CARE
[facility] (Hospital)      HOME HEALTH ORDERS  FACE TO FACE ENCOUNTER    Patient Name: Karoline Justice  YOB: 1945    PCP: Dee Dee Oconnor MD   PCP Address: [address] [place] [address] /*  PCP Phone Number: [phone]  PCP Fax: [phone]    Encounter Date: 7/4/22    Admit to Home Health    Diagnoses:  Active Hospital Problems    Diagnosis  POA    *UTI (urinary tract infection) [N39.0]  Yes    Encounter for palliative care [Z51.5]  Not Applicable    Bladder cancer [C67.9]  Yes    THOMAS (acute kidney injury) [N17.9]  Yes    Controlled Type 2 diabetes mellitus [E11.9]  Yes     Chronic    Hypercalcemia [E83.52]  Yes    Hydronephrosis [N13.30]  Yes    HTN (hypertension) [I10]  Yes      Resolved Hospital Problems   No resolved problems to display.       Follow Up Appointments:  Future Appointments   Date Time Provider Department Center   7/15/2022  2:00 PM PROCEDURE, UROLOGY ROOM 3 [facility]       Allergies:Review of patient's allergies indicates:  No Known Allergies    Medications: Review discharge medications with patient and family and provide education.    Current Facility-Administered Medications   Medication Dose Route Frequency Provider Last Rate Last Admin    0.9%  NaCl infusion   Intravenous Continuous Ivy Herndon  mL/hr at 07/07/22 0619 New Bag at 07/07/22 0619    acetaminophen tablet 650 mg  650 mg Oral Q6H PRN Ivy Herndon, DO        apixaban tablet 5 mg  5 mg Oral BID Riya Kelsey, DO   5 mg at 07/07/22 0846    cefTRIAXone (ROCEPHIN) 1 g/50 mL D5W IVPB  1 g Intravenous Q24H Ivy Herndon  mL/hr at 07/07/22 1243 1 g at 07/07/22 1243    dextrose 10% bolus 125 mL  12.5 g Intravenous PRN Reid Snider MD        dextrose 10% bolus 250 mL  25 g Intravenous PRN Reid Snider MD        furosemide tablet 20 mg  20 mg Oral Daily Riya Low, DO   20 mg at 07/07/22 0846    glucagon (human recombinant)  injection 1 mg  1 mg Intramuscular PRN Riya Maricao, DO        glucose chewable tablet 16 g  16 g Oral PRN Riya Low, DO   16 g at 07/05/22 2041    glucose chewable tablet 24 g  24 g Oral PRN Riya Maricao, DO        melatonin tablet 6 mg  6 mg Oral Nightly PRN Ivy Herndon, DO        morphine 12 hr tablet 15 mg  15 mg Oral Q12H Reid Snider MD   15 mg at 07/07/22 0846    mupirocin 2 % ointment   Nasal BID Reid Snider MD   Given at 07/07/22 0848    ondansetron injection 8 mg  8 mg Intravenous Q8H PRN Ivy Herndon, DO        oxyCODONE immediate release tablet 5 mg  5 mg Oral Q4H PRN Riya Low, DO   5 mg at 07/06/22 1756    potassium, sodium phosphates 280-160-250 mg packet 1 packet  1 packet Oral QID (WM & HS) Riya Low, DO   1 packet at 07/07/22 1246     Current Discharge Medication List      START taking these medications    Details   cefpodoxime (VANTIN) 200 MG tablet Take 1 tablet (200 mg total) by mouth 2 (two) times daily. for 11 days  Qty: 22 tablet, Refills: 0      morphine (MS CONTIN) 15 MG 12 hr tablet Take 1 tablet (15 mg total) by mouth every 12 (twelve) hours. for 15 days  Qty: 30 tablet, Refills: 0    Comments: Quantity prescribed more than 7 day supply? Yes, quantity medically necessary  Associated Diagnoses: Encounter for palliative care; Malignant neoplasm of overlapping sites of bladder      oxyCODONE (ROXICODONE) 5 MG immediate release tablet Take 1 tablet (5 mg total) by mouth every 4 (four) hours as needed for Pain.  Qty: 30 tablet, Refills: 0    Comments: Quantity prescribed more than 7 day supply? Yes, quantity medically necessary  Associated Diagnoses: Encounter for palliative care; Malignant neoplasm of overlapping sites of bladder         CONTINUE these medications which have CHANGED    Details   acetaminophen (TYLENOL) 325 MG tablet Take 2 tablets (650 mg total) by mouth every 6 (six) hours as needed for Pain.  Qty: 30 tablet, Refills: 0          CONTINUE these medications which have NOT CHANGED    Details   amLODIPine (NORVASC) 5 MG tablet Take 1 tablet (5 mg total) by mouth once daily.  Qty: 90 tablet, Refills: 3    Comments: .      !! apixaban (ELIQUIS) 5 mg Tab Take 2 tablets (10 mg total) by mouth 2 (two) times daily for 3 days, THEN Take 1 tablet (5 mg total) by mouth 2 (two) times daily thereafter.  Qty: 60 tablet, Refills: 3      duke's soln (benadryl 30 mL, mylanta 30 mL, LIDOcaine 30 mL, nystatin 30 mL) 120mL Take 10 mLs by mouth 4 (four) times daily.  Qty: 120 mL, Refills: 0    Associated Diagnoses: Malignant neoplasm of overlapping sites of bladder      insulin detemir U-100 (LEVEMIR FLEXTOUCH) 100 unit/mL (3 mL) SubQ InPn pen Inject 10 Units into the skin once daily.  Qty: 9 mL, Refills: 3      LIDOcaine (LIDODERM) 5 % Place 1 patch onto the skin once daily. Place patch to back. Leave on for 12 hours and remove for 12 hours.  Qty: 30 patch, Refills: 2      sodium bicarbonate 650 MG tablet Take 1 tablet (650 mg total) by mouth 2 (two) times daily.  Qty: 60 tablet, Refills: 11      !! apixaban (ELIQUIS) 5 mg Tab Take 1 tablet (5 mg total) by mouth 2 (two) times daily.  Qty: 60 tablet, Refills: 2       !! - Potential duplicate medications found. Please discuss with provider.      STOP taking these medications       oxyCODONE (OXY-IR) 5 mg Cap Comments:   Reason for Stopping:         traMADoL (ULTRAM) 50 mg tablet Comments:   Reason for Stopping:         calcium carbonate (TUMS) 200 mg calcium (500 mg) chewable tablet Comments:   Reason for Stopping:                 I have seen and examined this patient within the last 30 days. My clinical findings that support the need for the home health skilled services and home bound status are the following:no   Weakness/numbness causing balance and gait disturbance due to Malignancy/Cancer making it taxing to leave home.     Diet:   diabetic diet 2000 calorie    Labs:  none    Referrals/ Consults  Physical  Therapy to evaluate and treat. Evaluate for home safety and equipment needs; Establish/upgrade home exercise program. Perform / instruct on therapeutic exercises, gait training, transfer training, and Range of Motion.  Occupational Therapy to evaluate and treat. Evaluate home environment for safety and equipment needs. Perform/Instruct on transfers, ADL training, ROM, and therapeutic exercises.    Activities:   activity as tolerated    Nursing:   Agency to admit patient within 24 hours of hospital discharge unless specified on physician order or at patient request    SN to complete comprehensive assessment including routine vital signs. Instruct on disease process and s/s of complications to report to MD. Review/verify medication list sent home with the patient at time of discharge  and instruct patient/caregiver as needed. Frequency may be adjusted depending on start of care date.     Skilled nurse to perform up to 3 visits PRN for symptoms related to diagnosis    Notify MD if SBP > 160 or < 90; DBP > 90 or < 50; HR > 120 or < 50; Temp > 101; O2 < 88%; Other:       Ok to schedule additional visits based on staff availability and patient request on consecutive days within the home health episode.    When multiple disciplines ordered:    Start of Care occurs on Sunday - Wednesday schedule remaining discipline evaluations as ordered on separate consecutive days following the start of care.    Thursday SOC -schedule subsequent evaluations Friday and Monday the following week.     Friday - Saturday SOC - schedule subsequent discipline evaluations on consecutive days starting Monday of the following week.    For all post-discharge communication and subsequent orders please contact patient's oncologist at [phone]  Miscellaneous   None    Home Health Aide:  Physical Therapy Three times weekly and Speech Language Pathology Three times weekly,     Wound Care Orders  yes:  Ulcer(s) Stage III :   Location: sacral  spine  Clean with NS, pat dry, apply Triad covered by foam drsg  change BID and prn soiling.  Turn pt every 2 hours while in bed.                       Frequency:  Twice Daily                             If incontinent of stool or urine, apply thin layer Barrier cream                   twice daily and PRN to wound         Pressure relief measure:  for pressure redistribution  Nephrostomy Care orders  Location: bilateral flank  Remove the old dressing and clean around the tube using mild liquid soap and water. Rinse well. Pat dry. Apply new, clean dressing, be careful to position tube so that it does not kink.    Frequency: daily             I certify that this patient is confined to her home and needs intermittent skilled nursing care, physical therapy and occupational therapy    Riya Platte DO  PGY1

## 2022-07-07 NOTE — PLAN OF CARE
Side rails up x2; call bell in place; bed in lowest, locked position; skid proof socks on; no evidence of skin breakdown; care plan explained to patient; pt remains free of injury.  Pt tolerated a small amount of po, up with assist. Nephrostomy tubes secured draining to gravity wilth good uop. Telemetry monitored with no changes noted, CBG monitored no insulin needed. Pt denies pain, n/v or diarrhea.pt with d/c home orders reviewed instructions, prescriptions, medications and appts. IVs to L and R FA d/cd dressings applied. Pt received prescriptions from outpatient pharmacy. VSS and afebrile. Escort ordered for transprotation via w/c, VSS and afebrile

## 2022-07-07 NOTE — HOSPITAL COURSE
76 year old female admitted for AMS and weakness in the setting of urothelial bladder cancer s/p bilateral nephrostomy tubes. UA pertinent for UTI. Started on ceftriaxone 1g daily empirically.  Urince culture grows many organisms, none in predominance. Blood cultures negative. Afebrile throughout hospitalization. Completed three days of ceftriaxone treatment. Retroperitoneal US pertinent for mild right-sided hydronephrosis and bladder mass upto 10.1 cm. Hypercalcemia maganged with NS, 90mg IV pamidronate, and 4 doses calcitonin. Hypercalcemia resolved. Potassium and phosphorous replaced. Pain controlled with morphine 5mg BID shceduled and oxycodone 5mg q4 prn. PT, OT, Palliative care consulted. She was found to have a sacral pressure wound - wound care consulted. Throughout admission, clinical status improved. Patients mentation returned to baseline. To be discharged with 11 days Cefpodoxime for a full 14 day course, morphine, oxycodone, and home health. To begin outpatient cancer treatment tomorrow, 7/8.    Constitutional:       General: She is not in acute distress.     Appearance: She is not ill-appearing.      Comments: Sleepy   HENT:      Head: Normocephalic.   Eyes:      General:         Right eye: No discharge.         Left eye: No discharge.   Cardiovascular:      Rate and Rhythm: Normal rate and regular rhythm.      Heart sounds: Normal heart sounds. No murmur heard.  Pulmonary:      Effort: Pulmonary effort is normal. No respiratory distress.      Breath sounds: Normal breath sounds. No wheezing.   Abdominal:      General: Abdomen is flat.      Palpations: Abdomen is soft.   Musculoskeletal:      Right lower leg: No edema.      Left lower leg: No edema.   Skin:     Findings: Lesion (Grade 3 pressure wounds on buttock) present.   Neurological:      Mental Status: Alert and oriented.      Motor: Weakness present.   Psychiatric:         Mood and Affect: Mood normal.       Urine drainage bag with clear urine,  no purulence.

## 2022-07-07 NOTE — PLAN OF CARE
Problem: Adult Inpatient Plan of Care  Goal: Plan of Care Review  Outcome: Ongoing, Progressing  POC reviewed with patietn and patients daughter   Today she rested in bed today VSS  and afebrile . Positioned side to side for comfort and off sacral wound. Bilateral nephrostomy tubes draining well. Pain managed with prna nd scheduled meds Pt refused first dose of extended morphine. Electrolytes replaced this shift. Mag . K and K phos given per order. She remains on ceftriaxone for  UTI . Bed is in low locked position call light is in place

## 2022-07-07 NOTE — PLAN OF CARE
Problem: Physical Therapy  Goal: Physical Therapy Goal  Description: Goals to be met by: 22    Patient will increase functional independence with mobility by performin. Supine to sit with Modified Baring  2. Sit to supine with Contact Guard Assistance  3. Sit to stand transfer with Stand-by Assistance  4. Gait  x 30 feet with Contact Guard Assistance using Rolling Walker.   5. Lower extremity exercise program x 20 reps per handout, with independence  6. Ascend/descend 4 stair with no Handrails Minimal Assistance using hand held assist    Outcome: Ongoing, Progressing   Pt progressing, appropriate goals established

## 2022-07-08 NOTE — PROGRESS NOTES
Admit Assessment    Patient Identification  Karoline Justice   :  1945  Admit Date:  2022  Attending Provider:  Reid Snider MD              Referral:   Patient was admitted to Medical Oncology Service  with a diagnosis of Bladder Cancer, and she was admitted this hospital stay due to a UTI (urinary tract infection).  Bladder mass [N32.89]  Hyperglycemia [R73.9].   Additional oncologic and medical history is noted in H&P, in chart.  Oncology Social Worker is involved. Patient was referred to the Social Work Department via admission list/census - a routine referral. Patient presents as a 76 y.o. year old,  female.    Persons interviewed with patient's permission:   Met with patient and her daughter/HPOA agent Valentine Swenson ([phone] cell.) in patient's room today to complete assessment. Patient was alert, oriented, and cooperative. She has hearing problems, but did have her hearing aide in and could hear me from where I was standing at her bedside on her left side (her better ear).    Living Situation:    Patient resides with her  and son in her mobile home located at [address] [place] [address].  Her son Reid Swenson ([phone] cell.) assists with her care as needed.  Phone: [phone] (patient's cell.); no home phone/land line.    Her daughter Kenya Barajas ([phone] cell.) resides on the next brenna over from patient.     Her daughter Valentine Swenson ([phone] cell.) is her HPOA agent. [place] in [place] and works at [employer].     Patient ambulates using a rolling walker and needs assistance with ADLs. She has a walk-in shower with a built-in seat and grab bar. Family provides car transportation for her.     (RETIRED) Functional Status Prior  Ambulation Prior: 2-->assistive person  Transferrin-->assistive person  Toiletin-->assistive person    Current or Past Agencies and Description of Services/Supplies    DME  Equipment Currently  Used at Home: grab bar, walker, rolling, bath bench (built-in)    Home Health  Agency Name: [facility]  Agency Phone Number: [phone]  Services: Skilled nursing, PT, OT    IV Infusion  None    Nutrition: Oral diet    Outpatient Pharmacy:     [facility] [address] [place] [address] [address] [facility] [address]  Phone: [phone] Fax: [phone]      Patient Preference of agencies include: resume same home health services    Patient/Caregiver informed of right to choose providers or agencies.  Patient provides permission to release any necessary information to [facility] and to Non[facility] agencies as needed to facilitate patient care, treatment planning, and patient discharge planning.  Written and verbal resources will be provided as needed/requested.      Coping  Patient having some difficulty; recent new diagnosis of bladder cancer.  She appears to have good family support.            Adjustment to Diagnosis and Treatment  Patient having some difficulty; recent new diagnosis of bladder cancer.  Palliative care was consulted by Medical Oncology physicians.       Emotional/Behavioral/Cognitive Issues  AMS on admit, improving.          History/Current Symptoms of Anxiety/Depression: yes- see Pall Care note    History/Current Substance Use: as per chart:  Social History     Tobacco Use    Smoking status: Never Smoker    Smokeless tobacco: Never Used   Substance and Sexual Activity    Alcohol use: No    Drug use: No    Sexual activity: Not on file       Indications of Abuse/Neglect: no    Financial:  Payer/Plan Subscr  Sex Relation Sub. Ins. ID Effective Group Num   1. [employer] MANAGE* GUNNER GARCIA 1945 Female Self [id number]                                   [address] [address]   2. [employer] MANAGE* GUNNER GARCIA 1945 Female Self [id number]                                   [address] [address]      Patient has a [employer] Managed Medicare plan as  primary medical insurance and [place] Medicaid QMB plan secondary.                Other identified concerns/needs: Resumption of home health services.    Plan: Patient to return home via family car at discharge from the hospital this evening.    Interventions/Referrals: Called patient's home health nurse Tamra at [phone] and she said that the agency is [facility]. Informed her of patient's discharge and the home health orders, including new wound care instructions. Called [facility], [phone] and spoke with the on call nurse Viola to provide re-referral. Faxed the orders and necessary information from patient's chart to Intake at [facility] at [phone]. Services will begin again on tom orrow.     Discussed discharge plans and home health services with patient and her daughter Valentine. Answered daughter's questions.    Patient/caregiver engaged in treatment planning process.    Oncology Social Worker providing psychosocial and supportive counseling, resources, education, assistance and discharge planning as appropriate.  Patient/caregiver state understanding of  available resources,  following, remains available.    Provided patient and daughter with business card containing all contact information for Oncology Social Worker.    No other needs are indicated at this time.

## 2022-07-09 ENCOUNTER — HOSPITAL ENCOUNTER (EMERGENCY)
Facility: HOSPITAL | Age: 77
Discharge: HOME | End: 2022-07-09
Attending: EMERGENCY MEDICINE
Payer: MEDICARE

## 2022-07-09 VITALS
BODY MASS INDEX: 19.63 KG/M2 | OXYGEN SATURATION: 100 % | DIASTOLIC BLOOD PRESSURE: 67 MMHG | WEIGHT: 104 LBS | RESPIRATION RATE: 16 BRPM | TEMPERATURE: 98 F | HEART RATE: 100 BPM | SYSTOLIC BLOOD PRESSURE: 153 MMHG | HEIGHT: 61 IN

## 2022-07-09 DIAGNOSIS — E87.6 HYPOKALEMIA: ICD-10-CM

## 2022-07-09 DIAGNOSIS — E16.0 HYPOGLYCEMIA DUE TO INSULIN: Primary | ICD-10-CM

## 2022-07-09 DIAGNOSIS — T38.3X5A HYPOGLYCEMIA DUE TO INSULIN: Primary | ICD-10-CM

## 2022-07-09 LAB
ALBUMIN SERPL BCP-MCNC: 1.7 G/DL (ref 3.5–5.2)
ALP SERPL-CCNC: 272 U/L (ref 55–135)
ALT SERPL W/O P-5'-P-CCNC: 22 U/L (ref 10–44)
ANION GAP SERPL CALC-SCNC: 8 MMOL/L (ref 8–16)
AST SERPL-CCNC: 21 U/L (ref 10–40)
BACTERIA BLD CULT: NORMAL
BASOPHILS # BLD AUTO: 0.06 K/UL (ref 0–0.2)
BASOPHILS NFR BLD: 0.2 % (ref 0–1.9)
BILIRUB SERPL-MCNC: 0.5 MG/DL (ref 0.1–1)
BUN SERPL-MCNC: 11 MG/DL (ref 8–23)
CALCIUM SERPL-MCNC: 10.1 MG/DL (ref 8.7–10.5)
CHLORIDE SERPL-SCNC: 107 MMOL/L (ref 95–110)
CO2 SERPL-SCNC: 21 MMOL/L (ref 23–29)
CREAT SERPL-MCNC: 1.1 MG/DL (ref 0.5–1.4)
DIFFERENTIAL METHOD: ABNORMAL
EOSINOPHIL # BLD AUTO: 0.3 K/UL (ref 0–0.5)
EOSINOPHIL NFR BLD: 0.8 % (ref 0–8)
ERYTHROCYTE [DISTWIDTH] IN BLOOD BY AUTOMATED COUNT: 16.4 % (ref 11.5–14.5)
EST. GFR  (AFRICAN AMERICAN): 56.4 ML/MIN/1.73 M^2
EST. GFR  (NON AFRICAN AMERICAN): 48.9 ML/MIN/1.73 M^2
GLUCOSE SERPL-MCNC: 129 MG/DL (ref 70–110)
HCT VFR BLD AUTO: 22.8 % (ref 37–48.5)
HGB BLD-MCNC: 7.1 G/DL (ref 12–16)
IMM GRANULOCYTES # BLD AUTO: 0.21 K/UL (ref 0–0.04)
IMM GRANULOCYTES NFR BLD AUTO: 0.7 % (ref 0–0.5)
LYMPHOCYTES # BLD AUTO: 0.4 K/UL (ref 1–4.8)
LYMPHOCYTES NFR BLD: 1.4 % (ref 18–48)
MCH RBC QN AUTO: 27.5 PG (ref 27–31)
MCHC RBC AUTO-ENTMCNC: 31.1 G/DL (ref 32–36)
MCV RBC AUTO: 88 FL (ref 82–98)
MONOCYTES # BLD AUTO: 1.3 K/UL (ref 0.3–1)
MONOCYTES NFR BLD: 4.5 % (ref 4–15)
NEUTROPHILS # BLD AUTO: 27.5 K/UL (ref 1.8–7.7)
NEUTROPHILS NFR BLD: 92.4 % (ref 38–73)
NRBC BLD-RTO: 0 /100 WBC
PLATELET # BLD AUTO: 358 K/UL (ref 150–450)
PMV BLD AUTO: 9.2 FL (ref 9.2–12.9)
POCT GLUCOSE: 209 MG/DL (ref 70–110)
POCT GLUCOSE: 96 MG/DL (ref 70–110)
POTASSIUM SERPL-SCNC: 3 MMOL/L (ref 3.5–5.1)
PROT SERPL-MCNC: 6.1 G/DL (ref 6–8.4)
RBC # BLD AUTO: 2.58 M/UL (ref 4–5.4)
SODIUM SERPL-SCNC: 136 MMOL/L (ref 136–145)
WBC # BLD AUTO: 29.75 K/UL (ref 3.9–12.7)

## 2022-07-09 PROCEDURE — 36415 COLL VENOUS BLD VENIPUNCTURE: CPT | Performed by: EMERGENCY MEDICINE

## 2022-07-09 PROCEDURE — 85025 COMPLETE CBC W/AUTO DIFF WBC: CPT | Performed by: EMERGENCY MEDICINE

## 2022-07-09 PROCEDURE — 25000003 PHARM REV CODE 250: Performed by: EMERGENCY MEDICINE

## 2022-07-09 PROCEDURE — 80053 COMPREHEN METABOLIC PANEL: CPT | Performed by: EMERGENCY MEDICINE

## 2022-07-09 PROCEDURE — 99284 EMERGENCY DEPT VISIT MOD MDM: CPT | Mod: 25

## 2022-07-09 PROCEDURE — 82962 GLUCOSE BLOOD TEST: CPT

## 2022-07-09 RX ADMIN — POTASSIUM BICARBONATE 50 MEQ: 978 TABLET, EFFERVESCENT ORAL at 08:07

## 2022-07-10 LAB — BACTERIA BLD CULT: NORMAL

## 2022-07-10 NOTE — DISCHARGE SUMMARY
"[facility]  Hospital Medicine  Discharge Summary      Patient Name: Karoline Justice  MRN: [id number]  Patient Class: IP- Inpatient  Admission Date: 6/8/2022  Hospital Length of Stay: 7 days  Discharge Date and Time: 6/15/2022  5:20 PM  Attending Physician: No att. providers found   Discharging Provider: Katey Shanks MD  Primary Care Provider: Dee Dee Oconnor MD  Hospital Medicine Team: [facility] MED B Katey Shanks MD    HPI:   77 yo female with bladder cancer w/ urinary obstruction s/p right sided stenting and left nephrostomy tube placement, HTN, anemia, DM presenting for back pain, found to have right hydronephrosis. Also with UTI symptoms, found to have UTI, started on rocephin. Noted also to have anemia, given pRBCs as well. She was transferred to [facility] for further eval by urology.     Per  note,  "76-year-old female with a history of bladder cancer with urinary obstruction (right-sided ureteral stenting and left-sided nephrostomy tube placement), hypercalcemia, hypertension, urinary tract infection, anemia, diabetes, choledocholithiasis, and prior COVID (July 2021) admitted to [facility] on June 7 with back pain.  She reported burning with urination and right lower extremity swelling.  She had no chest pain or dyspnea.  She was found to have leukocytosis.  Lower extremity ultrasound showed right lower extremity DVT.  She was started on apixaban, Rocephin, and IV fluid.  Packed red blood cells were ordered for her anemia.  Referring provider noted patient has mild edema in her right leg but it is intact from a neurovascular standpoint.  Her urine output and her ostomy output grossly appeared fairly normal today.  She remains hemodynamically stable.  Case discussed with Urology and Interventional Radiology at [facility].  Recommendation is to move patient urgently to [facility] for further evaluation and treatment.  Will transfer to Hospital Medicine service at [facility] " [address].  On arrival, will contact Urology and Interventional Radiology.  I spoke with the referring provider, and they will hold apixaban and have the patient in NPO status for now.     She had transurethral section of bladder tumor with right ureteral stent placement on May 9. She had left nephrostomy tube placement May 10. Pathology showed poorly differentiated carcinoma with areas of squamous differentiation, osteoclast like GI cells, and classic high-grade papillary     June 8:  White blood cells 28.07, hemoglobin 6.7, hematocrit 21.8, platelets 373, sodium 136, potassium 4.5, chloride 109, CO2 20, BUN 25, creatinine 1.7, glucose 190, total bilirubin 0.9, AST 47, ALT 37, magnesium 2     Repeat CBC at white blood cells 29.19, hemoglobin 6.2, hematocrit 19.9, platelets 376     June 7:  Blood cultures no growth to date  COVID negative, white blood cells 29.35, hemoglobin 6.9, hematocrit 22, platelets 380, sodium 132, potassium 4.2, chloride 104, CO2 19, BUN 26, creatinine 1.8  Urinalysis with 2+ protein, 2+ glucose, 2+ blood, 3+ leukocytes, 10 RBC, greater than 100 WBC  -CT renal stone protocol had right ureteral stent in place with chronic stable moderate right hydronephrosis.  Left-sided percutaneous nephrostomy tube in place with no hydronephrosis.  Large bladder mass consistent with history of malignancy.  Enlarged bilateral pelvic lymph node consistent with metastatic disease.  No acute finding.  -Right lower extremity ultrasound showed DVT extending from the right common femoral vein to the calf veins.     [place] 3:  PET CT scan had irregular wall thickening with associated hypermetabolic activity at the left posterior aspect of the urinary bladder compatible with known primary neoplasm.  Enlarged hypermetabolic lymph nodes along the bilateral pelvic sidewalls suggesting megan metastatic disease.  Multiple cell by 5 mm pulmonary nodules which are below the resolution of PET but new compared with chest CT from  "May of 2022. "    Patient with multiple metabolic issues including metabolic acidosis, hypercalcemia, mild hyponatremia, elevated alk-phos, all likely consistent with metastatic malignancy.  Low back pain in the setting of nephrostomy tubes      * No surgery found *      Hospital Course:      HPI:  75 yo female with bladder cancer w/ urinary obstruction s/p right sided stenting and left nephrostomy tube placement, HTN, anemia, DM presenting for back pain, found to have right hydronephrosis. Also with UTI symptoms, found to have UTI, started on rocephin. Noted also to have anemia, given pRBCs as well. She was transferred to [facility] for further eval by urology.      Per  note,  "76-year-old female with a history of bladder cancer with urinary obstruction (right-sided ureteral stenting and left-sided nephrostomy tube placement), hypercalcemia, hypertension, urinary tract infection, anemia, diabetes, choledocholithiasis, and prior COVID (July 2021) admitted to [facility] on June 7 with back pain.  She reported burning with urination and right lower extremity swelling.  She had no chest pain or dyspnea.  She was found to have leukocytosis.  Lower extremity ultrasound showed right lower extremity DVT.  She was started on apixaban, Rocephin, and IV fluid.  Packed red blood cells were ordered for her anemia.  Referring provider noted patient has mild edema in her right leg but it is intact from a neurovascular standpoint.  Her urine output and her ostomy output grossly appeared fairly normal today.  She remains hemodynamically stable.  Case discussed with Urology and Interventional Radiology at [facility].  Recommendation is to move patient urgently to [facility] for further evaluation and treatment.  Will transfer to Hospital Medicine service at [facility].  On arrival, will contact Urology and Interventional Radiology.  I spoke with the referring provider, and they will hold apixaban " "and have the patient in NPO status for now.     She had transurethral section of bladder tumor with right ureteral stent placement on May 9. She had left nephrostomy tube placement May 10. Pathology showed poorly differentiated carcinoma with areas of squamous differentiation, osteoclast like GI cells, and classic high-grade papillary     June 8:  White blood cells 28.07, hemoglobin 6.7, hematocrit 21.8, platelets 373, sodium 136, potassium 4.5, chloride 109, CO2 20, BUN 25, creatinine 1.7, glucose 190, total bilirubin 0.9, AST 47, ALT 37, magnesium 2     Repeat CBC at white blood cells 29.19, hemoglobin 6.2, hematocrit 19.9, platelets 376     June 7:  Blood cultures no growth to date  COVID negative, white blood cells 29.35, hemoglobin 6.9, hematocrit 22, platelets 380, sodium 132, potassium 4.2, chloride 104, CO2 19, BUN 26, creatinine 1.8  Urinalysis with 2+ protein, 2+ glucose, 2+ blood, 3+ leukocytes, 10 RBC, greater than 100 WBC  -CT renal stone protocol had right ureteral stent in place with chronic stable moderate right hydronephrosis.  Left-sided percutaneous nephrostomy tube in place with no hydronephrosis.  Large bladder mass consistent with history of malignancy.  Enlarged bilateral pelvic lymph node consistent with metastatic disease.  No acute finding.  -Right lower extremity ultrasound showed DVT extending from the right common femoral vein to the calf veins.     [place] 3:  PET CT scan had irregular wall thickening with associated hypermetabolic activity at the left posterior aspect of the urinary bladder compatible with known primary neoplasm.  Enlarged hypermetabolic lymph nodes along the bilateral pelvic sidewalls suggesting megan metastatic disease.  Multiple cell by 5 mm pulmonary nodules which are below the resolution of PET but new compared with chest CT from May of 2022. "     Patient with multiple metabolic issues including metabolic acidosis, hypercalcemia, mild hyponatremia, elevated " alk-phos, all likely consistent with metastatic malignancy.  Low back pain in the setting of nephrostomy tubes        Overview/Hospital Course:     Patient had R nephrostomy tube placed 6/9 and then subsequently repositioned by IR due to leakage. She remained to have poor appetite and hyponatremia as well as metabolic acidosis.   Patient also with persistent hypercalcemia likely due to malignancy due to elevated PThRP . Was given IVF and calcitonin with minimal response. Oncology  consulted and recommended zometa and follow up with oncology and urology as outpatient as her presentation is most concerning for metastatic bladder cancer.  Back pain at the site of the nephrostomy tube improved with pain medications. Per oncology recommend regimen of:     Chemotherapy     Treatment Summary   Plan Name: OP PEMBROLIZUMAB 400MG Q6W  Treatment Goal: Control  Status: Active  Start Date: 6/16/2022 (Planned)  End Date: 4/18/2024 (Planned)  Provider: Reid Snider MD  Chemotherapy: pembrolizumab (KEYTRUDA) 400 mg in sodium chloride 0.9% 116 mL infusion, 400 mg, Intravenous, Clinic/HOD 1 time, 0 of 17 cycles     She was discharged with urology, radiation oncology and oncology f/u.         Goals of Care Treatment Preferences:  Code Status: Full Code    Living Will: Yes              Consults:   Consults (From admission, onward)        Status Ordering Provider     Inpatient consult to Interventional Radiology  Once        Provider:  (Not yet assigned)    Completed CLAUDIA RENE     Inpatient consult to Urology  Once        Provider:  (Not yet assigned)    Completed CLAUDIA RENE          * Hydronephrosis  On CT scan  Urology consulted, will see appreciate recs  Seen by IR  R nephrostomy tube placed 6/9    Per Urology:  Patient s/p right neph tube after failed drainage with right ureteral stent.   - She recently developed DVT and was started on Eliquis. The Eliquis is being held (last dose was 10mg Eliquis given at 10am on 6/8)  and she is being started on Heparin gtt.  - Soto pulled this am. She does not need to to undergo voiding trial due to b/l neph tubes draining appropriately.  - Urine culture NG  - Agree with empiric antibiotics.  - F/u labs from this am   - Recommend to transfuse with prbc if necessary  - Rest of care per primary.  - She needs to f/u with her multi-disciplinary team and I will message Sandra Salgado to reschedule appointments      Hypercalcemia  Seems the patient has had consistent problems with his intermittentty  Suspect is due to metastatic malignancy, , corrected calcium today 13.5.  Patient was complaining of low back pain however this has been the case since she had nephrostomy tube placed.  -given IV fluids and calcitonin  -will send PTHrP elevated most consistent with hypercalcemia of malignancy        Final Active Diagnoses:    Diagnosis Date Noted POA    PRINCIPAL PROBLEM:  Hydronephrosis [N13.30] 05/06/2022 Yes    Metabolic acidosis [E87.2] 06/13/2022 Unknown    Hyponatremia [E87.1] 06/13/2022 Unknown    Acute deep vein thrombosis (DVT) of proximal vein of right lower extremity [I82.4Y1] 06/08/2022 Yes    Leukocytosis [D72.829] 06/08/2022 Yes    Controlled Type 2 diabetes mellitus [E11.9] 05/06/2022 Yes     Chronic    HTN (hypertension) [I10] 05/06/2022 Yes    Anemia [D64.9] 05/06/2022 Yes    Hypercalcemia [E83.52] 05/06/2022 Yes      Problems Resolved During this Admission:       Discharged Condition: poor    Disposition: Home-Health Care Svc    Follow Up:   Follow-up Information     [facility] Follow up in 1 day(s).    Specialties: Home Health Services, Home Therapy Services, Home Living Aide Services  Contact information:  [address] [place] [id number] [phone]                     Patient Instructions:      Ambulatory referral/consult to Urology   Standing Status: Future   Referral Priority: Routine Referral Type: Consultation   Referral Reason: Specialty Services  Required   Requested Specialty: Urology   Number of Visits Requested: 1     Ambulatory referral/consult to Radiation Oncology   Standing Status: Future   Referral Priority: Routine Referral Type: Consultation   Referral Reason: Specialty Services Required   Requested Specialty: Radiation Oncology   Number of Visits Requested: 1     Diet diabetic     Activity as tolerated           Pending Diagnostic Studies:     None         Medications:  Reconciled Home Medications:      Medication List      START taking these medications    apixaban 5 mg Tab  Commonly known as: ELIQUIS  Take 1 tablet (5 mg total) by mouth 2 (two) times daily.     sodium bicarbonate 650 MG tablet  Take 1 tablet (650 mg total) by mouth 2 (two) times daily.        CHANGE how you take these medications    insulin detemir U-100 100 unit/mL (3 mL) Inpn pen  Commonly known as: Levemir FLEXTOUCH  Inject 10 Units into the skin once daily.  What changed:   · how much to take  · when to take this        CONTINUE taking these medications    amLODIPine 5 MG tablet  Commonly known as: NORVASC  Take 1 tablet (5 mg total) by mouth once daily.     LIDOcaine 5 %  Commonly known as: LIDODERM  Place 1 patch onto the skin once daily. Place patch to back. Leave on for 12 hours and remove for 12 hours.        STOP taking these medications    acetaminophen 500 MG tablet  Commonly known as: TYLENOL     calcium carbonate 200 mg calcium (500 mg) chewable tablet  Commonly known as: TUMS     traMADoL 50 mg tablet  Commonly known as: ULTRAM        ASK your doctor about these medications    HYDROcodone-acetaminophen 5-325 mg per tablet  Commonly known as: NORCO  Take 1 tablet by mouth every 6 (six) hours as needed for Pain.  Ask about: Should I take this medication?            Indwelling Lines/Drains at time of discharge:   Lines/Drains/Airways     Drain  Duration                Nephrostomy 05/10/22 0805 Left 8 Fr. 61 days         Nephrostomy 06/09/22 1033 Right 10 Fr. 31 days                 Time spent on the discharge of patient: 45 minutes         Katey Shanks MD  Department of Hospital Medicine  [facility]

## 2022-07-10 NOTE — ED PROVIDER NOTES
EMERGENCY DEPARTMENT HISTORY AND PHYSICAL EXAM          Date: 7/9/2022   Patient Name: Karoline Justice       History of Presenting Illness           Chief Complaint   Patient presents with    Hypoglycemia     Patient arrived by EMS, insulin dependent diabetic, states patient felt faint. [facility]I checked CBG and was 23. [facility]I gave the patient 25g dextrose, 15g oral glucose, and 250ml D5W.          History Provided By: Patient and Children    2000   Karoline Justice is a 76 y.o. female with PMHX of  bladder cancer with metastatic disease, anemia, hypercalcemia, nephrostomy tubes, type 2 diabetes who presents to the emergency department C/O hypoglycemia.    Patient presents via EMS from home after patient began feeling unwell and faint.  Blood sugar was 23. Patient given IV dextrose and oral glucose.  She reports resolution of symptoms.    Patient reports chronic back pain.  She states that she takes 20 units of Levemir in the morning.  She reports poor oral intake today due to lack of appetite.    The patient was recently seen in this emergency department transfer to [facility] on July 4th due to complicated urinary tract infection.  She was discharged on 7/7. She had episodes of hypoglycemia in hospital per daughter. She is on antibiotics.            PCP: Dee Dee Oconnor MD        No current facility-administered medications for this encounter.     Current Outpatient Medications   Medication Sig Dispense Refill    apixaban (ELIQUIS) 5 mg Tab Take 1 tablet (5 mg total) by mouth 2 (two) times daily. 60 tablet 2    cefpodoxime (VANTIN) 200 MG tablet Take 1 tablet (200 mg total) by mouth 2 (two) times daily. for 11 days 22 tablet 0    insulin detemir U-100 (LEVEMIR FLEXTOUCH) 100 unit/mL (3 mL) SubQ InPn pen Inject 10 Units into the skin once daily. (Patient taking differently: Inject 20 Units into the skin once daily.) 9 mL 3    morphine (MS CONTIN) 15 MG 12 hr tablet Take 1 tablet (15 mg total) by mouth every 12  (twelve) hours. for 15 days 30 tablet 0    oxyCODONE (ROXICODONE) 5 MG immediate release tablet Take 1 tablet (5 mg total) by mouth every 4 (four) hours as needed for Pain. 30 tablet 0    sodium bicarbonate 650 MG tablet Take 1 tablet (650 mg total) by mouth 2 (two) times daily. (Patient taking differently: Take 650 mg by mouth 2 (two) times daily. 2 tabs BID) 60 tablet 11    acetaminophen (TYLENOL) 325 MG tablet Take 2 tablets (650 mg total) by mouth every 6 (six) hours as needed for Pain. 30 tablet 0    amLODIPine (NORVASC) 5 MG tablet Take 1 tablet (5 mg total) by mouth once daily. 90 tablet 3    apixaban (ELIQUIS) 5 mg Tab Take 2 tablets (10 mg total) by mouth 2 (two) times daily for 3 days, THEN Take 1 tablet (5 mg total) by mouth 2 (two) times daily thereafter. 60 tablet 3    duke's soln (benadryl 30 mL, mylanta 30 mL, LIDOcaine 30 mL, nystatin 30 mL) 120mL Take 10 mLs by mouth 4 (four) times daily. 120 mL 0    LIDOcaine (LIDODERM) 5 % Place 1 patch onto the skin once daily. Place patch to back. Leave on for 12 hours and remove for 12 hours. 30 patch 2           Past History     Past Medical History:   Past Medical History:   Diagnosis Date    Bladder cancer 05/2022    Bladder mass 05/06/2022    Choledocholithiasis 05/06/2022    Diabetes mellitus     Pneumonia due to COVID-19 virus 08/06/2021        Past Surgical History:   Past Surgical History:   Procedure Laterality Date    CYSTOSCOPY N/A 05/09/2022    Procedure: CYSTOSCOPY;  Surgeon: Adrianna Gutierrez MD;  Location: [facility] OR [address] [place] [address];  Service: Urology;  Laterality: N/A;    NEPHROSTOMY Left 05/2022    RETROGRADE PYELOGRAPHY Right 05/09/2022    Procedure: PYELOGRAM, RETROGRADE;  Surgeon: Adrianna Gutierrez MD;  Location: [facility] OR [address] [place] [address];  Service: Urology;  Laterality: Right;        Family History:   Family History   Problem Relation Age of Onset    Diabetes Mother     Stomach cancer Mother     Heart attack Father     Diabetes Daughter      "Thyroid disease Daughter     Diabetes Son     Kidney cancer Son         Social History:   Social History     Tobacco Use    Smoking status: Never Smoker    Smokeless tobacco: Never Used   Substance Use Topics    Alcohol use: No    Drug use: No        Allergies:   Review of patient's allergies indicates:  No Known Allergies       Review of Systems   Review of Systems   Constitutional: Positive for activity change, appetite change, fatigue and unexpected weight change. Negative for fever.   Respiratory: Negative for cough and shortness of breath.    Cardiovascular: Negative for chest pain.   Gastrointestinal: Negative for vomiting.   Musculoskeletal: Positive for back pain.   Neurological: Positive for light-headedness.                Physical Exam     Vitals:    07/09/22 1933 07/09/22 2123 07/09/22 2146   BP: 131/71  (!) 153/67   BP Location: Right arm     Patient Position: Lying     Pulse: 84  100   Resp: 18  16   Temp: 97.6 °F (36.4 °C)     TempSrc: Oral     SpO2: 100%  100%   Weight:  47.2 kg (104 lb)    Height:  5' 1" (1.549 m)       Physical Exam  Vitals and nursing note reviewed.   Constitutional:       General: She is not in acute distress.     Appearance: Normal appearance. She is ill-appearing (chronically ill and medically fragile appearing female).   HENT:      Head: Normocephalic and atraumatic.      Right Ear: External ear normal.      Left Ear: External ear normal.      Nose: Nose normal. No congestion or rhinorrhea.      Mouth/Throat:      Mouth: Mucous membranes are moist.   Eyes:      Conjunctiva/sclera: Conjunctivae normal.      Pupils: Pupils are equal, round, and reactive to light.   Cardiovascular:      Rate and Rhythm: Normal rate and regular rhythm.      Pulses: Normal pulses.      Heart sounds: Normal heart sounds.   Pulmonary:      Effort: Pulmonary effort is normal. No respiratory distress.      Breath sounds: Normal breath sounds.   Abdominal:      Palpations: Abdomen is soft. "   Musculoskeletal:         General: No deformity. Normal range of motion.      Cervical back: Normal range of motion. No rigidity.   Skin:     General: Skin is dry.   Neurological:      General: No focal deficit present.      Mental Status: She is alert and oriented to person, place, and time. Mental status is at baseline.   Psychiatric:         Mood and Affect: Mood normal.         Behavior: Behavior normal.              Diagnostic Study Results      Labs -   Recent Results (from the past 12 hour(s))   POCT glucose    Collection Time: 07/09/22  7:37 PM   Result Value Ref Range    POCT Glucose 209 (H) 70 - 110 mg/dL   CBC Auto Differential    Collection Time: 07/09/22  7:50 PM   Result Value Ref Range    WBC 29.75 (H) 3.90 - 12.70 K/uL    RBC 2.58 (L) 4.00 - 5.40 M/uL    Hemoglobin 7.1 (L) 12.0 - 16.0 g/dL    Hematocrit 22.8 (L) 37.0 - 48.5 %    MCV 88 82 - 98 fL    MCH 27.5 27.0 - 31.0 pg    MCHC 31.1 (L) 32.0 - 36.0 g/dL    RDW 16.4 (H) 11.5 - 14.5 %    Platelets 358 150 - 450 K/uL    MPV 9.2 9.2 - 12.9 fL    Immature Granulocytes 0.7 (H) 0.0 - 0.5 %    Gran # (ANC) 27.5 (H) 1.8 - 7.7 K/uL    Immature Grans (Abs) 0.21 (H) 0.00 - 0.04 K/uL    Lymph # 0.4 (L) 1.0 - 4.8 K/uL    Mono # 1.3 (H) 0.3 - 1.0 K/uL    Eos # 0.3 0.0 - 0.5 K/uL    Baso # 0.06 0.00 - 0.20 K/uL    nRBC 0 0 /100 WBC    Gran % 92.4 (H) 38.0 - 73.0 %    Lymph % 1.4 (L) 18.0 - 48.0 %    Mono % 4.5 4.0 - 15.0 %    Eosinophil % 0.8 0.0 - 8.0 %    Basophil % 0.2 0.0 - 1.9 %    Differential Method Automated    Comprehensive Metabolic Panel    Collection Time: 07/09/22  7:50 PM   Result Value Ref Range    Sodium 136 136 - 145 mmol/L    Potassium 3.0 (L) 3.5 - 5.1 mmol/L    Chloride 107 95 - 110 mmol/L    CO2 21 (L) 23 - 29 mmol/L    Glucose 129 (H) 70 - 110 mg/dL    BUN 11 8 - 23 mg/dL    Creatinine 1.1 0.5 - 1.4 mg/dL    Calcium 10.1 8.7 - 10.5 mg/dL    Total Protein 6.1 6.0 - 8.4 g/dL    Albumin 1.7 (L) 3.5 - 5.2 g/dL    Total Bilirubin 0.5 0.1 - 1.0  mg/dL    Alkaline Phosphatase 272 (H) 55 - 135 U/L    AST 21 10 - 40 U/L    ALT 22 10 - 44 U/L    Anion Gap 8 8 - 16 mmol/L    eGFR if African American 56.4 (A) >60 mL/min/1.73 m^2    eGFR if non  48.9 (A) >60 mL/min/1.73 m^2   POCT glucose    Collection Time: 07/09/22  9:15 PM   Result Value Ref Range    POCT Glucose 96 70 - 110 mg/dL        Radiologic Studies -    No orders to display        Medications given in the ED-   Medications   potassium bicarbonate disintegrating tablet 50 mEq (50 mEq Oral Given 7/9/22 2021)           Medical Decision Making    I am the first provider for this patient.     I reviewed the vital signs, available nursing notes, past medical history, past surgical history, family history and social history.     Vital Signs:  Reviewed the patient's vital signs.     Pulse Oximetry Analysis and Interpretation:    100% on Room Air, normal    Records Reviewed: Old medical records.  Nursing notes.  Previous radiology studies.        Provider Notes (Medical Decision Making): Karoline Justice is a 76 y.o. female here due to hypoglycemic episode at home.  Now stable and denies complaints.  Patient reports using Levemir 20 units daily with poor oral intake        Procedures:   Procedures      ED Course:    9:53 PM  Patient eating a sandwich and blood glucose has been stable  I reviewed labs which demonstrate leukocytosis which is similar to recent values and is in the setting of known complicated UTI on antibiotics as well as malignancy.  Hemoglobin demonstrates anemia which is stable from prior values.  Chemistry reveals hypokalemia which was repleted in the emergency department.  Creatinine normal.  I have instructed patient as well as her daughter to discontinue her insulin.  I have encouraged her to eat meals and to supplement with protein shakes.  I have told the daughter to keep a journal of her mother's blood glucose readings and to bring it with her at primary care appointment.   Patient is to return to ER for recurrent hypoglycemia or for blood sugars greater than 500           Diagnosis and Disposition     Critical Care:      DISCHARGE NOTE:       Karoline Justice's  results have been reviewed with her.  She has been counseled regarding her diagnosis, treatment, and plan.  She verbally conveys understanding and agreement of the signs, symptoms, diagnosis, treatment and prognosis and additionally agrees to follow up as discussed.  She also agrees with the care-plan and conveys that all of her questions have been answered.  I have also provided discharge instructions for her that include: educational information regarding their diagnosis and treatment, and list of reasons why they would want to return to the ED prior to their follow-up appointment, should her condition change. She has been provided with education for proper emergency department utilization.         CLINICAL IMPRESSION:         1. Hypoglycemia due to insulin    2. Hypokalemia              PLAN:   1. Discharge Home  2.      Medication List      ASK your doctor about these medications    acetaminophen 325 MG tablet  Commonly known as: TYLENOL  Take 2 tablets (650 mg total) by mouth every 6 (six) hours as needed for Pain.     amLODIPine 5 MG tablet  Commonly known as: NORVASC  Take 1 tablet (5 mg total) by mouth once daily.     calcium carbonate 200 mg calcium (500 mg) chewable tablet  Commonly known as: TUMS     cefpodoxime 200 MG tablet  Commonly known as: VANTIN  Take 1 tablet (200 mg total) by mouth 2 (two) times daily. for 11 days     DUKE'S SOLUTION (BENADRYL 30 ML, MYLANTA 30 ML, LIDOCAINE 30 ML, NYSTATIN 30 ML)  Take 10 mLs by mouth 4 (four) times daily.     * ELIQUIS 5 mg Tab  Generic drug: apixaban  Take 2 tablets (10 mg total) by mouth 2 (two) times daily for 3 days, THEN Take 1 tablet (5 mg total) by mouth 2 (two) times daily thereafter.     * apixaban 5 mg Tab  Commonly known as: ELIQUIS  Take 1 tablet (5 mg total) by  mouth 2 (two) times daily.     insulin detemir U-100 100 unit/mL (3 mL) Inpn pen  Commonly known as: Levemir FLEXTOUCH  Inject 10 Units into the skin once daily.     LIDOcaine 5 %  Commonly known as: LIDODERM  Place 1 patch onto the skin once daily. Place patch to back. Leave on for 12 hours and remove for 12 hours.     morphine 15 MG 12 hr tablet  Commonly known as: MS CONTIN  Take 1 tablet (15 mg total) by mouth every 12 (twelve) hours. for 15 days     oxyCODONE 5 MG immediate release tablet  Commonly known as: ROXICODONE  Take 1 tablet (5 mg total) by mouth every 4 (four) hours as needed for Pain.     sodium bicarbonate 650 MG tablet  Take 1 tablet (650 mg total) by mouth 2 (two) times daily.         * This list has 2 medication(s) that are the same as other medications prescribed for you. Read the directions carefully, and ask your doctor or other care provider to review them with you.               3. Dee Dee Oconnor MD  [address] [facility] [place] [address]  [phone]    Call on 7/11/2022      [facility] Emergency Department  [address] [place] [address] [id number] [phone]  Go to   If symptoms worsen       _______________________________     Please note that this dictation was completed with M*Gotuit, the computer voice recognition software.  Quite often unanticipated grammatical, syntax, homophones, and other interpretive errors are inadvertently transcribed by the computer software.  Please disregard these errors.  Please excuse any errors that have escaped final proofreading.             Perico Hernández MD  07/09/22 0373

## 2022-07-10 NOTE — HOSPITAL COURSE
"   HPI:  75 yo female with bladder cancer w/ urinary obstruction s/p right sided stenting and left nephrostomy tube placement, HTN, anemia, DM presenting for back pain, found to have right hydronephrosis. Also with UTI symptoms, found to have UTI, started on rocephin. Noted also to have anemia, given pRBCs as well. She was transferred to [facility] for further eval by urology.      Per  note,  "76-year-old female with a history of bladder cancer with urinary obstruction (right-sided ureteral stenting and left-sided nephrostomy tube placement), hypercalcemia, hypertension, urinary tract infection, anemia, diabetes, choledocholithiasis, and prior COVID (July 2021) admitted to [facility] on June 7 with back pain.  She reported burning with urination and right lower extremity swelling.  She had no chest pain or dyspnea.  She was found to have leukocytosis.  Lower extremity ultrasound showed right lower extremity DVT.  She was started on apixaban, Rocephin, and IV fluid.  Packed red blood cells were ordered for her anemia.  Referring provider noted patient has mild edema in her right leg but it is intact from a neurovascular standpoint.  Her urine output and her ostomy output grossly appeared fairly normal today.  She remains hemodynamically stable.  Case discussed with Urology and Interventional Radiology at [facility].  Recommendation is to move patient urgently to [facility] for further evaluation and treatment.  Will transfer to Hospital Medicine service at [facility].  On arrival, will contact Urology and Interventional Radiology.  I spoke with the referring provider, and they will hold apixaban and have the patient in NPO status for now.     She had transurethral section of bladder tumor with right ureteral stent placement on May 9. She had left nephrostomy tube placement May 10. Pathology showed poorly differentiated carcinoma with areas of squamous differentiation, osteoclast like " "GI cells, and classic high-grade papillary     June 8:  White blood cells 28.07, hemoglobin 6.7, hematocrit 21.8, platelets 373, sodium 136, potassium 4.5, chloride 109, CO2 20, BUN 25, creatinine 1.7, glucose 190, total bilirubin 0.9, AST 47, ALT 37, magnesium 2     Repeat CBC at white blood cells 29.19, hemoglobin 6.2, hematocrit 19.9, platelets 376     June 7:  Blood cultures no growth to date  COVID negative, white blood cells 29.35, hemoglobin 6.9, hematocrit 22, platelets 380, sodium 132, potassium 4.2, chloride 104, CO2 19, BUN 26, creatinine 1.8  Urinalysis with 2+ protein, 2+ glucose, 2+ blood, 3+ leukocytes, 10 RBC, greater than 100 WBC  -CT renal stone protocol had right ureteral stent in place with chronic stable moderate right hydronephrosis.  Left-sided percutaneous nephrostomy tube in place with no hydronephrosis.  Large bladder mass consistent with history of malignancy.  Enlarged bilateral pelvic lymph node consistent with metastatic disease.  No acute finding.  -Right lower extremity ultrasound showed DVT extending from the right common femoral vein to the calf veins.     [place] 3:  PET CT scan had irregular wall thickening with associated hypermetabolic activity at the left posterior aspect of the urinary bladder compatible with known primary neoplasm.  Enlarged hypermetabolic lymph nodes along the bilateral pelvic sidewalls suggesting megan metastatic disease.  Multiple cell by 5 mm pulmonary nodules which are below the resolution of PET but new compared with chest CT from May of 2022. "     Patient with multiple metabolic issues including metabolic acidosis, hypercalcemia, mild hyponatremia, elevated alk-phos, all likely consistent with metastatic malignancy.  Low back pain in the setting of nephrostomy tubes        Overview/Hospital Course:     Patient had R nephrostomy tube placed 6/9 and then subsequently repositioned by IR due to leakage. She remained to have poor appetite and hyponatremia as " well as metabolic acidosis.   Patient also with persistent hypercalcemia likely due to malignancy due to elevated PThRP . Was given IVF and calcitonin with minimal response. Oncology  consulted and recommended zometa and follow up with oncology and urology as outpatient as her presentation is most concerning for metastatic bladder cancer.  Back pain at the site of the nephrostomy tube improved with pain medications. Per oncology recommend regimen of:     Chemotherapy     Treatment Summary   Plan Name: OP PEMBROLIZUMAB 400MG Q6W  Treatment Goal: Control  Status: Active  Start Date: 6/16/2022 (Planned)  End Date: 4/18/2024 (Planned)  Provider: Reid Snider MD  Chemotherapy: pembrolizumab (KEYTRUDA) 400 mg in sodium chloride 0.9% 116 mL infusion, 400 mg, Intravenous, Clinic/HOD 1 time, 0 of 17 cycles     She was discharged with urology, radiation oncology and oncology f/u.

## 2022-07-11 NOTE — PHYSICIAN QUERY
PT Name: Karoline Justice  MR #: [id number]     DOCUMENTATION CLARIFICATION      CDS:  Ena Butler RN, BSN  Contact Information:  [email or web address]  This form is a permanent document in the medical record.     Query Date: July 11, 2022    By submitting this query, we are merely seeking further clarification of documentation to reflect the severity of illness of your patient. Please utilize your independent clinical judgment when addressing the question(s) below.    The Medical Record contains the following:     Indicators   Supporting Clinical Findings Location in Medical Record    X Documentation of condition  UTI (urinary tract infection)  7/5 H&P Hematology:  Dr. Kelsey / Dr. Snider    X Urinary Device, Catheter  Right and Left Nephrostomy Tubes  7/5 1:55 PM:  Oncology Nursing Note    X Lab Value(s)      07/04 07/05 07/06 07/07   WBC 25.23  26.60 22.26  26.34           Lab:  7/4 - 7/7    X UA Results    Lab 07/04/22  1701   COLORU Yellow   APPEARANCEUA Cloudy*   PHUR 5.0   SPECGRAV 1.020   PROTEINUA 3+*   GLUCUA Negative   KETONESU Negative   BILIRUBINUA Negative   OCCULTUA 3+*   NITRITE Negative   UROBILINOGEN 1.0   LEUKOCYTESUR 3+*   RBCUA >100*   WBCUA >100*   BACTERIA Moderate*   SQUAMEPITHEL 22   HYALINECASTS >87*        7/5 H&P Hematology:  Dr. Kelsey / Dr. Snider    X Cultures  7/4 17:01  Urine Culture:  Multiple organisms isolated.  None in predominance     Blood Cultures X 2 (17:02 / 18:08) :  No growth after 5 Days  7/4:  Lab        7/4:  Lab    X Treatment/Medication  Rocephin 1 Gm IV    IV Fluid Bolus (Lactated Ringers) 1000 ml ER   IV Fluid Bolus (Normal Saline) 1000 ml ER   Rocephin 2 Gm IV X 1 Dose 7/5 7/6 - 7/7 MAR   7/4:  MAR      7/4:  MAR   7/5:  MAR    X Other  Urinary bladder cancer comlicated by b/l hydronephrosis s/p bilateral PNT.   Of note, she had her left PNT placed May 2022 after presenting to ED with dysuria and decreased UOP. She was then hospitalized again 6/8/2022 for UTI and  urinary obstruction requiring right PNT placement.  Daughter notes today patient's urine was purulent.       Daughter notes today patient's urine was purulent. Patient has been complaining of back pain, unclear if this is orignating from nephrostomy tubes or an open sore on the buttocks         Cream colored drainage to old nephrostomy dressings.   7/5 H&P Hematology:  Dr. Kelsey / Dr. Snider                             7/6 12:37:  Oncology Nurses Note        Provider, please clarify if there is any clinical correlation between UTI and Nephrostomy Tubes.  [   ] Due to or associated with each other   [   ] Unrelated to each other   [   ] Other explanation (please specify): _____________   [ x  ] Clinically undetermined       Please document in your progress notes daily for the duration of treatment until resolved, and include in your discharge summary.    Form No. [id number]

## 2022-07-12 ENCOUNTER — PATIENT MESSAGE (OUTPATIENT)
Dept: HEMATOLOGY/ONCOLOGY | Facility: CLINIC | Age: 77
End: 2022-07-12
Payer: MEDICARE

## 2022-07-12 ENCOUNTER — HOSPITAL ENCOUNTER (EMERGENCY)
Facility: HOSPITAL | Age: 77
Discharge: HOME | End: 2022-07-12
Attending: EMERGENCY MEDICINE
Payer: MEDICARE

## 2022-07-12 VITALS
TEMPERATURE: 98 F | DIASTOLIC BLOOD PRESSURE: 56 MMHG | OXYGEN SATURATION: 100 % | HEIGHT: 61 IN | RESPIRATION RATE: 18 BRPM | BODY MASS INDEX: 19.65 KG/M2 | HEART RATE: 76 BPM | SYSTOLIC BLOOD PRESSURE: 114 MMHG

## 2022-07-12 DIAGNOSIS — K59.00 CONSTIPATION, UNSPECIFIED CONSTIPATION TYPE: Primary | ICD-10-CM

## 2022-07-12 DIAGNOSIS — K59.00 CONSTIPATION: ICD-10-CM

## 2022-07-12 PROCEDURE — 25000003 PHARM REV CODE 250: Performed by: EMERGENCY MEDICINE

## 2022-07-12 PROCEDURE — 96375 TX/PRO/DX INJ NEW DRUG ADDON: CPT

## 2022-07-12 PROCEDURE — 96361 HYDRATE IV INFUSION ADD-ON: CPT

## 2022-07-12 PROCEDURE — 96374 THER/PROPH/DIAG INJ IV PUSH: CPT

## 2022-07-12 PROCEDURE — 99284 EMERGENCY DEPT VISIT MOD MDM: CPT | Mod: 25

## 2022-07-12 PROCEDURE — 63600175 PHARM REV CODE 636 W HCPCS: Performed by: EMERGENCY MEDICINE

## 2022-07-12 RX ORDER — HYDROMORPHONE HYDROCHLORIDE 1 MG/ML
0.5 INJECTION, SOLUTION INTRAMUSCULAR; INTRAVENOUS; SUBCUTANEOUS
Status: COMPLETED | OUTPATIENT
Start: 2022-07-12 | End: 2022-07-12

## 2022-07-12 RX ORDER — LACTULOSE 10 G/15ML
10 SOLUTION ORAL
Status: COMPLETED | OUTPATIENT
Start: 2022-07-12 | End: 2022-07-12

## 2022-07-12 RX ORDER — GLYCERIN 1 G/1
1 SUPPOSITORY RECTAL
Qty: 24 SUPPOSITORY | Refills: 0 | Status: ON HOLD | OUTPATIENT
Start: 2022-07-12 | End: 2022-07-22 | Stop reason: HOSPADM

## 2022-07-12 RX ORDER — ONDANSETRON 2 MG/ML
4 INJECTION INTRAMUSCULAR; INTRAVENOUS
Status: COMPLETED | OUTPATIENT
Start: 2022-07-12 | End: 2022-07-12

## 2022-07-12 RX ORDER — LACTULOSE 10 G/15ML
10 SOLUTION ORAL EVERY 6 HOURS PRN
Qty: 450 ML | Refills: 0 | Status: ON HOLD | OUTPATIENT
Start: 2022-07-12 | End: 2022-07-22 | Stop reason: HOSPADM

## 2022-07-12 RX ADMIN — SODIUM CHLORIDE 1000 ML: 0.9 INJECTION, SOLUTION INTRAVENOUS at 04:07

## 2022-07-12 RX ADMIN — LACTULOSE 10 G: 20 SOLUTION ORAL at 05:07

## 2022-07-12 RX ADMIN — HYDROMORPHONE HYDROCHLORIDE 0.5 MG: 1 INJECTION, SOLUTION INTRAMUSCULAR; INTRAVENOUS; SUBCUTANEOUS at 04:07

## 2022-07-12 RX ADMIN — ONDANSETRON 4 MG: 2 INJECTION INTRAMUSCULAR; INTRAVENOUS at 04:07

## 2022-07-12 NOTE — ED PROVIDER NOTES
Encounter Date: 7/12/2022       History     Chief Complaint   Patient presents with    Abdominal Pain     Per EMS, pt from home with c/o Bladder Ca Stage II that is having increased pain, only took Tylenol at 1230 today.     75 yo female with history of untreated bladder CA with bilateral nephrostomy tubes is here via EMS with complaint of diffuse abd pain, constipation and the urge to defecate. No fever. No vomiting. Tolerating PO. Patient and daughter uncertain when last BM, but report it's probably been over one week. Has been taking some opiate pain medications. Today only tylenol. No aggravating or alleviating factors. No known sick contacts.         Review of patient's allergies indicates:  No Known Allergies  Past Medical History:   Diagnosis Date    Bladder cancer 05/2022    Bladder mass 05/06/2022    Choledocholithiasis 05/06/2022    Diabetes mellitus     Pneumonia due to COVID-19 virus 08/06/2021     Past Surgical History:   Procedure Laterality Date    CYSTOSCOPY N/A 05/09/2022    Procedure: CYSTOSCOPY;  Surgeon: Adrianna Gutierrez MD;  Location: [facility] OR [address] [place] [address];  Service: Urology;  Laterality: N/A;    NEPHROSTOMY Left 05/2022    RETROGRADE PYELOGRAPHY Right 05/09/2022    Procedure: PYELOGRAM, RETROGRADE;  Surgeon: Adrianna Gutierrez MD;  Location: [facility] OR [address] [place] [address];  Service: Urology;  Laterality: Right;     Family History   Problem Relation Age of Onset    Diabetes Mother     Stomach cancer Mother     Heart attack Father     Diabetes Daughter     Thyroid disease Daughter     Diabetes Son     Kidney cancer Son      Social History     Tobacco Use    Smoking status: Never Smoker    Smokeless tobacco: Never Used   Substance Use Topics    Alcohol use: No    Drug use: No     Review of Systems   Constitutional: Positive for fatigue.   Respiratory: Negative.    Cardiovascular: Negative.    Gastrointestinal: Positive for abdominal pain and constipation. Negative for vomiting.   All other systems  reviewed and are negative.      Physical Exam     Initial Vitals   BP Pulse Resp Temp SpO2   07/12/22 1514 07/12/22 1514 07/12/22 1514 07/12/22 1516 07/12/22 1514   (!) 114/56 76 20 98.1 °F (36.7 °C) 100 %      MAP       --                Physical Exam    Nursing note and vitals reviewed.  Constitutional: She is not diaphoretic. No distress.   Frail, ill appearing.    HENT:   Head: Normocephalic and atraumatic.   Dry oral mucosa   Eyes: EOM are normal. Pupils are equal, round, and reactive to light.   Neck: Neck supple.   Normal range of motion.  Cardiovascular: Normal rate, regular rhythm and intact distal pulses.   Pulmonary/Chest: Breath sounds normal. No respiratory distress. She has no wheezes. She has no rales.   Abdominal: Abdomen is soft. Bowel sounds are normal. She exhibits no distension. There is no abdominal tenderness. There is no rebound and no guarding.   Musculoskeletal:         General: No tenderness or edema. Normal range of motion.      Cervical back: Normal range of motion and neck supple.     Neurological: She is alert and oriented to person, place, and time.   Skin: Skin is warm and dry. Capillary refill takes less than 2 seconds. No rash noted.         ED Course   Procedures  Labs Reviewed - No data to display       Imaging Results          X-Ray Abdomen AP 1 View (KUB) (In process)                  Medications   lactulose 20 gram/30 mL solution Soln 10 g (has no administration in time range)   sodium chloride 0.9% bolus 1,000 mL (1,000 mLs Intravenous New Bag 7/12/22 1611)   ondansetron injection 4 mg (4 mg Intravenous Given 7/12/22 1612)   HYDROmorphone injection 0.5 mg (0.5 mg Intravenous Given 7/12/22 1611)     Medical Decision Making:   Clinical Tests:   Radiological Study: Ordered and Reviewed                      Clinical Impression:   Final diagnoses:  [K59.00] Constipation  [K59.00] Constipation, unspecified constipation type (Primary)          ED Disposition Condition    Discharge  Stable        ED Prescriptions     Medication Sig Dispense Start Date End Date Auth. Provider    lactulose (CHRONULAC) 20 gram/30 mL Soln Take 15 mLs (10 g total) by mouth every 6 (six) hours as needed (constipation). 450 mL 7/12/2022  Reid Stephens MD    glycerin adult suppository Place 1 suppository rectally as needed for Constipation. 24 suppository 7/12/2022  Reid Stephens MD        Follow-up Information     Follow up With Specialties Details Why Contact Info    Dee Dee Oconnor MD Family Medicine Schedule an appointment as soon as possible for a visit   [address] [place] [address] [facility] [address]  [phone]             Reid Stephens MD  07/13/22 0606

## 2022-07-13 ENCOUNTER — TELEPHONE (OUTPATIENT)
Dept: UROLOGY | Facility: HOSPITAL | Age: 77
End: 2022-07-13
Payer: MEDICARE

## 2022-07-13 ENCOUNTER — PATIENT MESSAGE (OUTPATIENT)
Dept: UROLOGY | Facility: CLINIC | Age: 77
End: 2022-07-13
Payer: MEDICARE

## 2022-07-13 ENCOUNTER — TELEPHONE (OUTPATIENT)
Dept: UROLOGY | Facility: CLINIC | Age: 77
End: 2022-07-13
Payer: MEDICARE

## 2022-07-13 NOTE — TELEPHONE ENCOUNTER
I spoke with patient's son, who agreed to new cysto appt with urology residents.    ----- Message from Ramos Rincon MD sent at 7/13/2022  2:18 PM CDT -----  Patient is scheduled for stent removal in resident clinic this Friday. Can we move this back 1-2 weeks?    Thanks,  PAUL Rincon MD  Urology, PGY-5  Pager: [phone]

## 2022-07-13 NOTE — TELEPHONE ENCOUNTER
I spoke with patient's daugther and healthcare power of , Valentine Swenson, about patient's appointment for stent removal on 7/15/22. They would like to push procedure back 1-2 weeks. I will message to have this moved back.    PAUL Rincon MD  Urology, PGY-5  [facility]

## 2022-07-14 ENCOUNTER — HOSPITAL ENCOUNTER (EMERGENCY)
Facility: HOSPITAL | Age: 77
Discharge: ANOTHER HOSPITAL | End: 2022-07-15
Attending: EMERGENCY MEDICINE
Payer: MEDICARE

## 2022-07-14 DIAGNOSIS — D72.829 LEUKOCYTOSIS, UNSPECIFIED TYPE: ICD-10-CM

## 2022-07-14 DIAGNOSIS — C67.9 MALIGNANT NEOPLASM OF URINARY BLADDER, UNSPECIFIED SITE: ICD-10-CM

## 2022-07-14 DIAGNOSIS — R31.9 URINARY TRACT INFECTION WITH HEMATURIA, SITE UNSPECIFIED: ICD-10-CM

## 2022-07-14 DIAGNOSIS — N39.0 URINARY TRACT INFECTION WITH HEMATURIA, SITE UNSPECIFIED: ICD-10-CM

## 2022-07-14 DIAGNOSIS — R53.1 WEAKNESS: Primary | ICD-10-CM

## 2022-07-14 LAB
ALBUMIN SERPL BCP-MCNC: 1.4 G/DL (ref 3.5–5.2)
ALP SERPL-CCNC: 202 U/L (ref 55–135)
ALT SERPL W/O P-5'-P-CCNC: 22 U/L (ref 10–44)
ANION GAP SERPL CALC-SCNC: 8 MMOL/L (ref 8–16)
AST SERPL-CCNC: 16 U/L (ref 10–40)
BILIRUB SERPL-MCNC: 0.5 MG/DL (ref 0.1–1)
BUN SERPL-MCNC: 23 MG/DL (ref 8–23)
CALCIUM SERPL-MCNC: 9.2 MG/DL (ref 8.7–10.5)
CHLORIDE SERPL-SCNC: 102 MMOL/L (ref 95–110)
CO2 SERPL-SCNC: 22 MMOL/L (ref 23–29)
CREAT SERPL-MCNC: 1.7 MG/DL (ref 0.5–1.4)
EST. GFR  (AFRICAN AMERICAN): 33.3 ML/MIN/1.73 M^2
EST. GFR  (NON AFRICAN AMERICAN): 28.9 ML/MIN/1.73 M^2
GLUCOSE SERPL-MCNC: 260 MG/DL (ref 70–110)
LACTATE SERPL-SCNC: 2.7 MMOL/L (ref 0.5–2.2)
LIPASE SERPL-CCNC: 30 U/L (ref 23–300)
POTASSIUM SERPL-SCNC: 4 MMOL/L (ref 3.5–5.1)
PROT SERPL-MCNC: 5.3 G/DL (ref 6–8.4)
SODIUM SERPL-SCNC: 132 MMOL/L (ref 136–145)

## 2022-07-14 PROCEDURE — 85027 COMPLETE CBC AUTOMATED: CPT | Performed by: EMERGENCY MEDICINE

## 2022-07-14 PROCEDURE — 80053 COMPREHEN METABOLIC PANEL: CPT | Performed by: EMERGENCY MEDICINE

## 2022-07-14 PROCEDURE — 81000 URINALYSIS NONAUTO W/SCOPE: CPT | Performed by: EMERGENCY MEDICINE

## 2022-07-14 PROCEDURE — 96365 THER/PROPH/DIAG IV INF INIT: CPT

## 2022-07-14 PROCEDURE — 83690 ASSAY OF LIPASE: CPT | Performed by: EMERGENCY MEDICINE

## 2022-07-14 PROCEDURE — 87086 URINE CULTURE/COLONY COUNT: CPT | Performed by: EMERGENCY MEDICINE

## 2022-07-14 PROCEDURE — 83605 ASSAY OF LACTIC ACID: CPT | Performed by: EMERGENCY MEDICINE

## 2022-07-14 PROCEDURE — 87040 BLOOD CULTURE FOR BACTERIA: CPT | Mod: 59 | Performed by: EMERGENCY MEDICINE

## 2022-07-14 PROCEDURE — 36415 COLL VENOUS BLD VENIPUNCTURE: CPT | Performed by: EMERGENCY MEDICINE

## 2022-07-14 PROCEDURE — 96375 TX/PRO/DX INJ NEW DRUG ADDON: CPT

## 2022-07-14 PROCEDURE — 99285 EMERGENCY DEPT VISIT HI MDM: CPT | Mod: 25

## 2022-07-14 PROCEDURE — 85007 BL SMEAR W/DIFF WBC COUNT: CPT | Performed by: EMERGENCY MEDICINE

## 2022-07-14 RX ORDER — FENTANYL CITRATE 50 UG/ML
50 INJECTION, SOLUTION INTRAMUSCULAR; INTRAVENOUS
Status: COMPLETED | OUTPATIENT
Start: 2022-07-14 | End: 2022-07-15

## 2022-07-15 ENCOUNTER — HOSPITAL ENCOUNTER (INPATIENT)
Facility: HOSPITAL | Age: 77
LOS: 8 days | Discharge: HOSPICE | DRG: 872 | End: 2022-07-23
Attending: INTERNAL MEDICINE | Admitting: INTERNAL MEDICINE
Payer: MEDICARE

## 2022-07-15 VITALS
BODY MASS INDEX: 22.09 KG/M2 | WEIGHT: 117 LBS | HEART RATE: 74 BPM | TEMPERATURE: 98 F | DIASTOLIC BLOOD PRESSURE: 58 MMHG | RESPIRATION RATE: 15 BRPM | SYSTOLIC BLOOD PRESSURE: 120 MMHG | HEIGHT: 61 IN | OXYGEN SATURATION: 99 %

## 2022-07-15 DIAGNOSIS — Z71.89 GOALS OF CARE, COUNSELING/DISCUSSION: ICD-10-CM

## 2022-07-15 DIAGNOSIS — R10.9 INTRACTABLE ABDOMINAL PAIN: ICD-10-CM

## 2022-07-15 DIAGNOSIS — Z71.89 ADVANCE CARE PLANNING: ICD-10-CM

## 2022-07-15 DIAGNOSIS — E87.1 HYPONATREMIA: ICD-10-CM

## 2022-07-15 DIAGNOSIS — R32 INCONTINENCE ASSOCIATED DERMATITIS: ICD-10-CM

## 2022-07-15 DIAGNOSIS — N17.9 AKI (ACUTE KIDNEY INJURY): Primary | ICD-10-CM

## 2022-07-15 DIAGNOSIS — R52 PAIN: ICD-10-CM

## 2022-07-15 DIAGNOSIS — Z51.5 PALLIATIVE CARE ENCOUNTER: ICD-10-CM

## 2022-07-15 DIAGNOSIS — R07.9 CHEST PAIN: ICD-10-CM

## 2022-07-15 DIAGNOSIS — L25.8 INCONTINENCE ASSOCIATED DERMATITIS: ICD-10-CM

## 2022-07-15 DIAGNOSIS — A41.9 SEPSIS: ICD-10-CM

## 2022-07-15 DIAGNOSIS — K52.9 COLITIS: ICD-10-CM

## 2022-07-15 DIAGNOSIS — C67.9 MALIGNANT NEOPLASM OF URINARY BLADDER, UNSPECIFIED SITE: ICD-10-CM

## 2022-07-15 LAB
BACTERIA #/AREA URNS HPF: ABNORMAL /HPF
BACTERIA #/AREA URNS HPF: ABNORMAL /HPF
BASOPHILS # BLD AUTO: ABNORMAL K/UL (ref 0–0.2)
BASOPHILS NFR BLD: 0 % (ref 0–1.9)
BILIRUB UR QL STRIP: ABNORMAL
BILIRUB UR QL STRIP: ABNORMAL
C DIFF GDH STL QL: POSITIVE
C DIFF TOX A+B STL QL IA: POSITIVE
CLARITY UR: ABNORMAL
CLARITY UR: ABNORMAL
COLOR UR: ABNORMAL
COLOR UR: ABNORMAL
DIFFERENTIAL METHOD: ABNORMAL
EOSINOPHIL # BLD AUTO: ABNORMAL K/UL (ref 0–0.5)
EOSINOPHIL NFR BLD: 0 % (ref 0–8)
ERYTHROCYTE [DISTWIDTH] IN BLOOD BY AUTOMATED COUNT: 15.9 % (ref 11.5–14.5)
GLUCOSE UR QL STRIP: ABNORMAL
GLUCOSE UR QL STRIP: NEGATIVE
HCT VFR BLD AUTO: 22.1 % (ref 37–48.5)
HGB BLD-MCNC: 7 G/DL (ref 12–16)
HGB UR QL STRIP: ABNORMAL
HGB UR QL STRIP: ABNORMAL
HYALINE CASTS #/AREA URNS LPF: 62 /LPF
HYALINE CASTS #/AREA URNS LPF: >87 /LPF
IMM GRANULOCYTES # BLD AUTO: ABNORMAL K/UL (ref 0–0.04)
IMM GRANULOCYTES NFR BLD AUTO: ABNORMAL % (ref 0–0.5)
KETONES UR QL STRIP: ABNORMAL
KETONES UR QL STRIP: NEGATIVE
LACTATE SERPL-SCNC: 1.4 MMOL/L (ref 0.5–2.2)
LEUKOCYTE ESTERASE UR QL STRIP: ABNORMAL
LEUKOCYTE ESTERASE UR QL STRIP: ABNORMAL
LYMPHOCYTES # BLD AUTO: ABNORMAL K/UL (ref 1–4.8)
LYMPHOCYTES NFR BLD: 3 % (ref 18–48)
MAGNESIUM SERPL-MCNC: 1.5 MG/DL (ref 1.6–2.6)
MCH RBC QN AUTO: 26.9 PG (ref 27–31)
MCHC RBC AUTO-ENTMCNC: 31.7 G/DL (ref 32–36)
MCV RBC AUTO: 85 FL (ref 82–98)
MICROSCOPIC COMMENT: ABNORMAL
MICROSCOPIC COMMENT: ABNORMAL
MONOCYTES # BLD AUTO: ABNORMAL K/UL (ref 0.3–1)
MONOCYTES NFR BLD: 1 % (ref 4–15)
NEUTROPHILS NFR BLD: 81 % (ref 38–73)
NEUTS BAND NFR BLD MANUAL: 15 %
NITRITE UR QL STRIP: NEGATIVE
NITRITE UR QL STRIP: NEGATIVE
NRBC BLD-RTO: 0 /100 WBC
PH UR STRIP: 6 [PH] (ref 5–8)
PH UR STRIP: 6 [PH] (ref 5–8)
PLATELET # BLD AUTO: 354 K/UL (ref 150–450)
PMV BLD AUTO: 9.6 FL (ref 9.2–12.9)
POCT GLUCOSE: 180 MG/DL (ref 70–110)
POCT GLUCOSE: 200 MG/DL (ref 70–110)
PROT UR QL STRIP: ABNORMAL
PROT UR QL STRIP: ABNORMAL
RBC # BLD AUTO: 2.6 M/UL (ref 4–5.4)
RBC #/AREA URNS HPF: 75 /HPF (ref 0–4)
RBC #/AREA URNS HPF: >100 /HPF (ref 0–4)
SP GR UR STRIP: 1.01 (ref 1–1.03)
SP GR UR STRIP: 1.01 (ref 1–1.03)
SQUAMOUS #/AREA URNS HPF: 35 /HPF
SQUAMOUS #/AREA URNS HPF: 4 /HPF
URN SPEC COLLECT METH UR: ABNORMAL
URN SPEC COLLECT METH UR: ABNORMAL
UROBILINOGEN UR STRIP-ACNC: 1 EU/DL
UROBILINOGEN UR STRIP-ACNC: NEGATIVE EU/DL
WBC # BLD AUTO: 38.66 K/UL (ref 3.9–12.7)
WBC #/AREA STL HPF: ABNORMAL /[HPF]
WBC #/AREA URNS HPF: >100 /HPF (ref 0–5)
WBC #/AREA URNS HPF: >100 /HPF (ref 0–5)
WBC CLUMPS URNS QL MICRO: ABNORMAL
YEAST URNS QL MICRO: ABNORMAL
YEAST URNS QL MICRO: ABNORMAL

## 2022-07-15 PROCEDURE — 87045 FECES CULTURE AEROBIC BACT: CPT | Performed by: STUDENT IN AN ORGANIZED HEALTH CARE EDUCATION/TRAINING PROGRAM

## 2022-07-15 PROCEDURE — 87449 NOS EACH ORGANISM AG IA: CPT | Performed by: STUDENT IN AN ORGANIZED HEALTH CARE EDUCATION/TRAINING PROGRAM

## 2022-07-15 PROCEDURE — 27000207 HC ISOLATION

## 2022-07-15 PROCEDURE — 87177 OVA AND PARASITES SMEARS: CPT | Performed by: STUDENT IN AN ORGANIZED HEALTH CARE EDUCATION/TRAINING PROGRAM

## 2022-07-15 PROCEDURE — 94760 N-INVAS EAR/PLS OXIMETRY 1: CPT

## 2022-07-15 PROCEDURE — 25000003 PHARM REV CODE 250: Performed by: STUDENT IN AN ORGANIZED HEALTH CARE EDUCATION/TRAINING PROGRAM

## 2022-07-15 PROCEDURE — 87077 CULTURE AEROBIC IDENTIFY: CPT | Performed by: EMERGENCY MEDICINE

## 2022-07-15 PROCEDURE — 83605 ASSAY OF LACTIC ACID: CPT | Performed by: INTERNAL MEDICINE

## 2022-07-15 PROCEDURE — 63600175 PHARM REV CODE 636 W HCPCS: Performed by: STUDENT IN AN ORGANIZED HEALTH CARE EDUCATION/TRAINING PROGRAM

## 2022-07-15 PROCEDURE — 99497 ADVNCD CARE PLAN 30 MIN: CPT | Mod: 25,,, | Performed by: CLINICAL NURSE SPECIALIST

## 2022-07-15 PROCEDURE — 87209 SMEAR COMPLEX STAIN: CPT | Performed by: STUDENT IN AN ORGANIZED HEALTH CARE EDUCATION/TRAINING PROGRAM

## 2022-07-15 PROCEDURE — 87088 URINE BACTERIA CULTURE: CPT | Performed by: EMERGENCY MEDICINE

## 2022-07-15 PROCEDURE — 99223 PR INITIAL HOSPITAL CARE,LEVL III: ICD-10-PCS | Mod: ,,, | Performed by: INTERNAL MEDICINE

## 2022-07-15 PROCEDURE — 97161 PT EVAL LOW COMPLEX 20 MIN: CPT

## 2022-07-15 PROCEDURE — 97166 OT EVAL MOD COMPLEX 45 MIN: CPT

## 2022-07-15 PROCEDURE — 81000 URINALYSIS NONAUTO W/SCOPE: CPT | Performed by: EMERGENCY MEDICINE

## 2022-07-15 PROCEDURE — 87186 SC STD MICRODIL/AGAR DIL: CPT | Performed by: EMERGENCY MEDICINE

## 2022-07-15 PROCEDURE — 97112 NEUROMUSCULAR REEDUCATION: CPT

## 2022-07-15 PROCEDURE — 83735 ASSAY OF MAGNESIUM: CPT | Performed by: INTERNAL MEDICINE

## 2022-07-15 PROCEDURE — 89055 LEUKOCYTE ASSESSMENT FECAL: CPT | Performed by: STUDENT IN AN ORGANIZED HEALTH CARE EDUCATION/TRAINING PROGRAM

## 2022-07-15 PROCEDURE — 87046 STOOL CULTR AEROBIC BACT EA: CPT | Performed by: STUDENT IN AN ORGANIZED HEALTH CARE EDUCATION/TRAINING PROGRAM

## 2022-07-15 PROCEDURE — 87040 BLOOD CULTURE FOR BACTERIA: CPT | Mod: 59 | Performed by: INTERNAL MEDICINE

## 2022-07-15 PROCEDURE — 99497 PR ADVNCD CARE PLAN 30 MIN: ICD-10-PCS | Mod: 25,,, | Performed by: CLINICAL NURSE SPECIALIST

## 2022-07-15 PROCEDURE — 25000003 PHARM REV CODE 250: Performed by: EMERGENCY MEDICINE

## 2022-07-15 PROCEDURE — 87427 SHIGA-LIKE TOXIN AG IA: CPT | Mod: 59 | Performed by: STUDENT IN AN ORGANIZED HEALTH CARE EDUCATION/TRAINING PROGRAM

## 2022-07-15 PROCEDURE — 97530 THERAPEUTIC ACTIVITIES: CPT

## 2022-07-15 PROCEDURE — 63600175 PHARM REV CODE 636 W HCPCS: Performed by: EMERGENCY MEDICINE

## 2022-07-15 PROCEDURE — 20600001 HC STEP DOWN PRIVATE ROOM

## 2022-07-15 PROCEDURE — 99223 PR INITIAL HOSPITAL CARE,LEVL III: ICD-10-PCS | Mod: ,,, | Performed by: CLINICAL NURSE SPECIALIST

## 2022-07-15 PROCEDURE — 99223 1ST HOSP IP/OBS HIGH 75: CPT | Mod: ,,, | Performed by: CLINICAL NURSE SPECIALIST

## 2022-07-15 PROCEDURE — 97116 GAIT TRAINING THERAPY: CPT

## 2022-07-15 PROCEDURE — 99223 1ST HOSP IP/OBS HIGH 75: CPT | Mod: ,,, | Performed by: INTERNAL MEDICINE

## 2022-07-15 PROCEDURE — 87086 URINE CULTURE/COLONY COUNT: CPT | Performed by: EMERGENCY MEDICINE

## 2022-07-15 RX ORDER — IBUPROFEN 200 MG
16 TABLET ORAL
Status: DISCONTINUED | OUTPATIENT
Start: 2022-07-15 | End: 2022-07-22

## 2022-07-15 RX ORDER — SODIUM CHLORIDE 9 MG/ML
INJECTION, SOLUTION INTRAVENOUS CONTINUOUS
Status: ACTIVE | OUTPATIENT
Start: 2022-07-15 | End: 2022-07-15

## 2022-07-15 RX ORDER — HYDROMORPHONE HYDROCHLORIDE 1 MG/ML
INJECTION, SOLUTION INTRAMUSCULAR; INTRAVENOUS; SUBCUTANEOUS
Status: DISPENSED
Start: 2022-07-15 | End: 2022-07-15

## 2022-07-15 RX ORDER — IBUPROFEN 200 MG
24 TABLET ORAL
Status: DISCONTINUED | OUTPATIENT
Start: 2022-07-15 | End: 2022-07-22

## 2022-07-15 RX ORDER — HYDROMORPHONE HYDROCHLORIDE 1 MG/ML
0.5 INJECTION, SOLUTION INTRAMUSCULAR; INTRAVENOUS; SUBCUTANEOUS EVERY 6 HOURS PRN
Status: DISCONTINUED | OUTPATIENT
Start: 2022-07-15 | End: 2022-07-17

## 2022-07-15 RX ORDER — SODIUM CHLORIDE 9 MG/ML
INJECTION, SOLUTION INTRAVENOUS CONTINUOUS
Status: DISCONTINUED | OUTPATIENT
Start: 2022-07-15 | End: 2022-07-15

## 2022-07-15 RX ORDER — SODIUM CHLORIDE 0.9 % (FLUSH) 0.9 %
10 SYRINGE (ML) INJECTION EVERY 12 HOURS PRN
Status: DISCONTINUED | OUTPATIENT
Start: 2022-07-15 | End: 2022-07-23 | Stop reason: HOSPADM

## 2022-07-15 RX ORDER — GLUCAGON 1 MG
1 KIT INJECTION
Status: DISCONTINUED | OUTPATIENT
Start: 2022-07-15 | End: 2022-07-22

## 2022-07-15 RX ORDER — MEROPENEM AND SODIUM CHLORIDE 1 G/50ML
1 INJECTION, SOLUTION INTRAVENOUS
Status: COMPLETED | OUTPATIENT
Start: 2022-07-15 | End: 2022-07-15

## 2022-07-15 RX ORDER — INSULIN ASPART 100 [IU]/ML
0-5 INJECTION, SOLUTION INTRAVENOUS; SUBCUTANEOUS
Status: DISCONTINUED | OUTPATIENT
Start: 2022-07-15 | End: 2022-07-22

## 2022-07-15 RX ORDER — NALOXONE HCL 0.4 MG/ML
0.02 VIAL (ML) INJECTION
Status: DISCONTINUED | OUTPATIENT
Start: 2022-07-15 | End: 2022-07-23 | Stop reason: HOSPADM

## 2022-07-15 RX ORDER — MAGNESIUM SULFATE HEPTAHYDRATE 40 MG/ML
2 INJECTION, SOLUTION INTRAVENOUS ONCE
Status: COMPLETED | OUTPATIENT
Start: 2022-07-15 | End: 2022-07-15

## 2022-07-15 RX ADMIN — FENTANYL CITRATE 50 MCG: 50 INJECTION INTRAMUSCULAR; INTRAVENOUS at 12:07

## 2022-07-15 RX ADMIN — SODIUM CHLORIDE: 0.9 INJECTION, SOLUTION INTRAVENOUS at 06:07

## 2022-07-15 RX ADMIN — MAGNESIUM SULFATE HEPTAHYDRATE 2 G: 40 INJECTION, SOLUTION INTRAVENOUS at 10:07

## 2022-07-15 RX ADMIN — PIPERACILLIN SODIUM AND TAZOBACTAM SODIUM 4.5 G: 4; .5 INJECTION, POWDER, LYOPHILIZED, FOR SOLUTION INTRAVENOUS at 06:07

## 2022-07-15 RX ADMIN — SODIUM CHLORIDE: 0.9 INJECTION, SOLUTION INTRAVENOUS at 01:07

## 2022-07-15 RX ADMIN — SODIUM CHLORIDE, SODIUM LACTATE, POTASSIUM CHLORIDE, AND CALCIUM CHLORIDE 500 ML: .6; .31; .03; .02 INJECTION, SOLUTION INTRAVENOUS at 09:07

## 2022-07-15 RX ADMIN — HYDROMORPHONE HYDROCHLORIDE 0.5 MG: 1 INJECTION, SOLUTION INTRAMUSCULAR; INTRAVENOUS; SUBCUTANEOUS at 10:07

## 2022-07-15 RX ADMIN — PIPERACILLIN SODIUM AND TAZOBACTAM SODIUM 4.5 G: 4; .5 INJECTION, POWDER, LYOPHILIZED, FOR SOLUTION INTRAVENOUS at 05:07

## 2022-07-15 RX ADMIN — SODIUM CHLORIDE 500 ML: 0.9 INJECTION, SOLUTION INTRAVENOUS at 12:07

## 2022-07-15 RX ADMIN — MEROPENEM AND SODIUM CHLORIDE 1 G: 1 INJECTION, SOLUTION INTRAVENOUS at 12:07

## 2022-07-15 RX ADMIN — APIXABAN 5 MG: 5 TABLET, FILM COATED ORAL at 10:07

## 2022-07-15 RX ADMIN — HYDROMORPHONE HYDROCHLORIDE 0.5 MG: 1 INJECTION, SOLUTION INTRAMUSCULAR; INTRAVENOUS; SUBCUTANEOUS at 05:07

## 2022-07-15 RX ADMIN — APIXABAN 5 MG: 5 TABLET, FILM COATED ORAL at 09:07

## 2022-07-15 RX ADMIN — SODIUM CHLORIDE 1000 ML: 0.9 INJECTION, SOLUTION INTRAVENOUS at 08:07

## 2022-07-15 RX ADMIN — SODIUM CHLORIDE 1000 ML: 0.9 INJECTION, SOLUTION INTRAVENOUS at 12:07

## 2022-07-15 RX ADMIN — SODIUM CHLORIDE: 0.9 INJECTION, SOLUTION INTRAVENOUS at 10:07

## 2022-07-15 NOTE — ED PROVIDER NOTES
Encounter Date: 7/14/2022       History     Chief Complaint   Patient presents with    Weakness     Weakness, lower abd pain, nausea diarrhea x 3 days.      This is a 76-year-old female with a history of untreated bladder cancer, bilateral nephrostomy tubes, diabetes mellitus presents to the emergency department with continued abdominal pain and lower back pain.  Was seen here 2 days ago and diagnosed with constipation, has been off of her pain medications due to constipation, complaining of pain.  No fever.  No other issues.  No vomiting, has been having some diarrhea for the past 2 days as well.  She is alert oriented x4, GCS is 15    Previous HPI from July 5th admit and transfer:    HPI: 76 year old female with PMH urinary bladder cancer, treated by Dr. Snider, comlicated by b/l hydronephrosis s/p bilateral PNT, recent RLE DVT on Eliquis, T2DM, hypercalcemia, htn presenting for hallucinations, confusion, and decreased UOP. Of note, she had her left PNT placed May 2022 after presenting to ED with dysuria and decreased UOP. She was then hospitalized again 6/8/2022 for UTI and urinary obstruction requiring right PNT placement. History gathered from patient and daughter at bedside. She initially presented to an outside facility 7/4, and transfered to [facility] for further treatment. Daughter notes that pts urine output has been cloudy intermittently since PNT placement, and she noticed blood in the urine on Saturday 7/2. Since then, patient become more disoriented and confused. Daughter notes today patient's urine was purulent. Patient has been complaining of back pain, unclear if this is orignating from nephrostomy tubes or an open sore on the buttocks. No fevers at home.      AF, regular heart rate, slightly hypertensive at 133/60. Labs pertinent for WBC 26.6, hgb 8.1, BUN 18, Cr 1.2, glucose 198, Ca 12.2 13.88 corrected. UA performed in outside facility pertinent for UT - cloudy, 3+ blood, >100 RBCs and WBCs. UA  pending.  Of note, urine culture collected 5/5/2022 positive for viridans strep.  Blood cultures no growth to date.     Oncologic History  Bladder cancer - cT3, cM1. Followed by Dr. Snider. Diagnosed in 5/2022. Planned Keytruda 400mg q6, has not begun yet            * No surgery found *      Hospital Course: 76 year old female admitted for AMS and weakness in the setting of urothelial bladder cancer s/p bilateral nephrostomy tubes. UA pertinent for UTI. Started on ceftriaxone 1g daily empirically.  Urince culture grows many organisms, none in predominance. Blood cultures negative. Afebrile throughout hospitalization. Completed three days of ceftriaxone treatment. Retroperitoneal US pertinent for mild right-sided hydronephrosis and bladder mass upto 10.1 cm. Hypercalcemia maganged with NS, 90mg IV pamidronate, and 4 doses calcitonin. Hypercalcemia resolved. Potassium and phosphorous replaced. Pain controlled with morphine 5mg BID shceduled and oxycodone 5mg q4 prn. PT, OT, Palliative care consulted. She was found to have a sacral pressure wound - wound care consulted. Throughout admission, clinical status improved. Patients mentation returned to baseline. To be discharged with 11 days Cefpodoxime for a full 14 day course, morphine, oxycodone, and home health. To begin outpatient cancer treatment tomorrow, 7/8.           Review of patient's allergies indicates:  No Known Allergies  Past Medical History:   Diagnosis Date    Bladder cancer 05/2022    Bladder mass 05/06/2022    Choledocholithiasis 05/06/2022    Diabetes mellitus     Pneumonia due to COVID-19 virus 08/06/2021     Past Surgical History:   Procedure Laterality Date    CYSTOSCOPY N/A 05/09/2022    Procedure: CYSTOSCOPY;  Surgeon: Adrianna Gutierrez MD;  Location: [facility] OR [address] [place] [address];  Service: Urology;  Laterality: N/A;    NEPHROSTOMY Left 05/2022    RETROGRADE PYELOGRAPHY Right 05/09/2022    Procedure: PYELOGRAM, RETROGRADE;  Surgeon: Adrianna Gutierrez  MD;  Location: [facility] OR [address] [place] [address];  Service: Urology;  Laterality: Right;     Family History   Problem Relation Age of Onset    Diabetes Mother     Stomach cancer Mother     Heart attack Father     Diabetes Daughter     Thyroid disease Daughter     Diabetes Son     Kidney cancer Son      Social History     Tobacco Use    Smoking status: Never Smoker    Smokeless tobacco: Never Used   Substance Use Topics    Alcohol use: No    Drug use: No     Review of Systems   Constitutional: Negative for fever.   HENT: Negative for sore throat.    Respiratory: Negative for shortness of breath.    Cardiovascular: Negative for chest pain.   Gastrointestinal: Positive for abdominal pain and nausea.   Genitourinary: Negative for dysuria.   Musculoskeletal: Positive for back pain.   Skin: Negative for rash.   Neurological: Negative for weakness.   Hematological: Does not bruise/bleed easily.   All other systems reviewed and are negative.      Physical Exam     Initial Vitals [07/14/22 2258]   BP Pulse Resp Temp SpO2   126/69 107 18 98.2 °F (36.8 °C) 100 %      MAP       --         Physical Exam    Nursing note and vitals reviewed.  Constitutional: She appears well-developed and well-nourished. She is not diaphoretic. No distress.   HENT:   Head: Normocephalic and atraumatic.   Eyes: Conjunctivae and EOM are normal. Pupils are equal, round, and reactive to light.   Neck: Neck supple.   Normal range of motion.  Cardiovascular: Normal rate, regular rhythm, normal heart sounds and intact distal pulses.   No murmur heard.  Pulmonary/Chest: Breath sounds normal. No respiratory distress. She has no wheezes. She has no rhonchi. She has no rales. She exhibits no tenderness.   Abdominal: Abdomen is soft. Bowel sounds are normal. She exhibits distension.   Diffuse abdominal tenderness, no focal tenderness.   Musculoskeletal:         General: No tenderness or edema. Normal range of motion.      Cervical back: Normal range of motion and  neck supple.     Neurological: She is alert and oriented to person, place, and time. She has normal strength. No cranial nerve deficit. GCS score is 15. GCS eye subscore is 4. GCS verbal subscore is 5. GCS motor subscore is 6.   Skin: Skin is warm and dry. Capillary refill takes less than 2 seconds.   Healing skin breakdown to sacral area         ED Course   Procedures  Labs Reviewed   CBC W/ AUTO DIFFERENTIAL - Abnormal; Notable for the following components:       Result Value    WBC 38.66 (*)     RBC 2.60 (*)     Hemoglobin 7.0 (*)     Hematocrit 22.1 (*)     MCH 26.9 (*)     MCHC 31.7 (*)     RDW 15.9 (*)     Gran % 81.0 (*)     Lymph % 3.0 (*)     Mono % 1.0 (*)     All other components within normal limits   COMPREHENSIVE METABOLIC PANEL - Abnormal; Notable for the following components:    Sodium 132 (*)     CO2 22 (*)     Glucose 260 (*)     Creatinine 1.7 (*)     Total Protein 5.3 (*)     Albumin 1.4 (*)     Alkaline Phosphatase 202 (*)     eGFR if  33.3 (*)     eGFR if non  28.9 (*)     All other components within normal limits   URINALYSIS, REFLEX TO URINE CULTURE - Abnormal; Notable for the following components:    Color, UA Orange (*)     Appearance, UA Cloudy (*)     Protein, UA 3+ (*)     Glucose, UA 1+ (*)     Bilirubin (UA) 1+ (*)     Occult Blood UA 3+ (*)     Leukocytes, UA 3+ (*)     All other components within normal limits    Narrative:     Preferred Collection Type->Urine, Clean Catch  Specimen Source->Urine   LACTIC ACID, PLASMA - Abnormal; Notable for the following components:    Lactate (Lactic Acid) 2.7 (*)     All other components within normal limits   URINALYSIS, REFLEX TO URINE CULTURE - Abnormal; Notable for the following components:    Color, UA Orange (*)     Appearance, UA Cloudy (*)     Protein, UA 3+ (*)     Ketones, UA Trace (*)     Bilirubin (UA) 1+ (*)     Occult Blood UA 3+ (*)     Leukocytes, UA 3+ (*)     All other components within normal  limits    Narrative:     Preferred Collection Type->Urine, Clean Catch  Specimen Source->Urine   URINALYSIS MICROSCOPIC - Abnormal; Notable for the following components:    RBC, UA >100 (*)     WBC, UA >100 (*)     Bacteria Moderate (*)     Yeast, UA Many (*)     Hyaline Casts, UA 62 (*)     All other components within normal limits    Narrative:     Preferred Collection Type->Urine, Clean Catch  Specimen Source->Urine   URINALYSIS MICROSCOPIC - Abnormal; Notable for the following components:    RBC, UA 75 (*)     WBC, UA >100 (*)     WBC Clumps, UA Many (*)     Bacteria Moderate (*)     Yeast, UA Rare (*)     Hyaline Casts, UA >87 (*)     All other components within normal limits    Narrative:     Preferred Collection Type->Urine, Clean Catch  Specimen Source->Urine   CULTURE, BLOOD   CULTURE, BLOOD   CULTURE, URINE   CULTURE, URINE   LIPASE          Imaging Results          CT Abdomen Pelvis  Without Contrast (In process)                  Medications   fentaNYL injection 50 mcg (50 mcg Intravenous Given 7/15/22 0021)   sodium chloride 0.9% bolus 500 mL (0 mLs Intravenous Stopped 7/15/22 0050)   meropenem-0.9% sodium chloride 1 g/50 mL IVPB (1 g Intravenous New Bag 7/15/22 0055)   sodium chloride 0.9% bolus 1,000 mL (1,000 mLs Intravenous New Bag 7/15/22 0055)     Medical Decision Making:   Differential Diagnosis:   Generalized abdominal pain, bladder cancer,             ED Course as of 07/15/22 0158   Fri Jul 15, 2022   0131 CT scan reveals moderate to severe long segment left-sided colitis, indeterminate may be infectious inflammatory or ischemic no pneumatosis bowel obstruction or evidence of perforation increase edema surrounding the urinary bladder masslike wall thickening of the posterior urinary bladder suspicious for malignancy, bilateral nephrostomy tubes, no hydronephrosis right ureteral stent is present [SD]      ED Course User Index  [SD] Sherman Montgomery MD             Clinical Impression:   Final  diagnoses:  [R53.1] Weakness (Primary)  [N39.0, R31.9] Urinary tract infection with hematuria, site unspecified  [C67.9] Malignant neoplasm of urinary bladder, unspecified site  [D72.829] Leukocytosis, unspecified type          ED Disposition Condition    Transfer to Another Facility Stable              Sherman Montgomery MD  07/15/22 0155

## 2022-07-15 NOTE — ASSESSMENT & PLAN NOTE
Follows with Dr. Snider. Was scheduled to begin outpatient chemo 7/8.   Now s/p bilateral nephrostomy tubes  Has seen radiation oncology recently as well    - outpatient treatment likely  - consider palliative consult given frequent admissions

## 2022-07-15 NOTE — ASSESSMENT & PLAN NOTE
Was discharged on 7/7 with 11 days of cefpodoxime to complete full 14 day course.    - did not complete course of cefpodoxime; now will be receiving zosyn instead  - f/u urine culture done at OSH yesterday; tailor abx accordingly; urine is frankly purulent

## 2022-07-15 NOTE — SUBJECTIVE & OBJECTIVE
Patient information was obtained from patient, relative(s), past medical records, and ER records.     Oncology History:   Bladder cancer   5/20/2022 Initial Diagnosis     Bladder cancer        Tumor Conference     Presenting Hospital / Clinic: [facility]  Virtual Tumor Board Conference: Virtual  Presenter: Sylvia Escobar  Date Presented to Tumor Board: 5/26/2022  Specialties Present: Pathology; Urology; Radiation Oncology  Cancer Type: Bladder cancer  Recommended Plan Note: Will obtain bladder MRI, PET CT and bone scan to further evaluate for metastatic disease. Seeing medical oncology to discuss systemic therapy.            6/16/2022 -  Chemotherapy     Treatment Summary   Plan Name: OP PEMBROLIZUMAB 400MG Q6W  Treatment Goal: Control  Status: Active  Start Date: 6/16/2022 (Planned)  End Date: 4/18/2024 (Planned)  Provider: Reid Snider MD  Chemotherapy: pembrolizumab (KEYTRUDA) 400 mg in sodium chloride 0.9% 116 mL infusion, 400 mg, Intravenous, Clinic/HOD 1 time, 0 of 17 cycles          Medications Prior to Admission   Medication Sig Dispense Refill Last Dose    acetaminophen (TYLENOL) 325 MG tablet Take 2 tablets (650 mg total) by mouth every 6 (six) hours as needed for Pain. 30 tablet 0     amLODIPine (NORVASC) 5 MG tablet Take 1 tablet (5 mg total) by mouth once daily. 90 tablet 3     apixaban (ELIQUIS) 5 mg Tab Take 1 tablet (5 mg total) by mouth 2 (two) times daily. 60 tablet 2     cefpodoxime (VANTIN) 200 MG tablet Take 1 tablet (200 mg total) by mouth 2 (two) times daily. for 11 days 22 tablet 0     duke's soln (benadryl 30 mL, mylanta 30 mL, LIDOcaine 30 mL, nystatin 30 mL) 120mL Take 10 mLs by mouth 4 (four) times daily. 120 mL 0     glycerin adult suppository Place 1 suppository rectally as needed for Constipation. 24 suppository 0     insulin detemir U-100 (LEVEMIR FLEXTOUCH) 100 unit/mL (3 mL) SubQ InPn pen Inject 10 Units into the skin once daily. (Patient taking differently: Inject  20 Units into the skin once daily.) 9 mL 3     lactulose (CHRONULAC) 20 gram/30 mL Soln Take 15 mLs (10 g total) by mouth every 6 (six) hours as needed (constipation). 450 mL 0     LIDOcaine (LIDODERM) 5 % Place 1 patch onto the skin once daily. Place patch to back. Leave on for 12 hours and remove for 12 hours. 30 patch 2     morphine (MS CONTIN) 15 MG 12 hr tablet Take 1 tablet (15 mg total) by mouth every 12 (twelve) hours. for 15 days 30 tablet 0     oxyCODONE (ROXICODONE) 5 MG immediate release tablet Take 1 tablet (5 mg total) by mouth every 4 (four) hours as needed for Pain. 30 tablet 0     sodium bicarbonate 650 MG tablet Take 1 tablet (650 mg total) by mouth 2 (two) times daily. (Patient taking differently: Take 650 mg by mouth 2 (two) times daily. 2 tabs BID) 60 tablet 11     [DISCONTINUED] apixaban (ELIQUIS) 5 mg Tab Take 2 tablets (10 mg total) by mouth 2 (two) times daily for 3 days, THEN Take 1 tablet (5 mg total) by mouth 2 (two) times daily thereafter. 60 tablet 3        Patient has no known allergies.     Past Medical History:   Diagnosis Date    Bladder cancer 05/2022    Bladder mass 05/06/2022    Choledocholithiasis 05/06/2022    Diabetes mellitus     Pneumonia due to COVID-19 virus 08/06/2021     Past Surgical History:   Procedure Laterality Date    CYSTOSCOPY N/A 05/09/2022    Procedure: CYSTOSCOPY;  Surgeon: Adrianna Gutierrez MD;  Location: [facility] OR [address] [place] [address];  Service: Urology;  Laterality: N/A;    NEPHROSTOMY Left 05/2022    RETROGRADE PYELOGRAPHY Right 05/09/2022    Procedure: PYELOGRAM, RETROGRADE;  Surgeon: Adrianna Gutierrez MD;  Location: [facility] OR [address] [place] [address];  Service: Urology;  Laterality: Right;     Family History       Problem Relation (Age of Onset)    Diabetes Mother, Daughter, Son    Heart attack Father    Kidney cancer Son    Stomach cancer Mother    Thyroid disease Daughter          Tobacco Use    Smoking status: Never Smoker    Smokeless tobacco: Never Used   Substance and Sexual  Activity    Alcohol use: No    Drug use: No    Sexual activity: Not on file       Review of Systems   Constitutional:  Positive for chills. Negative for fever.   HENT:  Negative for congestion and sore throat.    Respiratory:  Negative for cough and shortness of breath.    Cardiovascular:  Negative for chest pain and palpitations.   Gastrointestinal:  Positive for abdominal pain and diarrhea. Negative for blood in stool and vomiting.   Musculoskeletal:  Positive for back pain. Negative for arthralgias and myalgias.   Skin:  Negative for rash and wound.   Neurological:  Positive for dizziness. Negative for syncope.   Hematological:  Negative for adenopathy. Does not bruise/bleed easily.   Objective:     Vital Signs (Most Recent):    Vital Signs (24h Range):  Temp:  [98.2 °F (36.8 °C)] 98.2 °F (36.8 °C)  Pulse:  [] 74  Resp:  [14-19] 15  SpO2:  [96 %-100 %] 99 %  BP: (104-127)/(54-69) 120/58        There is no height or weight on file to calculate BMI.  There is no height or weight on file to calculate BSA.    ECOG SCORE           [unfilled]    Lines/Drains/Airways       Drain  Duration                  Nephrostomy 05/10/22 0805 Left 8 Fr. 65 days         Nephrostomy 06/09/22 1033 Right 10 Fr. 35 days              Peripheral Intravenous Line  Duration                  Peripheral IV - Single Lumen 07/14/22 2339 22 G Left Hand <1 day                    Physical Exam  Vitals and nursing note reviewed.   Constitutional:       General: She is not in acute distress.     Appearance: She is ill-appearing. She is not toxic-appearing or diaphoretic.   Cardiovascular:      Rate and Rhythm: Normal rate and regular rhythm.      Heart sounds: No murmur heard.  Pulmonary:      Effort: Pulmonary effort is normal. No respiratory distress.   Abdominal:      Palpations: Abdomen is soft.      Tenderness: There is abdominal tenderness. There is no guarding.   Musculoskeletal:      Right lower leg: Edema present.      Left lower leg:  Edema present.   Skin:     General: Skin is warm and dry.      Coloration: Skin is pale.   Neurological:      Mental Status: She is alert. Mental status is at baseline.      Cranial Nerves: No dysarthria.   Psychiatric:         Mood and Affect: Mood normal.         Behavior: Behavior normal.       Significant Labs:   All pertinent labs from the last 24 hours have been reviewed.    Diagnostic Results:  I have reviewed all pertinent imaging results/findings within the past 24 hours.

## 2022-07-15 NOTE — ASSESSMENT & PLAN NOTE
Intractable abdominal pain. Leukocytosis and elevated lactate.  Has been having diarrhea for several days. Multiple episodes of diarrhea immediately upon presentation to [facility]  Has been on abx so at increased risk for C dif    - CT from OSH reportedly showed L sided colitis. F/u official read from OSH CT (available in our Epic)  - clear liquid diet  - C dif studies ordered as well as other stool studies to eval for infectious diarrhea  - got meropenem at OSH; unsure reason for carbapenem use; will start zosyn given leukocytosis and lactate to cover for potential gram negative sepss

## 2022-07-15 NOTE — ASSESSMENT & PLAN NOTE
Impression: Pt is a 77 y/o female with bladder cancer, bilateral nephrostomy tubes, recent RLE DVT on Eliquis, and DM2 who presents as a transfer from [place] for intractable abdominal pain and lower back pain in the setting of colitis on CT scan. She had been off her pain meds for 2 days due to severe constipation. Pt to start outpt chemo on . Pt is a  DNR. Weakness noted. Pt [place].     Reason for consult: GOC. Communicated with Hem/Onc resident, Dr. Herndon.     Goals of care/advance care planning.     Met with pt and pt's daughter, Valentine who is MPOA. Per Valentine, pt has decided she wants to focus on comfort and do hospice care.  Pt verified this. Per daughter, pt lives in [place] with her spouse and son, Reid. Per daughter, pt has 12 children, 3 . Two sons  recently. One son at pt's house.  Pt's daughter endorses what is most important to pt is to be comfortable.     Per daughter, she will speak to pt's family to update them and have hem/onc team update. Daughter works at [employer] in [employer].      Code status: DNR.   MPOA: Valentine Swenson- daughter.    Symptom management:     Pt denies nausea, vomiting.     Pain: Pt endorses pain to ABD area. Pt admitted with colitis. C-DIFF pending.    Current Meds: Pt on Dilaudid 0.5 mg IVP q 6hrs prn pain.     Recs:   Would have Dilaudid 0.5 mg IVP q 6 hrs prn severe pain.   Consider Oxycondone 5 mg q 4 hrs prn moderate pain.       Plan:   Pt would like hospice care at discharge.   Spoke to pt's daughter and pt.   Pt is a DNR.   Will follow.

## 2022-07-15 NOTE — HPI
"Pt is a 75 y/o female with bladder cancer, bilateral nephrostomy tubes, recent RLE DVT on Eliquis, and DM2 who presented as a transfer from [place] for intractable abdominal pain and lower back pain in the setting of colitis on CT scan. She had been off her pain meds for 2 days due to severe constipation.   Per chart review, pt was recently admitted to this service for a UTI and was discharged 7/7 with 11 days of cefpodoxime to complete a total 14 day course. She was also hypercalcemic during that admission and treated with fluids and pamindronate and calcitonin. Since that discharge she has had 3 ER visits for hypoglycemia, pain, and other various reasons.   Per chart review, pt reported several days of abdominal pain, chills, and diarrhea. She is weak and prone to falling when walking to the bathroom.   At the outside hospital yesterday, she had worsening leukocytosis to 38, elevated lactate, evidence of recurrent/persistent UTI, and THOMAS. Per the ER physician's note, a CT revealed "moderate to severe long segment left-sided colitis, indeterminate may be infectious inflammatory or ischemic." However, I was not able to see the official radiology read yet. She was given meropenem and IV pain meds and transferred to [facility].            Patient information was obtained from patient, relative(s), past medical records, and ER records    Pt is DNR.   "

## 2022-07-15 NOTE — ASSESSMENT & PLAN NOTE
Worsening leukocytosis to 38 and elevated lactate at OSH    - continue zosyn given suspected intra-abdominal source (colitis on CT)  - trend lactate to resolution

## 2022-07-15 NOTE — ASSESSMENT & PLAN NOTE
Sliding scale only as patient recently with ER visit for glucose of 23  Start basal bolus based on trend

## 2022-07-15 NOTE — PT/OT/SLP EVAL
Physical Therapy  Co-Evaluation  (Co-Evaluation required to maximize patient safety, activity/mobility, and participation)     Karoline Justice   [id number]    Time Tracking:     PT Received On: 07/15/22   PT Start Time: 0948   PT Stop Time: 1022   PT Total Time (min): 34 min    Billable Minutes: Evaluation 1 procedure, Gait Training 12 mins and Neuromuscular Re-education 12 mins      Recommendations:     Discharge recommendations: Skilled Nursing Facility     Equipment recommendations: Bedside Commode    Barriers to Discharge: Increased level of assistance from baseline     Patient Information:     Recent Surgery: * No surgery found *      Diagnosis: Colitis    Length of Stay: 0 days    General Precautions: Standard, fall, hearing impaired  Orthopedic Precautions: None  Brace: N/A    Assessment:     Karoline Justice is a 76 y.o. female admitted to [facility] on 7/15/2022 for Colitis. Karoline uJstice tolerated evaluation well today. She was found supine in bed upon PT arrival. Patient is hard of hearing and seemed confused at times. Complaints of LLQ pain 9/10 and seemed fearful of getting up. She required mod A to move from sitting EOB to standing, and mod A with no AD to take 4 side steps to HOB. Throughout session patient complained of pain, and was noticeably fatigued once back in bed. Due to Karoline's increased need for assistance from her baseline, it is recommended she go to a SNF upon discharge. Discussed PT role, POC, goals and recommendations (Skilled Nursing Facility) with patient; verbalized understanding. Karoline Justice would benefit from acute PT services to promote mobility during this admission and improve return to PLOF.    Problem List: weakness, decreased endurance, impaired self-care skills, impaired mobility, decreased sitting or standing balance, gait instability and impaired cardiopulmonary response to activity    Rehab Prognosis: Good; patient would benefit from acute skilled PT  "services to address these deficits and reach maximum level of function.    Plan:     Patient to be seen 3 x/week to address the above listed problems via gait training, therapeutic activities, therapeutic exercises, neuromuscular re-education    Plan of Care Expires: 08/14/22  Plan of Care reviewed with: patient, daughter    Subjective:     Communicated with RN prior to evaluation, appropriate to see for evaluation.    Pt found supine in bed (HOB elevated) upon PT entry to room, agreeable to evaluation.    Patient commenting: "Do we have to get up now?"    Does this patient have any cultural, spiritual, Restorationist conflicts given the current situation? Patient has no barriers to learning. Patient verbalizes understanding of his/her program and goals and demonstrates them correctly. No cultural, spiritual, or educational needs identified.    Past Medical History:   Diagnosis Date    Bladder cancer 05/2022    Bladder mass 05/06/2022    Choledocholithiasis 05/06/2022    Diabetes mellitus     Pneumonia due to COVID-19 virus 08/06/2021      Past Surgical History:   Procedure Laterality Date    CYSTOSCOPY N/A 05/09/2022    Procedure: CYSTOSCOPY;  Surgeon: Adrianna Gutierrez MD;  Location: [facility] OR [address] [place] [address];  Service: Urology;  Laterality: N/A;    NEPHROSTOMY Left 05/2022    RETROGRADE PYELOGRAPHY Right 05/09/2022    Procedure: PYELOGRAM, RETROGRADE;  Surgeon: Adrianna Gutierrez MD;  Location: [facility] OR [address] [place] [address];  Service: Urology;  Laterality: Right;       Living Environment:  Patient lives with spouse and son in mobile home with 4 AGUILAR. Has access to a WIS with small threshold to get in and grab bars.     PLOF:  Prior to admission, patient was independent with walker.    DME:  Patient owns or has access to the following DME: Rolling Walker and Transfer Tub Bench    Upon discharge, patient will have assistance from spouse and children.    Objective:     Patient found with: nephrostomy, peripheral IV    Pain:  Pain Rating " 1: 9/10 LLQ abdomen   Pain Rating Post-Intervention 1: 9/10 LLQ abdomen    Cognitive Exam:  Patient is oriented x 4  Patient follows 100% of single-step commands.    Sensation:   Intact     Lower Extremity Range of Motion:  Right Lower Extremity: WFL actively  Left Lower Extremity: WFL actively    Lower Extremity Strength:  Right Lower Extremity: WFL  Left Lower Extremity: WFL    Functional Mobility:    · Bed Mobility:  · Supine to Sitting: Min A  · Sitting to Supine: Mod A   · Scooting towards EOB in sitting: CGA    · Transfers:  · Sit to Stand: Mod A from EOB with no AD x 1 trial(s)  · Stand to Sit: Min A to EOB with no AD x 1 trial(s)    · Gait:  · 4 side steps to HOB, patient complained of pain.     · Assist level: Mod Assist  · Device: no AD    · Balance:  · Static Sit: Stand-By Assist at EOB    · Static Stand: Mod Assist with no AD    Additional Therapeutic Activity/Exercises:     1. Karoline was found supine in bed upon PT arrival. Patient is hard of hearing and seemed confused at times. Complaints of LLQ pain 9/10 and seemed fearful of getting up.    2. She required mod A to move from sitting EOB to standing, and mod A with no AD to take 4 side steps to HOB. Throughout session patient complained of pain, and was noticeably fatigued once back in bed.     3. Due to Karoline's increased need for assistance from her baseline, it is recommended she go to a SNF upon discharge. Discussed PT role, POC, goals and recommendations (Skilled Nursing Facility) with patient; verbalized understanding. Karoline Justice would benefit from acute PT services to promote mobility during this admission and improve return to PLOF.    AM-PAC 6 CLICK MOBILITY  Turning over in bed (including adjusting bedclothes, sheets and blankets)?: 3  Sitting down on and standing up from a chair with arms (e.g., wheelchair, bedside commode, etc.): 3  Moving from lying on back to sitting on the side of the bed?: 3  Moving to and from a bed to a  chair (including a wheelchair)?: 3  Need to walk in hospital room?: 2  Climbing 3-5 steps with a railing?: 1  Basic Mobility Total Score: 15    Patient was left supine in bed (HOB elevated) with all lines intact, call button in reach, RN notified and daughter present.    Clinical Decision Making for Evaluation Complexity:  1. Body System(s) Examination: 1-2  2. Clinical Presentation: Evolving  3. Evaluation Complexity: Low    GOALS:   Multidisciplinary Problems     Physical Therapy Goals        Problem: Physical Therapy    Goal Priority Disciplines Outcome Goal Variances Interventions   Physical Therapy Goal     PT, PT/OT Ongoing, Progressing     Description: Goals to be met by:     Patient will increase functional independence with mobility by performin. Supine to sit with Stand-by Assistance  2. Sit to supine with Contact Guard Assistance  3. Rolling to Left and Right with Supervision.  4. Sit to stand transfer with Contact Guard Assistance  5. Gait  x 30 feet with Contact Guard Assistance using Rolling Walker.  6. Ascend/descend 4 stairs with left Handrails Moderate Assistance using No Assistive Device.   7. Stand for 5 minutes with Contact Guard Assistance using Rolling Walker  8. Lower extremity exercise program x10 reps per handout, with assistance as needed                     Dee Floyd, SPT  7/15/2022

## 2022-07-15 NOTE — CONSULTS
[facility] - Oncology ([facility])  Palliative Medicine  Consult Note    Patient Name: Karoline Justice  MRN: [id number]  Admission Date: 7/15/2022  Hospital Length of Stay: 0 days  Code Status: DNR   Attending Provider: Judd Herrera MD  Consulting Provider: ISIDRA Pleitez  Primary Care Physician: Dee Dee Oconnor MD  Principal Problem:Colitis    Patient information was obtained from patient, relative(s) and primary team.      Inpatient consult to Palliative Care  Consult performed by: ISIDRA Zaldivar  Consult ordered by: Ivy Herndon DO        Assessment/Plan:     Palliative care encounter  Impression: Pt is a 75 y/o female with bladder cancer, bilateral nephrostomy tubes, recent RLE DVT on Eliquis, and DM2 who presents as a transfer from [place] for intractable abdominal pain and lower back pain in the setting of colitis on CT scan. She had been off her pain meds for 2 days due to severe constipation. Pt to start outpt chemo on . Pt is a  DNR. Weakness noted. Pt [place].     Reason for consult: GOC. Communicated with Hem/Onc resident, Dr. Herndon.     Goals of care/advance care planning.     Met with pt and pt's daughter, Valentine who is MPOA. Per Valentine, pt has decided she wants to focus on comfort and do hospice care.  Pt verified this. Per daughter, pt lives in [facility] with her spouse and son, Reid. Per daughter, pt has 12 children, 3 . Two sons  recently. One son at pt's house.  Pt's daughter endorses what is most important to pt is to be comfortable.     Per daughter, she will speak to pt's family to update them and have hem/onc team update. Daughter works at [employer] in [employer].      Code status: DNR.   MPOA: Valentine Swenson- daughter.    Symptom management:     Pt denies nausea, vomiting.     Pain: Pt endorses pain to ABD area. Pt admitted with colitis. C-DIFF pending.    Current Meds: Pt on Dilaudid 0.5 mg IVP q 6hrs prn pain.     Recs:   Would have Dilaudid 0.5 mg  "IVP q 6 hrs prn severe pain.   Consider Oxycondone 5 mg q 4 hrs prn moderate pain.       Plan:   Pt would like hospice care at discharge.   Spoke to pt's daughter and pt.   Pt is a DNR.   Will follow.           Thank you for your consult. I will follow-up with patient. Please contact us if you have any additional questions.    Subjective:     HPI:   Pt is a 75 y/o female with bladder cancer, bilateral nephrostomy tubes, recent RLE DVT on Eliquis, and DM2 who presented as a transfer from [place] for intractable abdominal pain and lower back pain in the setting of colitis on CT scan. She had been off her pain meds for 2 days due to severe constipation.   Per chart review, pt was recently admitted to this service for a UTI and was discharged 7/7 with 11 days of cefpodoxime to complete a total 14 day course. She was also hypercalcemic during that admission and treated with fluids and pamindronate and calcitonin. Since that discharge she has had 3 ER visits for hypoglycemia, pain, and other various reasons.   Per chart review, pt reported several days of abdominal pain, chills, and diarrhea. She is weak and prone to falling when walking to the bathroom.   At the outside hospital yesterday, she had worsening leukocytosis to 38, elevated lactate, evidence of recurrent/persistent UTI, and THOMAS. Per the ER physician's note, a CT revealed "moderate to severe long segment left-sided colitis, indeterminate may be infectious inflammatory or ischemic." However, I was not able to see the official radiology read yet. She was given meropenem and IV pain meds and transferred to [facility].            Patient information was obtained from patient, relative(s), past medical records, and ER records    Pt is DNR.       Hospital Course:  No notes on file    Interval History: Pt would like to d/c with hospice care per daughter, DILEEP Grijalva. Pt is DNR. Pt admitted with colitis.     Past Medical History:   Diagnosis Date    Bladder cancer 05/2022 "    Bladder mass 05/06/2022    Choledocholithiasis 05/06/2022    Diabetes mellitus     Pneumonia due to COVID-19 virus 08/06/2021       Past Surgical History:   Procedure Laterality Date    CYSTOSCOPY N/A 05/09/2022    Procedure: CYSTOSCOPY;  Surgeon: Adrianna Gutierrez MD;  Location: [facility] OR [address] [place] [address];  Service: Urology;  Laterality: N/A;    NEPHROSTOMY Left 05/2022    RETROGRADE PYELOGRAPHY Right 05/09/2022    Procedure: PYELOGRAM, RETROGRADE;  Surgeon: Adrianna Gutierrez MD;  Location: [facility] OR [address] [place] [address];  Service: Urology;  Laterality: Right;       Review of patient's allergies indicates:  No Known Allergies    Medications:  Continuous Infusions:   sodium chloride 0.9% 100 mL/hr at 07/15/22 1012     Scheduled Meds:   apixaban  5 mg Oral BID    HYDROmorphone        magnesium sulfate IVPB  2 g Intravenous Once    piperacillin-tazobactam (ZOSYN) IVPB  4.5 g Intravenous Q12H    piperacillin-tazobactam 4.5 g in sodium chloride 0.9% 100 mL IVPB (ready to mix system)        sodium chloride 0.9%  1,000 mL Intravenous Once     PRN Meds:dextrose 10%, dextrose 10%, glucagon (human recombinant), glucose, glucose, HYDROmorphone, insulin aspart U-100, naloxone, sodium chloride 0.9%    Family History       Problem Relation (Age of Onset)    Diabetes Mother, Daughter, Son    Heart attack Father    Kidney cancer Son    Stomach cancer Mother    Thyroid disease Daughter          Tobacco Use    Smoking status: Never Smoker    Smokeless tobacco: Never Used   Substance and Sexual Activity    Alcohol use: No    Drug use: No    Sexual activity: Not on file       Review of Systems   Constitutional:  Positive for activity change and fatigue.   HENT:  Positive for hearing loss. Negative for trouble swallowing.    Respiratory:  Positive for shortness of breath.    Gastrointestinal:  Positive for abdominal pain and constipation.   Musculoskeletal:  Positive for gait problem.   Skin: Negative.    Neurological:  Positive for  weakness.   Objective:     Vital Signs (Most Recent):  Temp: 98 °F (36.7 °C) (07/15/22 0825)  Pulse: 76 (07/15/22 0825)  Resp: 18 (07/15/22 1057)  BP: 120/80 (07/15/22 1017)  SpO2: 100 % (07/15/22 0825) Vital Signs (24h Range):  Temp:  [98 °F (36.7 °C)-98.6 °F (37 °C)] 98 °F (36.7 °C)  Pulse:  [] 76  Resp:  [14-19] 18  SpO2:  [96 %-100 %] 100 %  BP: ()/(42-80) 120/80     Weight: 56 kg (123 lb 7.3 oz)  Body mass index is 23.33 kg/m².    Physical Exam  Constitutional:       General: She is awake.      Comments: Pt is [place]. Pt able to answer questions but sleepy due to just receiving pain meds.    HENT:      Head: Normocephalic and atraumatic.      Comments: [place]  Eyes:      General: Lids are normal.      Conjunctiva/sclera: Conjunctivae normal.   Pulmonary:      Effort: No accessory muscle usage or respiratory distress.   Musculoskeletal:      Comments: Weakness noted.    Skin:     General: Skin is warm and dry.   Neurological:      Mental Status: She is alert and oriented to person, place, and time.   Psychiatric:         Speech: Speech normal.       Review of Symptoms      Symptom Assessment (ESAS 0-10 Scale)  Pain:  8  Dyspnea:  0  Anxiety:  0  Nausea:  0  Depression:  0  Anorexia:  0  Fatigue:  0  Insomnia:  0  Restlessness:  0  Agitation:  0       Pain Assessment:    Location(s): abdomen    Abdomen       Location: generalized        Quality: Cramping        Quantity: 8/10 in intensity        Chronicity: Onset 0 second(s) ago, stable        Aggravating Factors: None        Alleviating Factors: Opiates        Associated Symptoms: None    ECOG Performance Status thGthrthathdtheth:th th4th Living Arrangements:  Lives with spouse    Psychosocial/Cultural: Pt lives with spouse and son, Reid in [place], [place]. Daughter, Valentine, lives locally and is MPOA. She works in [employer].       Advance Care Planning   Advance Directives:   Living Will: Yes        Copy on chart: Yes    Do Not Resuscitate Status: Yes    Medical  Power of : Yes      Decision Making:  Patient answered questions and Family answered questions       Significant Labs: All pertinent labs within the past 24 hours have been reviewed.  CBC:   Recent Labs   Lab 07/14/22 2328   WBC 38.66*   HGB 7.0*   HCT 22.1*   MCV 85        BMP:  Recent Labs   Lab 07/14/22  2328 07/15/22  0744   *  --    *  --    K 4.0  --      --    CO2 22*  --    BUN 23  --    CREATININE 1.7*  --    CALCIUM 9.2  --    MG  --  1.5*     LFT:  Lab Results   Component Value Date    AST 16 07/14/2022     (H) 06/14/2022    ALKPHOS 202 (H) 07/14/2022    BILITOT 0.5 07/14/2022     Albumin:   Albumin   Date Value Ref Range Status   07/14/2022 1.4 (L) 3.5 - 5.2 g/dL Final     Protein:   Total Protein   Date Value Ref Range Status   07/14/2022 5.3 (L) 6.0 - 8.4 g/dL Final     Lactic acid:   Lab Results   Component Value Date    LACTATE 1.4 07/15/2022    LACTATE 2.7 (H) 07/14/2022       Significant Imaging: I have reviewed all pertinent imaging results/findings within the past 24 hours.      18 minutes of advance care planning completed.       Alba Newman, CNS  Palliative Medicine  [facility] - Oncology ([facility])

## 2022-07-15 NOTE — PROGRESS NOTES
07/15/22 1135   WOCN Assessment   WOCN Total Time (mins) 15   Visit Date 07/15/22   Visit Time 1135   Consult Type New   Wound pressure   Number of Wounds 1   Continence Type Urinary;Fecal   Intervention assessed;applied;chart review;coordination of care;orders   Teaching on-going   Positioning   Body Position turned;30 degrees;head facing, left   Head of Bed (HOB) Positioning HOB at 30 degrees        Altered Skin Integrity 07/05/22 0730 posterior Sacral spine #1 Ulceration Full thickness tissue loss. Subcutaneous fat may be visible but bone, tendon or muscle are not exposed   Date First Assessed/Time First Assessed: 07/05/22 0730   Altered Skin Integrity Present on Admission: yes  Orientation: posterior  Location: Sacral spine  Wound Number: #1  Is this injury device related?: No  Primary Wound Type: Ulceration  Descriptio...   Wound Image    Description of Altered Skin Integrity Full thickness tissue loss. Subcutaneous fat may be visible but bone, tendon or muscle are not exposed   Dressing Appearance Dry;Intact   Drainage Amount None   Appearance Pink;Yellow   Tissue loss description Full thickness   Red (%), Wound Tissue Color 50 %   Yellow (%), Wound Tissue Color 50 %   Wound Edges Irregular   Wound Length (cm) 8 cm   Wound Width (cm) 7 cm   Wound Depth (cm) 0.2 cm   Wound Volume (cm^3) 11.2 cm^3   Wound Surface Area (cm^2) 56 cm^2   Care Sterile normal saline  (Triad applied)   Dressing Foam   Wound consult performed.  Recommendation for:  Triad to sacrum BID and prn soiling.  Orders entered for nursing to perform.  Turn every 2 hours per staff.  Waffle mattress ordered.  Family member at bedside.  Primary nurse and family member made aware of new orders.  Positioned for comfort.  Safety maintained.

## 2022-07-15 NOTE — H&P
"[facility] - Oncology ([facility])  Hematology  Bone Marrow Transplant  H&P    Subjective:     Principal Problem: Colitis    HPI: Ms. Justice is a 76F with bladder cancer, bilateral nephrostomy tubes, recent RLE DVT on Eliquis, and DM2 who presents as a transfer from [place] for intractable abdominal pain and lower back pain in the setting of colitis on CT scan. She had been off her pain meds for 2 days due to severe constipation.   She was recently admitted to this service for a UTI and was discharged 7/7 with 11 days of cefpodoxime to complete a total 14 day course. She was also hypercalcemic during that admission and treated with fluids and pamindronate and calcitonin. Since that discharge she has had 3 ER visits for hypoglycemia, pain, and other various reasons.   She reports several days of abdominal pain, chills, and diarrhea. She is weak and prone to falling when walking to the bathroom.   At the outside hospital yesterday, she had worsening leukocytosis to 38, elevated lactate, evidence of recurrent/persistent UTI, and THOMAS. Per the ER physician's note, a CT revealed "moderate to severe long segment left-sided colitis, indeterminate may be infectious inflammatory or ischemic." However, I was not able to see the official radiology read yet. She was given meropenem and IV pain meds and transferred to [facility].          Patient information was obtained from patient, relative(s), past medical records, and ER records.     Oncology History:   Bladder cancer   5/20/2022 Initial Diagnosis     Bladder cancer        Tumor Conference     Presenting Hospital / Clinic: [facility]  Virtual Tumor Board Conference: Virtual  Presenter: Sylvia Escobar  Date Presented to Tumor Board: 5/26/2022  Specialties Present: Pathology; Urology; Radiation Oncology  Cancer Type: Bladder cancer  Recommended Plan Note: Will obtain bladder MRI, PET CT and bone scan to further evaluate for metastatic disease. Seeing medical oncology to " discuss systemic therapy.            6/16/2022 -  Chemotherapy     Treatment Summary   Plan Name: OP PEMBROLIZUMAB 400MG Q6W  Treatment Goal: Control  Status: Active  Start Date: 6/16/2022 (Planned)  End Date: 4/18/2024 (Planned)  Provider: Reid Snider MD  Chemotherapy: pembrolizumab (KEYTRUDA) 400 mg in sodium chloride 0.9% 116 mL infusion, 400 mg, Intravenous, Clinic/HOD 1 time, 0 of 17 cycles          Medications Prior to Admission   Medication Sig Dispense Refill Last Dose    acetaminophen (TYLENOL) 325 MG tablet Take 2 tablets (650 mg total) by mouth every 6 (six) hours as needed for Pain. 30 tablet 0     amLODIPine (NORVASC) 5 MG tablet Take 1 tablet (5 mg total) by mouth once daily. 90 tablet 3     apixaban (ELIQUIS) 5 mg Tab Take 1 tablet (5 mg total) by mouth 2 (two) times daily. 60 tablet 2     cefpodoxime (VANTIN) 200 MG tablet Take 1 tablet (200 mg total) by mouth 2 (two) times daily. for 11 days 22 tablet 0     duke's soln (benadryl 30 mL, mylanta 30 mL, LIDOcaine 30 mL, nystatin 30 mL) 120mL Take 10 mLs by mouth 4 (four) times daily. 120 mL 0     glycerin adult suppository Place 1 suppository rectally as needed for Constipation. 24 suppository 0     insulin detemir U-100 (LEVEMIR FLEXTOUCH) 100 unit/mL (3 mL) SubQ InPn pen Inject 10 Units into the skin once daily. (Patient taking differently: Inject 20 Units into the skin once daily.) 9 mL 3     lactulose (CHRONULAC) 20 gram/30 mL Soln Take 15 mLs (10 g total) by mouth every 6 (six) hours as needed (constipation). 450 mL 0     LIDOcaine (LIDODERM) 5 % Place 1 patch onto the skin once daily. Place patch to back. Leave on for 12 hours and remove for 12 hours. 30 patch 2     morphine (MS CONTIN) 15 MG 12 hr tablet Take 1 tablet (15 mg total) by mouth every 12 (twelve) hours. for 15 days 30 tablet 0     oxyCODONE (ROXICODONE) 5 MG immediate release tablet Take 1 tablet (5 mg total) by mouth every 4 (four) hours as needed for Pain. 30  tablet 0     sodium bicarbonate 650 MG tablet Take 1 tablet (650 mg total) by mouth 2 (two) times daily. (Patient taking differently: Take 650 mg by mouth 2 (two) times daily. 2 tabs BID) 60 tablet 11     [DISCONTINUED] apixaban (ELIQUIS) 5 mg Tab Take 2 tablets (10 mg total) by mouth 2 (two) times daily for 3 days, THEN Take 1 tablet (5 mg total) by mouth 2 (two) times daily thereafter. 60 tablet 3        Patient has no known allergies.     Past Medical History:   Diagnosis Date    Bladder cancer 05/2022    Bladder mass 05/06/2022    Choledocholithiasis 05/06/2022    Diabetes mellitus     Pneumonia due to COVID-19 virus 08/06/2021     Past Surgical History:   Procedure Laterality Date    CYSTOSCOPY N/A 05/09/2022    Procedure: CYSTOSCOPY;  Surgeon: Adrianna Gutierrez MD;  Location: [facility] OR [address] [place] [address];  Service: Urology;  Laterality: N/A;    NEPHROSTOMY Left 05/2022    RETROGRADE PYELOGRAPHY Right 05/09/2022    Procedure: PYELOGRAM, RETROGRADE;  Surgeon: Adrianna Gutierrez MD;  Location: [facility] OR [address] [place] [address];  Service: Urology;  Laterality: Right;     Family History       Problem Relation (Age of Onset)    Diabetes Mother, Daughter, Son    Heart attack Father    Kidney cancer Son    Stomach cancer Mother    Thyroid disease Daughter          Tobacco Use    Smoking status: Never Smoker    Smokeless tobacco: Never Used   Substance and Sexual Activity    Alcohol use: No    Drug use: No    Sexual activity: Not on file       Review of Systems   Constitutional:  Positive for chills. Negative for fever.   HENT:  Negative for congestion and sore throat.    Respiratory:  Negative for cough and shortness of breath.    Cardiovascular:  Negative for chest pain and palpitations.   Gastrointestinal:  Positive for abdominal pain and diarrhea. Negative for blood in stool and vomiting.   Musculoskeletal:  Positive for back pain. Negative for arthralgias and myalgias.   Skin:  Negative for rash and wound.   Neurological:   Positive for dizziness. Negative for syncope.   Hematological:  Negative for adenopathy. Does not bruise/bleed easily.   Objective:     Vital Signs (Most Recent):    Vital Signs (24h Range):  Temp:  [98.2 °F (36.8 °C)] 98.2 °F (36.8 °C)  Pulse:  [] 74  Resp:  [14-19] 15  SpO2:  [96 %-100 %] 99 %  BP: (104-127)/(54-69) 120/58        There is no height or weight on file to calculate BMI.  There is no height or weight on file to calculate BSA.    ECOG SCORE           [unfilled]    Lines/Drains/Airways       Drain  Duration                  Nephrostomy 05/10/22 0805 Left 8 Fr. 65 days         Nephrostomy 06/09/22 1033 Right 10 Fr. 35 days              Peripheral Intravenous Line  Duration                  Peripheral IV - Single Lumen 07/14/22 2339 22 G Left Hand <1 day                    Physical Exam  Vitals and nursing note reviewed.   Constitutional:       General: She is not in acute distress.     Appearance: She is ill-appearing. She is not toxic-appearing or diaphoretic.   Cardiovascular:      Rate and Rhythm: Normal rate and regular rhythm.      Heart sounds: No murmur heard.  Pulmonary:      Effort: Pulmonary effort is normal. No respiratory distress.   Abdominal:      Palpations: Abdomen is soft.      Tenderness: There is abdominal tenderness. There is no guarding.   Musculoskeletal:      Right lower leg: Edema present.      Left lower leg: Edema present.   Skin:     General: Skin is warm and dry.      Coloration: Skin is pale.   Neurological:      Mental Status: She is alert. Mental status is at baseline.      Cranial Nerves: No dysarthria.   Psychiatric:         Mood and Affect: Mood normal.         Behavior: Behavior normal.       Significant Labs:   All pertinent labs from the last 24 hours have been reviewed.    Diagnostic Results:  I have reviewed all pertinent imaging results/findings within the past 24 hours.    Assessment/Plan:     * Colitis  Intractable abdominal pain. Leukocytosis and elevated  lactate.  Has been having diarrhea for several days. Multiple episodes of diarrhea immediately upon presentation to [facility]  Has been on abx so at increased risk for C dif    - CT from OSH reportedly showed L sided colitis. F/u official read from OSH CT (available in our Epic)  - clear liquid diet  - C dif studies ordered as well as other stool studies to eval for infectious diarrhea  - got meropenem at OSH; unsure reason for carbapenem use; will start zosyn given leukocytosis and lactate to cover for potential gram negative sepss    Sepsis  Worsening leukocytosis to 38 and elevated lactate at OSH    - continue zosyn given suspected intra-abdominal source (colitis on CT)  - trend lactate to resolution    UTI (urinary tract infection)  Was discharged on 7/7 with 11 days of cefpodoxime to complete full 14 day course.    - did not complete course of cefpodoxime; now will be receiving zosyn instead  - f/u urine culture done at OSH yesterday; tailor abx accordingly; urine is frankly purulent    Bladder cancer  Follows with Dr. Snider. Was scheduled to begin outpatient chemo 7/8.   Now s/p bilateral nephrostomy tubes  Has seen radiation oncology recently as well    - outpatient treatment likely  - consider palliative consult given frequent admissions    THOMAS (acute kidney injury)  Cr 1.7 at OSH, baseline around 0.8.    - HOLD home morphine and oxy in setting of THOMAS; manage pain with dilaudid  - likely prerenal in setting of recent diarrhea and dehydration  - s/p fluids now  - strict intake/output  - trend Cr    HTN (hypertension)  Hold home meds given sepsis    Controlled Type 2 diabetes mellitus  Sliding scale only as patient recently with ER visit for glucose of 23  Start basal bolus based on trend        VTE Risk Mitigation (From admission, onward)         Ordered     apixaban tablet 5 mg  2 times daily         07/15/22 0535     Reason for No Pharmacological VTE Prophylaxis  Once        Question:  Reasons:  Answer:   Already adequately anticoagulated on oral Anticoagulants    07/15/22 0532     IP VTE HIGH RISK PATIENT  Once         07/15/22 0532                Disposition: tbd    Kristy Bustillos MD  Bone Marrow Transplant  Hematology  [facility] - Oncology (Hospital)        ATTENDING NOTE, ONCOLOGY INPATIENT TEAM    As above; events of last 12 hours noted.  Patient seen and examined, chart reviewed, recent abdominal CT scan was reviewed with radiologist  Briefly, she is a 76-year-old female with metastatic urothelial carcinoma.  She was transferred from an outside hospital where she had presented with abdominal pain.  Outside CT scan showed evidence of diffuse colonic inflammation.  Patient had an episode of watery diarrhea this a.m.       On examination, she appears uncomfortable, complaining of abdominal pain..  Lungs are clear to auscultation.  Abdomen has diffuse tenderness.  No rebound.  He has bilateral percutaneous nephrostomy tubes  Labs reviewed.    PLAN  Check stools for C diff  Follow-up cultures  Empiric treatment with Zosyn  Continue apixaban  Palliative care consult.  Long discussion with her daughter.  The patient wants to be DNR.  We will follow.  No need for surgical consult as she is not a candidate for surgery.      Judd Herrera MD

## 2022-07-15 NOTE — NURSING
Pt arrived to unit. MD notified. Pt started on IVFs and Zosyn. Pain medication given. Pain 8/10. Bilateral nephrostomy tubes and pressure wound present on arrival. Loose BMs, C.diff sample sent. Oriented to room.

## 2022-07-15 NOTE — PLAN OF CARE
Pt tolerated therapy session well, though limited by pain.     Problem: Occupational Therapy  Goal: Occupational Therapy Goal  Description: Goals to be met by: 8/15/22     Patient will increase functional independence with ADLs by performing:    UE Dressing with Contact Guard Assistance.  LE Dressing with Moderate Assistance.  Sitting at edge of bed x10 minutes with Supervision.  Supine to sit with Supervision.  Step transfer with Contact Guard Assistance  Toilet transfer to bedside commode with Contact Guard Assistance.    Outcome: Ongoing, Progressing

## 2022-07-15 NOTE — HPI
"Ms. Justice is a 76F with bladder cancer, bilateral nephrostomy tubes, recent RLE DVT on Eliquis, and DM2 who presents as a transfer from [place] for intractable abdominal pain and lower back pain in the setting of colitis on CT scan. She had been off her pain meds for 2 days due to severe constipation.   She was recently admitted to this service for a UTI and was discharged 7/7 with 11 days of cefpodoxime to complete a total 14 day course. She was also hypercalcemic during that admission and treated with fluids and pamindronate and calcitonin. Since that discharge she has had 3 ER visits for hypoglycemia, pain, and other various reasons.   She reports several days of abdominal pain, chills, and diarrhea. She is weak and prone to falling when walking to the bathroom.   At the outside hospital yesterday, she had worsening leukocytosis to 38, elevated lactate, evidence of recurrent/persistent UTI, and THOMAS. Per the ER physician's note, a CT revealed "moderate to severe long segment left-sided colitis, indeterminate may be infectious inflammatory or ischemic." However, I was not able to see the official radiology read yet. She was given meropenem and IV pain meds and transferred to [facility].      "

## 2022-07-15 NOTE — PLAN OF CARE
Advance Care Planning   [facility] - [facility] ([facility])  Palliative Care       Patient Name: Karoline Justice  MRN: [id number]  Admission Date: 7/15/2022  Hospital Length of Stay: 0 days  Code Status: DNR   Attending Provider: Judd Herrera MD  Palliative Care Provider: BATSHEVA Guidry, APRN, AGCNS  Primary Care Physician: Dee Dee Oconnor MD  Principal Problem:Colitis     Visit to bedside with Pal Care APRN. Pt in bed but was recently given medications so would fall asleep. Pt's daughter Valentine present.     Daughter stated that she had a conversation with pt this morning, which she recorded, stating that she wanted to go home with hospice after this discharge. Daughter stated that she is one of 12 children with only 6 living. Daughter stated that she lives in [place] and others live near pt in [place]. Daughter stated that she recorded the conversation so her other family members could hear her wishes.     Daughter stated that pt lives with her son Reid and 2nd  Bernardo. Per daughter, Bernardo has Parkinson's. Daughter stated that 2 of pt's sons recently , one in pt's home.  Daughter hesitant for pt to return to her home with hospice.    Spoke with daughter about levels of hospice care: home vs nursing home vs inpatient hospice. At this time pt, does not appear to meet inpatient hospice. Daughter stated that she would speak with pt's children and further discuss the options.     Daughter works at [employer] in [employer].    Support provided to dtr. Will follow up. Primary Onc SW to follow for hospice discharge.    Elke Carr, ALYSSAW, ACHP-SW

## 2022-07-15 NOTE — ASSESSMENT & PLAN NOTE
Cr 1.7 at OSH, baseline around 0.8.    - HOLD home morphine and oxy in setting of THOMAS; manage pain with dilaudid  - likely prerenal in setting of recent diarrhea and dehydration  - s/p fluids now  - strict intake/output  - trend Cr

## 2022-07-15 NOTE — PLAN OF CARE
Pt resting lying in bed daughter at the bedside no needs voiced at this time. Pt has had several diarrhea stools today. Cdiff results have not been released. I called the lab and was told it should be available in the morning. Pts code status was changed to DNR today and palliative consult was done as well. Will continue to monitor.

## 2022-07-15 NOTE — PLAN OF CARE
Problem: Physical Therapy  Goal: Physical Therapy Goal  Description: Goals to be met by:     Patient will increase functional independence with mobility by performin. Supine to sit with Stand-by Assistance  2. Sit to supine with Contact Guard Assistance  3. Rolling to Left and Right with Supervision.  4. Sit to stand transfer with Contact Guard Assistance  5. Gait  x 30 feet with Contact Guard Assistance using Rolling Walker.  6. Ascend/descend 4 stairs with left Handrails Moderate Assistance using No Assistive Device.   7. Stand for 5 minutes with Contact Guard Assistance using Rolling Walker  8. Lower extremity exercise program x10 reps per handout, with assistance as needed    Outcome: Ongoing, Progressing    Dee Floyd, SPT  7/15/2022

## 2022-07-15 NOTE — PT/OT/SLP EVAL
Occupational Therapy  Co- Evaluation and Co-Treatment    Name: Karoline Justice  MRN: [id number]  Admitting Diagnosis:  Colitis  Recent Surgery: * No surgery found *      Co-evaluation/treatment performed due to patient's multiple deficits requiring two skilled therapists to appropriately and safely assess patient's strength and endurance while facilitating functional tasks in addition to accommodating for patient's activity tolerance.     Recommendations:     Discharge Recommendations: nursing facility, skilled  Discharge Equipment Recommendations:  raised toilet  Barriers to discharge:   (increased skilled assistance needed)    Assessment:     Karoline Justice is a 76 y.o. female with a medical diagnosis of Colitis.  Pt is [place] and benefits from simple and loud directions to assist with processing.  Pt completed therapy session with bed mobility with min-mod A, sit to stand transfer with mod A, and lateral steps with mod A, HHA. Pt's mobility limited by pain this date, and pt was notably fatigued at end of lateral steps and getting back to bed. . Pt's daughter present during part of session, informed therapists at end of session they are considering home hospice.     She presents with performance deficits affecting function: weakness, impaired endurance, impaired sensation, impaired functional mobility, impaired self care skills, gait instability, impaired balance, decreased coordination, decreased upper extremity function, decreased lower extremity function.      Discharge Recommendation: Skilled Nursing Facility     Rehab Prognosis: Good; patient would benefit from acute skilled OT services to address these deficits and reach maximum level of function.       Plan:     Patient to be seen 3 x/week to address the above listed problems via self-care/home management, therapeutic activities, therapeutic exercises  · Plan of Care Expires: 08/11/22  · Plan of Care Reviewed with: patient, daughter    Subjective  "    Chief Complaint: "My sides hurt so much"   Patient/Family Comments/goals: Get better, get comfortable, return home     Occupational Profile:  Living Environment: Pt lives in mobile home with son and , 4 AGUILAR and  building ramp. Pt has WIS with small threshold, grab bars, and bath bench.   Previous level of function: mod I using RW at home  Roles and Routines: mother, wife  Equipment Used at Home:  walker, rolling, bath bench, grab bar  Assistance upon Discharge: family     Pain/Comfort:  · Pain Rating 1: 9/10  · Location - Side 1: Bilateral  · Location - Orientation 1: generalized  · Location 1: abdomen  · Pain Addressed 1: Reposition, Distraction, Nurse notified, Cessation of Activity  · Pain Rating Post-Intervention 1: 9/10    Patients cultural, spiritual, Gnosticism conflicts given the current situation: no    Objective:     Communicated with: MYRANDA Ritter prior to session.  Patient found supine with nephrostomy, peripheral IV upon OT entry to room. After session initiated, MD team and daughter entered room.     General Precautions: Standard, fall   Orthopedic Precautions:N/A   Braces: N/A  Respiratory Status: Room air    Occupational Performance:    Bed Mobility:    · Patient completed Rolling/Turning to Left with  minimum assistance  · Patient completed Rolling/Turning to Right with minimum assistance  · Patient completed Scooting/Bridging  · To EOB in sitting: CGA  · To middle of bed in sitting: CGA  · To HOB while supine: total assistance  · Patient completed Supine to Sit with moderate assistance  · Patient completed Sit to Supine with moderate assistance    Functional Mobility/Transfers:  · Patient completed Sit <> Stand Transfer from EOB with moderate assistance  with  hand-held assist 1 trials limited by pain  · Stand to sit: min A with HHA  · Functional Mobility: With mod A HHA, pt took 4-5 lateral steps at EOB    Activities of Daily Living:  · Upper Body Dressing: minimum assistance " affixed hospital gown with tie back of neck    Cognitive/Visual Perceptual:  Cognitive/Psychosocial Skills:     -       Oriented to: AOx4   -       Follows Commands/attention:Follows multistep  commands and needs higher volume of voice 2* [place]  -       Communication: clear/fluent  -       Memory: No Deficits noted  -       Safety awareness/insight to disability: impaired   -       Mood/Affect/Coping skills/emotional control: Appropriate to situation, Despondent, Cooperative and Pleasant  Visual/Perceptual:      -Intact      Physical Exam:  Balance:    -       Impaired  Postural examination/scapula alignment:    -       Rounded shoulders  Sensation:    -       Intact  Motor Planning:    -       Intact  Dominant hand:    -       Right  Upper Extremity Range of Motion:   Cannot extend B shoulders past 90deg  Upper Extremity Strength:  3/5   Strength:  4/5  Fine Motor Coordination:    -       Intact  Gross motor coordination:   WFL  Neurological:    -       Intact    AMPAC 6 Click ADL:  AMPAC Total Score: 19    Treatment & Education:   Pt educated on role of OT, POC, and goals for therapy.     POC was dicussed with patient/caregiver, who was included in its development and is in agreement with the identified goals and treatment plan.    Patient and family aware of patient's deficits and therapy progression.    Time provided for therapeutic counseling and discussion of health disposition.    Educated on importance of EOB/OOB mobility, maintaining routine, sitting up in chair, and maximizing independence with ADLs during admission    Pt completed ADLs and functional mobility for treatment session as noted above    Pt/caregiver verbalized understanding and expressed no further concerns/questions.   Updated communication board with level of assist required     Education:    Patient left HOB elevated with all lines intact, call button in reach, RN notified and daughter present    GOALS:   Multidisciplinary  Problems     Occupational Therapy Goals        Problem: Occupational Therapy    Goal Priority Disciplines Outcome Interventions   Occupational Therapy Goal     OT, PT/OT Ongoing, Progressing    Description: Goals to be met by: 8/15/22     Patient will increase functional independence with ADLs by performing:    UE Dressing with Contact Guard Assistance.  LE Dressing with Moderate Assistance.  Sitting at edge of bed x10 minutes with Supervision.  Supine to sit with Supervision.  Step transfer with Contact Guard Assistance  Toilet transfer to bedside commode with Contact Guard Assistance.                     History:     Past Medical History:   Diagnosis Date    Bladder cancer 05/2022    Bladder mass 05/06/2022    Choledocholithiasis 05/06/2022    Diabetes mellitus     Pneumonia due to COVID-19 virus 08/06/2021       Past Surgical History:   Procedure Laterality Date    CYSTOSCOPY N/A 05/09/2022    Procedure: CYSTOSCOPY;  Surgeon: Adrianna Gutierrez MD;  Location: [address];  Service: Urology;  Laterality: N/A;    NEPHROSTOMY Left 05/2022    RETROGRADE PYELOGRAPHY Right 05/09/2022    Procedure: PYELOGRAM, RETROGRADE;  Surgeon: Adrianna Gutierrez MD;  Location: [address];  Service: Urology;  Laterality: Right;       Time Tracking:     OT Date of Treatment: 07/15/22  OT Start Time: 0950  OT Stop Time: 1021  OT Total Time (min): 31 min    Billable Minutes:Evaluation 8  Therapeutic Activity 23    7/15/2022

## 2022-07-15 NOTE — SUBJECTIVE & OBJECTIVE
Interval History: Pt would like to d/c with hospice care per daughter, DILEEP Grijalva. Pt is DNR. Pt admitted with colitis.     Past Medical History:   Diagnosis Date    Bladder cancer 05/2022    Bladder mass 05/06/2022    Choledocholithiasis 05/06/2022    Diabetes mellitus     Pneumonia due to COVID-19 virus 08/06/2021       Past Surgical History:   Procedure Laterality Date    CYSTOSCOPY N/A 05/09/2022    Procedure: CYSTOSCOPY;  Surgeon: Adrianna Gutierrez MD;  Location: [facility] OR [address] [place] [address];  Service: Urology;  Laterality: N/A;    NEPHROSTOMY Left 05/2022    RETROGRADE PYELOGRAPHY Right 05/09/2022    Procedure: PYELOGRAM, RETROGRADE;  Surgeon: Adrianna Gutierrez MD;  Location: [facility] OR [address] [place] [address];  Service: Urology;  Laterality: Right;       Review of patient's allergies indicates:  No Known Allergies    Medications:  Continuous Infusions:   sodium chloride 0.9% 100 mL/hr at 07/15/22 1012     Scheduled Meds:   apixaban  5 mg Oral BID    HYDROmorphone        magnesium sulfate IVPB  2 g Intravenous Once    piperacillin-tazobactam (ZOSYN) IVPB  4.5 g Intravenous Q12H    piperacillin-tazobactam 4.5 g in sodium chloride 0.9% 100 mL IVPB (ready to mix system)        sodium chloride 0.9%  1,000 mL Intravenous Once     PRN Meds:dextrose 10%, dextrose 10%, glucagon (human recombinant), glucose, glucose, HYDROmorphone, insulin aspart U-100, naloxone, sodium chloride 0.9%    Family History       Problem Relation (Age of Onset)    Diabetes Mother, Daughter, Son    Heart attack Father    Kidney cancer Son    Stomach cancer Mother    Thyroid disease Daughter          Tobacco Use    Smoking status: Never Smoker    Smokeless tobacco: Never Used   Substance and Sexual Activity    Alcohol use: No    Drug use: No    Sexual activity: Not on file       Review of Systems   Constitutional:  Positive for activity change and fatigue.   HENT:  Positive for hearing loss. Negative for trouble swallowing.    Respiratory:  Positive for shortness of  breath.    Gastrointestinal:  Positive for abdominal pain and constipation.   Musculoskeletal:  Positive for gait problem.   Skin: Negative.    Neurological:  Positive for weakness.   Objective:     Vital Signs (Most Recent):  Temp: 98 °F (36.7 °C) (07/15/22 0825)  Pulse: 76 (07/15/22 0825)  Resp: 18 (07/15/22 1057)  BP: 120/80 (07/15/22 1017)  SpO2: 100 % (07/15/22 0825) Vital Signs (24h Range):  Temp:  [98 °F (36.7 °C)-98.6 °F (37 °C)] 98 °F (36.7 °C)  Pulse:  [] 76  Resp:  [14-19] 18  SpO2:  [96 %-100 %] 100 %  BP: ()/(42-80) 120/80     Weight: 56 kg (123 lb 7.3 oz)  Body mass index is 23.33 kg/m².    Physical Exam  Constitutional:       General: She is awake.      Comments: Pt is [place]. Pt able to answer questions but sleepy due to just receiving pain meds.    HENT:      Head: Normocephalic and atraumatic.      Comments: [place]  Eyes:      General: Lids are normal.      Conjunctiva/sclera: Conjunctivae normal.   Pulmonary:      Effort: No accessory muscle usage or respiratory distress.   Musculoskeletal:      Comments: Weakness noted.    Skin:     General: Skin is warm and dry.   Neurological:      Mental Status: She is alert and oriented to person, place, and time.   Psychiatric:         Speech: Speech normal.       Review of Symptoms      Symptom Assessment (ESAS 0-10 Scale)  Pain:  8  Dyspnea:  0  Anxiety:  0  Nausea:  0  Depression:  0  Anorexia:  0  Fatigue:  0  Insomnia:  0  Restlessness:  0  Agitation:  0       Pain Assessment:    Location(s): abdomen    Abdomen       Location: generalized        Quality: Cramping        Quantity: 8/10 in intensity        Chronicity: Onset 0 second(s) ago, stable        Aggravating Factors: None        Alleviating Factors: Opiates        Associated Symptoms: None    ECOG Performance Status ndGndrndanddndend:nd nd2nd Living Arrangements:  Lives with spouse    Psychosocial/Cultural: Pt lives with spouse and sonReid in [place], [place]. Daughter, Valentine, lives locally and is MPOA.  She works in [employer].       Advance Care Planning   Advance Directives:   Living Will: Yes        Copy on chart: Yes    Do Not Resuscitate Status: Yes    Medical Power of : Yes      Decision Making:  Patient answered questions and Family answered questions       Significant Labs: All pertinent labs within the past 24 hours have been reviewed.  CBC:   Recent Labs   Lab 07/14/22 2328   WBC 38.66*   HGB 7.0*   HCT 22.1*   MCV 85        BMP:  Recent Labs   Lab 07/14/22  2328 07/15/22  0744   *  --    *  --    K 4.0  --      --    CO2 22*  --    BUN 23  --    CREATININE 1.7*  --    CALCIUM 9.2  --    MG  --  1.5*     LFT:  Lab Results   Component Value Date    AST 16 07/14/2022     (H) 06/14/2022    ALKPHOS 202 (H) 07/14/2022    BILITOT 0.5 07/14/2022     Albumin:   Albumin   Date Value Ref Range Status   07/14/2022 1.4 (L) 3.5 - 5.2 g/dL Final     Protein:   Total Protein   Date Value Ref Range Status   07/14/2022 5.3 (L) 6.0 - 8.4 g/dL Final     Lactic acid:   Lab Results   Component Value Date    LACTATE 1.4 07/15/2022    LACTATE 2.7 (H) 07/14/2022       Significant Imaging: I have reviewed all pertinent imaging results/findings within the past 24 hours.

## 2022-07-16 LAB
ABO + RH BLD: NORMAL
ALBUMIN SERPL BCP-MCNC: 1.2 G/DL (ref 3.5–5.2)
ALP SERPL-CCNC: 171 U/L (ref 55–135)
ALT SERPL W/O P-5'-P-CCNC: 12 U/L (ref 10–44)
ANION GAP SERPL CALC-SCNC: 4 MMOL/L (ref 8–16)
ANISOCYTOSIS BLD QL SMEAR: SLIGHT
AST SERPL-CCNC: 11 U/L (ref 10–40)
BACTERIA UR CULT: NORMAL
BACTERIA UR CULT: NORMAL
BASOPHILS # BLD AUTO: 0.06 K/UL (ref 0–0.2)
BASOPHILS NFR BLD: 0.2 % (ref 0–1.9)
BILIRUB SERPL-MCNC: 0.5 MG/DL (ref 0.1–1)
BLD GP AB SCN CELLS X3 SERPL QL: NORMAL
BLD PROD TYP BPU: NORMAL
BLOOD UNIT EXPIRATION DATE: NORMAL
BLOOD UNIT TYPE CODE: 5100
BLOOD UNIT TYPE: NORMAL
BUN SERPL-MCNC: 20 MG/DL (ref 8–23)
BURR CELLS BLD QL SMEAR: ABNORMAL
CALCIUM SERPL-MCNC: 8.5 MG/DL (ref 8.7–10.5)
CHLORIDE SERPL-SCNC: 107 MMOL/L (ref 95–110)
CO2 SERPL-SCNC: 20 MMOL/L (ref 23–29)
CODING SYSTEM: NORMAL
CREAT SERPL-MCNC: 1.3 MG/DL (ref 0.5–1.4)
DIFFERENTIAL METHOD: ABNORMAL
DISPENSE STATUS: NORMAL
E COLI SXT1 STL QL IA: NEGATIVE
E COLI SXT2 STL QL IA: NEGATIVE
EOSINOPHIL # BLD AUTO: 0.1 K/UL (ref 0–0.5)
EOSINOPHIL NFR BLD: 0.2 % (ref 0–8)
ERYTHROCYTE [DISTWIDTH] IN BLOOD BY AUTOMATED COUNT: 16 % (ref 11.5–14.5)
EST. GFR  (AFRICAN AMERICAN): 46 ML/MIN/1.73 M^2
EST. GFR  (NON AFRICAN AMERICAN): 39.9 ML/MIN/1.73 M^2
GLUCOSE SERPL-MCNC: 136 MG/DL (ref 70–110)
HCT VFR BLD AUTO: 22.1 % (ref 37–48.5)
HGB BLD-MCNC: 6.8 G/DL (ref 12–16)
IMM GRANULOCYTES # BLD AUTO: 0.25 K/UL (ref 0–0.04)
IMM GRANULOCYTES NFR BLD AUTO: 0.8 % (ref 0–0.5)
LYMPHOCYTES # BLD AUTO: 0.9 K/UL (ref 1–4.8)
LYMPHOCYTES NFR BLD: 2.9 % (ref 18–48)
MAGNESIUM SERPL-MCNC: 2 MG/DL (ref 1.6–2.6)
MCH RBC QN AUTO: 27.3 PG (ref 27–31)
MCHC RBC AUTO-ENTMCNC: 30.8 G/DL (ref 32–36)
MCV RBC AUTO: 89 FL (ref 82–98)
MONOCYTES # BLD AUTO: 0.8 K/UL (ref 0.3–1)
MONOCYTES NFR BLD: 2.6 % (ref 4–15)
NEUTROPHILS # BLD AUTO: 30 K/UL (ref 1.8–7.7)
NEUTROPHILS NFR BLD: 93.3 % (ref 38–73)
NRBC BLD-RTO: 0 /100 WBC
NUM UNITS TRANS PACKED RBC: NORMAL
OVALOCYTES BLD QL SMEAR: ABNORMAL
PLATELET # BLD AUTO: 318 K/UL (ref 150–450)
PLATELET BLD QL SMEAR: ABNORMAL
PMV BLD AUTO: 9.9 FL (ref 9.2–12.9)
POCT GLUCOSE: 144 MG/DL (ref 70–110)
POCT GLUCOSE: 155 MG/DL (ref 70–110)
POCT GLUCOSE: 156 MG/DL (ref 70–110)
POCT GLUCOSE: 169 MG/DL (ref 70–110)
POIKILOCYTOSIS BLD QL SMEAR: SLIGHT
POLYCHROMASIA BLD QL SMEAR: ABNORMAL
POTASSIUM SERPL-SCNC: 3.9 MMOL/L (ref 3.5–5.1)
PROT SERPL-MCNC: 4.1 G/DL (ref 6–8.4)
RBC # BLD AUTO: 2.49 M/UL (ref 4–5.4)
SODIUM SERPL-SCNC: 131 MMOL/L (ref 136–145)
WBC # BLD AUTO: 32.11 K/UL (ref 3.9–12.7)

## 2022-07-16 PROCEDURE — 20600001 HC STEP DOWN PRIVATE ROOM

## 2022-07-16 PROCEDURE — 99233 PR SUBSEQUENT HOSPITAL CARE,LEVL III: ICD-10-PCS | Mod: ,,, | Performed by: INTERNAL MEDICINE

## 2022-07-16 PROCEDURE — 99233 SBSQ HOSP IP/OBS HIGH 50: CPT | Mod: ,,, | Performed by: INTERNAL MEDICINE

## 2022-07-16 PROCEDURE — 25000003 PHARM REV CODE 250: Performed by: STUDENT IN AN ORGANIZED HEALTH CARE EDUCATION/TRAINING PROGRAM

## 2022-07-16 PROCEDURE — 36410 VNPNXR 3YR/> PHY/QHP DX/THER: CPT

## 2022-07-16 PROCEDURE — C1751 CATH, INF, PER/CENT/MIDLINE: HCPCS

## 2022-07-16 PROCEDURE — 36430 TRANSFUSION BLD/BLD COMPNT: CPT

## 2022-07-16 PROCEDURE — 80053 COMPREHEN METABOLIC PANEL: CPT | Performed by: INTERNAL MEDICINE

## 2022-07-16 PROCEDURE — 85025 COMPLETE CBC W/AUTO DIFF WBC: CPT | Performed by: INTERNAL MEDICINE

## 2022-07-16 PROCEDURE — P9016 RBC LEUKOCYTES REDUCED: HCPCS

## 2022-07-16 PROCEDURE — 27000207 HC ISOLATION

## 2022-07-16 PROCEDURE — 86920 COMPATIBILITY TEST SPIN: CPT

## 2022-07-16 PROCEDURE — 63600175 PHARM REV CODE 636 W HCPCS: Performed by: STUDENT IN AN ORGANIZED HEALTH CARE EDUCATION/TRAINING PROGRAM

## 2022-07-16 PROCEDURE — 36415 COLL VENOUS BLD VENIPUNCTURE: CPT

## 2022-07-16 PROCEDURE — 25000003 PHARM REV CODE 250

## 2022-07-16 PROCEDURE — 36415 COLL VENOUS BLD VENIPUNCTURE: CPT | Performed by: INTERNAL MEDICINE

## 2022-07-16 PROCEDURE — 86850 RBC ANTIBODY SCREEN: CPT

## 2022-07-16 PROCEDURE — 83735 ASSAY OF MAGNESIUM: CPT | Performed by: INTERNAL MEDICINE

## 2022-07-16 PROCEDURE — 63600175 PHARM REV CODE 636 W HCPCS: Performed by: INTERNAL MEDICINE

## 2022-07-16 RX ORDER — DIPHENHYDRAMINE HCL 25 MG
25 CAPSULE ORAL ONCE
Status: COMPLETED | OUTPATIENT
Start: 2022-07-16 | End: 2022-07-16

## 2022-07-16 RX ORDER — ACETAMINOPHEN 325 MG/1
650 TABLET ORAL ONCE
Status: COMPLETED | OUTPATIENT
Start: 2022-07-16 | End: 2022-07-16

## 2022-07-16 RX ORDER — HYDROMORPHONE HYDROCHLORIDE 5 MG/5ML
0.5 SOLUTION ORAL
Status: DISCONTINUED | OUTPATIENT
Start: 2022-07-16 | End: 2022-07-23 | Stop reason: HOSPADM

## 2022-07-16 RX ORDER — HYDROCODONE BITARTRATE AND ACETAMINOPHEN 500; 5 MG/1; MG/1
TABLET ORAL
Status: DISCONTINUED | OUTPATIENT
Start: 2022-07-16 | End: 2022-07-23 | Stop reason: HOSPADM

## 2022-07-16 RX ADMIN — VANCOMYCIN HYDROCHLORIDE 125 MG: KIT at 06:07

## 2022-07-16 RX ADMIN — HYDROMORPHONE HYDROCHLORIDE 0.5 MG: 1 INJECTION, SOLUTION INTRAMUSCULAR; INTRAVENOUS; SUBCUTANEOUS at 03:07

## 2022-07-16 RX ADMIN — DIPHENHYDRAMINE HYDROCHLORIDE 25 MG: 25 CAPSULE ORAL at 02:07

## 2022-07-16 RX ADMIN — APIXABAN 5 MG: 5 TABLET, FILM COATED ORAL at 08:07

## 2022-07-16 RX ADMIN — VANCOMYCIN HYDROCHLORIDE 125 MG: KIT at 12:07

## 2022-07-16 RX ADMIN — ACETAMINOPHEN 650 MG: 325 TABLET ORAL at 02:07

## 2022-07-16 RX ADMIN — HYDROMORPHONE HYDROCHLORIDE 0.5 MG: 1 INJECTION, SOLUTION INTRAMUSCULAR; INTRAVENOUS; SUBCUTANEOUS at 09:07

## 2022-07-16 RX ADMIN — HYDROMORPHONE HYDROCHLORIDE 0.5 MG: 1 INJECTION, SOLUTION INTRAMUSCULAR; INTRAVENOUS; SUBCUTANEOUS at 02:07

## 2022-07-16 RX ADMIN — VANCOMYCIN HYDROCHLORIDE 125 MG: KIT at 11:07

## 2022-07-16 RX ADMIN — SODIUM CHLORIDE 1000 ML: 0.9 INJECTION, SOLUTION INTRAVENOUS at 03:07

## 2022-07-16 NOTE — PLAN OF CARE
Problem: Adult Inpatient Plan of Care  Goal: Plan of Care Review  Outcome: Ongoing, Progressing   POC reviewed with daughter . Today pt had some noted hypotension.  IV access was obtained by outside source for IV access and midline was placed in upper right arm . Fluids and pains medication given Fluids paused for one unit of PRBC to infuse.  Patient sleeping most of the day . Vanc remains in place for cdiff  and  patient  had three loose bm this shift.  Bed is I low locked position, call light is in place. Daughter is at he bedside and very attentive to patient

## 2022-07-16 NOTE — ASSESSMENT & PLAN NOTE
This patient does have evidence of infective focus  My overall impression is sepsis. Vital signs were reviewed and noted in progress note.  Antibiotics given-   Antibiotics (From admission, onward)            Start     Stop Route Frequency Ordered    07/16/22 0100  vancomycin 125 mg/5 mL oral solution 125 mg  (C. difficile Infection (CDI) Treatment Order Panel)         07/25 2359 Oral Every 6 hours 07/16/22 0056    07/15/22 0546  PIPERACILLIN-TAZOBACTAM 4.5G/100ML SODIUM CHLORIDE 0.9%-READY TO MIX IVPB        Note to Pharmacy: Created by cabinet override    07/15 1759   07/15/22 0546        Cultures were taken-   Microbiology Results (last 7 days)     Procedure Component Value Units Date/Time    Blood culture [[id number]] Collected: 07/15/22 0744    Order Status: Completed Specimen: Blood Updated: 07/16/22 1012     Blood Culture, Routine No Growth to date      No Growth to date    Blood culture [[id number]] Collected: 07/15/22 0744    Order Status: Completed Specimen: Blood Updated: 07/16/22 1012     Blood Culture, Routine No Growth to date      No Growth to date    Clostridium difficile EIA [[id number]]  (Abnormal) Collected: 07/15/22 0538    Order Status: Completed Specimen: Stool Updated: 07/15/22 2132     C. diff Antigen Positive     C difficile Toxins A+B, EIA Positive     Comment: Testing not recommended for children <24 months old.       Stool culture [[id number]] Collected: 07/15/22 0538    Order Status: Sent Specimen: Stool Updated: 07/15/22 0612    E. coli 0157 antigen [[id number]] Collected: 07/15/22 0538    Order Status: No result Specimen: Stool Updated: 07/15/22 0612    Blood culture [[id number]]     Order Status: Canceled Specimen: Blood     Blood culture [[id number]]     Order Status: Canceled Specimen: Blood     Blood culture [[id number]]     Order Status: Canceled Specimen: Blood     Blood culture [[id number]]     Order Status: Canceled Specimen: Blood         Latest lactate reviewed, they are-  Recent Labs    Lab 07/14/22  2328 07/15/22  0744   LACTATE 2.7* 1.4       Organ dysfunction indicated by Acute kidney injury  Source- C diff colitis    Source control Achieved by-         -Leukocytosis improving, still elevated 32.1  -Elevated lactate resolved  -Intermittent hypotension -lowest of 79/44. 1L IVF bolus ordered pending midline placement

## 2022-07-16 NOTE — ASSESSMENT & PLAN NOTE
-Urine culture positive for Gram negative rods  -Patient has bilateral nephrostomy tubes and is otherwise asymptomatic. Will hold antibiotics at this time

## 2022-07-16 NOTE — PLAN OF CARE
Recommendations    1. When medically able, ADAT diabetic diet with texture per SLP    2. Add Boost Breeze BID      3. RD to monitor and follow    Goals: Meet % EEN, EPN by RD f/u date  Nutrition Goal Status: new  Communication of RD Recs:  (POC)

## 2022-07-16 NOTE — HOSPITAL COURSE
Ms. Justice was admitted for diarrhea and treated for C difficile colitis. She was initially treated with oral vancomycin, however IV metronidazole was later added due to continued diarrhea. Her pain medications were adjusted and palliative care was consulted. After discussions with the patient and her daughter, she made the decision to transition to hospice care. She was discharged to home hospice on 7/23/22.

## 2022-07-16 NOTE — ASSESSMENT & PLAN NOTE
-CT abd/pelvis pertinent for colitis with no free air or abscess  -C. Diff assay positive  -Oral Vanc x 10 days  -Clear liquid diet  -Monitor daily CBC/CMP

## 2022-07-16 NOTE — HPI
"Ms. Justice is a 76F with bladder cancer, bilateral nephrostomy tubes, recent RLE DVT on Eliquis, and DM2 who presents as a transfer from [place] for intractable abdominal pain and lower back pain in the setting of colitis on CT scan. She had been off her pain meds for 2 days due to severe constipation.   She was recently admitted to this service for a UTI and was discharged 7/7 with 11 days of cefpodoxime to complete a total 14 day course. She was also hypercalcemic during that admission and treated with fluids and pamindronate and calcitonin. Since that discharge she has had 3 ER visits for hypoglycemia, pain, and other various reasons.   She reports several days of abdominal pain, chills, and diarrhea. She is weak and prone to falling when walking to the bathroom.   At the outside hospital yesterday, she had worsening leukocytosis to 38, elevated lactate, evidence of recurrent/persistent UTI, and THOMAS. Per the ER physician's note, a CT revealed "moderate to severe long segment left-sided colitis, indeterminate may be infectious inflammatory or ischemic." However, I was not able to see the official radiology read yet. She was given meropenem and IV pain meds and [facility].            Patient information was obtained from patient, relative(s), past medical records, and ER records.      Oncology History:       Bladder cancer   5/20/2022 Initial Diagnosis     Bladder cancer        Tumor Conference     Presenting Hospital / Clinic: [facility]  Virtual Tumor Board Conference: Virtual  Presenter: Sylvia Escobar  Date Presented to Tumor Board: 5/26/2022  Specialties Present: Pathology; Urology; Radiation Oncology  Cancer Type: Bladder cancer  Recommended Plan Note: Will obtain bladder MRI, PET CT and bone scan to further evaluate for metastatic disease. Seeing medical oncology to discuss systemic therapy.            6/16/2022 -  Chemotherapy     Treatment Summary   Plan Name: OP PEMBROLIZUMAB 400MG " Q6W  Treatment Goal: Control  Status: Active  Start Date: 6/16/2022 (Planned)  End Date: 4/18/2024 (Planned)  Provider: Reid Snider MD  Chemotherapy: pembrolizumab (KEYTRUDA) 400 mg in sodium chloride 0.9% 116 mL infusion, 400 mg, Intravenous, Clinic/HOD 1 time, 0 of 17 cycles

## 2022-07-16 NOTE — ASSESSMENT & PLAN NOTE
Moderate Malnutrition  Nutrition Problem  Moderate Protein-Calorie Malnutrition  Malnutrition in the context of Chronic Illness    Related to (etiology):   Inadequate energy intake    Signs and Symptoms (as evidenced by):   Energy Intake: <75% of estimated energy requirement for >3 months  Body Fat Depletion: severe depletion of orbital, moderate depletion of triceps  Muscle Mass Depletion: moderate depletion of temples, clavicle region, interosseous muscle, lower extremities  Weight Loss: 18% x 12 months    Interventions/Recommendations (treatment strategy):  Collaboration of nutrition care with other providers  ONS    Nutrition Diagnosis Status:   New

## 2022-07-16 NOTE — PROGRESS NOTES
ATTENDING NOTE, ONCOLOGY INPATIENT TEAM    Events of last 24 hours noted.  Patient seen and examined, chart reviewed.  Full note to followed by house staff.  Appears uncomfortable, complaining of abdominal pain.  She does have C diff colitis and she has been started on vancomycin.  Lungs are clear to auscultation.  Abdomen is soft, with mild diffuse tenderness.  Labs reviewed.  Hemoglobin is 6.8 and hematocrit is 22.1 today.  WBCs 66882 per cubic mm and platelets 646515 per cubic mm.     PLAN  Repeat CBC and CMP daily  Continue oral vancomycin.  Transfuse 1 unit of PRBCs today  Continue apixaban  We will follow.      Judd Herrera MD

## 2022-07-16 NOTE — ASSESSMENT & PLAN NOTE
Cr 1.3., trending down. Baseline around 0.8  -Hold home morphine and oxy in setting of THOMAS  -IVF  -Strict Is and Os

## 2022-07-16 NOTE — SUBJECTIVE & OBJECTIVE
Interval History: Patient continues to experience abdominal pain and cramping, mildly improved from yesterday. Continues to have diarrhea. AF overnight.    Oncology Treatment Plan:   OP PEMBROLIZUMAB 400MG Q6W    Medications:  Continuous Infusions:  Scheduled Meds:   apixaban  5 mg Oral BID    sodium chloride 0.9%  1,000 mL Intravenous Once    vancomycin  125 mg Oral Q6H     PRN Meds:dextrose 10%, dextrose 10%, glucagon (human recombinant), glucose, glucose, HYDROmorphone, insulin aspart U-100, naloxone, sodium chloride 0.9%     Review of Systems   Constitutional:  Negative for chills and fever.   HENT:  Negative for congestion and sore throat.    Respiratory:  Negative for cough and shortness of breath.    Cardiovascular:  Negative for chest pain and palpitations.   Gastrointestinal:  Positive for abdominal pain and diarrhea. Negative for blood in stool and vomiting.   Musculoskeletal:  Positive for back pain. Negative for arthralgias and myalgias.   Skin:  Negative for rash and wound.   Neurological:  Positive for dizziness. Negative for syncope.   Hematological:  Negative for adenopathy. Does not bruise/bleed easily.   Objective:     Vital Signs (Most Recent):  Temp: 98 °F (36.7 °C) (07/16/22 1047)  Pulse: 85 (07/16/22 1047)  Resp: 15 (07/16/22 1047)  BP: (!) 101/50 (07/16/22 1047)  SpO2: 95 % (07/16/22 1047)   Vital Signs (24h Range):  Temp:  [96.6 °F (35.9 °C)-98.7 °F (37.1 °C)] 98 °F (36.7 °C)  Pulse:  [85-96] 85  Resp:  [15-18] 15  SpO2:  [93 %-99 %] 95 %  BP: ()/(39-57) 101/50     Weight: 56 kg (123 lb 7.3 oz)  Body mass index is 23.33 kg/m².  Body surface area is 1.55 meters squared.      Intake/Output Summary (Last 24 hours) at 7/16/2022 1214  Last data filed at 7/16/2022 0400  Gross per 24 hour   Intake 240 ml   Output 275 ml   Net -35 ml       Physical Exam  Vitals and nursing note reviewed.   Constitutional:       Appearance: She is ill-appearing. She is not toxic-appearing or diaphoretic.   HENT:       Head: Normocephalic.   Eyes:      Extraocular Movements: Extraocular movements intact.   Cardiovascular:      Rate and Rhythm: Normal rate and regular rhythm.      Heart sounds: No murmur heard.  Pulmonary:      Effort: Pulmonary effort is normal. No respiratory distress.   Abdominal:      General: Abdomen is flat.      Palpations: Abdomen is soft.      Tenderness: There is abdominal tenderness. There is no guarding.   Musculoskeletal:      Right lower leg: Edema present.      Left lower leg: Edema present.   Skin:     General: Skin is warm and dry.   Neurological:      Mental Status: She is alert. Mental status is at baseline.      Cranial Nerves: No dysarthria.   Psychiatric:         Mood and Affect: Mood normal.         Behavior: Behavior normal.       Significant Labs:   CBC:   Recent Labs   Lab 07/14/22 2328 07/16/22  0511   WBC 38.66* 32.11*   HGB 7.0* 6.8*   HCT 22.1* 22.1*    318    and CMP:   Recent Labs   Lab 07/14/22 2328 07/16/22 0511   * 131*   K 4.0 3.9    107   CO2 22* 20*   * 136*   BUN 23 20   CREATININE 1.7* 1.3   CALCIUM 9.2 8.5*   PROT 5.3* 4.1*   ALBUMIN 1.4* 1.2*   BILITOT 0.5 0.5   ALKPHOS 202* 171*   AST 16 11   ALT 22 12   ANIONGAP 8 4*   EGFRNONAA 28.9* 39.9*     Microbiology Results (last 7 days)       Procedure Component Value Units Date/Time    Blood culture [[id number]] Collected: 07/15/22 0744    Order Status: Completed Specimen: Blood Updated: 07/16/22 1012     Blood Culture, Routine No Growth to date      No Growth to date    Blood culture [[id number]] Collected: 07/15/22 0744    Order Status: Completed Specimen: Blood Updated: 07/16/22 1012     Blood Culture, Routine No Growth to date      No Growth to date    Clostridium difficile EIA [[id number]]  (Abnormal) Collected: 07/15/22 0538    Order Status: Completed Specimen: Stool Updated: 07/15/22 2132     C. diff Antigen Positive     C difficile Toxins A+B, EIA Positive     Comment: Testing not  recommended for children <24 months old.       Stool culture [[id number]] Collected: 07/15/22 0538    Order Status: Sent Specimen: Stool Updated: 07/15/22 0612    E. coli 0157 antigen [[id number]] Collected: 07/15/22 0538    Order Status: No result Specimen: Stool Updated: 07/15/22 0612    Blood culture [[id number]]     Order Status: Canceled Specimen: Blood     Blood culture [[id number]]     Order Status: Canceled Specimen: Blood     Blood culture [[id number]]     Order Status: Canceled Specimen: Blood     Blood culture [[id number]]     Order Status: Canceled Specimen: Blood            Diagnostic Results:  CT: CT abd/pelvis 7/15 - colitis, small amount of free fluid, no free air or abscess

## 2022-07-16 NOTE — PLAN OF CARE
Problem: Diabetes Comorbidity  Goal: Blood Glucose Level Within Targeted Range  Outcome: Ongoing, Progressing     Problem: Fluid and Electrolyte Imbalance (Acute Kidney Injury/Impairment)  Goal: Fluid and Electrolyte Balance  Outcome: Ongoing, Progressing     Problem: Oral Intake Inadequate (Acute Kidney Injury/Impairment)  Goal: Optimal Nutrition Intake  Outcome: Ongoing, Progressing     Problem: Renal Function Impairment (Acute Kidney Injury/Impairment)  Goal: Effective Renal Function  Outcome: Ongoing, Progressing     Problem: Adult Inpatient Plan of Care  Goal: Plan of Care Review  Outcome: Ongoing, Progressing  Goal: Patient-Specific Goal (Individualized)  Outcome: Ongoing, Progressing  Goal: Absence of Hospital-Acquired Illness or Injury  Outcome: Ongoing, Progressing  Goal: Optimal Comfort and Wellbeing  Outcome: Ongoing, Progressing  Goal: Readiness for Transition of Care  Outcome: Ongoing, Progressing     Problem: Adjustment to Illness (Sepsis/Septic Shock)  Goal: Optimal Coping  Outcome: Ongoing, Progressing     Problem: Bleeding (Sepsis/Septic Shock)  Goal: Absence of Bleeding  Outcome: Ongoing, Progressing     Problem: Glycemic Control Impaired (Sepsis/Septic Shock)  Goal: Blood Glucose Level Within Desired Range  Outcome: Ongoing, Progressing     Problem: Infection Progression (Sepsis/Septic Shock)  Goal: Absence of Infection Signs and Symptoms  Outcome: Ongoing, Progressing     Problem: Nutrition Impaired (Sepsis/Septic Shock)  Goal: Optimal Nutrition Intake  Outcome: Ongoing, Progressing     Problem: Infection  Goal: Absence of Infection Signs and Symptoms  Outcome: Ongoing, Progressing     Problem: Impaired Wound Healing  Goal: Optimal Wound Healing  Outcome: Ongoing, Progressing     Problem: Skin Injury Risk Increased  Goal: Skin Health and Integrity  Outcome: Ongoing, Progressing     Problem: Fall Injury Risk  Goal: Absence of Fall and Fall-Related Injury  Outcome: Ongoing, Progressing      Problem: Coping Ineffective  Goal: Effective Coping  Outcome: Ongoing, Progressing

## 2022-07-16 NOTE — ASSESSMENT & PLAN NOTE
Follows with Dr. Snider. Was scheduled to begin outpatient chemo 7/8.   Now s/p bilateral nephrostomy tubes  Has seen radiation oncology recently as well     - outpatient treatment likely  - consider palliative consult given frequent admissions  -Prn dilaudid for pain control. Currently holding morphine and oxy in setting of THOMAS

## 2022-07-16 NOTE — ASSESSMENT & PLAN NOTE
Hgb 7.0 on admission, 6.8 today  -Likely due to MABEL vs anemia of chronic disease  -transfuse 1prbc 7/16

## 2022-07-16 NOTE — PROGRESS NOTES
"[facility] ([facility])  Hematology/Oncology  Progress Note    Patient Name: Karoline Justice  Admission Date: 7/15/2022  Hospital Length of Stay: 1 days  Code Status: DNR     Subjective:     HPI:  Ms. Justice is a 76F with bladder cancer, bilateral nephrostomy tubes, recent RLE DVT on Eliquis, and DM2 who presents as a transfer from [place] for intractable abdominal pain and lower back pain in the setting of colitis on CT scan. She had been off her pain meds for 2 days due to severe constipation.   She was recently admitted to this service for a UTI and was discharged 7/7 with 11 days of cefpodoxime to complete a total 14 day course. She was also hypercalcemic during that admission and treated with fluids and pamindronate and calcitonin. Since that discharge she has had 3 ER visits for hypoglycemia, pain, and other various reasons.   She reports several days of abdominal pain, chills, and diarrhea. She is weak and prone to falling when walking to the bathroom.   At the outside hospital yesterday, she had worsening leukocytosis to 38, elevated lactate, evidence of recurrent/persistent UTI, and THOMAS. Per the ER physician's note, a CT revealed "moderate to severe long segment left-sided colitis, indeterminate may be infectious inflammatory or ischemic." However, I was not able to see the official radiology read yet. She was given meropenem and IV pain meds and [place] [facility] [place]            Patient information was obtained from patient, relative(s), past medical records, and ER records.      Oncology History:       Bladder cancer   5/20/2022 Initial Diagnosis     Bladder cancer        Tumor Conference     Presenting Hospital / Clinic: [facility] [address]  Virtual Tumor Board Conference: Virtual  Presenter: Sylvia Escobar  Date Presented to Tumor Board: 5/26/2022  Specialties Present: Pathology; Urology; Radiation Oncology  Cancer Type: Bladder cancer  Recommended Plan Note: Will obtain bladder MRI, PET CT " and bone scan to further evaluate for metastatic disease. Seeing medical oncology to discuss systemic therapy.            6/16/2022 -  Chemotherapy     Treatment Summary   Plan Name: OP PEMBROLIZUMAB 400MG Q6W  Treatment Goal: Control  Status: Active  Start Date: 6/16/2022 (Planned)  End Date: 4/18/2024 (Planned)  Provider: Reid Snider MD  Chemotherapy: pembrolizumab (KEYTRUDA) 400 mg in sodium chloride 0.9% 116 mL infusion, 400 mg, Intravenous, Clinic/HOD 1 time, 0 of 17 cycles             Interval History: Patient continues to experience abdominal pain and cramping, mildly improved from yesterday. Continues to have diarrhea. AF overnight.    Oncology Treatment Plan:   OP PEMBROLIZUMAB 400MG Q6W    Medications:  Continuous Infusions:  Scheduled Meds:   apixaban  5 mg Oral BID    sodium chloride 0.9%  1,000 mL Intravenous Once    vancomycin  125 mg Oral Q6H     PRN Meds:dextrose 10%, dextrose 10%, glucagon (human recombinant), glucose, glucose, HYDROmorphone, insulin aspart U-100, naloxone, sodium chloride 0.9%     Review of Systems   Constitutional:  Negative for chills and fever.   HENT:  Negative for congestion and sore throat.    Respiratory:  Negative for cough and shortness of breath.    Cardiovascular:  Negative for chest pain and palpitations.   Gastrointestinal:  Positive for abdominal pain and diarrhea. Negative for blood in stool and vomiting.   Musculoskeletal:  Positive for back pain. Negative for arthralgias and myalgias.   Skin:  Negative for rash and wound.   Neurological:  Positive for dizziness. Negative for syncope.   Hematological:  Negative for adenopathy. Does not bruise/bleed easily.   Objective:     Vital Signs (Most Recent):  Temp: 98 °F (36.7 °C) (07/16/22 1047)  Pulse: 85 (07/16/22 1047)  Resp: 15 (07/16/22 1047)  BP: (!) 101/50 (07/16/22 1047)  SpO2: 95 % (07/16/22 1047)   Vital Signs (24h Range):  Temp:  [96.6 °F (35.9 °C)-98.7 °F (37.1 °C)] 98 °F (36.7 °C)  Pulse:  [85-96]  85  Resp:  [15-18] 15  SpO2:  [93 %-99 %] 95 %  BP: ()/(39-57) 101/50     Weight: 56 kg (123 lb 7.3 oz)  Body mass index is 23.33 kg/m².  Body surface area is 1.55 meters squared.      Intake/Output Summary (Last 24 hours) at 7/16/2022 1214  Last data filed at 7/16/2022 0400  Gross per 24 hour   Intake 240 ml   Output 275 ml   Net -35 ml       Physical Exam  Vitals and nursing note reviewed.   Constitutional:       Appearance: She is ill-appearing. She is not toxic-appearing or diaphoretic.   HENT:      Head: Normocephalic.   Eyes:      Extraocular Movements: Extraocular movements intact.   Cardiovascular:      Rate and Rhythm: Normal rate and regular rhythm.      Heart sounds: No murmur heard.  Pulmonary:      Effort: Pulmonary effort is normal. No respiratory distress.   Abdominal:      General: Abdomen is flat.      Palpations: Abdomen is soft.      Tenderness: There is abdominal tenderness. There is no guarding.   Musculoskeletal:      Right lower leg: Edema present.      Left lower leg: Edema present.   Skin:     General: Skin is warm and dry.   Neurological:      Mental Status: She is alert. Mental status is at baseline.      Cranial Nerves: No dysarthria.   Psychiatric:         Mood and Affect: Mood normal.         Behavior: Behavior normal.       Significant Labs:   CBC:   Recent Labs   Lab 07/14/22 2328 07/16/22  0511   WBC 38.66* 32.11*   HGB 7.0* 6.8*   HCT 22.1* 22.1*    318    and CMP:   Recent Labs   Lab 07/14/22 2328 07/16/22  0511   * 131*   K 4.0 3.9    107   CO2 22* 20*   * 136*   BUN 23 20   CREATININE 1.7* 1.3   CALCIUM 9.2 8.5*   PROT 5.3* 4.1*   ALBUMIN 1.4* 1.2*   BILITOT 0.5 0.5   ALKPHOS 202* 171*   AST 16 11   ALT 22 12   ANIONGAP 8 4*   EGFRNONAA 28.9* 39.9*     Microbiology Results (last 7 days)       Procedure Component Value Units Date/Time    Blood culture [[id number]] Collected: 07/15/22 0744    Order Status: Completed Specimen: Blood Updated: 07/16/22  1012     Blood Culture, Routine No Growth to date      No Growth to date    Blood culture [[id number]] Collected: 07/15/22 0744    Order Status: Completed Specimen: Blood Updated: 07/16/22 1012     Blood Culture, Routine No Growth to date      No Growth to date    Clostridium difficile EIA [[id number]]  (Abnormal) Collected: 07/15/22 0538    Order Status: Completed Specimen: Stool Updated: 07/15/22 2132     C. diff Antigen Positive     C difficile Toxins A+B, EIA Positive     Comment: Testing not recommended for children <24 months old.       Stool culture [[id number]] Collected: 07/15/22 0538    Order Status: Sent Specimen: Stool Updated: 07/15/22 0612    E. coli 0157 antigen [[id number]] Collected: 07/15/22 0538    Order Status: No result Specimen: Stool Updated: 07/15/22 0612    Blood culture [[id number]]     Order Status: Canceled Specimen: Blood     Blood culture [[id number]]     Order Status: Canceled Specimen: Blood     Blood culture [[id number]]     Order Status: Canceled Specimen: Blood     Blood culture [[id number]]     Order Status: Canceled Specimen: Blood            Diagnostic Results:  CT: CT abd/pelvis 7/15 - colitis, small amount of free fluid, no free air or abscess    Assessment/Plan:     * Sepsis  This patient does have evidence of infective focus  My overall impression is sepsis. Vital signs were reviewed and noted in progress note.  Antibiotics given-   Antibiotics (From admission, onward)                Start     Stop Route Frequency Ordered    07/16/22 0100  vancomycin 125 mg/5 mL oral solution 125 mg  (C. difficile Infection (CDI) Treatment Order Panel)         07/25 2359 Oral Every 6 hours 07/16/22 0056    07/15/22 0546  PIPERACILLIN-TAZOBACTAM 4.5G/100ML SODIUM CHLORIDE 0.9%-READY TO MIX IVPB        Note to Pharmacy: Created by cabinet override    07/15 1759   07/15/22 0546          Cultures were taken-   Microbiology Results (last 7 days)       Procedure Component Value Units Date/Time    Blood  culture [[id number]] Collected: 07/15/22 0744    Order Status: Completed Specimen: Blood Updated: 07/16/22 1012     Blood Culture, Routine No Growth to date      No Growth to date    Blood culture [[id number]] Collected: 07/15/22 0744    Order Status: Completed Specimen: Blood Updated: 07/16/22 1012     Blood Culture, Routine No Growth to date      No Growth to date    Clostridium difficile EIA [[id number]]  (Abnormal) Collected: 07/15/22 0538    Order Status: Completed Specimen: Stool Updated: 07/15/22 2132     C. diff Antigen Positive     C difficile Toxins A+B, EIA Positive     Comment: Testing not recommended for children <24 months old.       Stool culture [[id number]] Collected: 07/15/22 0538    Order Status: Sent Specimen: Stool Updated: 07/15/22 0612    E. coli 0157 antigen [[id number]] Collected: 07/15/22 0538    Order Status: No result Specimen: Stool Updated: 07/15/22 0612    Blood culture [[id number]]     Order Status: Canceled Specimen: Blood     Blood culture [[id number]]     Order Status: Canceled Specimen: Blood     Blood culture [[id number]]     Order Status: Canceled Specimen: Blood     Blood culture [[id number]]     Order Status: Canceled Specimen: Blood           Latest lactate reviewed, they are-  Recent Labs   Lab 07/14/22 2328 07/15/22  0744   LACTATE 2.7* 1.4       Organ dysfunction indicated by Acute kidney injury  Source- C diff colitis    Source control Achieved by-         -Leukocytosis improving, still elevated 32.1  -Elevated lactate resolved  -Intermittent hypotension -lowest of 79/44. 1L IVF bolus ordered pending midline placement      Colitis  -CT abd/pelvis pertinent for colitis with no free air or abscess  -C. Diff assay positive  -Oral Vanc x 10 days  -Clear liquid diet  -Monitor daily CBC/CMP     Anemia  Hgb 7.0 on admission, 6.8 today  -Likely due to MABEL vs anemia of chronic disease  -transfuse 1prbc 7/16    UTI (urinary tract infection)  -Urine culture positive for Gram negative  rods  -Patient has bilateral nephrostomy tubes and is otherwise asymptomatic. Will hold antibiotics at this time     Bladder cancer  Follows with Dr. Snider. Was scheduled to begin outpatient chemo 7/8.   Now s/p bilateral nephrostomy tubes  Has seen radiation oncology recently as well     - outpatient treatment likely  - consider palliative consult given frequent admissions  -Prn dilaudid for pain control. Currently holding morphine and oxy in setting of THOMAS    THOMAS (acute kidney injury)  Cr 1.3., trending down. Baseline around 0.8  -Hold home morphine and oxy in setting of THOMAS  -IVF  -Strict Is and Os    HTN (hypertension)  Hold home meds, monitor bp     Controlled Type 2 diabetes mellitus  SSI, POC glucose             Riya Zwolle, DO  Hematology/Oncology  [facility] (H[facility])      ATTENDING NOTE, ONCOLOGY INPATIENT TEAM    As above.  Patient seen and examined, chart reviewed.  Please refer to my note of same day for our assessment and plan.    Judd Herrera MD

## 2022-07-16 NOTE — CONSULTS
[facility] - On[facility] ([facility])  Adult Nutrition  Consult Note    SUMMARY     Recommendations    1. When medically able, ADAT diabetic diet with texture per SLP    2. Add Boost Breeze BID      3. RD to monitor and follow    Goals: Meet % EEN, EPN by RD f/u date  Nutrition Goal Status: new  Communication of RD Recs:  (POC)    Assessment and Plan    Moderate Malnutrition  Nutrition Problem  Moderate Protein-Calorie Malnutrition  Malnutrition in the context of Chronic Illness    Related to (etiology):   Inadequate energy intake    Signs and Symptoms (as evidenced by):   Energy Intake: <75% of estimated energy requirement for >3 months  Body Fat Depletion: severe depletion of orbital, moderate depletion of triceps  Muscle Mass Depletion: moderate depletion of temples, clavicle region, interosseous muscle, lower extremities  Weight Loss: 18% x 12 months    Interventions/Recommendations (treatment strategy):  Collaboration of nutrition care with other providers  ONS    Nutrition Diagnosis Status:   New      Malnutrition Assessment  Weight Loss (Malnutrition):  (13% x 12 months)  Energy Intake (Malnutrition): less than 75% for greater than or equal to 3 months   Orbital Region (Subcutaneous Fat Loss): severe depletion  Upper Arm Region (Subcutaneous Fat Loss): moderate depletion   Rastafarian Region (Muscle Loss): moderate depletion  Clavicle Bone Region (Muscle Loss): moderate depletion  Clavicle and Acromion Bone Region (Muscle Loss): moderate depletion  Dorsal Hand (Muscle Loss): moderate depletion  Anterior Thigh Region (Muscle Loss): moderate depletion  Posterior Calf Region (Muscle Loss): moderate depletion     Reason for Assessment    Reason For Assessment: consult  Diagnosis:  (Colitis)  Relevant Medical History: T2DM, HTN, THOMAS, Bladder cancer, Sepsis, UTI  Interdisciplinary Rounds: did not attend    General Information Comments:    Pt report good appetite PTA. Appetite decreased since admit. Pt on CLD during RD  "visits. Tolerating CLD, told RD that Bodyarmor taste really good. Have difficulty with swallowing sometimes. Able to tolerate soft diet at home. Pt report 3 episodes of diarrhea today by RD visit. Per wt hx, noted weight loss of 18lb (13%) over a year, wt has been stable around 117lb in the past 2 months. NFPE completed today, noted moderate-severe fat depletion and moderate muscle depletion. Pt meet the criteria for moderate malnutrition in the context of chronic illness.     Nutrition Discharge Planning: Pending medical course    Nutrition Risk Screen    Nutrition Risk Screen: large or nonhealing wound, burn or pressure injury    Nutrition/Diet History    Spiritual, Cultural Beliefs, Baptist Practices, Values that Affect Care: no    Anthropometrics    Temp: 98.3 °F (36.8 °C)  Height Method: Stated  Height: 5' 1" (154.9 cm)  Height (inches): 61 in  Weight Method: Bed Scale  Weight: 56 kg (123 lb 7.3 oz)  Weight (lb): 123.46 lb  Ideal Body Weight (IBW), Female: 105 lb  % Ideal Body Weight, Female (lb): 117.58 %  BMI (Calculated): 23.3    Lab/Procedures/Meds    Pertinent Labs Reviewed: reviewed  Pertinent Labs Comments: Na 132, Cr 1.7, eGFR 28.9, glucose 260,   Pertinent Medications Reviewed: reviewed    Estimated/Assessed Needs    Weight Used For Calorie Calculations: 56 kg (123 lb 7.3 oz)  Energy Calorie Requirements (kcal): 1680kcal  Energy Need Method: Kcal/kg (30)  Protein Requirements: 67-84g (1.2-1.5g/kg)  Weight Used For Protein Calculations: 56 kg (123 lb 7.3 oz)  Fluid Requirements (mL): 1ml/kcal or per MD  Estimated Fluid Requirement Method: RDA Method  RDA Method (mL): 1680     Nutrition Prescription Ordered    Current Diet Order: CLD    Evaluation of Received Nutrient/Fluid Intake    I/O: - 0.2L since admit  Energy Calories Required: not meeting needs  Protein Required: not meeting needs  Comments: LBM 7/15  Tolerance: tolerating    Nutrition Risk    Level of Risk/Frequency of Follow-up: low "     Monitor and Evaluation    Food and Nutrient Intake: energy intake, food and beverage intake  Food and Nutrient Adminstration: diet order  Knowledge/Beliefs/Attitudes: beliefs and attitudes, food and nutrition knowledge/skill  Physical Activity and Function: nutrition-related ADLs and IADLs  Anthropometric Measurements: height/length, weight, weight change, body mass index  Biochemical Data, Medical Tests and Procedures: electrolyte and renal panel, gastrointestinal profile, glucose/endocrine profile, inflammatory profile, lipid profile  Nutrition-Focused Physical Findings: overall appearance     Nutrition Follow-Up    RD Follow-up?: Yes     Brennon HERNANDEZ-YUE

## 2022-07-17 LAB
ALBUMIN SERPL BCP-MCNC: 1.2 G/DL (ref 3.5–5.2)
ALP SERPL-CCNC: 172 U/L (ref 55–135)
ALT SERPL W/O P-5'-P-CCNC: 13 U/L (ref 10–44)
ANION GAP SERPL CALC-SCNC: 7 MMOL/L (ref 8–16)
ANISOCYTOSIS BLD QL SMEAR: SLIGHT
AST SERPL-CCNC: 13 U/L (ref 10–40)
BASOPHILS # BLD AUTO: 0.07 K/UL (ref 0–0.2)
BASOPHILS NFR BLD: 0.2 % (ref 0–1.9)
BILIRUB SERPL-MCNC: 0.7 MG/DL (ref 0.1–1)
BUN SERPL-MCNC: 23 MG/DL (ref 8–23)
BURR CELLS BLD QL SMEAR: ABNORMAL
CALCIUM SERPL-MCNC: 8.3 MG/DL (ref 8.7–10.5)
CHLORIDE SERPL-SCNC: 106 MMOL/L (ref 95–110)
CO2 SERPL-SCNC: 16 MMOL/L (ref 23–29)
CREAT SERPL-MCNC: 1.6 MG/DL (ref 0.5–1.4)
DIFFERENTIAL METHOD: ABNORMAL
EOSINOPHIL # BLD AUTO: 0.1 K/UL (ref 0–0.5)
EOSINOPHIL NFR BLD: 0.2 % (ref 0–8)
ERYTHROCYTE [DISTWIDTH] IN BLOOD BY AUTOMATED COUNT: 15.9 % (ref 11.5–14.5)
EST. GFR  (AFRICAN AMERICAN): 35.8 ML/MIN/1.73 M^2
EST. GFR  (NON AFRICAN AMERICAN): 31.1 ML/MIN/1.73 M^2
GLUCOSE SERPL-MCNC: 111 MG/DL (ref 70–110)
HCT VFR BLD AUTO: 31.1 % (ref 37–48.5)
HGB BLD-MCNC: 9.6 G/DL (ref 12–16)
IMM GRANULOCYTES # BLD AUTO: 0.54 K/UL (ref 0–0.04)
IMM GRANULOCYTES NFR BLD AUTO: 1.4 % (ref 0–0.5)
LYMPHOCYTES # BLD AUTO: 1.2 K/UL (ref 1–4.8)
LYMPHOCYTES NFR BLD: 3.3 % (ref 18–48)
MAGNESIUM SERPL-MCNC: 2 MG/DL (ref 1.6–2.6)
MCH RBC QN AUTO: 27.9 PG (ref 27–31)
MCHC RBC AUTO-ENTMCNC: 30.9 G/DL (ref 32–36)
MCV RBC AUTO: 90 FL (ref 82–98)
MONOCYTES # BLD AUTO: 0.9 K/UL (ref 0.3–1)
MONOCYTES NFR BLD: 2.3 % (ref 4–15)
NEUTROPHILS # BLD AUTO: 35.1 K/UL (ref 1.8–7.7)
NEUTROPHILS NFR BLD: 92.6 % (ref 38–73)
NRBC BLD-RTO: 0 /100 WBC
PLATELET # BLD AUTO: 352 K/UL (ref 150–450)
PMV BLD AUTO: 10.4 FL (ref 9.2–12.9)
POCT GLUCOSE: 147 MG/DL (ref 70–110)
POCT GLUCOSE: 148 MG/DL (ref 70–110)
POCT GLUCOSE: 157 MG/DL (ref 70–110)
POIKILOCYTOSIS BLD QL SMEAR: SLIGHT
POLYCHROMASIA BLD QL SMEAR: ABNORMAL
POTASSIUM SERPL-SCNC: 4.2 MMOL/L (ref 3.5–5.1)
PROT SERPL-MCNC: 4.4 G/DL (ref 6–8.4)
RBC # BLD AUTO: 3.44 M/UL (ref 4–5.4)
SODIUM SERPL-SCNC: 129 MMOL/L (ref 136–145)
WBC # BLD AUTO: 37.93 K/UL (ref 3.9–12.7)

## 2022-07-17 PROCEDURE — 99233 PR SUBSEQUENT HOSPITAL CARE,LEVL III: ICD-10-PCS | Mod: ,,, | Performed by: INTERNAL MEDICINE

## 2022-07-17 PROCEDURE — 99233 SBSQ HOSP IP/OBS HIGH 50: CPT | Mod: ,,, | Performed by: INTERNAL MEDICINE

## 2022-07-17 PROCEDURE — 83735 ASSAY OF MAGNESIUM: CPT | Performed by: INTERNAL MEDICINE

## 2022-07-17 PROCEDURE — 36415 COLL VENOUS BLD VENIPUNCTURE: CPT | Performed by: INTERNAL MEDICINE

## 2022-07-17 PROCEDURE — 27000207 HC ISOLATION

## 2022-07-17 PROCEDURE — 25000003 PHARM REV CODE 250

## 2022-07-17 PROCEDURE — 20600001 HC STEP DOWN PRIVATE ROOM

## 2022-07-17 PROCEDURE — 80053 COMPREHEN METABOLIC PANEL: CPT | Performed by: INTERNAL MEDICINE

## 2022-07-17 PROCEDURE — 63600175 PHARM REV CODE 636 W HCPCS: Performed by: INTERNAL MEDICINE

## 2022-07-17 PROCEDURE — 25000003 PHARM REV CODE 250: Performed by: STUDENT IN AN ORGANIZED HEALTH CARE EDUCATION/TRAINING PROGRAM

## 2022-07-17 PROCEDURE — 85025 COMPLETE CBC W/AUTO DIFF WBC: CPT | Performed by: INTERNAL MEDICINE

## 2022-07-17 PROCEDURE — 63600175 PHARM REV CODE 636 W HCPCS

## 2022-07-17 RX ORDER — SODIUM CHLORIDE 9 MG/ML
INJECTION, SOLUTION INTRAVENOUS CONTINUOUS
Status: DISCONTINUED | OUTPATIENT
Start: 2022-07-17 | End: 2022-07-18

## 2022-07-17 RX ORDER — HYDROMORPHONE HYDROCHLORIDE 1 MG/ML
0.5 INJECTION, SOLUTION INTRAMUSCULAR; INTRAVENOUS; SUBCUTANEOUS EVERY 4 HOURS PRN
Status: DISCONTINUED | OUTPATIENT
Start: 2022-07-17 | End: 2022-07-23 | Stop reason: HOSPADM

## 2022-07-17 RX ORDER — HYDROMORPHONE HYDROCHLORIDE 1 MG/ML
0.5 INJECTION, SOLUTION INTRAMUSCULAR; INTRAVENOUS; SUBCUTANEOUS EVERY 6 HOURS PRN
Status: DISCONTINUED | OUTPATIENT
Start: 2022-07-17 | End: 2022-07-17

## 2022-07-17 RX ADMIN — APIXABAN 5 MG: 5 TABLET, FILM COATED ORAL at 08:07

## 2022-07-17 RX ADMIN — HYDROMORPHONE HYDROCHLORIDE 0.5 MG: 1 INJECTION, SOLUTION INTRAMUSCULAR; INTRAVENOUS; SUBCUTANEOUS at 03:07

## 2022-07-17 RX ADMIN — HYDROMORPHONE HYDROCHLORIDE 0.5 MG: 1 INJECTION, SOLUTION INTRAMUSCULAR; INTRAVENOUS; SUBCUTANEOUS at 09:07

## 2022-07-17 RX ADMIN — VANCOMYCIN HYDROCHLORIDE 125 MG: KIT at 05:07

## 2022-07-17 RX ADMIN — ALUMINUM HYDROXIDE, MAGNESIUM HYDROXIDE, AND DIMETHICONE 10 ML: 400; 400; 40 SUSPENSION ORAL at 10:07

## 2022-07-17 RX ADMIN — HYDROMORPHONE HYDROCHLORIDE 0.5 MG: 1 INJECTION, SOLUTION INTRAMUSCULAR; INTRAVENOUS; SUBCUTANEOUS at 04:07

## 2022-07-17 RX ADMIN — APIXABAN 5 MG: 5 TABLET, FILM COATED ORAL at 09:07

## 2022-07-17 RX ADMIN — ALUMINUM HYDROXIDE, MAGNESIUM HYDROXIDE, AND DIMETHICONE 10 ML: 400; 400; 40 SUSPENSION ORAL at 04:07

## 2022-07-17 RX ADMIN — ALUMINUM HYDROXIDE, MAGNESIUM HYDROXIDE, AND DIMETHICONE 10 ML: 400; 400; 40 SUSPENSION ORAL at 08:07

## 2022-07-17 RX ADMIN — VANCOMYCIN HYDROCHLORIDE 125 MG: KIT at 11:07

## 2022-07-17 RX ADMIN — SODIUM CHLORIDE: 0.9 INJECTION, SOLUTION INTRAVENOUS at 04:07

## 2022-07-17 NOTE — ASSESSMENT & PLAN NOTE
Hgb 7.0 on admission  -Likely due to MABEL vs anemia of chronic disease  [facility], hgb increased to 9.6

## 2022-07-17 NOTE — ASSESSMENT & PLAN NOTE
Cr elevated 1.6. Baseline around 0.8  -Hold home morphine and oxy in setting of THOMAS  -IVF  -Strict Is and Os

## 2022-07-17 NOTE — PLAN OF CARE
Problem: Adult Inpatient Plan of Care  Goal: Plan of Care Review  Outcome: Ongoing, Progressing  Plan of care reviewed with patient  and daughter . VSS and afebrile.  Multiple loose mucoid stools this shift. Slowing down towards end of shift. Vancomycin oral is in place. IVF started at 100 ml/hr  for low  nephrostomy output . Triad cream applied to fox area .  Bed is in low locked position call light is in place. Encouraged patient to drink more and turn off her back . She is alert and oriented today . Pain controlled with prn pain medications . Daughter is at bedside and attentive to her needs

## 2022-07-17 NOTE — ASSESSMENT & PLAN NOTE
Follows with Dr. Snider. Was scheduled to begin outpatient chemo 7/8.   Now s/p bilateral nephrostomy tubes  Has seen radiation oncology recently as well     -outpatient treatment likely  - consider palliative consult given frequent admissions  -Prn dilaudid for pain control. Currently holding morphine and oxy in setting of THOMAS

## 2022-07-17 NOTE — ASSESSMENT & PLAN NOTE
This patient does have evidence of infective focus  My overall impression is sepsis. Vital signs were reviewed and noted in progress note.  Antibiotics given-   Antibiotics (From admission, onward)            Start     Stop Route Frequency Ordered    07/16/22 0100  vancomycin 125 mg/5 mL oral solution 125 mg  (C. difficile Infection (CDI) Treatment Order Panel)         07/25 2359 Oral Every 6 hours 07/16/22 0056    07/15/22 0546  PIPERACILLIN-TAZOBACTAM 4.5G/100ML SODIUM CHLORIDE 0.9%-READY TO MIX IVPB        Note to Pharmacy: Created by cabinet override    07/15 1759   07/15/22 0546        Cultures were taken-   Microbiology Results (last 7 days)     Procedure Component Value Units Date/Time    E. coli 0157 antigen [[id number]] Collected: 07/15/22 0538    Order Status: Completed Specimen: Stool Updated: 07/16/22 2234     Shiga Toxin 1 E.coli Negative     Shiga Toxin 2 E.coli Negative    Stool culture [[id number]] Collected: 07/15/22 0538    Order Status: Completed Specimen: Stool Updated: 07/16/22 1327     Stool Culture Culture in progress    Blood culture [[id number]] Collected: 07/15/22 0744    Order Status: Completed Specimen: Blood Updated: 07/16/22 1012     Blood Culture, Routine No Growth to date      No Growth to date    Blood culture [[id number]] Collected: 07/15/22 0744    Order Status: Completed Specimen: Blood Updated: 07/16/22 1012     Blood Culture, Routine No Growth to date      No Growth to date    Clostridium difficile EIA [[id number]]  (Abnormal) Collected: 07/15/22 0538    Order Status: Completed Specimen: Stool Updated: 07/15/22 2132     C. diff Antigen Positive     C difficile Toxins A+B, EIA Positive     Comment: Testing not recommended for children <24 months old.       Blood culture [[id number]]     Order Status: Canceled Specimen: Blood     Blood culture [[id number]]     Order Status: Canceled Specimen: Blood     Blood culture [[id number]]     Order Status: Canceled Specimen: Blood     Blood culture  [[id number]]     Order Status: Canceled Specimen: Blood         Latest lactate reviewed, they are-  Recent Labs   Lab 07/14/22  2328 07/15/22  0744   LACTATE 2.7* 1.4       Organ dysfunction indicated by Acute kidney injury  Source- C diff colitis    Source control Achieved by- antibiotics (oral vancomycin)        -Leukocytosis persists at 37  -Elevated lactate resolved  -1L IVF bolus administered 7/16, BP normotensive. Continue to monitor.   -Afebrile

## 2022-07-17 NOTE — PLAN OF CARE
Plan of care reviewed with patient and her daughter at the start of shift. No diarrhea so far this shift. Pt responding to voice more so than opening her eyes spontaneously. Pt alert and orientated when answering questions. Pt reposition for skin protection. Minimal out put from bilateral nephrostomy tubes. Pt afebrile. Pt not taking much in orally. Pt blood glucose with in range this evening. No signs of bleeding noted. IVF bolus completed after blood transfusion completed

## 2022-07-17 NOTE — PROGRESS NOTES
"[facility] ([facility])  Hematology/Oncology  Progress Note    Patient Name: Karoline Justice  Admission Date: 7/15/2022  Hospital Length of Stay: 2 days  Code Status: DNR     Subjective:     HPI:  Ms. Justice is a 76F with bladder cancer, bilateral nephrostomy tubes, recent RLE DVT on Eliquis, and DM2 who presents as a transfer from [place] for intractable abdominal pain and lower back pain in the setting of colitis on CT scan. She had been off her pain meds for 2 days due to severe constipation.   She was recently admitted to this service for a UTI and was discharged 7/7 with 11 days of cefpodoxime to complete a total 14 day course. She was also hypercalcemic during that admission and treated with fluids and pamindronate and calcitonin. Since that discharge she has had 3 ER visits for hypoglycemia, pain, and other various reasons.   She reports several days of abdominal pain, chills, and diarrhea. She is weak and prone to falling when walking to the bathroom.   At the outside hospital yesterday, she had worsening leukocytosis to 38, elevated lactate, evidence of recurrent/persistent UTI, and THOMAS. Per the ER physician's note, a CT revealed "moderate to severe long segment left-sided colitis, indeterminate may be infectious inflammatory or ischemic." However, I was not able to see the official radiology read yet. She was given meropenem and IV pain meds and transferred to [facility].            Patient information was obtained from patient, relative(s), past medical records, and ER records.      Oncology History:       Bladder cancer   5/20/2022 Initial Diagnosis     Bladder cancer        Tumor Conference     Presenting Hospital / Clinic: [facility] [address]  Virtual Tumor Board Conference: Virtual  Presenter: Sylvia Escobar  Date Presented to Tumor Board: 5/26/2022  Specialties Present: Pathology; Urology; Radiation Oncology  Cancer Type: Bladder cancer  Recommended Plan Note: Will obtain bladder MRI, PET CT " and bone scan to further evaluate for metastatic disease. Seeing medical oncology to discuss systemic therapy.            6/16/2022 -  Chemotherapy     Treatment Summary   Plan Name: OP PEMBROLIZUMAB 400MG Q6W  Treatment Goal: Control  Status: Active  Start Date: 6/16/2022 (Planned)  End Date: 4/18/2024 (Planned)  Provider: Reid Snider MD  Chemotherapy: pembrolizumab (KEYTRUDA) 400 mg in sodium chloride 0.9% 116 mL infusion, 400 mg, Intravenous, Clinic/HOD 1 time, 0 of 17 cycles             Interval History: Per chart review and patients daughter, no diarrhea overnight. Diarrhea started again this morning. Patient is more alert this morning, responds appropriately to questioning on interview, drinking coffee. Nephrostomy bags pertinent for blood and purulence.     Oncology Treatment Plan:   OP PEMBROLIZUMAB 400MG Q6W    Medications:  Continuous Infusions:  Scheduled Meds:   apixaban  5 mg Oral BID    duke's soln (benadryl 30 mL, mylanta 30 mL, LIDOcaine 30 mL, nystatin 30 mL) 120 mL  10 mL Oral QID    vancomycin  125 mg Oral Q6H     PRN Meds:sodium chloride, dextrose 10%, dextrose 10%, glucagon (human recombinant), glucose, glucose, HYDROmorphone, HYDROmorphone, insulin aspart U-100, naloxone, sodium chloride 0.9%     Review of Systems   Constitutional:  Negative for chills and fever.   HENT:  Negative for congestion and sore throat.    Respiratory:  Negative for cough and shortness of breath.    Cardiovascular:  Negative for chest pain and palpitations.   Gastrointestinal:  Positive for abdominal pain and diarrhea. Negative for blood in stool and vomiting.   Musculoskeletal:  Positive for back pain. Negative for arthralgias and myalgias.   Skin:  Negative for rash and wound.   Neurological:  Positive for dizziness. Negative for syncope.   Hematological:  Negative for adenopathy. Does not bruise/bleed easily.   Objective:     Vital Signs (Most Recent):  Temp: 98 °F (36.7 °C) (07/17/22 0815)  Pulse: 103  (07/17/22 0815)  Resp: 17 (07/17/22 0815)  BP: (!) 118/55 (07/17/22 0300)  SpO2: 99 % (07/17/22 0815)   Vital Signs (24h Range):  Temp:  [97.1 °F (36.2 °C)-98.5 °F (36.9 °C)] 98 °F (36.7 °C)  Pulse:  [] 103  Resp:  [14-19] 17  SpO2:  [95 %-99 %] 99 %  BP: ()/(42-56) 118/55     Weight: 56 kg (123 lb 7.3 oz)  Body mass index is 23.33 kg/m².  Body surface area is 1.55 meters squared.      Intake/Output Summary (Last 24 hours) at 7/17/2022 0844  Last data filed at 7/17/2022 0500  Gross per 24 hour   Intake 1231 ml   Output 455 ml   Net 776 ml       Physical Exam  Vitals and nursing note reviewed.   Constitutional:       Appearance: She is not ill-appearing, toxic-appearing or diaphoretic.   HENT:      Head: Normocephalic.   Eyes:      Extraocular Movements: Extraocular movements intact.   Cardiovascular:      Rate and Rhythm: Normal rate and regular rhythm.      Heart sounds: No murmur heard.  Pulmonary:      Effort: Pulmonary effort is normal. No respiratory distress.   Abdominal:      General: Abdomen is flat.      Palpations: Abdomen is soft.      Tenderness: There is no abdominal tenderness. There is no guarding.   Musculoskeletal:      Right lower leg: No edema.      Left lower leg: No edema.   Skin:     General: Skin is warm and dry.   Neurological:      Mental Status: She is alert. Mental status is at baseline.      Cranial Nerves: No dysarthria.   Psychiatric:         Mood and Affect: Mood normal.         Behavior: Behavior normal.       Significant Labs:   CBC:   Recent Labs   Lab 07/16/22  0511 07/17/22  0530   WBC 32.11* 37.93*   HGB 6.8* 9.6*   HCT 22.1* 31.1*    352    and CMP:   Recent Labs   Lab 07/16/22  0511 07/17/22  0530   * 129*   K 3.9 4.2    106   CO2 20* 16*   * 111*   BUN 20 23   CREATININE 1.3 1.6*   CALCIUM 8.5* 8.3*   PROT 4.1* 4.4*   ALBUMIN 1.2* 1.2*   BILITOT 0.5 0.7   ALKPHOS 171* 172*   AST 11 13   ALT 12 13   ANIONGAP 4* 7*   EGFRNONAA 39.9* 31.1*        Diagnostic Results:  None    Assessment/Plan:     * Sepsis  This patient does have evidence of infective focus  My overall impression is sepsis. Vital signs were reviewed and noted in progress note.  Antibiotics given-   Antibiotics (From admission, onward)            Start     Stop Route Frequency Ordered    07/16/22 0100  vancomycin 125 mg/5 mL oral solution 125 mg  (C. difficile Infection (CDI) Treatment Order Panel)         07/25 2359 Oral Every 6 hours 07/16/22 0056    07/15/22 0546  PIPERACILLIN-TAZOBACTAM 4.5G/100ML SODIUM CHLORIDE 0.9%-READY TO MIX IVPB        Note to Pharmacy: Created by cabinet override    07/15 1759   07/15/22 0546        Cultures were taken-   Microbiology Results (last 7 days)     Procedure Component Value Units Date/Time    E. coli 0157 antigen [[id number]] Collected: 07/15/22 0538    Order Status: Completed Specimen: Stool Updated: 07/16/22 2234     Shiga Toxin 1 E.coli Negative     Shiga Toxin 2 E.coli Negative    Stool culture [[id number]] Collected: 07/15/22 0538    Order Status: Completed Specimen: Stool Updated: 07/16/22 1327     Stool Culture Culture in progress    Blood culture [[id number]] Collected: 07/15/22 0744    Order Status: Completed Specimen: Blood Updated: 07/16/22 1012     Blood Culture, Routine No Growth to date      No Growth to date    Blood culture [[id number]] Collected: 07/15/22 0744    Order Status: Completed Specimen: Blood Updated: 07/16/22 1012     Blood Culture, Routine No Growth to date      No Growth to date    Clostridium difficile EIA [[id number]]  (Abnormal) Collected: 07/15/22 0538    Order Status: Completed Specimen: Stool Updated: 07/15/22 2132     C. diff Antigen Positive     C difficile Toxins A+B, EIA Positive     Comment: Testing not recommended for children <24 months old.       Blood culture [[id number]]     Order Status: Canceled Specimen: Blood     Blood culture [[id number]]     Order Status: Canceled Specimen: Blood     Blood culture  [[id number]]     Order Status: Canceled Specimen: Blood     Blood culture [[id number]]     Order Status: Canceled Specimen: Blood         Latest lactate reviewed, they are-  Recent Labs   Lab 07/14/22  2328 07/15/22  0744   LACTATE 2.7* 1.4       Organ dysfunction indicated by Acute kidney injury  Source- C diff colitis    Source control Achieved by- antibiotics (oral vancomycin)        -Leukocytosis persists at 37  -Elevated lactate resolved  -1L IVF bolus administered 7/16, BP normotensive. Continue to monitor.   -Afebrile      Colitis  -CT abd/pelvis pertinent for colitis with no free air or abscess  -C. Diff assay positive  -Oral Vanc x 10 days   -Clear liquid diet  -Monitor daily CBC/CMP     Anemia  Hgb 7.0 on admission  -Likely due to MABEL vs anemia of chronic disease  -transfused 1prbc 7/16, hgb increased to 9.6    UTI (urinary tract infection)  -Urine culture positive for Gram negative rods  -Patient has bilateral nephrostomy tubes with minimal output and pertinent for blood and opal pus.     Bladder cancer  Follows with Dr. Snider. Was scheduled to begin outpatient chemo 7/8.   Now s/p bilateral nephrostomy tubes  Has seen radiation oncology recently as well     -outpatient treatment likely  - consider palliative consult given frequent admissions  -Prn dilaudid for pain control. Currently holding morphine and oxy in setting of THOMAS    THOMAS (acute kidney injury)  Cr elevated 1.6. Baseline around 0.8  -Hold home morphine and oxy in setting of THOMAS  -IVF  -Strict Is and Os    HTN (hypertension)  Hold home meds, monitor bp     Controlled Type 2 diabetes mellitus  SSI, POC glucose             Riya Kelsey,   Hematology/Oncology  [facility] (Hospital)      ATTENDING NOTE, ONCOLOGY INPATIENT TEAM    As above; events of last 24 hours noted.  Patient seen and examined, chart reviewed.  Appears comfortable, in NAD, states that she is feeling better, however, she continues to have significant diarrhea.  Lungs  are clear to auscultation.  Abdomen is soft, with mild tenderness on palpation.  No rebound.  Labs reviewed.    PLAN  Follow labs daily  Continue oral vancomycin  Continue IV hydration  Continue apixaban  Palliative care consult  We will follow    Judd Herrera MD

## 2022-07-17 NOTE — SUBJECTIVE & OBJECTIVE
Interval History: Per chart review and patients daughter, no diarrhea overnight. Diarrhea started again this morning. Patient is more alert this morning, responds appropriately to questioning on interview, drinking coffee. Nephrostomy bags pertinent for blood and purulence.     Oncology Treatment Plan:   OP PEMBROLIZUMAB 400MG Q6W    Medications:  Continuous Infusions:  Scheduled Meds:   apixaban  5 mg Oral BID    duke's soln (benadryl 30 mL, mylanta 30 mL, LIDOcaine 30 mL, nystatin 30 mL) 120 mL  10 mL Oral QID    vancomycin  125 mg Oral Q6H     PRN Meds:sodium chloride, dextrose 10%, dextrose 10%, glucagon (human recombinant), glucose, glucose, HYDROmorphone, HYDROmorphone, insulin aspart U-100, naloxone, sodium chloride 0.9%     Review of Systems   Constitutional:  Negative for chills and fever.   HENT:  Negative for congestion and sore throat.    Respiratory:  Negative for cough and shortness of breath.    Cardiovascular:  Negative for chest pain and palpitations.   Gastrointestinal:  Positive for abdominal pain and diarrhea. Negative for blood in stool and vomiting.   Musculoskeletal:  Positive for back pain. Negative for arthralgias and myalgias.   Skin:  Negative for rash and wound.   Neurological:  Positive for dizziness. Negative for syncope.   Hematological:  Negative for adenopathy. Does not bruise/bleed easily.   Objective:     Vital Signs (Most Recent):  Temp: 98 °F (36.7 °C) (07/17/22 0815)  Pulse: 103 (07/17/22 0815)  Resp: 17 (07/17/22 0815)  BP: (!) 118/55 (07/17/22 0300)  SpO2: 99 % (07/17/22 0815)   Vital Signs (24h Range):  Temp:  [97.1 °F (36.2 °C)-98.5 °F (36.9 °C)] 98 °F (36.7 °C)  Pulse:  [] 103  Resp:  [14-19] 17  SpO2:  [95 %-99 %] 99 %  BP: ()/(42-56) 118/55     Weight: 56 kg (123 lb 7.3 oz)  Body mass index is 23.33 kg/m².  Body surface area is 1.55 meters squared.      Intake/Output Summary (Last 24 hours) at 7/17/2022 0844  Last data filed at 7/17/2022 0500  Gross per 24  hour   Intake 1231 ml   Output 455 ml   Net 776 ml       Physical Exam  Vitals and nursing note reviewed.   Constitutional:       Appearance: She is not ill-appearing, toxic-appearing or diaphoretic.   HENT:      Head: Normocephalic.   Eyes:      Extraocular Movements: Extraocular movements intact.   Cardiovascular:      Rate and Rhythm: Normal rate and regular rhythm.      Heart sounds: No murmur heard.  Pulmonary:      Effort: Pulmonary effort is normal. No respiratory distress.   Abdominal:      General: Abdomen is flat.      Palpations: Abdomen is soft.      Tenderness: There is no abdominal tenderness. There is no guarding.   Musculoskeletal:      Right lower leg: No edema.      Left lower leg: No edema.   Skin:     General: Skin is warm and dry.   Neurological:      Mental Status: She is alert. Mental status is at baseline.      Cranial Nerves: No dysarthria.   Psychiatric:         Mood and Affect: Mood normal.         Behavior: Behavior normal.       Significant Labs:   CBC:   Recent Labs   Lab 07/16/22  0511 07/17/22  0530   WBC 32.11* 37.93*   HGB 6.8* 9.6*   HCT 22.1* 31.1*    352    and CMP:   Recent Labs   Lab 07/16/22  0511 07/17/22  0530   * 129*   K 3.9 4.2    106   CO2 20* 16*   * 111*   BUN 20 23   CREATININE 1.3 1.6*   CALCIUM 8.5* 8.3*   PROT 4.1* 4.4*   ALBUMIN 1.2* 1.2*   BILITOT 0.5 0.7   ALKPHOS 171* 172*   AST 11 13   ALT 12 13   ANIONGAP 4* 7*   EGFRNONAA 39.9* 31.1*       Diagnostic Results:  None

## 2022-07-17 NOTE — ASSESSMENT & PLAN NOTE
-Urine culture positive for Gram negative rods  -Patient has bilateral nephrostomy tubes with minimal output and pertinent for blood and opal pus.

## 2022-07-18 PROBLEM — L25.8 INCONTINENCE ASSOCIATED DERMATITIS: Status: ACTIVE | Noted: 2022-07-18

## 2022-07-18 PROBLEM — R32 INCONTINENCE ASSOCIATED DERMATITIS: Status: ACTIVE | Noted: 2022-07-18

## 2022-07-18 LAB
ALBUMIN SERPL BCP-MCNC: 1.2 G/DL (ref 3.5–5.2)
ALP SERPL-CCNC: 171 U/L (ref 55–135)
ALT SERPL W/O P-5'-P-CCNC: 13 U/L (ref 10–44)
ANION GAP SERPL CALC-SCNC: 10 MMOL/L (ref 8–16)
ANION GAP SERPL CALC-SCNC: 7 MMOL/L (ref 8–16)
ANISOCYTOSIS BLD QL SMEAR: SLIGHT
AST SERPL-CCNC: 14 U/L (ref 10–40)
BACTERIA STL CULT: NORMAL
BACTERIA UR CULT: ABNORMAL
BASOPHILS # BLD AUTO: 0.15 K/UL (ref 0–0.2)
BASOPHILS NFR BLD: 0.4 % (ref 0–1.9)
BILIRUB SERPL-MCNC: 0.4 MG/DL (ref 0.1–1)
BUN SERPL-MCNC: 29 MG/DL (ref 8–23)
BUN SERPL-MCNC: 30 MG/DL (ref 8–23)
BURR CELLS BLD QL SMEAR: ABNORMAL
CALCIUM SERPL-MCNC: 8.1 MG/DL (ref 8.7–10.5)
CALCIUM SERPL-MCNC: 8.3 MG/DL (ref 8.7–10.5)
CHLORIDE SERPL-SCNC: 107 MMOL/L (ref 95–110)
CHLORIDE SERPL-SCNC: 108 MMOL/L (ref 95–110)
CO2 SERPL-SCNC: 12 MMOL/L (ref 23–29)
CO2 SERPL-SCNC: 15 MMOL/L (ref 23–29)
CREAT SERPL-MCNC: 1.8 MG/DL (ref 0.5–1.4)
CREAT SERPL-MCNC: 1.9 MG/DL (ref 0.5–1.4)
DIFFERENTIAL METHOD: ABNORMAL
EOSINOPHIL # BLD AUTO: 0 K/UL (ref 0–0.5)
EOSINOPHIL NFR BLD: 0 % (ref 0–8)
ERYTHROCYTE [DISTWIDTH] IN BLOOD BY AUTOMATED COUNT: 16.1 % (ref 11.5–14.5)
EST. GFR  (AFRICAN AMERICAN): 29.1 ML/MIN/1.73 M^2
EST. GFR  (AFRICAN AMERICAN): 31.1 ML/MIN/1.73 M^2
EST. GFR  (NON AFRICAN AMERICAN): 25.2 ML/MIN/1.73 M^2
EST. GFR  (NON AFRICAN AMERICAN): 27 ML/MIN/1.73 M^2
GLUCOSE SERPL-MCNC: 168 MG/DL (ref 70–110)
GLUCOSE SERPL-MCNC: 196 MG/DL (ref 70–110)
HCT VFR BLD AUTO: 31.3 % (ref 37–48.5)
HGB BLD-MCNC: 9.7 G/DL (ref 12–16)
HYPOCHROMIA BLD QL SMEAR: ABNORMAL
IMM GRANULOCYTES # BLD AUTO: 0.97 K/UL (ref 0–0.04)
IMM GRANULOCYTES NFR BLD AUTO: 2.3 % (ref 0–0.5)
LYMPHOCYTES # BLD AUTO: 1.2 K/UL (ref 1–4.8)
LYMPHOCYTES NFR BLD: 2.7 % (ref 18–48)
MAGNESIUM SERPL-MCNC: 2 MG/DL (ref 1.6–2.6)
MCH RBC QN AUTO: 27.8 PG (ref 27–31)
MCHC RBC AUTO-ENTMCNC: 31 G/DL (ref 32–36)
MCV RBC AUTO: 90 FL (ref 82–98)
MONOCYTES # BLD AUTO: 0.9 K/UL (ref 0.3–1)
MONOCYTES NFR BLD: 2.2 % (ref 4–15)
NEUTROPHILS # BLD AUTO: 39.3 K/UL (ref 1.8–7.7)
NEUTROPHILS NFR BLD: 92.4 % (ref 38–73)
NRBC BLD-RTO: 0 /100 WBC
PLATELET # BLD AUTO: 345 K/UL (ref 150–450)
PLATELET BLD QL SMEAR: ABNORMAL
PMV BLD AUTO: 10.3 FL (ref 9.2–12.9)
POCT GLUCOSE: 175 MG/DL (ref 70–110)
POCT GLUCOSE: 211 MG/DL (ref 70–110)
POCT GLUCOSE: 219 MG/DL (ref 70–110)
POCT GLUCOSE: 219 MG/DL (ref 70–110)
POIKILOCYTOSIS BLD QL SMEAR: SLIGHT
POLYCHROMASIA BLD QL SMEAR: ABNORMAL
POTASSIUM SERPL-SCNC: 3.9 MMOL/L (ref 3.5–5.1)
POTASSIUM SERPL-SCNC: 4.4 MMOL/L (ref 3.5–5.1)
PROT SERPL-MCNC: 4.4 G/DL (ref 6–8.4)
RBC # BLD AUTO: 3.49 M/UL (ref 4–5.4)
SODIUM SERPL-SCNC: 129 MMOL/L (ref 136–145)
SODIUM SERPL-SCNC: 130 MMOL/L (ref 136–145)
TOXIC GRANULES BLD QL SMEAR: PRESENT
WBC # BLD AUTO: 42.49 K/UL (ref 3.9–12.7)

## 2022-07-18 PROCEDURE — 25000003 PHARM REV CODE 250: Performed by: STUDENT IN AN ORGANIZED HEALTH CARE EDUCATION/TRAINING PROGRAM

## 2022-07-18 PROCEDURE — 27000207 HC ISOLATION

## 2022-07-18 PROCEDURE — 85025 COMPLETE CBC W/AUTO DIFF WBC: CPT | Performed by: INTERNAL MEDICINE

## 2022-07-18 PROCEDURE — 99223 1ST HOSP IP/OBS HIGH 75: CPT | Mod: ,,, | Performed by: NURSE PRACTITIONER

## 2022-07-18 PROCEDURE — 99233 PR SUBSEQUENT HOSPITAL CARE,LEVL III: ICD-10-PCS | Mod: ,,, | Performed by: INTERNAL MEDICINE

## 2022-07-18 PROCEDURE — 80048 BASIC METABOLIC PNL TOTAL CA: CPT | Performed by: STUDENT IN AN ORGANIZED HEALTH CARE EDUCATION/TRAINING PROGRAM

## 2022-07-18 PROCEDURE — 83735 ASSAY OF MAGNESIUM: CPT | Performed by: INTERNAL MEDICINE

## 2022-07-18 PROCEDURE — 25000003 PHARM REV CODE 250

## 2022-07-18 PROCEDURE — 20600001 HC STEP DOWN PRIVATE ROOM

## 2022-07-18 PROCEDURE — 80053 COMPREHEN METABOLIC PANEL: CPT | Performed by: INTERNAL MEDICINE

## 2022-07-18 PROCEDURE — 25000003 PHARM REV CODE 250: Performed by: NURSE PRACTITIONER

## 2022-07-18 PROCEDURE — 36415 COLL VENOUS BLD VENIPUNCTURE: CPT | Performed by: INTERNAL MEDICINE

## 2022-07-18 PROCEDURE — 36415 COLL VENOUS BLD VENIPUNCTURE: CPT | Performed by: STUDENT IN AN ORGANIZED HEALTH CARE EDUCATION/TRAINING PROGRAM

## 2022-07-18 PROCEDURE — 63600175 PHARM REV CODE 636 W HCPCS: Performed by: STUDENT IN AN ORGANIZED HEALTH CARE EDUCATION/TRAINING PROGRAM

## 2022-07-18 PROCEDURE — 99223 PR INITIAL HOSPITAL CARE,LEVL III: ICD-10-PCS | Mod: ,,, | Performed by: NURSE PRACTITIONER

## 2022-07-18 PROCEDURE — 99233 SBSQ HOSP IP/OBS HIGH 50: CPT | Mod: ,,, | Performed by: INTERNAL MEDICINE

## 2022-07-18 PROCEDURE — 97535 SELF CARE MNGMENT TRAINING: CPT

## 2022-07-18 RX ORDER — ONDANSETRON 4 MG/1
8 TABLET, FILM COATED ORAL EVERY 6 HOURS PRN
Status: DISCONTINUED | OUTPATIENT
Start: 2022-07-18 | End: 2022-07-23 | Stop reason: HOSPADM

## 2022-07-18 RX ORDER — LORAZEPAM 0.5 MG/1
0.5 TABLET ORAL EVERY 6 HOURS PRN
Status: DISCONTINUED | OUTPATIENT
Start: 2022-07-18 | End: 2022-07-23 | Stop reason: HOSPADM

## 2022-07-18 RX ORDER — SODIUM CHLORIDE 9 MG/ML
INJECTION, SOLUTION INTRAVENOUS CONTINUOUS
Status: DISCONTINUED | OUTPATIENT
Start: 2022-07-18 | End: 2022-07-22

## 2022-07-18 RX ORDER — ONDANSETRON 4 MG/1
8 TABLET, FILM COATED ORAL EVERY 8 HOURS PRN
Status: DISCONTINUED | OUTPATIENT
Start: 2022-07-18 | End: 2022-07-18

## 2022-07-18 RX ORDER — PROCHLORPERAZINE MALEATE 5 MG
5 TABLET ORAL 3 TIMES DAILY PRN
Status: DISCONTINUED | OUTPATIENT
Start: 2022-07-18 | End: 2022-07-23 | Stop reason: HOSPADM

## 2022-07-18 RX ADMIN — SODIUM CHLORIDE: 0.9 INJECTION, SOLUTION INTRAVENOUS at 12:07

## 2022-07-18 RX ADMIN — INSULIN ASPART 2 UNITS: 100 INJECTION, SOLUTION INTRAVENOUS; SUBCUTANEOUS at 10:07

## 2022-07-18 RX ADMIN — ALUMINUM HYDROXIDE, MAGNESIUM HYDROXIDE, AND DIMETHICONE 10 ML: 400; 400; 40 SUSPENSION ORAL at 12:07

## 2022-07-18 RX ADMIN — VANCOMYCIN HYDROCHLORIDE 125 MG: KIT at 12:07

## 2022-07-18 RX ADMIN — APIXABAN 5 MG: 5 TABLET, FILM COATED ORAL at 08:07

## 2022-07-18 RX ADMIN — INSULIN ASPART 2 UNITS: 100 INJECTION, SOLUTION INTRAVENOUS; SUBCUTANEOUS at 12:07

## 2022-07-18 RX ADMIN — VANCOMYCIN HYDROCHLORIDE 125 MG: KIT at 07:07

## 2022-07-18 RX ADMIN — SODIUM CHLORIDE: 0.9 INJECTION, SOLUTION INTRAVENOUS at 03:07

## 2022-07-18 RX ADMIN — VANCOMYCIN HYDROCHLORIDE 125 MG: KIT at 11:07

## 2022-07-18 RX ADMIN — APIXABAN 5 MG: 5 TABLET, FILM COATED ORAL at 09:07

## 2022-07-18 RX ADMIN — SODIUM CHLORIDE 1000 ML: 0.9 INJECTION, SOLUTION INTRAVENOUS at 10:07

## 2022-07-18 RX ADMIN — CHOLESTYRAMINE: 4 POWDER, FOR SUSPENSION ORAL at 07:07

## 2022-07-18 RX ADMIN — ALUMINUM HYDROXIDE, MAGNESIUM HYDROXIDE, AND DIMETHICONE 10 ML: 400; 400; 40 SUSPENSION ORAL at 04:07

## 2022-07-18 RX ADMIN — VANCOMYCIN HYDROCHLORIDE 125 MG: KIT at 05:07

## 2022-07-18 RX ADMIN — ONDANSETRON HYDROCHLORIDE 8 MG: 4 TABLET, FILM COATED ORAL at 04:07

## 2022-07-18 RX ADMIN — ONDANSETRON HYDROCHLORIDE 8 MG: 4 TABLET, FILM COATED ORAL at 10:07

## 2022-07-18 RX ADMIN — INSULIN ASPART 2 UNITS: 100 INJECTION, SOLUTION INTRAVENOUS; SUBCUTANEOUS at 05:07

## 2022-07-18 RX ADMIN — ALUMINUM HYDROXIDE, MAGNESIUM HYDROXIDE, AND DIMETHICONE 10 ML: 400; 400; 40 SUSPENSION ORAL at 09:07

## 2022-07-18 RX ADMIN — LORAZEPAM 0.5 MG: 0.5 TABLET ORAL at 08:07

## 2022-07-18 NOTE — PROGRESS NOTES
"[facility] - Oncology ([facility])  Hematology/Oncology  Progress Note    Patient Name: Karoline Justice  Admission Date: 7/15/2022  Hospital Length of Stay: 3 days  Code Status: DNR     Subjective:     HPI:  Ms. Justice is a 76F with bladder cancer, bilateral nephrostomy tubes, recent RLE DVT on Eliquis, and DM2 who presents as a transfer from [place] for intractable abdominal pain and lower back pain in the setting of colitis on CT scan. She had been off her pain meds for 2 days due to severe constipation.   She was recently admitted to this service for a UTI and was discharged 7/7 with 11 days of cefpodoxime to complete a total 14 day course. She was also hypercalcemic during that admission and treated with fluids and pamindronate and calcitonin. Since that discharge she has had 3 ER visits for hypoglycemia, pain, and other various reasons.   She reports several days of abdominal pain, chills, and diarrhea. She is weak and prone to falling when walking to the bathroom.   At the outside hospital yesterday, she had worsening leukocytosis to 38, elevated lactate, evidence of recurrent/persistent UTI, and THOMAS. Per the ER physician's note, a CT revealed "moderate to severe long segment left-sided colitis, indeterminate may be infectious inflammatory or ischemic." However, I was not able to see the official radiology read yet. She was given meropenem and IV pain meds and transferred to [facility].            Patient information was obtained from patient, relative(s), past medical records, and ER records.      Oncology History:       Bladder cancer   5/20/2022 Initial Diagnosis     Bladder cancer        Tumor Conference     Presenting Hospital / Clinic: [facility]  Virtual Tumor Board Conference: Virtual  Presenter: Sylvia Escobar  Date Presented to Tumor Board: 5/26/2022  Specialties Present: Pathology; Urology; Radiation Oncology  Cancer Type: Bladder cancer  Recommended Plan Note: Will obtain bladder MRI, PET CT " and bone scan to further evaluate for metastatic disease. Seeing medical oncology to discuss systemic therapy.            6/16/2022 -  Chemotherapy     Treatment Summary   Plan Name: OP PEMBROLIZUMAB 400MG Q6W  Treatment Goal: Control  Status: Active  Start Date: 6/16/2022 (Planned)  End Date: 4/18/2024 (Planned)  Provider: Reid Snider MD  Chemotherapy: pembrolizumab (KEYTRUDA) 400 mg in sodium chloride 0.9% 116 mL infusion, 400 mg, Intravenous, Clinic/HOD 1 time, 0 of 17 cycles             Interval History: Per patient, she had diarrhea one time last night. Patient is alert this morning, responds appropriately to questioning on interview. Nephrostomy bags don't have much output. She feels very fatigued and like she has no strength.    Oncology Treatment Plan:   OP PEMBROLIZUMAB 400MG Q6W    Medications:  Continuous Infusions:   sodium chloride 0.9% 100 mL/hr at 07/18/22 1200     Scheduled Meds:   apixaban  5 mg Oral BID    duke's soln (benadryl 30 mL, mylanta 30 mL, LIDOcaine 30 mL, nystatin 30 mL) 120 mL  10 mL Oral QID    Questran and Aquaphor Topical Compound   Topical (Top) BID    sodium chloride 0.9%  1,000 mL Intravenous Once    vancomycin  125 mg Oral Q6H     PRN Meds:sodium chloride, dextrose 10%, dextrose 10%, glucagon (human recombinant), glucose, glucose, HYDROmorphone, HYDROmorphone, insulin aspart U-100, naloxone, ondansetron, sodium chloride 0.9%     Review of Systems   Constitutional:  Negative for chills and fever.   HENT:  Negative for congestion and sore throat.    Respiratory:  Negative for cough and shortness of breath.    Cardiovascular:  Negative for chest pain and palpitations.   Gastrointestinal:  Positive for abdominal pain and diarrhea. Negative for blood in stool and vomiting.   Musculoskeletal:  Positive for back pain. Negative for arthralgias and myalgias.   Skin:  Negative for rash and wound.   Neurological:  Positive for dizziness. Negative for syncope.   Hematological:   Negative for adenopathy. Does not bruise/bleed easily.   Objective:     Vital Signs (Most Recent):  Temp: 98.8 °F (37.1 °C) (07/18/22 1212)  Pulse: 100 (07/18/22 1212)  Resp: 20 (07/18/22 1212)  BP: (!) 151/67 (07/18/22 1212)  SpO2: 97 % (07/18/22 1212)   Vital Signs (24h Range):  Temp:  [98 °F (36.7 °C)-98.9 °F (37.2 °C)] 98.8 °F (37.1 °C)  Pulse:  [100-104] 100  Resp:  [16-20] 20  SpO2:  [95 %-98 %] 97 %  BP: (106-151)/(44-70) 151/67     Weight: 56 kg (123 lb 7.3 oz)  Body mass index is 23.33 kg/m².  Body surface area is 1.55 meters squared.      Intake/Output Summary (Last 24 hours) at 7/18/2022 1358  Last data filed at 7/18/2022 1200  Gross per 24 hour   Intake 570 ml   Output 285 ml   Net 285 ml         Physical Exam  Vitals and nursing note reviewed.   Constitutional:       Appearance: She is not ill-appearing, toxic-appearing or diaphoretic.   HENT:      Head: Normocephalic.   Eyes:      Extraocular Movements: Extraocular movements intact.   Cardiovascular:      Rate and Rhythm: Normal rate and regular rhythm.      Heart sounds: No murmur heard.  Pulmonary:      Effort: Pulmonary effort is normal. No respiratory distress.   Abdominal:      General: Abdomen is flat.      Palpations: Abdomen is soft.      Tenderness: There is no abdominal tenderness. There is no guarding.   Musculoskeletal:      Right lower leg: No edema.      Left lower leg: No edema.   Skin:     General: Skin is warm and dry.   Neurological:      Mental Status: She is alert. Mental status is at baseline.      Cranial Nerves: No dysarthria.   Psychiatric:         Mood and Affect: Mood normal.         Behavior: Behavior normal.       Significant Labs:   CBC:   Recent Labs   Lab 07/17/22 0530 07/18/22  0529   WBC 37.93* 42.49*   HGB 9.6* 9.7*   HCT 31.1* 31.3*    345      and CMP:   Recent Labs   Lab 07/17/22  0530 07/18/22  0529 07/18/22  1218   * 129* 130*   K 4.2 4.4 3.9    107 108   CO2 16* 12* 15*   * 168* 196*    BUN 23 29* 30*   CREATININE 1.6* 1.8* 1.9*   CALCIUM 8.3* 8.3* 8.1*   PROT 4.4* 4.4*  --    ALBUMIN 1.2* 1.2*  --    BILITOT 0.7 0.4  --    ALKPHOS 172* 171*  --    AST 13 14  --    ALT 13 13  --    ANIONGAP 7* 10 7*   EGFRNONAA 31.1* 27.0* 25.2*         Diagnostic Results:  None    Assessment/Plan:     * Sepsis  This patient does have evidence of infective focus  My overall impression is sepsis. Vital signs were reviewed and noted in progress note.  Antibiotics given-   Antibiotics (From admission, onward)                Start     Stop Route Frequency Ordered    07/16/22 0100  vancomycin 125 mg/5 mL oral solution 125 mg  (C. difficile Infection (CDI) Treatment Order Panel)         07/25 2359 Oral Every 6 hours 07/16/22 0056    07/15/22 0546  PIPERACILLIN-TAZOBACTAM 4.5G/100ML SODIUM CHLORIDE 0.9%-READY TO MIX IVPB        Note to Pharmacy: Created by cabinet override    07/15 1759   07/15/22 0546            Cultures were taken-   Microbiology Results (last 7 days)       Procedure Component Value Units Date/Time    Stool culture [[id number]] Collected: 07/15/22 0538    Order Status: Completed Specimen: Stool Updated: 07/18/22 1243     Stool Culture No Salmonella,Shigella,Vibrio,Campylobacter,Yersinia isolated.    Blood culture [[id number]] Collected: 07/15/22 0744    Order Status: Completed Specimen: Blood Updated: 07/18/22 1012     Blood Culture, Routine No Growth to date      No Growth to date      No Growth to date      No Growth to date    Blood culture [[id number]] Collected: 07/15/22 0744    Order Status: Completed Specimen: Blood Updated: 07/18/22 1012     Blood Culture, Routine No Growth to date      No Growth to date      No Growth to date      No Growth to date    E. coli 0157 antigen [[id number]] Collected: 07/15/22 0538    Order Status: Completed Specimen: Stool Updated: 07/16/22 2234     Shiga Toxin 1 E.coli Negative     Shiga Toxin 2 E.coli Negative    Clostridium difficile EIA [[id number]]  (Abnormal)  Collected: 07/15/22 0538    Order Status: Completed Specimen: Stool Updated: 07/15/22 2132     C. diff Antigen Positive     C difficile Toxins A+B, EIA Positive     Comment: Testing not recommended for children <24 months old.       Blood culture [[id number]]     Order Status: Canceled Specimen: Blood     Blood culture [[id number]]     Order Status: Canceled Specimen: Blood     Blood culture [[id number]]     Order Status: Canceled Specimen: Blood     Blood culture [[id number]]     Order Status: Canceled Specimen: Blood             Latest lactate reviewed, they are-  No results for input(s): LACTATE in the last 72 hours.    Organ dysfunction indicated by Acute kidney injury  Source- C diff colitis    Source control Achieved by- antibiotics (oral vancomycin)    -Leukocytosis increased to 42  -1L IVF bolus administered 7/16, BP normotensive. Receiving  mL/hr infusion. Continue to monitor.   -Afebrile      Colitis  -CT abd/pelvis pertinent for colitis with no free air or abscess  -C. Diff assay positive  -Oral Vanc x 10 days. This is day 3 and there haven't been any signs of improvement. Will re-evaluate tomorrow morning if vancomycin needs to be switched to fidaxomicin via ID consult  -Clear liquid diet  -Monitor daily CBC/CMP     UTI (urinary tract infection)  -Urine culture positive for Gram negative rods  -Patient has bilateral nephrostomy tubes with minimal output and pertinent for blood and opal pus.     Bladder cancer  Follows with Dr. Snider. Was scheduled to begin outpatient chemo 7/8.   Now s/p bilateral nephrostomy tubes  Has seen radiation oncology recently as well     -outpatient treatment likely  - consider palliative consult given frequent admissions  -Prn dilaudid for pain control. Currently holding morphine and oxy in setting of THOMAS    THOMAS (acute kidney injury)  Cr elevated 1.6. Baseline around 0.8  -Hold home morphine and oxy in setting of THOMAS  -IVF  -Strict Is and Os    Anemia  Hgb 7.0 on  admission  -Likely due to MABEL vs anemia of chronic disease  -transfused 1prbc on 7/16, hgb increased to 9.6 on 7/17  -Hgb today is 9.7  -Transfuse if < 7    HTN (hypertension)  Hold home meds, monitor bp     Controlled Type 2 diabetes mellitus  SSI, POC glucose             Deonna Hansen MD  Hematology/Oncology  [facility] (Hospital)        ATTENDING NOTE, ONCOLOGY INPATIENT TEAM    Patient seen and examined, chart reviewed.  Please refer to my note of same day for our assessment and plan.      Judd Herrera MD

## 2022-07-18 NOTE — CONSULTS
"[facility] - Oncology ([facility]  Skin Integrity CHRISTY  Consult Note    Patient Name: Karoline Justice  MRN: [id number]  Admission Date: 7/15/2022  Hospital Length of Stay: 3 days  Attending Physician: Judd Herrera MD  Primary Care Provider: Dee Dee Oconnor MD     Consults  Subjective:     History of Present Illness:  Karoline Justice is a 76 year old female with bladder cancer, bilateral nephrostomy tubes, recent RLE DVT on Eliquis, and DM2 who presents as a transfer from [place] for intractable abdominal pain and lower back pain in the setting of colitis on CT scan. She had been off her pain meds for 2 days due to severe constipation. She was recently admitted to this service for a UTI and was discharged 7/7 with 11 days of cefpodoxime to complete a total 14 day course. She was also hypercalcemic during that admission and treated with fluids and pamindronate and calcitonin. Since that discharge she has had 3 ER visits for hypoglycemia, pain, and other various reasons.   She reports several days of abdominal pain, chills, and diarrhea. She is weak and prone to falling when walking to the bathroom.   At the outside hospital yesterday, she had worsening leukocytosis to 38, elevated lactate, evidence of recurrent/persistent UTI, and THOMAS. Per the ER physician's note, a CT revealed "moderate to severe long segment left-sided colitis, indeterminate may be infectious inflammatory or ischemic." She was transferred to [facility] for further management. Skin integrity CHRISTY consulted for evaluation of skin breakdown.       Scheduled Meds:   apixaban  5 mg Oral BID    duke's soln (benadryl 30 mL, mylanta 30 mL, LIDOcaine 30 mL, nystatin 30 mL) 120 mL  10 mL Oral QID    Questran and Aquaphor Topical Compound   Topical (Top) BID    vancomycin  125 mg Oral Q6H     Continuous Infusions:   sodium chloride 0.9% 100 mL/hr at 07/18/22 1200     PRN Meds:sodium chloride, dextrose 10%, dextrose 10%, glucagon (human recombinant), glucose, " glucose, HYDROmorphone, HYDROmorphone, insulin aspart U-100, naloxone, ondansetron, sodium chloride 0.9%    Review of patient's allergies indicates:  No Known Allergies     Past Medical History:   Diagnosis Date    Bladder cancer 05/2022    Bladder mass 05/06/2022    Choledocholithiasis 05/06/2022    Diabetes mellitus     Pneumonia due to COVID-19 virus 08/06/2021     Past Surgical History:   Procedure Laterality Date    CYSTOSCOPY N/A 05/09/2022    Procedure: CYSTOSCOPY;  Surgeon: Adrianna Gutierrez MD;  Location: [facility] OR [address] [place] [address];  Service: Urology;  Laterality: N/A;    NEPHROSTOMY Left 05/2022    RETROGRADE PYELOGRAPHY Right 05/09/2022    Procedure: PYELOGRAM, RETROGRADE;  Surgeon: Adrianna Gutierrez MD;  Location: [facility] OR [address] [place] [address];  Service: Urology;  Laterality: Right;       Family History       Problem Relation (Age of Onset)    Diabetes Mother, Daughter, Son    Heart attack Father    Kidney cancer Son    Stomach cancer Mother    Thyroid disease Daughter          Tobacco Use    Smoking status: Never Smoker    Smokeless tobacco: Never Used   Substance and Sexual Activity    Alcohol use: No    Drug use: No    Sexual activity: Not on file     Review of Systems   Skin:  Positive for wound.     Objective:     Vital Signs (Most Recent):  Temp: 98.8 °F (37.1 °C) (07/18/22 1212)  Pulse: 100 (07/18/22 1212)  Resp: 20 (07/18/22 1212)  BP: (!) 151/67 (07/18/22 1212)  SpO2: 97 % (07/18/22 1212)   Vital Signs (24h Range):  Temp:  [98 °F (36.7 °C)-98.9 °F (37.2 °C)] 98.8 °F (37.1 °C)  Pulse:  [100-104] 100  Resp:  [16-20] 20  SpO2:  [95 %-98 %] 97 %  BP: (106-151)/(44-70) 151/67     Weight: 56 kg (123 lb 7.3 oz)  Body mass index is 23.33 kg/m².  Physical Exam  Constitutional:       Appearance: Normal appearance.   Skin:     General: Skin is warm and dry.      Findings: Lesion present.   Neurological:      Mental Status: She is alert.       Laboratory:  All pertinent labs reviewed within the last 24  hours.    Diagnostic Results:  None        Assessment/Plan:          CHRISTY Skin Integrity Evaluation    Skin Integrity CHRISTY evaluation of patient as part of the comprehensive skin care team.   She has been admitted for 3 days. Skin injury was noted on 7/5/22. POA Yes.      Sacrum          Incontinence associated dermatitis  - consult received for evaluation of skin breakdown.  - pt transferred from OSH with severe abdominal and lower back pain in the setting of colitis.  - pt is receiving treatment for C diff infection.  - scattered lesions with pink wound base and irregular wound edges are related to incontinence dermatitis.  - perineum is also reddened, inflamed, and tender to touch due to excessive diarrhea from infection.  - dc [facility] and recommend Questran/Aquaphor compound bid/prn soiling.  - d/w family at bedside and primary RN.   - waffle overlay in place and inflated appropriately.   - please avoid briefs if possible.  - q2h turn schedule.  - nursing to maintain pressure injury prevention measures and continue wound care per orders.         Thank you for your consult. I will follow-up with patient. Please contact us if you have any additional questions.       Wendi Luna NP  Skin Integrity CHRISTY  [facility] - Oncology ([facility])

## 2022-07-18 NOTE — PT/OT/SLP PROGRESS
"Occupational Therapy   Treatment    Name: Karoline Justice  MRN: [id number]  Admitting Diagnosis:  Sepsis       Recommendations:     Discharge Recommendations: nursing facility, skilled  Discharge Equipment Recommendations:  bedside commode  Barriers to discharge:   (increased skilled assistance needed)    Assessment:     Karoline Justice is a 76 y.o. female with a medical diagnosis of Sepsis. Pt encountered left side lying in bed, c/o of bowel movement and agreed to sitting EOB and progressing from there after briefs changed.  This therapist assisted pt to change into fresh briefs with fresh bed pad, with pt completing bilateral rolling with a maximum amount of cues for body mechanics.  After brief changed, pt communicated she was too tired to sit EOB and felt nauseous, and wished to defer the remainder of therapeutic activities to another day.     She presents with performance deficits affecting function are weakness, impaired endurance, impaired self care skills, impaired functional mobility, gait instability, pain, impaired cardiopulmonary response to activity, impaired balance.     Rehab Prognosis:  Good; patient would benefit from acute skilled OT services to address these deficits and reach maximum level of function.       Plan:     Patient to be seen 3 x/week to address the above listed problems via self-care/home management, therapeutic activities, therapeutic exercises  · Plan of Care Expires: 08/11/22  · Plan of Care Reviewed with: patient, daughter    Subjective     "I was trying to spit things up earlier"     Pain/Comfort:  · Pain Rating 1:  not rated  · Location - Orientation 1: lower  · Location 1: back  · Pain Addressed 1: Distraction, Nurse notified, Reposition, Cessation of Activity  · Pain Rating Post-Intervention 1:  not rated  · Pain Rating 2: not rated  · Location 2: perineum  · Pain Addressed 2: Reposition, Distraction, Cessation of Activity (applied ointment as requested)  · Pain " Rating Post-Intervention 2:  not rated    Objective:     Communicated with: MYRANDA Villanueva prior to session.  Patient found left sidelying with nephrostomy, peripheral IV upon OT entry to room.    General Precautions: Standard, fall, hearing impaired, special contact   Orthopedic Precautions:N/A   Braces: N/A  Respiratory Status: Room air     Occupational Performance:     Bed Mobility:    · Patient completed Rolling/Turning to Left with  stand by assistance and maximum cues  · Patient completed Rolling/Turning to Right with stand by assistance and and maximum cues  · Patient completed Scooting/Bridging with moderate assistance and pt pushing from bent knees on bed x 3 trials     Functional Mobility/Transfers:  · Pt declined this date    Activities of Daily Living:  · Toileting: total assistance completing fox-care using posterior and anterior approaches   · Toileting: total assistance applying barrier cream to perineal region   · Lower Body Dressing: total assistance changing adult brief      AMPAC 6 Click ADL: 18    Treatment & Education:     POC was dicussed with patient/caregiver, who was included in its development and is in agreement with the identified goals and treatment plan.    Patient and family aware of patient's deficits and therapy progression.    Time provided for therapeutic counseling and discussion of health disposition.    Educated on importance of EOB/OOB mobility, maintaining routine, sitting up in chair, and maximizing independence with ADLs during admission    Pt completed ADLs and functional mobility for treatment session as noted above    Pt/caregiver verbalized understanding and expressed no further concerns/questions.        Patient left supine with all lines intact, call button in reach and RN notifiedEducation:      GOALS:   Multidisciplinary Problems     Occupational Therapy Goals        Problem: Occupational Therapy    Goal Priority Disciplines Outcome Interventions   Occupational  Therapy Goal     OT, PT/OT Ongoing, Progressing    Description: Goals to be met by: 8/15/22     Patient will increase functional independence with ADLs by performing:    UE Dressing with Contact Guard Assistance.  LE Dressing with Moderate Assistance.  Sitting at edge of bed x10 minutes with Supervision.  Supine to sit with Supervision.  Step transfer with Contact Guard Assistance  Toilet transfer to bedside commode with Contact Guard Assistance.                     Time Tracking:     OT Date of Treatment: 07/18/22  OT Start Time: 1311  OT Stop Time: 1332  OT Total Time (min): 21 min    Billable Minutes:Self Care/Home Management 21    OT/DAISY: OT          7/18/2022

## 2022-07-18 NOTE — PLAN OF CARE
Plan of care reviewed with patient and her daughter at the start of shift. Pt turned on schedule. Sacrum remains covered with foam dressing. Pt afebrile. Minimal urine output from right nephrostomy tube. Pt taking only small amounts of oral fluids. No stools so far this shift; pt remains in contact precautions. Pt awake and talking to staff this evening. Blood glucose with in target range this evening. Daughter remains at bedside.

## 2022-07-18 NOTE — ASSESSMENT & PLAN NOTE
- consult received for evaluation of skin breakdown.  - pt transferred from OSH with severe abdominal and lower back pain in the setting of colitis.  - pt is receiving treatment for C diff infection.  - scattered lesions with pink wound base and irregular wound edges are related to incontinence dermatitis.  - perineum is also reddened, inflamed, and tender to touch due to excessive diarrhea from infection.  - dc [facility] and recommend Questran/Aquaphor compound bid/prn soiling.  - d/w family at bedside and primary RN.   - waffle overlay in place and inflated appropriately.   - please avoid briefs if possible.  - q2h turn schedule.  - nursing to maintain pressure injury prevention measures and continue wound care per orders.

## 2022-07-18 NOTE — PLAN OF CARE
Pt tolerated session fairly 2* not feeling well    Problem: Occupational Therapy  Goal: Occupational Therapy Goal  Description: Goals to be met by: 8/15/22     Patient will increase functional independence with ADLs by performing:    UE Dressing with Contact Guard Assistance.  LE Dressing with Moderate Assistance.  Sitting at edge of bed x10 minutes with Supervision.  Supine to sit with Supervision.  Step transfer with Contact Guard Assistance  Toilet transfer to bedside commode with Contact Guard Assistance.    Outcome: Ongoing, Progressing

## 2022-07-18 NOTE — HPI
"Karoline Justice is a 76 year old female with bladder cancer, bilateral nephrostomy tubes, recent RLE DVT on Eliquis, and DM2 who presents as a transfer from [place] for intractable abdominal pain and lower back pain in the setting of colitis on CT scan. She had been off her pain meds for 2 days due to severe constipation. She was recently admitted to this service for a UTI and was discharged 7/7 with 11 days of cefpodoxime to complete a total 14 day course. She was also hypercalcemic during that admission and treated with fluids and pamindronate and calcitonin. Since that discharge she has had 3 ER visits for hypoglycemia, pain, and other various reasons.   She reports several days of abdominal pain, chills, and diarrhea. She is weak and prone to falling when walking to the bathroom.   At the outside hospital yesterday, she had worsening leukocytosis to 38, elevated lactate, evidence of recurrent/persistent UTI, and THOMAS. Per the ER physician's note, a CT revealed "moderate to severe long segment left-sided colitis, indeterminate may be infectious inflammatory or ischemic." She was transferred to [facility] for further management. Skin integrity CHRISTY consulted for evaluation of skin breakdown.   "

## 2022-07-18 NOTE — ASSESSMENT & PLAN NOTE
This patient does have evidence of infective focus  My overall impression is sepsis. Vital signs were reviewed and noted in progress note.  Antibiotics given-   Antibiotics (From admission, onward)            Start     Stop Route Frequency Ordered    07/16/22 0100  vancomycin 125 mg/5 mL oral solution 125 mg  (C. difficile Infection (CDI) Treatment Order Panel)         07/25 2359 Oral Every 6 hours 07/16/22 0056    07/15/22 0546  PIPERACILLIN-TAZOBACTAM 4.5G/100ML SODIUM CHLORIDE 0.9%-READY TO MIX IVPB        Note to Pharmacy: Created by cabinet override    07/15 1759   07/15/22 0546          Cultures were taken-   Microbiology Results (last 7 days)     Procedure Component Value Units Date/Time    Stool culture [[id number]] Collected: 07/15/22 0538    Order Status: Completed Specimen: Stool Updated: 07/18/22 1243     Stool Culture No Salmonella,Shigella,Vibrio,Campylobacter,Yersinia isolated.    Blood culture [[id number]] Collected: 07/15/22 0744    Order Status: Completed Specimen: Blood Updated: 07/18/22 1012     Blood Culture, Routine No Growth to date      No Growth to date      No Growth to date      No Growth to date    Blood culture [[id number]] Collected: 07/15/22 0744    Order Status: Completed Specimen: Blood Updated: 07/18/22 1012     Blood Culture, Routine No Growth to date      No Growth to date      No Growth to date      No Growth to date    E. coli 0157 antigen [[id number]] Collected: 07/15/22 0538    Order Status: Completed Specimen: Stool Updated: 07/16/22 2234     Shiga Toxin 1 E.coli Negative     Shiga Toxin 2 E.coli Negative    Clostridium difficile EIA [[id number]]  (Abnormal) Collected: 07/15/22 0538    Order Status: Completed Specimen: Stool Updated: 07/15/22 2132     C. diff Antigen Positive     C difficile Toxins A+B, EIA Positive     Comment: Testing not recommended for children <24 months old.       Blood culture [[id number]]     Order Status: Canceled Specimen: Blood     Blood culture  [[id number]]     Order Status: Canceled Specimen: Blood     Blood culture [[id number]]     Order Status: Canceled Specimen: Blood     Blood culture [[id number]]     Order Status: Canceled Specimen: Blood           Latest lactate reviewed, they are-  No results for input(s): LACTATE in the last 72 hours.    Organ dysfunction indicated by Acute kidney injury  Source- C diff colitis    Source control Achieved by- antibiotics (oral vancomycin)    -Leukocytosis increased to 42  -1L IVF bolus administered 7/16, BP normotensive. Receiving  mL/hr infusion. Continue to monitor.   -Afebrile

## 2022-07-18 NOTE — PROGRESS NOTES
ATTENDING NOTE, ONCOLOGY INPATIENT TEAM    Events of last 24 hours noted.  Patient seen and examined, chart reviewed.  Full note to followed by house staff.  Appears comfortable, in NAD, however, she has several complaints.  She is still experiencing watery diarrhea and generalized weakness.  She also complains of intermittent nausea and poor appetite  Lungs are clear to auscultation.  Abdomen is soft, with mild diffuse tenderness..  Labs reviewed.  WBCs are 42,400 per cubic mm, hemoglobin 9.7 grams/deciliter and platelets 263761 per cubic mm.  Sodium is 129 mEq/L, and albumin is 1.2 grams/dL.    PLAN  Repeat labs in a.m.  If no improvement by tomorrow we will add metronidazole and ask ID to evaluate her.  Continue Eliquis  PT OT to evaluate    Judd Herrera MD

## 2022-07-18 NOTE — PT/OT/SLP PROGRESS
Physical Therapy      Patient Name:  Karoline Justice   MRN:  [id number]    Patient not seen today secondary to Patient ill (Comment) (reporting N/V, RN at bedside, patient pre-medicated for nausea). Attempted session at 10:30am. Will follow-up as appopriate.

## 2022-07-18 NOTE — ASSESSMENT & PLAN NOTE
Hgb 7.0 on admission  -Likely due to MABEL vs anemia of chronic disease  -transfused 1prbc on 7/16, hgb increased to 9.6 on 7/17  -Hgb today is 9.7  -Transfuse if < 7

## 2022-07-18 NOTE — ASSESSMENT & PLAN NOTE
-CT abd/pelvis pertinent for colitis with no free air or abscess  -C. Diff assay positive  -Oral Vanc x 10 days. This is day 3 and there haven't been any signs of improvement. Will re-evaluate tomorrow morning if vancomycin needs to be switched to fidaxomicin via ID consult  -Clear liquid diet  -Monitor daily CBC/CMP

## 2022-07-18 NOTE — PROGRESS NOTES
Received call from patient's daughter Valentine Swenson, inquiring about possible inpatient hospice options for patient, who told her that she doesn't want her  and family to see her die at home. Explained to daughter about inpatient hospice, and criteria that has to be met in order for agencies to provide it and insurance to cover it. Informed her that most hospice agencies don't have their own inpatient settings and will contract with other facilities such as acute, LTAC or rehab hospitals, or skilled nursing facilities; two agencies, [facility] and [facility] have their own inpatient settings in the [place] area but there aren't any in the [facility] where patient lives. Daughter had already been speaking with Tamiko, [phone], [facility]. Informed daughter that although patient isn't ready for discharge today we could still initiate the referral and ask the hospice staff to speak with them, provide information and answer their questions, and daughter stated agreement. Called the agency and spoke with Tamiko, and faxed to her attention at [phone] the face sheet, H&P, and today's MD notes. Tamiko called me back and let me know that she had called patient's daughter and spoke with her via phone. Tamiko said that she could set up for a virtual visit but staff wouldn't be able to physically come to [facility] in [place] for an informational visit. Tamiko offered virtual visit but daughter declined at this time. Will continue to follow and assist as needs are identified.

## 2022-07-18 NOTE — SUBJECTIVE & OBJECTIVE
Interval History: Per patient, she had diarrhea one time last night. Patient is alert this morning, responds appropriately to questioning on interview. Nephrostomy bags don't have much output. She feels very fatigued and like she has no strength.    Oncology Treatment Plan:   OP PEMBROLIZUMAB 400MG Q6W    Medications:  Continuous Infusions:   sodium chloride 0.9% 100 mL/hr at 07/18/22 1200     Scheduled Meds:   apixaban  5 mg Oral BID    duke's soln (benadryl 30 mL, mylanta 30 mL, LIDOcaine 30 mL, nystatin 30 mL) 120 mL  10 mL Oral QID    Questran and Aquaphor Topical Compound   Topical (Top) BID    sodium chloride 0.9%  1,000 mL Intravenous Once    vancomycin  125 mg Oral Q6H     PRN Meds:sodium chloride, dextrose 10%, dextrose 10%, glucagon (human recombinant), glucose, glucose, HYDROmorphone, HYDROmorphone, insulin aspart U-100, naloxone, ondansetron, sodium chloride 0.9%     Review of Systems   Constitutional:  Negative for chills and fever.   HENT:  Negative for congestion and sore throat.    Respiratory:  Negative for cough and shortness of breath.    Cardiovascular:  Negative for chest pain and palpitations.   Gastrointestinal:  Positive for abdominal pain and diarrhea. Negative for blood in stool and vomiting.   Musculoskeletal:  Positive for back pain. Negative for arthralgias and myalgias.   Skin:  Negative for rash and wound.   Neurological:  Positive for dizziness. Negative for syncope.   Hematological:  Negative for adenopathy. Does not bruise/bleed easily.   Objective:     Vital Signs (Most Recent):  Temp: 98.8 °F (37.1 °C) (07/18/22 1212)  Pulse: 100 (07/18/22 1212)  Resp: 20 (07/18/22 1212)  BP: (!) 151/67 (07/18/22 1212)  SpO2: 97 % (07/18/22 1212)   Vital Signs (24h Range):  Temp:  [98 °F (36.7 °C)-98.9 °F (37.2 °C)] 98.8 °F (37.1 °C)  Pulse:  [100-104] 100  Resp:  [16-20] 20  SpO2:  [95 %-98 %] 97 %  BP: (106-151)/(44-70) 151/67     Weight: 56 kg (123 lb 7.3 oz)  Body mass index is 23.33  kg/m².  Body surface area is 1.55 meters squared.      Intake/Output Summary (Last 24 hours) at 7/18/2022 1358  Last data filed at 7/18/2022 1200  Gross per 24 hour   Intake 570 ml   Output 285 ml   Net 285 ml         Physical Exam  Vitals and nursing note reviewed.   Constitutional:       Appearance: She is not ill-appearing, toxic-appearing or diaphoretic.   HENT:      Head: Normocephalic.   Eyes:      Extraocular Movements: Extraocular movements intact.   Cardiovascular:      Rate and Rhythm: Normal rate and regular rhythm.      Heart sounds: No murmur heard.  Pulmonary:      Effort: Pulmonary effort is normal. No respiratory distress.   Abdominal:      General: Abdomen is flat.      Palpations: Abdomen is soft.      Tenderness: There is no abdominal tenderness. There is no guarding.   Musculoskeletal:      Right lower leg: No edema.      Left lower leg: No edema.   Skin:     General: Skin is warm and dry.   Neurological:      Mental Status: She is alert. Mental status is at baseline.      Cranial Nerves: No dysarthria.   Psychiatric:         Mood and Affect: Mood normal.         Behavior: Behavior normal.       Significant Labs:   CBC:   Recent Labs   Lab 07/17/22  0530 07/18/22  0529   WBC 37.93* 42.49*   HGB 9.6* 9.7*   HCT 31.1* 31.3*    345      and CMP:   Recent Labs   Lab 07/17/22  0530 07/18/22  0529 07/18/22  1218   * 129* 130*   K 4.2 4.4 3.9    107 108   CO2 16* 12* 15*   * 168* 196*   BUN 23 29* 30*   CREATININE 1.6* 1.8* 1.9*   CALCIUM 8.3* 8.3* 8.1*   PROT 4.4* 4.4*  --    ALBUMIN 1.2* 1.2*  --    BILITOT 0.7 0.4  --    ALKPHOS 172* 171*  --    AST 13 14  --    ALT 13 13  --    ANIONGAP 7* 10 7*   EGFRNONAA 31.1* 27.0* 25.2*         Diagnostic Results:  None

## 2022-07-18 NOTE — SUBJECTIVE & OBJECTIVE
Scheduled Meds:   apixaban  5 mg Oral BID    duke's soln (benadryl 30 mL, mylanta 30 mL, LIDOcaine 30 mL, nystatin 30 mL) 120 mL  10 mL Oral QID    Questran and Aquaphor Topical Compound   Topical (Top) BID    vancomycin  125 mg Oral Q6H     Continuous Infusions:   sodium chloride 0.9% 100 mL/hr at 07/18/22 1200     PRN Meds:sodium chloride, dextrose 10%, dextrose 10%, glucagon (human recombinant), glucose, glucose, HYDROmorphone, HYDROmorphone, insulin aspart U-100, naloxone, ondansetron, sodium chloride 0.9%    Review of patient's allergies indicates:  No Known Allergies     Past Medical History:   Diagnosis Date    Bladder cancer 05/2022    Bladder mass 05/06/2022    Choledocholithiasis 05/06/2022    Diabetes mellitus     Pneumonia due to COVID-19 virus 08/06/2021     Past Surgical History:   Procedure Laterality Date    CYSTOSCOPY N/A 05/09/2022    Procedure: CYSTOSCOPY;  Surgeon: Adrianna Gutierrez MD;  Location: [facility] OR [address] [place] [address];  Service: Urology;  Laterality: N/A;    NEPHROSTOMY Left 05/2022    RETROGRADE PYELOGRAPHY Right 05/09/2022    Procedure: PYELOGRAM, RETROGRADE;  Surgeon: Adrianna Gutierrez MD;  Location: [facility] OR [address] [place] [address];  Service: Urology;  Laterality: Right;       Family History       Problem Relation (Age of Onset)    Diabetes Mother, Daughter, Son    Heart attack Father    Kidney cancer Son    Stomach cancer Mother    Thyroid disease Daughter          Tobacco Use    Smoking status: Never Smoker    Smokeless tobacco: Never Used   Substance and Sexual Activity    Alcohol use: No    Drug use: No    Sexual activity: Not on file     Review of Systems   Skin:  Positive for wound.     Objective:     Vital Signs (Most Recent):  Temp: 98.8 °F (37.1 °C) (07/18/22 1212)  Pulse: 100 (07/18/22 1212)  Resp: 20 (07/18/22 1212)  BP: (!) 151/67 (07/18/22 1212)  SpO2: 97 % (07/18/22 1212)   Vital Signs (24h Range):  Temp:  [98 °F (36.7 °C)-98.9 °F (37.2 °C)] 98.8 °F (37.1 °C)  Pulse:  [100-104] 100  Resp:  [16-20]  20  SpO2:  [95 %-98 %] 97 %  BP: (106-151)/(44-70) 151/67     Weight: 56 kg (123 lb 7.3 oz)  Body mass index is 23.33 kg/m².  Physical Exam  Constitutional:       Appearance: Normal appearance.   Skin:     General: Skin is warm and dry.      Findings: Lesion present.   Neurological:      Mental Status: She is alert.       Laboratory:  All pertinent labs reviewed within the last 24 hours.    Diagnostic Results:  None

## 2022-07-19 LAB
ALBUMIN SERPL BCP-MCNC: 1.3 G/DL (ref 3.5–5.2)
ALP SERPL-CCNC: 167 U/L (ref 55–135)
ALT SERPL W/O P-5'-P-CCNC: 18 U/L (ref 10–44)
ANION GAP SERPL CALC-SCNC: 8 MMOL/L (ref 8–16)
AST SERPL-CCNC: 33 U/L (ref 10–40)
BASOPHILS # BLD AUTO: 0.07 K/UL (ref 0–0.2)
BASOPHILS NFR BLD: 0.2 % (ref 0–1.9)
BILIRUB SERPL-MCNC: 0.4 MG/DL (ref 0.1–1)
BUN SERPL-MCNC: 30 MG/DL (ref 8–23)
CALCIUM SERPL-MCNC: 8.2 MG/DL (ref 8.7–10.5)
CHLORIDE SERPL-SCNC: 109 MMOL/L (ref 95–110)
CO2 SERPL-SCNC: 15 MMOL/L (ref 23–29)
CREAT SERPL-MCNC: 1.9 MG/DL (ref 0.5–1.4)
DIFFERENTIAL METHOD: ABNORMAL
EOSINOPHIL # BLD AUTO: 0.1 K/UL (ref 0–0.5)
EOSINOPHIL NFR BLD: 0.2 % (ref 0–8)
ERYTHROCYTE [DISTWIDTH] IN BLOOD BY AUTOMATED COUNT: 15.9 % (ref 11.5–14.5)
EST. GFR  (AFRICAN AMERICAN): 29.1 ML/MIN/1.73 M^2
EST. GFR  (NON AFRICAN AMERICAN): 25.2 ML/MIN/1.73 M^2
GLUCOSE SERPL-MCNC: 256 MG/DL (ref 70–110)
HCT VFR BLD AUTO: 30.6 % (ref 37–48.5)
HGB BLD-MCNC: 9.5 G/DL (ref 12–16)
IMM GRANULOCYTES # BLD AUTO: 1.11 K/UL (ref 0–0.04)
IMM GRANULOCYTES NFR BLD AUTO: 3 % (ref 0–0.5)
LYMPHOCYTES # BLD AUTO: 1.2 K/UL (ref 1–4.8)
LYMPHOCYTES NFR BLD: 3.1 % (ref 18–48)
MAGNESIUM SERPL-MCNC: 1.9 MG/DL (ref 1.6–2.6)
MCH RBC QN AUTO: 27.8 PG (ref 27–31)
MCHC RBC AUTO-ENTMCNC: 31 G/DL (ref 32–36)
MCV RBC AUTO: 90 FL (ref 82–98)
MONOCYTES # BLD AUTO: 1 K/UL (ref 0.3–1)
MONOCYTES NFR BLD: 2.6 % (ref 4–15)
NEUTROPHILS # BLD AUTO: 33.6 K/UL (ref 1.8–7.7)
NEUTROPHILS NFR BLD: 90.9 % (ref 38–73)
NRBC BLD-RTO: 0 /100 WBC
PLATELET # BLD AUTO: 344 K/UL (ref 150–450)
PMV BLD AUTO: 10.1 FL (ref 9.2–12.9)
POCT GLUCOSE: 180 MG/DL (ref 70–110)
POCT GLUCOSE: 204 MG/DL (ref 70–110)
POCT GLUCOSE: 227 MG/DL (ref 70–110)
POCT GLUCOSE: 247 MG/DL (ref 70–110)
POCT GLUCOSE: 272 MG/DL (ref 70–110)
POTASSIUM SERPL-SCNC: 3.8 MMOL/L (ref 3.5–5.1)
PROT SERPL-MCNC: 4.4 G/DL (ref 6–8.4)
RBC # BLD AUTO: 3.42 M/UL (ref 4–5.4)
SODIUM SERPL-SCNC: 132 MMOL/L (ref 136–145)
WBC # BLD AUTO: 36.98 K/UL (ref 3.9–12.7)

## 2022-07-19 PROCEDURE — 99233 SBSQ HOSP IP/OBS HIGH 50: CPT | Mod: ,,, | Performed by: INTERNAL MEDICINE

## 2022-07-19 PROCEDURE — S0030 INJECTION, METRONIDAZOLE: HCPCS | Performed by: STUDENT IN AN ORGANIZED HEALTH CARE EDUCATION/TRAINING PROGRAM

## 2022-07-19 PROCEDURE — 85025 COMPLETE CBC W/AUTO DIFF WBC: CPT | Performed by: INTERNAL MEDICINE

## 2022-07-19 PROCEDURE — 36415 COLL VENOUS BLD VENIPUNCTURE: CPT | Performed by: INTERNAL MEDICINE

## 2022-07-19 PROCEDURE — 25000003 PHARM REV CODE 250

## 2022-07-19 PROCEDURE — 25000003 PHARM REV CODE 250: Performed by: STUDENT IN AN ORGANIZED HEALTH CARE EDUCATION/TRAINING PROGRAM

## 2022-07-19 PROCEDURE — 97116 GAIT TRAINING THERAPY: CPT | Mod: CQ

## 2022-07-19 PROCEDURE — 20600001 HC STEP DOWN PRIVATE ROOM

## 2022-07-19 PROCEDURE — C1751 CATH, INF, PER/CENT/MIDLINE: HCPCS

## 2022-07-19 PROCEDURE — 63600175 PHARM REV CODE 636 W HCPCS

## 2022-07-19 PROCEDURE — 83735 ASSAY OF MAGNESIUM: CPT | Performed by: INTERNAL MEDICINE

## 2022-07-19 PROCEDURE — 99233 PR SUBSEQUENT HOSPITAL CARE,LEVL III: ICD-10-PCS | Mod: ,,, | Performed by: INTERNAL MEDICINE

## 2022-07-19 PROCEDURE — 80053 COMPREHEN METABOLIC PANEL: CPT

## 2022-07-19 PROCEDURE — 97530 THERAPEUTIC ACTIVITIES: CPT | Mod: CQ

## 2022-07-19 PROCEDURE — 27000207 HC ISOLATION

## 2022-07-19 RX ORDER — METRONIDAZOLE 500 MG/100ML
500 INJECTION, SOLUTION INTRAVENOUS
Status: DISCONTINUED | OUTPATIENT
Start: 2022-07-19 | End: 2022-07-23 | Stop reason: HOSPADM

## 2022-07-19 RX ADMIN — ONDANSETRON HYDROCHLORIDE 8 MG: 4 TABLET, FILM COATED ORAL at 08:07

## 2022-07-19 RX ADMIN — INSULIN ASPART 2 UNITS: 100 INJECTION, SOLUTION INTRAVENOUS; SUBCUTANEOUS at 09:07

## 2022-07-19 RX ADMIN — INSULIN ASPART 2 UNITS: 100 INJECTION, SOLUTION INTRAVENOUS; SUBCUTANEOUS at 06:07

## 2022-07-19 RX ADMIN — METRONIDAZOLE 500 MG: 500 INJECTION, SOLUTION INTRAVENOUS at 11:07

## 2022-07-19 RX ADMIN — VANCOMYCIN HYDROCHLORIDE 500 MG: KIT at 11:07

## 2022-07-19 RX ADMIN — ALUMINUM HYDROXIDE, MAGNESIUM HYDROXIDE, AND DIMETHICONE 10 ML: 400; 400; 40 SUSPENSION ORAL at 08:07

## 2022-07-19 RX ADMIN — CHOLESTYRAMINE: 4 POWDER, FOR SUSPENSION ORAL at 08:07

## 2022-07-19 RX ADMIN — VANCOMYCIN HYDROCHLORIDE 125 MG: KIT at 12:07

## 2022-07-19 RX ADMIN — HYDROMORPHONE HYDROCHLORIDE 0.5 MG: 1 INJECTION, SOLUTION INTRAMUSCULAR; INTRAVENOUS; SUBCUTANEOUS at 09:07

## 2022-07-19 RX ADMIN — INSULIN ASPART 2 UNITS: 100 INJECTION, SOLUTION INTRAVENOUS; SUBCUTANEOUS at 08:07

## 2022-07-19 RX ADMIN — VANCOMYCIN HYDROCHLORIDE 125 MG: KIT at 06:07

## 2022-07-19 RX ADMIN — ALUMINUM HYDROXIDE, MAGNESIUM HYDROXIDE, AND DIMETHICONE 10 ML: 400; 400; 40 SUSPENSION ORAL at 04:07

## 2022-07-19 RX ADMIN — HYDROMORPHONE HYDROCHLORIDE 0.5 MG: 1 INJECTION, SOLUTION INTRAMUSCULAR; INTRAVENOUS; SUBCUTANEOUS at 12:07

## 2022-07-19 RX ADMIN — ALUMINUM HYDROXIDE, MAGNESIUM HYDROXIDE, AND DIMETHICONE 10 ML: 400; 400; 40 SUSPENSION ORAL at 01:07

## 2022-07-19 RX ADMIN — METRONIDAZOLE 500 MG: 500 INJECTION, SOLUTION INTRAVENOUS at 03:07

## 2022-07-19 RX ADMIN — APIXABAN 5 MG: 5 TABLET, FILM COATED ORAL at 08:07

## 2022-07-19 RX ADMIN — INSULIN ASPART 3 UNITS: 100 INJECTION, SOLUTION INTRAVENOUS; SUBCUTANEOUS at 12:07

## 2022-07-19 RX ADMIN — SODIUM CHLORIDE: 0.9 INJECTION, SOLUTION INTRAVENOUS at 02:07

## 2022-07-19 NOTE — SUBJECTIVE & OBJECTIVE
Interval History: Little nephrostomy  output. Feels fatigued. Still having diarrhea.    Oncology Treatment Plan:   OP PEMBROLIZUMAB 400MG Q6W    Medications:  Continuous Infusions:   sodium chloride 0.9% 100 mL/hr at 07/19/22 0200     Scheduled Meds:   apixaban  5 mg Oral BID    duke's soln (benadryl 30 mL, mylanta 30 mL, LIDOcaine 30 mL, nystatin 30 mL) 120 mL  10 mL Oral QID    Questran and Aquaphor Topical Compound   Topical (Top) BID    vancomycin  125 mg Oral Q6H     PRN Meds:sodium chloride, dextrose 10%, dextrose 10%, glucagon (human recombinant), glucose, glucose, HYDROmorphone, HYDROmorphone, insulin aspart U-100, LORazepam, naloxone, ondansetron, prochlorperazine, sodium chloride 0.9%     Review of Systems   Constitutional:  Negative for chills and fever.   HENT:  Negative for congestion and sore throat.    Respiratory:  Negative for cough and shortness of breath.    Cardiovascular:  Negative for chest pain and palpitations.   Gastrointestinal:  Positive for abdominal pain and diarrhea. Negative for blood in stool and vomiting.   Musculoskeletal:  Positive for back pain. Negative for arthralgias and myalgias.   Skin:  Negative for rash and wound.   Neurological:  Positive for dizziness. Negative for syncope.   Hematological:  Negative for adenopathy. Does not bruise/bleed easily.   Objective:     Vital Signs (Most Recent):  Temp: 97.2 °F (36.2 °C) (07/19/22 0500)  Pulse: 102 (07/19/22 0500)  Resp: 18 (07/19/22 0500)  BP: (!) 116/53 (07/19/22 0500)  SpO2: 98 % (07/19/22 0500)   Vital Signs (24h Range):  Temp:  [97.2 °F (36.2 °C)-98.8 °F (37.1 °C)] 97.2 °F (36.2 °C)  Pulse:  [100-102] 102  Resp:  [16-20] 18  SpO2:  [97 %-98 %] 98 %  BP: (116-151)/(48-67) 116/53     Weight: 56 kg (123 lb 7.3 oz)  Body mass index is 23.33 kg/m².  Body surface area is 1.55 meters squared.      Intake/Output Summary (Last 24 hours) at 7/19/2022 0646  Last data filed at 7/19/2022 0629  Gross per 24 hour   Intake 4251.64 ml    Output 255 ml   Net 3996.64 ml       Physical Exam  Vitals and nursing note reviewed.   Constitutional:       Appearance: She is not ill-appearing, toxic-appearing or diaphoretic.   HENT:      Head: Normocephalic.   Eyes:      Extraocular Movements: Extraocular movements intact.   Cardiovascular:      Rate and Rhythm: Normal rate and regular rhythm.      Heart sounds: No murmur heard.  Pulmonary:      Effort: Pulmonary effort is normal. No respiratory distress.   Abdominal:      General: Abdomen is flat.      Palpations: Abdomen is soft.      Tenderness: There is no abdominal tenderness. There is no guarding.   Musculoskeletal:      Right lower leg: No edema.      Left lower leg: No edema.   Skin:     General: Skin is warm and dry.   Neurological:      Mental Status: She is alert. Mental status is at baseline.      Cranial Nerves: No dysarthria.   Psychiatric:         Mood and Affect: Mood normal.         Behavior: Behavior normal.     Significant Labs:  CBC:   Recent Labs   Lab 07/18/22  0529 07/19/22  0335   WBC 42.49* 36.98*   HGB 9.7* 9.5*   HCT 31.3* 30.6*    344     CMP:   Recent Labs   Lab 07/18/22  0529 07/18/22  1218   * 130*   K 4.4 3.9    108   CO2 12* 15*   * 196*   BUN 29* 30*   CREATININE 1.8* 1.9*   CALCIUM 8.3* 8.1*   PROT 4.4*  --    ALBUMIN 1.2*  --    BILITOT 0.4  --    ALKPHOS 171*  --    AST 14  --    ALT 13  --    ANIONGAP 10 7*   EGFRNONAA 27.0* 25.2*     No results for input(s): PT, INR, APTT in the last 24 hours.    No results for input(s): PH, PCO2, PO2, HCO3, POCSATURATED, BE in the last 72 hours.    Diagnostic Results:  None

## 2022-07-19 NOTE — PLAN OF CARE
"Plan of care reviewed with daughter at pt bedside. Pt taking about topics not related to her situation - patient comments "the baby needs changing". Pt's daughter states that pt has been "having conversations with many people that are not in the room." Pt afebrile this shift. Pt had one emesis - dark in color and strong odor. Pt more restless this evening, more so after first dose of lorazepam given, pt attempting to climb out of bed. Urine output from nephrostomy tubes minimal.   "

## 2022-07-19 NOTE — PROGRESS NOTES
"[facility] - Oncology ([facility])  Hematology/Oncology  Progress Note    Patient Name: Karoline Justice  Admission Date: 7/15/2022  Hospital Length of Stay: 4 days  Code Status: DNR     Subjective:     HPI:  Ms. Justice is a 76F with bladder cancer, bilateral nephrostomy tubes, recent RLE DVT on Eliquis, and DM2 who presents as a transfer from [place] for intractable abdominal pain and lower back pain in the setting of colitis on CT scan. She had been off her pain meds for 2 days due to severe constipation.   She was recently admitted to this service for a UTI and was discharged 7/7 with 11 days of cefpodoxime to complete a total 14 day course. She was also hypercalcemic during that admission and treated with fluids and pamindronate and calcitonin. Since that discharge she has had 3 ER visits for hypoglycemia, pain, and other various reasons.   She reports several days of abdominal pain, chills, and diarrhea. She is weak and prone to falling when walking to the bathroom.   At the outside hospital yesterday, she had worsening leukocytosis to 38, elevated lactate, evidence of recurrent/persistent UTI, and THOMAS. Per the ER physician's note, a CT revealed "moderate to severe long segment left-sided colitis, indeterminate may be infectious inflammatory or ischemic." However, I was not able to see the official radiology read yet. She was given meropenem and IV pain meds and transferred to [facility].            Patient information was obtained from patient, relative(s), past medical records, and ER records.      Oncology History:       Bladder cancer   5/20/2022 Initial Diagnosis     Bladder cancer        Tumor Conference     Presenting Hospital / Clinic: [facility]  Virtual Tumor Board Conference: Virtual  Presenter: Sylvia Escobar  Date Presented to Tumor Board: 5/26/2022  Specialties Present: Pathology; Urology; Radiation Oncology  Cancer Type: Bladder cancer  Recommended Plan Note: Will obtain bladder MRI, PET CT " and bone scan to further evaluate for metastatic disease. Seeing medical oncology to discuss systemic therapy.            6/16/2022 -  Chemotherapy     Treatment Summary   Plan Name: OP PEMBROLIZUMAB 400MG Q6W  Treatment Goal: Control  Status: Active  Start Date: 6/16/2022 (Planned)  End Date: 4/18/2024 (Planned)  Provider: Reid Snider MD  Chemotherapy: pembrolizumab (KEYTRUDA) 400 mg in sodium chloride 0.9% 116 mL infusion, 400 mg, Intravenous, Clinic/HOD 1 time, 0 of 17 cycles             Interval History: Little nephrostomy  output. Feels fatigued. Still having diarrhea.    Oncology Treatment Plan:   OP PEMBROLIZUMAB 400MG Q6W    Medications:  Continuous Infusions:   sodium chloride 0.9% 100 mL/hr at 07/19/22 0200     Scheduled Meds:   apixaban  5 mg Oral BID    duke's soln (benadryl 30 mL, mylanta 30 mL, LIDOcaine 30 mL, nystatin 30 mL) 120 mL  10 mL Oral QID    Questran and Aquaphor Topical Compound   Topical (Top) BID    vancomycin  125 mg Oral Q6H     PRN Meds:sodium chloride, dextrose 10%, dextrose 10%, glucagon (human recombinant), glucose, glucose, HYDROmorphone, HYDROmorphone, insulin aspart U-100, LORazepam, naloxone, ondansetron, prochlorperazine, sodium chloride 0.9%     Review of Systems   Constitutional:  Negative for chills and fever.   HENT:  Negative for congestion and sore throat.    Respiratory:  Negative for cough and shortness of breath.    Cardiovascular:  Negative for chest pain and palpitations.   Gastrointestinal:  Positive for abdominal pain and diarrhea. Negative for blood in stool and vomiting.   Musculoskeletal:  Positive for back pain. Negative for arthralgias and myalgias.   Skin:  Negative for rash and wound.   Neurological:  Positive for dizziness. Negative for syncope.   Hematological:  Negative for adenopathy. Does not bruise/bleed easily.   Objective:     Vital Signs (Most Recent):  Temp: 97.2 °F (36.2 °C) (07/19/22 0500)  Pulse: 102 (07/19/22 0500)  Resp: 18  (07/19/22 0500)  BP: (!) 116/53 (07/19/22 0500)  SpO2: 98 % (07/19/22 0500)   Vital Signs (24h Range):  Temp:  [97.2 °F (36.2 °C)-98.8 °F (37.1 °C)] 97.2 °F (36.2 °C)  Pulse:  [100-102] 102  Resp:  [16-20] 18  SpO2:  [97 %-98 %] 98 %  BP: (116-151)/(48-67) 116/53     Weight: 56 kg (123 lb 7.3 oz)  Body mass index is 23.33 kg/m².  Body surface area is 1.55 meters squared.      Intake/Output Summary (Last 24 hours) at 7/19/2022 0646  Last data filed at 7/19/2022 0629  Gross per 24 hour   Intake 4251.64 ml   Output 255 ml   Net 3996.64 ml       Physical Exam  Vitals and nursing note reviewed.   Constitutional:       Appearance: She is not ill-appearing, toxic-appearing or diaphoretic.   HENT:      Head: Normocephalic.   Eyes:      Extraocular Movements: Extraocular movements intact.   Cardiovascular:      Rate and Rhythm: Normal rate and regular rhythm.      Heart sounds: No murmur heard.  Pulmonary:      Effort: Pulmonary effort is normal. No respiratory distress.   Abdominal:      General: Abdomen is flat.      Palpations: Abdomen is soft.      Tenderness: There is no abdominal tenderness. There is no guarding.   Musculoskeletal:      Right lower leg: No edema.      Left lower leg: No edema.   Skin:     General: Skin is warm and dry.   Neurological:      Mental Status: She is alert. Mental status is at baseline.      Cranial Nerves: No dysarthria.   Psychiatric:         Mood and Affect: Mood normal.         Behavior: Behavior normal.     Significant Labs:  CBC:   Recent Labs   Lab 07/18/22  0529 07/19/22  0335   WBC 42.49* 36.98*   HGB 9.7* 9.5*   HCT 31.3* 30.6*    344     CMP:   Recent Labs   Lab 07/18/22  0529 07/18/22  1218   * 130*   K 4.4 3.9    108   CO2 12* 15*   * 196*   BUN 29* 30*   CREATININE 1.8* 1.9*   CALCIUM 8.3* 8.1*   PROT 4.4*  --    ALBUMIN 1.2*  --    BILITOT 0.4  --    ALKPHOS 171*  --    AST 14  --    ALT 13  --    ANIONGAP 10 7*   EGFRNONAA 27.0* 25.2*     No results  for input(s): PT, INR, APTT in the last 24 hours.    No results for input(s): PH, PCO2, PO2, HCO3, POCSATURATED, BE in the last 72 hours.    Diagnostic Results:  None    Assessment/Plan:     * Sepsis  This patient does have evidence of infective focus  My overall impression is sepsis. Vital signs were reviewed and noted in progress note.  Antibiotics given-   Antibiotics (From admission, onward)            Start     Stop Route Frequency Ordered    07/16/22 0100  vancomycin 125 mg/5 mL oral solution 125 mg  (C. difficile Infection (CDI) Treatment Order Panel)         07/25 2359 Oral Every 6 hours 07/16/22 0056    07/15/22 0546  PIPERACILLIN-TAZOBACTAM 4.5G/100ML SODIUM CHLORIDE 0.9%-READY TO MIX IVPB        Note to Pharmacy: Created by cabinet override    07/15 1759   07/15/22 0546          Cultures were taken-   Microbiology Results (last 7 days)     Procedure Component Value Units Date/Time    Stool culture [[id number]] Collected: 07/15/22 0538    Order Status: Completed Specimen: Stool Updated: 07/18/22 1243     Stool Culture No Salmonella,Shigella,Vibrio,Campylobacter,Yersinia isolated.    Blood culture [[id number]] Collected: 07/15/22 0744    Order Status: Completed Specimen: Blood Updated: 07/18/22 1012     Blood Culture, Routine No Growth to date      No Growth to date      No Growth to date      No Growth to date    Blood culture [[id number]] Collected: 07/15/22 0744    Order Status: Completed Specimen: Blood Updated: 07/18/22 1012     Blood Culture, Routine No Growth to date      No Growth to date      No Growth to date      No Growth to date    E. coli 0157 antigen [[id number]] Collected: 07/15/22 0538    Order Status: Completed Specimen: Stool Updated: 07/16/22 2234     Shiga Toxin 1 E.coli Negative     Shiga Toxin 2 E.coli Negative    Clostridium difficile EIA [[id number]]  (Abnormal) Collected: 07/15/22 0538    Order Status: Completed Specimen: Stool Updated: 07/15/22 2132     C. diff Antigen Positive     C  difficile Toxins A+B, EIA Positive     Comment: Testing not recommended for children <24 months old.       Blood culture [[id number]]     Order Status: Canceled Specimen: Blood     Blood culture [[id number]]     Order Status: Canceled Specimen: Blood     Blood culture [[id number]]     Order Status: Canceled Specimen: Blood     Blood culture [[id number]]     Order Status: Canceled Specimen: Blood           Latest lactate reviewed, they are-  No results for input(s): LACTATE in the last 72 hours.    Organ dysfunction indicated by Acute kidney injury  Source- C diff colitis    Source control Achieved by- antibiotics (oral vancomycin)    -Leukocytosis decreased to 36.98  -BP normotensive. Receiving  mL/hr infusion. Continue to monitor.   -Afebrile        Colitis  -CT abd/pelvis pertinent for colitis with no free air or abscess  -C. Diff assay positive  -Oral Vanc x 10 days. Some signs of improvement with decreased WBC count. Consider switching to different antibiotic if she becomes febrile, increase in WBC count, or if there is no improvement in diarrhea  -Clear liquid diet  -Monitor daily CBC/CMP     UTI (urinary tract infection)  -Urine culture positive for Gram negative rods  -Patient has bilateral nephrostomy tubes with minimal output and pertinent for blood and opal pus.     Bladder cancer  Follows with Dr. Snider. Was scheduled to begin outpatient chemo 7/8.   Now s/p bilateral nephrostomy tubes  Has seen radiation oncology recently as well     -outpatient treatment likely  -Patient's daughter is considering hospice since patient informed her she didn't want to die in front of family at home. In contact with hospice of [facility]  -Prn dilaudid for pain control. Currently holding morphine and oxy in setting of THOMAS    THOMAS (acute kidney injury)  Cr elevated 1.6. Baseline around 0.8  -Hold home morphine and oxy in setting of THOMAS  -IVF  -Strict Is and Os    Anemia  Hgb 7.0 on admission  -Likely due to MABEL vs anemia  of chronic disease  -Has had 1 pRBC since admission. Hgb today is 9.5  -Transfuse if < 7    HTN (hypertension)  Hold home meds, monitor bp     Controlled Type 2 diabetes mellitus  SSI, POC glucose             Deonna Hansen MD  Hematology/Oncology  [facility] ([facility])      ATTENDING NOTE, ONCOLOGY INPATIENT TEAM    As above; events of last 24 hours noted.  Patient seen and examined, chart reviewed.  Appears comfortable, in NAD, however, she continues to have watery BMs..  Lungs are clear to auscultation.  Abdomen is soft, with minimal tenderness today, improved from yesterday  Labs reviewed.  Case discussed with ID Service.    PLAN   Repeat CMP TODAY, and again in am  Add metronidazole  Increase vancomycin to 250 mg Q 6 hours po  Continue apixaban.  We will reassess in am.  We will follow.    Judd Herrera MD

## 2022-07-19 NOTE — HPI
75 y/o F h/o  bladder cancer, b/l nephrostomy tubes and ureteral stent, recent RLE DVT on Eliquis, and DM2 who presents as a transfer from [place] for intractable abdominal pain and lower back pain CT with moderate to severe L sided colitis, found to have c diff (ag+/Toxin+), recently treated with cephalosporin for UTI.  She continued to have diarrhea and leukocytosis on PO vancomycin and dose was increased and metronidazole was added.  Today she had fever of 100.9 and WBC now 44K.  Of note urine culture from 7/15 growing serratia marcescens, cultures taken from indwelling PCNT.  She states her diarrhea has improved now down to 2 stools daily, overall feeling much improved, abdominal and flank pan much improved.  She also states that when she eats she coughs and had episode of this prior to having fever this AM

## 2022-07-19 NOTE — ASSESSMENT & PLAN NOTE
-CT abd/pelvis pertinent for colitis with no free air or abscess  -C. Diff assay positive  -Oral Vanc x 10 days. Some signs of improvement with decreased WBC count. Consider switching to different antibiotic if she becomes febrile, increase in WBC count, or if there is no improvement in diarrhea  -Clear liquid diet  -Monitor daily CBC/CMP

## 2022-07-19 NOTE — PLAN OF CARE
Pt resting lying in bed no needs voiced at this time. No significant events today. Vitals signs have been stable. Pt call light in reach and bed alarm is on. Will continue to monitor.

## 2022-07-19 NOTE — ASSESSMENT & PLAN NOTE
This patient does have evidence of infective focus  My overall impression is sepsis. Vital signs were reviewed and noted in progress note.  Antibiotics given-   Antibiotics (From admission, onward)            Start     Stop Route Frequency Ordered    07/16/22 0100  vancomycin 125 mg/5 mL oral solution 125 mg  (C. difficile Infection (CDI) Treatment Order Panel)         07/25 2359 Oral Every 6 hours 07/16/22 0056    07/15/22 0546  PIPERACILLIN-TAZOBACTAM 4.5G/100ML SODIUM CHLORIDE 0.9%-READY TO MIX IVPB        Note to Pharmacy: Created by cabinet override    07/15 1759   07/15/22 0546          Cultures were taken-   Microbiology Results (last 7 days)     Procedure Component Value Units Date/Time    Stool culture [[id number]] Collected: 07/15/22 0538    Order Status: Completed Specimen: Stool Updated: 07/18/22 1243     Stool Culture No Salmonella,Shigella,Vibrio,Campylobacter,Yersinia isolated.    Blood culture [[id number]] Collected: 07/15/22 0744    Order Status: Completed Specimen: Blood Updated: 07/18/22 1012     Blood Culture, Routine No Growth to date      No Growth to date      No Growth to date      No Growth to date    Blood culture [[id number]] Collected: 07/15/22 0744    Order Status: Completed Specimen: Blood Updated: 07/18/22 1012     Blood Culture, Routine No Growth to date      No Growth to date      No Growth to date      No Growth to date    E. coli 0157 antigen [[id number]] Collected: 07/15/22 0538    Order Status: Completed Specimen: Stool Updated: 07/16/22 2234     Shiga Toxin 1 E.coli Negative     Shiga Toxin 2 E.coli Negative    Clostridium difficile EIA [[id number]]  (Abnormal) Collected: 07/15/22 0538    Order Status: Completed Specimen: Stool Updated: 07/15/22 2132     C. diff Antigen Positive     C difficile Toxins A+B, EIA Positive     Comment: Testing not recommended for children <24 months old.       Blood culture [[id number]]     Order Status: Canceled Specimen: Blood     Blood culture  [[id number]]     Order Status: Canceled Specimen: Blood     Blood culture [[id number]]     Order Status: Canceled Specimen: Blood     Blood culture [[id number]]     Order Status: Canceled Specimen: Blood           Latest lactate reviewed, they are-  No results for input(s): LACTATE in the last 72 hours.    Organ dysfunction indicated by Acute kidney injury  Source- C diff colitis    Source control Achieved by- antibiotics (oral vancomycin)    -Leukocytosis decreased to 36.98  -BP normotensive. Receiving  mL/hr infusion. Continue to monitor.   -Afebrile

## 2022-07-19 NOTE — ASSESSMENT & PLAN NOTE
Follows with Dr. Snider. Was scheduled to begin outpatient chemo 7/8.   Now s/p bilateral nephrostomy tubes  Has seen radiation oncology recently as well     -outpatient treatment likely  -Patient's daughter is considering hospice since patient informed her she didn't want to die in front of family at home. In contact with hospice of [facility]  -Prn dilaudid for pain control. Currently holding morphine and oxy in setting of THOMAS

## 2022-07-19 NOTE — ASSESSMENT & PLAN NOTE
Hgb 7.0 on admission  -Likely due to MABEL vs anemia of chronic disease  -Has had 1 pRBC since admission. Hgb today is 9.5  -Transfuse if < 7

## 2022-07-19 NOTE — PT/OT/SLP PROGRESS
"Physical Therapy Treatment    Patient Name:  Karoline Justice   MRN:  [id number]    Recommendations:     Discharge Recommendations:  nursing facility, skilled   Discharge Equipment Recommendations: bedside commode   Barriers to discharge: impaired functional mobility requiring increased assistance    Assessment:     Karoline Justice is a 76 y.o. female admitted with a medical diagnosis of Sepsis.  She presents with the following impairments/functional limitations:  weakness, impaired endurance, impaired self care skills, impaired functional mobility, gait instability, impaired balance, impaired cardiopulmonary response to activity, pain, decreased lower extremity function, impaired cognition, decreased safety awareness. Pt tolerates session fairly with focus on bed mobility, transfers, and gait training. Pt pleasantly confused throughout session with tangential speech and near constant movement/fidgeting. Pt appears anxious in presentation but can be calmed and redirected with gentle cues and redirection. Pt states she is in her apartment and makes mention of a baby but then does not elaborate. Pt able to sit up to EOB with minimal assistance but otherwise requires generally Moderate Assistance with transfers, gait, and return to bed. Pt will continue to benefit from therapy services to address impairments listed above.     Rehab Prognosis: Good; patient would benefit from acute skilled PT services to address these deficits and reach maximum level of function.    Recent Surgery: * No surgery found *      Plan:     During this hospitalization, patient to be seen 3 x/week to address the identified rehab impairments via gait training, therapeutic activities, therapeutic exercises, neuromuscular re-education and progress toward the following goals:    · Plan of Care Expires:  08/14/22    Subjective     Chief Complaint: low back pain  Patient/Family Comments/goals: "Yea I know, I'm at my " "apartment."  Pain/Comfort:  · Pain Rating 1: other (see comments) (unrated c/o pain)  · Location - Side 1: Bilateral  · Location - Orientation 1: lower  · Location 1: back      Objective:     Communicated with RN prior to session.  Patient found right sidelying with peripheral IV, nephrostomy upon PTA entry to room.     General Precautions: Standard, fall, hearing impaired, special contact   Orthopedic Precautions:N/A   Braces: N/A  Respiratory Status: Room air     Functional Mobility:  · Bed Mobility:     · Supine to Sit: minimum assistance  · Sit to Supine: moderate assistance  · Transfers:     · Sit to Stand:  minimum assistance and moderate assistance with hand-held assist  · Gait: Pt takes side steps and fwd/bwd steps ~ 2 ft each trial. Shuffled gait, posterior lean, pt appears anxious with increased breathing rate and pt reaching to brace self on bed or therapist as if falling despite standing stable.       AM-PAC 6 CLICK MOBILITY  Turning over in bed (including adjusting bedclothes, sheets and blankets)?: 3  Sitting down on and standing up from a chair with arms (e.g., wheelchair, bedside commode, etc.): 3  Moving from lying on back to sitting on the side of the bed?: 3  Moving to and from a bed to a chair (including a wheelchair)?: 3  Need to walk in hospital room?: 2  Climbing 3-5 steps with a railing?: 1  Basic Mobility Total Score: 15       Therapeutic Activities and Exercises:  Pt assisted with functional mobility as noted above.   Sit<>stand x 3 trials with RHHA and Min/Mod A.   Pt redirected frequently and provided reorientation to situation, location, and therapy task. Frequent encouragement required.       Patient left right sidelying with all lines intact, call button in reach and RN present.    GOALS:   Multidisciplinary Problems     Physical Therapy Goals        Problem: Physical Therapy    Goal Priority Disciplines Outcome Goal Variances Interventions   Physical Therapy Goal     PT, PT/OT " Ongoing, Progressing     Description: Goals to be met by:     Patient will increase functional independence with mobility by performin. Supine to sit with Stand-by Assistance  2. Sit to supine with Contact Guard Assistance  3. Rolling to Left and Right with Supervision.  4. Sit to stand transfer with Contact Guard Assistance  5. Gait  x 30 feet with Contact Guard Assistance using Rolling Walker.  6. Ascend/descend 4 stairs with left Handrails Moderate Assistance using No Assistive Device.   7. Stand for 5 minutes with Contact Guard Assistance using Rolling Walker  8. Lower extremity exercise program x10 reps per handout, with assistance as needed                     Time Tracking:     PT Received On: 22  PT Start Time: 826     PT Stop Time: 849  PT Total Time (min): 23 min     Billable Minutes: Gait Training 15 and Therapeutic Activity 8    Treatment Type: Treatment  PT/PTA: PTA     PTA Visit Number: 2022

## 2022-07-20 LAB
ALBUMIN SERPL BCP-MCNC: 1.2 G/DL (ref 3.5–5.2)
ALP SERPL-CCNC: 162 U/L (ref 55–135)
ALT SERPL W/O P-5'-P-CCNC: 15 U/L (ref 10–44)
ANION GAP SERPL CALC-SCNC: 6 MMOL/L (ref 8–16)
ANISOCYTOSIS BLD QL SMEAR: SLIGHT
AST SERPL-CCNC: 22 U/L (ref 10–40)
BACTERIA BLD CULT: NORMAL
BASOPHILS # BLD AUTO: 0.08 K/UL (ref 0–0.2)
BASOPHILS NFR BLD: 0.2 % (ref 0–1.9)
BILIRUB SERPL-MCNC: 0.4 MG/DL (ref 0.1–1)
BUN SERPL-MCNC: 29 MG/DL (ref 8–23)
CALCIUM SERPL-MCNC: 8 MG/DL (ref 8.7–10.5)
CHLORIDE SERPL-SCNC: 109 MMOL/L (ref 95–110)
CO2 SERPL-SCNC: 15 MMOL/L (ref 23–29)
CREAT SERPL-MCNC: 1.9 MG/DL (ref 0.5–1.4)
DIFFERENTIAL METHOD: ABNORMAL
EOSINOPHIL # BLD AUTO: 0.1 K/UL (ref 0–0.5)
EOSINOPHIL NFR BLD: 0.3 % (ref 0–8)
ERYTHROCYTE [DISTWIDTH] IN BLOOD BY AUTOMATED COUNT: 16.1 % (ref 11.5–14.5)
EST. GFR  (AFRICAN AMERICAN): 29.1 ML/MIN/1.73 M^2
EST. GFR  (NON AFRICAN AMERICAN): 25.2 ML/MIN/1.73 M^2
GLUCOSE SERPL-MCNC: 175 MG/DL (ref 70–110)
HCT VFR BLD AUTO: 28.6 % (ref 37–48.5)
HGB BLD-MCNC: 9 G/DL (ref 12–16)
HYPOCHROMIA BLD QL SMEAR: ABNORMAL
IMM GRANULOCYTES # BLD AUTO: 1 K/UL (ref 0–0.04)
IMM GRANULOCYTES NFR BLD AUTO: 2.9 % (ref 0–0.5)
LYMPHOCYTES # BLD AUTO: 1.4 K/UL (ref 1–4.8)
LYMPHOCYTES NFR BLD: 4 % (ref 18–48)
MAGNESIUM SERPL-MCNC: 1.9 MG/DL (ref 1.6–2.6)
MCH RBC QN AUTO: 27 PG (ref 27–31)
MCHC RBC AUTO-ENTMCNC: 31.5 G/DL (ref 32–36)
MCV RBC AUTO: 86 FL (ref 82–98)
MONOCYTES # BLD AUTO: 0.9 K/UL (ref 0.3–1)
MONOCYTES NFR BLD: 2.5 % (ref 4–15)
NEUTROPHILS # BLD AUTO: 31.3 K/UL (ref 1.8–7.7)
NEUTROPHILS NFR BLD: 90.1 % (ref 38–73)
NRBC BLD-RTO: 0 /100 WBC
OVALOCYTES BLD QL SMEAR: ABNORMAL
PLATELET # BLD AUTO: 361 K/UL (ref 150–450)
PLATELET BLD QL SMEAR: ABNORMAL
PMV BLD AUTO: 9.9 FL (ref 9.2–12.9)
POCT GLUCOSE: 165 MG/DL (ref 70–110)
POCT GLUCOSE: 177 MG/DL (ref 70–110)
POCT GLUCOSE: 204 MG/DL (ref 70–110)
POIKILOCYTOSIS BLD QL SMEAR: SLIGHT
POLYCHROMASIA BLD QL SMEAR: ABNORMAL
POTASSIUM SERPL-SCNC: 3.6 MMOL/L (ref 3.5–5.1)
PROT SERPL-MCNC: 4.2 G/DL (ref 6–8.4)
RBC # BLD AUTO: 3.33 M/UL (ref 4–5.4)
SODIUM SERPL-SCNC: 130 MMOL/L (ref 136–145)
TOXIC GRANULES BLD QL SMEAR: PRESENT
WBC # BLD AUTO: 34.72 K/UL (ref 3.9–12.7)

## 2022-07-20 PROCEDURE — 25000003 PHARM REV CODE 250

## 2022-07-20 PROCEDURE — 83735 ASSAY OF MAGNESIUM: CPT | Performed by: INTERNAL MEDICINE

## 2022-07-20 PROCEDURE — 80053 COMPREHEN METABOLIC PANEL: CPT

## 2022-07-20 PROCEDURE — 25000003 PHARM REV CODE 250: Performed by: STUDENT IN AN ORGANIZED HEALTH CARE EDUCATION/TRAINING PROGRAM

## 2022-07-20 PROCEDURE — C1751 CATH, INF, PER/CENT/MIDLINE: HCPCS

## 2022-07-20 PROCEDURE — 27000207 HC ISOLATION

## 2022-07-20 PROCEDURE — 20600001 HC STEP DOWN PRIVATE ROOM

## 2022-07-20 PROCEDURE — C9399 UNCLASSIFIED DRUGS OR BIOLOG: HCPCS | Performed by: STUDENT IN AN ORGANIZED HEALTH CARE EDUCATION/TRAINING PROGRAM

## 2022-07-20 PROCEDURE — 99233 PR SUBSEQUENT HOSPITAL CARE,LEVL III: ICD-10-PCS | Mod: ,,, | Performed by: INTERNAL MEDICINE

## 2022-07-20 PROCEDURE — 25000003 PHARM REV CODE 250: Performed by: NURSE PRACTITIONER

## 2022-07-20 PROCEDURE — 99233 SBSQ HOSP IP/OBS HIGH 50: CPT | Mod: ,,, | Performed by: INTERNAL MEDICINE

## 2022-07-20 PROCEDURE — S0030 INJECTION, METRONIDAZOLE: HCPCS | Performed by: STUDENT IN AN ORGANIZED HEALTH CARE EDUCATION/TRAINING PROGRAM

## 2022-07-20 PROCEDURE — 85025 COMPLETE CBC W/AUTO DIFF WBC: CPT | Performed by: INTERNAL MEDICINE

## 2022-07-20 PROCEDURE — 63600175 PHARM REV CODE 636 W HCPCS

## 2022-07-20 RX ADMIN — VANCOMYCIN HYDROCHLORIDE 500 MG: KIT at 06:07

## 2022-07-20 RX ADMIN — ALUMINUM HYDROXIDE, MAGNESIUM HYDROXIDE, AND DIMETHICONE 10 ML: 400; 400; 40 SUSPENSION ORAL at 08:07

## 2022-07-20 RX ADMIN — APIXABAN 5 MG: 5 TABLET, FILM COATED ORAL at 08:07

## 2022-07-20 RX ADMIN — METRONIDAZOLE 500 MG: 500 INJECTION, SOLUTION INTRAVENOUS at 11:07

## 2022-07-20 RX ADMIN — CHOLESTYRAMINE: 4 POWDER, FOR SUSPENSION ORAL at 08:07

## 2022-07-20 RX ADMIN — CHOLESTYRAMINE: 4 POWDER, FOR SUSPENSION ORAL at 09:07

## 2022-07-20 RX ADMIN — VANCOMYCIN HYDROCHLORIDE 500 MG: KIT at 11:07

## 2022-07-20 RX ADMIN — VANCOMYCIN HYDROCHLORIDE 500 MG: KIT at 05:07

## 2022-07-20 RX ADMIN — ALUMINUM HYDROXIDE, MAGNESIUM HYDROXIDE, AND DIMETHICONE 10 ML: 400; 400; 40 SUSPENSION ORAL at 05:07

## 2022-07-20 RX ADMIN — VANCOMYCIN HYDROCHLORIDE 500 MG: KIT at 12:07

## 2022-07-20 RX ADMIN — METRONIDAZOLE 500 MG: 500 INJECTION, SOLUTION INTRAVENOUS at 02:07

## 2022-07-20 RX ADMIN — METRONIDAZOLE 500 MG: 500 INJECTION, SOLUTION INTRAVENOUS at 08:07

## 2022-07-20 RX ADMIN — INSULIN DETEMIR 5 UNITS: 100 INJECTION, SOLUTION SUBCUTANEOUS at 09:07

## 2022-07-20 RX ADMIN — ALUMINUM HYDROXIDE, MAGNESIUM HYDROXIDE, AND DIMETHICONE 10 ML: 400; 400; 40 SUSPENSION ORAL at 12:07

## 2022-07-20 RX ADMIN — INSULIN ASPART 2 UNITS: 100 INJECTION, SOLUTION INTRAVENOUS; SUBCUTANEOUS at 08:07

## 2022-07-20 RX ADMIN — HYDROMORPHONE HYDROCHLORIDE 0.5 MG: 1 INJECTION, SOLUTION INTRAMUSCULAR; INTRAVENOUS; SUBCUTANEOUS at 01:07

## 2022-07-20 RX ADMIN — HYDROMORPHONE HYDROCHLORIDE 0.5 MG: 1 SOLUTION ORAL at 06:07

## 2022-07-20 RX ADMIN — HYDROMORPHONE HYDROCHLORIDE 0.5 MG: 1 INJECTION, SOLUTION INTRAMUSCULAR; INTRAVENOUS; SUBCUTANEOUS at 09:07

## 2022-07-20 NOTE — PLAN OF CARE
Plan of care reviewed with patient and daughter at the start of shift. PT alert to person place and time but requires some reminders for her situation. One small loose mucusy stool this shift.

## 2022-07-20 NOTE — ASSESSMENT & PLAN NOTE
Cr stabilized at 1.9. Baseline around 0.8  -Hold home morphine and oxy in setting of THOMAS  -IVF  -Strict Is and Os

## 2022-07-20 NOTE — ASSESSMENT & PLAN NOTE
This patient does have evidence of infective focus  My overall impression is sepsis. Vital signs were reviewed and noted in progress note.  Antibiotics given-   Antibiotics (From admission, onward)            Start     Stop Route Frequency Ordered    07/19/22 1800  vancomycin 125 mg/5 mL oral solution 500 mg  (C. difficile Infection (CDI) Treatment Order Panel)         07/25 2359 Oral Every 6 hours 07/19/22 1429    07/19/22 1530  metronidazole IVPB 500 mg         -- IV Every 8 hours (non-standard times) 07/19/22 1429    07/15/22 0546  PIPERACILLIN-TAZOBACTAM 4.5G/100ML SODIUM CHLORIDE 0.9%-READY TO MIX IVPB        Note to Pharmacy: Created by cabinet override    07/15 1759   07/15/22 0546          Cultures were taken-   Microbiology Results (last 7 days)     Procedure Component Value Units Date/Time    Blood culture [[id number]] Collected: 07/15/22 0744    Order Status: Completed Specimen: Blood Updated: 07/19/22 1012     Blood Culture, Routine No Growth to date      No Growth to date      No Growth to date      No Growth to date      No Growth to date    Blood culture [[id number]] Collected: 07/15/22 0744    Order Status: Completed Specimen: Blood Updated: 07/19/22 1012     Blood Culture, Routine No Growth to date      No Growth to date      No Growth to date      No Growth to date      No Growth to date    Stool culture [[id number]] Collected: 07/15/22 0538    Order Status: Completed Specimen: Stool Updated: 07/18/22 1243     Stool Culture No Salmonella,Shigella,Vibrio,Campylobacter,Yersinia isolated.    E. coli 0157 antigen [[id number]] Collected: 07/15/22 0538    Order Status: Completed Specimen: Stool Updated: 07/16/22 2234     Shiga Toxin 1 E.coli Negative     Shiga Toxin 2 E.coli Negative    Clostridium difficile EIA [[id number]]  (Abnormal) Collected: 07/15/22 0538    Order Status: Completed Specimen: Stool Updated: 07/15/22 2132     C. diff Antigen Positive     C difficile Toxins A+B, EIA Positive     Comment:  Testing not recommended for children <24 months old.       Blood culture [[id number]]     Order Status: Canceled Specimen: Blood     Blood culture [[id number]]     Order Status: Canceled Specimen: Blood     Blood culture [[id number]]     Order Status: Canceled Specimen: Blood     Blood culture [[id number]]     Order Status: Canceled Specimen: Blood           Latest lactate reviewed, they are-  No results for input(s): LACTATE in the last 72 hours.    Organ dysfunction indicated by Acute kidney injury  Source- C diff colitis    Source control Achieved by- antibiotics. Currently on vancomycin and metronidazole.    -Leukocytosis decreased to 34.72  -BP normotensive. Receiving  mL/hr infusion. Continue to monitor.   -Afebrile

## 2022-07-20 NOTE — ASSESSMENT & PLAN NOTE
Hgb 7.0 on admission  -Likely due to MABEL vs anemia of chronic disease  -Has had 1 pRBC since admission.  -Transfuse if < 7

## 2022-07-20 NOTE — ASSESSMENT & PLAN NOTE
-CT abd/pelvis pertinent for colitis with no free air or abscess  -C. Diff assay positive  -Currently taking 500mg q6 vancomycin and 500mg q8 metronidazole  -Clear liquid diet  -Monitor daily CBC/CMP

## 2022-07-20 NOTE — PLAN OF CARE
TID plan of care    Team called over concerns of continued diarrhea on 4 days of PO vancomycin 125mg q6, recommend increasing to 500 mg q6 and adding metronidazole to complete course.  Call back if not improving in next 72 hours

## 2022-07-20 NOTE — PROGRESS NOTES
"[facility] - Oncology ([facility])  Hematology/Oncology  Progress Note    Patient Name: Karoline Justice  Admission Date: 7/15/2022  Hospital Length of Stay: 5 days  Code Status: DNR     Subjective:     HPI:  Ms. Justice is a 76F with bladder cancer, bilateral nephrostomy tubes, recent RLE DVT on Eliquis, and DM2 who presents as a transfer from [place] for intractable abdominal pain and lower back pain in the setting of colitis on CT scan. She had been off her pain meds for 2 days due to severe constipation.   She was recently admitted to this service for a UTI and was discharged 7/7 with 11 days of cefpodoxime to complete a total 14 day course. She was also hypercalcemic during that admission and treated with fluids and pamindronate and calcitonin. Since that discharge she has had 3 ER visits for hypoglycemia, pain, and other various reasons.   She reports several days of abdominal pain, chills, and diarrhea. She is weak and prone to falling when walking to the bathroom.   At the outside hospital yesterday, she had worsening leukocytosis to 38, elevated lactate, evidence of recurrent/persistent UTI, and THOMAS. Per the ER physician's note, a CT revealed "moderate to severe long segment left-sided colitis, indeterminate may be infectious inflammatory or ischemic." However, I was not able to see the official radiology read yet. She was given meropenem and IV pain meds and [place] [facility] [place]            Patient information was obtained from patient, relative(s), past medical records, and ER records.      Oncology History:       Bladder cancer   5/20/2022 Initial Diagnosis     Bladder cancer        Tumor Conference     Presenting Hospital / Clinic: [facility] [address] [facility]  Virtual Tumor Board Conference: Virtual  Presenter: Sylvia Escobar  Date Presented to Tumor Board: 5/26/2022  Specialties Present: Pathology; Urology; Radiation Oncology  Cancer Type: Bladder cancer  Recommended Plan Note: Will obtain bladder MRI, PET CT " and bone scan to further evaluate for metastatic disease. Seeing medical oncology to discuss systemic therapy.            6/16/2022 -  Chemotherapy     Treatment Summary   Plan Name: OP PEMBROLIZUMAB 400MG Q6W  Treatment Goal: Control  Status: Active  Start Date: 6/16/2022 (Planned)  End Date: 4/18/2024 (Planned)  Provider: Reid Snider MD  Chemotherapy: pembrolizumab (KEYTRUDA) 400 mg in sodium chloride 0.9% 116 mL infusion, 400 mg, Intravenous, Clinic/HOD 1 time, 0 of 17 cycles             Interval History: Still feels very fatigued. Continued diarrhea despite antibiotic changes as per ID. Will continue to monitor.    Oncology Treatment Plan:   OP PEMBROLIZUMAB 400MG Q6W    Medications:  Continuous Infusions:   sodium chloride 0.9% 100 mL/hr at 07/19/22 0200     Scheduled Meds:   apixaban  5 mg Oral BID    duke's soln (benadryl 30 mL, mylanta 30 mL, LIDOcaine 30 mL, nystatin 30 mL) 120 mL  10 mL Oral QID    metronidazole  500 mg Intravenous Q8H    Questran and Aquaphor Topical Compound   Topical (Top) BID    vancomycin  500 mg Oral Q6H     PRN Meds:sodium chloride, dextrose 10%, dextrose 10%, glucagon (human recombinant), glucose, glucose, HYDROmorphone, HYDROmorphone, insulin aspart U-100, LORazepam, naloxone, ondansetron, prochlorperazine, sodium chloride 0.9%     Review of Systems   Constitutional:  Negative for chills and fever.   HENT:  Negative for congestion and sore throat.    Respiratory:  Negative for cough and shortness of breath.    Cardiovascular:  Negative for chest pain and palpitations.   Gastrointestinal:  Positive for abdominal pain and diarrhea. Negative for blood in stool and vomiting.   Musculoskeletal:  Positive for back pain. Negative for arthralgias and myalgias.   Skin:  Negative for rash and wound.   Neurological:  Positive for dizziness. Negative for syncope.   Hematological:  Negative for adenopathy. Does not bruise/bleed easily.     Objective:     Vital Signs (Most  Recent):  Temp: 97.6 °F (36.4 °C) (07/20/22 0359)  Pulse: 94 (07/20/22 0359)  Resp: 15 (07/20/22 0359)  BP: (!) 116/54 (07/20/22 0359)  SpO2: 96 % (07/20/22 0359)   Vital Signs (24h Range):  Temp:  [96.9 °F (36.1 °C)-98 °F (36.7 °C)] 97.6 °F (36.4 °C)  Pulse:  [] 94  Resp:  [15-20] 15  SpO2:  [95 %-99 %] 96 %  BP: (113-141)/(53-84) 116/54     Weight: 56 kg (123 lb 7.3 oz)  Body mass index is 23.33 kg/m².  Body surface area is 1.55 meters squared.      Intake/Output Summary (Last 24 hours) at 7/20/2022 0753  Last data filed at 7/20/2022 0300  Gross per 24 hour   Intake 320 ml   Output 300 ml   Net 20 ml       Physical Exam  Vitals and nursing note reviewed.   Constitutional:       Appearance: She is not ill-appearing, toxic-appearing or diaphoretic.   HENT:      Head: Normocephalic.   Eyes:      Extraocular Movements: Extraocular movements intact.   Cardiovascular:      Rate and Rhythm: Normal rate and regular rhythm.      Heart sounds: No murmur heard.  Pulmonary:      Effort: Pulmonary effort is normal. No respiratory distress.   Abdominal:      General: Abdomen is flat.      Palpations: Abdomen is soft.      Tenderness: There is no abdominal tenderness. There is no guarding.   Musculoskeletal:      Right lower leg: No edema.      Left lower leg: No edema.   Skin:     General: Skin is warm and dry.   Neurological:      Mental Status: She is alert. Mental status is at baseline.      Cranial Nerves: No dysarthria.   Psychiatric:         Mood and Affect: Mood normal.         Behavior: Behavior normal.     Significant Labs:  CBC:   Recent Labs   Lab 07/19/22  0335 07/20/22  0309   WBC 36.98* 34.72*   HGB 9.5* 9.0*   HCT 30.6* 28.6*    361     CMP:   Recent Labs   Lab 07/18/22  1218 07/19/22  1623 07/20/22  0309   * 132* 130*   K 3.9 3.8 3.6    109 109   CO2 15* 15* 15*   * 256* 175*   BUN 30* 30* 29*   CREATININE 1.9* 1.9* 1.9*   CALCIUM 8.1* 8.2* 8.0*   PROT  --  4.4* 4.2*   ALBUMIN  --   1.3* 1.2*   BILITOT  --  0.4 0.4   ALKPHOS  --  167* 162*   AST  --  33 22   ALT  --  18 15   ANIONGAP 7* 8 6*   EGFRNONAA 25.2* 25.2* 25.2*     No results for input(s): PT, INR, APTT in the last 24 hours.    No results for input(s): PH, PCO2, PO2, HCO3, POCSATURATED, BE in the last 72 hours.    Diagnostic Results:  None    Assessment/Plan:     * Sepsis  This patient does have evidence of infective focus  My overall impression is sepsis. Vital signs were reviewed and noted in progress note.  Antibiotics given-   Antibiotics (From admission, onward)            Start     Stop Route Frequency Ordered    07/19/22 1800  vancomycin 125 mg/5 mL oral solution 500 mg  (C. difficile Infection (CDI) Treatment Order Panel)         07/25 2359 Oral Every 6 hours 07/19/22 1429    07/19/22 1530  metronidazole IVPB 500 mg         -- IV Every 8 hours (non-standard times) 07/19/22 1429    07/15/22 0546  PIPERACILLIN-TAZOBACTAM 4.5G/100ML SODIUM CHLORIDE 0.9%-READY TO MIX IVPB        Note to Pharmacy: Created by cabinet override    07/15 1759   07/15/22 0546          Cultures were taken-   Microbiology Results (last 7 days)     Procedure Component Value Units Date/Time    Blood culture [[id number]] Collected: 07/15/22 0744    Order Status: Completed Specimen: Blood Updated: 07/19/22 1012     Blood Culture, Routine No Growth to date      No Growth to date      No Growth to date      No Growth to date      No Growth to date    Blood culture [[id number]] Collected: 07/15/22 0744    Order Status: Completed Specimen: Blood Updated: 07/19/22 1012     Blood Culture, Routine No Growth to date      No Growth to date      No Growth to date      No Growth to date      No Growth to date    Stool culture [[id number]] Collected: 07/15/22 0538    Order Status: Completed Specimen: Stool Updated: 07/18/22 1243     Stool Culture No Salmonella,Shigella,Vibrio,Campylobacter,Yersinia isolated.    E. coli 0157 antigen [[id number]] Collected: 07/15/22 0538     Order Status: Completed Specimen: Stool Updated: 07/16/22 2234     Shiga Toxin 1 E.coli Negative     Shiga Toxin 2 E.coli Negative    Clostridium difficile EIA [[id number]]  (Abnormal) Collected: 07/15/22 0538    Order Status: Completed Specimen: Stool Updated: 07/15/22 2132     C. diff Antigen Positive     C difficile Toxins A+B, EIA Positive     Comment: Testing not recommended for children <24 months old.       Blood culture [[id number]]     Order Status: Canceled Specimen: Blood     Blood culture [[id number]]     Order Status: Canceled Specimen: Blood     Blood culture [[id number]]     Order Status: Canceled Specimen: Blood     Blood culture [[id number]]     Order Status: Canceled Specimen: Blood           Latest lactate reviewed, they are-  No results for input(s): LACTATE in the last 72 hours.    Organ dysfunction indicated by Acute kidney injury  Source- C diff colitis    Source control Achieved by- antibiotics. Currently on vancomycin and metronidazole.    -Leukocytosis decreased to 34.72  -BP normotensive. Receiving  mL/hr infusion. Continue to monitor.   -Afebrile        Colitis  -CT abd/pelvis pertinent for colitis with no free air or abscess  -C. Diff assay positive  -Currently taking 500mg q6 vancomycin and 500mg q8 metronidazole  -Clear liquid diet  -Monitor daily CBC/CMP     UTI (urinary tract infection)  -Urine culture positive for Gram negative rods  -Patient has bilateral nephrostomy tubes with minimal output and pertinent for blood and opal pus.     Bladder cancer  Follows with Dr. Snider. Was scheduled to begin outpatient chemo 7/8.   Now s/p bilateral nephrostomy tubes  Has seen radiation oncology recently as well     -outpatient treatment likely  -Patient's daughter is considering hospice since patient informed her she didn't want to die in front of family at home. In contact with hospice of [facility]  -Prn dilaudid for pain control. Currently holding morphine and oxy in setting of THOMAS    THOMAS  (acute kidney injury)  Cr stabilized at 1.9. Baseline around 0.8  -Hold home morphine and oxy in setting of THOMAS  -IVF  -Strict Is and Os    Anemia  Hgb 7.0 on admission  -Likely due to MABEL vs anemia of chronic disease  -Has had 1 pRBC since admission.  -Transfuse if < 7    HTN (hypertension)  Hold home meds, monitor bp     Controlled Type 2 diabetes mellitus  SSI, POC glucose        Deonna Hansen MD  Hematology/Oncology  [facility] - Oncology ([facility])      ATTENDING NOTE, ONCOLOGY INPATIENT TEAM    As above; events of last 24 hours noted.  Patient seen and examined, chart reviewed.  She states that the diarrhea has decreased.  She has only had 1 BM since midnight and states that her stools were semi formed.  Appears comfortable, in NAD, but complains of weakness..  Lungs are clear to auscultation.  Abdomen is soft, with minimal tenderness..  Labs reviewed.    PLAN  Follow labs daily  Continue vancomycin 500 mg Q 6 and metronidazole 500 IV Q 8  Continue apixaban  Palliative care team to evaluate since daughter would like her to go to hospice upon discharge.  We will follow.     Judd Herrera MD

## 2022-07-20 NOTE — SUBJECTIVE & OBJECTIVE
Interval History: Still feels very fatigued. Continued diarrhea despite antibiotic changes as per ID. Will continue to monitor.    Oncology Treatment Plan:   OP PEMBROLIZUMAB 400MG Q6W    Medications:  Continuous Infusions:   sodium chloride 0.9% 100 mL/hr at 07/19/22 0200     Scheduled Meds:   apixaban  5 mg Oral BID    duke's soln (benadryl 30 mL, mylanta 30 mL, LIDOcaine 30 mL, nystatin 30 mL) 120 mL  10 mL Oral QID    metronidazole  500 mg Intravenous Q8H    Questran and Aquaphor Topical Compound   Topical (Top) BID    vancomycin  500 mg Oral Q6H     PRN Meds:sodium chloride, dextrose 10%, dextrose 10%, glucagon (human recombinant), glucose, glucose, HYDROmorphone, HYDROmorphone, insulin aspart U-100, LORazepam, naloxone, ondansetron, prochlorperazine, sodium chloride 0.9%     Review of Systems   Constitutional:  Negative for chills and fever.   HENT:  Negative for congestion and sore throat.    Respiratory:  Negative for cough and shortness of breath.    Cardiovascular:  Negative for chest pain and palpitations.   Gastrointestinal:  Positive for abdominal pain and diarrhea. Negative for blood in stool and vomiting.   Musculoskeletal:  Positive for back pain. Negative for arthralgias and myalgias.   Skin:  Negative for rash and wound.   Neurological:  Positive for dizziness. Negative for syncope.   Hematological:  Negative for adenopathy. Does not bruise/bleed easily.     Objective:     Vital Signs (Most Recent):  Temp: 97.6 °F (36.4 °C) (07/20/22 0359)  Pulse: 94 (07/20/22 0359)  Resp: 15 (07/20/22 0359)  BP: (!) 116/54 (07/20/22 0359)  SpO2: 96 % (07/20/22 0359)   Vital Signs (24h Range):  Temp:  [96.9 °F (36.1 °C)-98 °F (36.7 °C)] 97.6 °F (36.4 °C)  Pulse:  [] 94  Resp:  [15-20] 15  SpO2:  [95 %-99 %] 96 %  BP: (113-141)/(53-84) 116/54     Weight: 56 kg (123 lb 7.3 oz)  Body mass index is 23.33 kg/m².  Body surface area is 1.55 meters squared.      Intake/Output Summary (Last 24 hours) at 7/20/2022  0753  Last data filed at 7/20/2022 0300  Gross per 24 hour   Intake 320 ml   Output 300 ml   Net 20 ml       Physical Exam  Vitals and nursing note reviewed.   Constitutional:       Appearance: She is not ill-appearing, toxic-appearing or diaphoretic.   HENT:      Head: Normocephalic.   Eyes:      Extraocular Movements: Extraocular movements intact.   Cardiovascular:      Rate and Rhythm: Normal rate and regular rhythm.      Heart sounds: No murmur heard.  Pulmonary:      Effort: Pulmonary effort is normal. No respiratory distress.   Abdominal:      General: Abdomen is flat.      Palpations: Abdomen is soft.      Tenderness: There is no abdominal tenderness. There is no guarding.   Musculoskeletal:      Right lower leg: No edema.      Left lower leg: No edema.   Skin:     General: Skin is warm and dry.   Neurological:      Mental Status: She is alert. Mental status is at baseline.      Cranial Nerves: No dysarthria.   Psychiatric:         Mood and Affect: Mood normal.         Behavior: Behavior normal.     Significant Labs:  CBC:   Recent Labs   Lab 07/19/22  0335 07/20/22  0309   WBC 36.98* 34.72*   HGB 9.5* 9.0*   HCT 30.6* 28.6*    361     CMP:   Recent Labs   Lab 07/18/22  1218 07/19/22  1623 07/20/22  0309   * 132* 130*   K 3.9 3.8 3.6    109 109   CO2 15* 15* 15*   * 256* 175*   BUN 30* 30* 29*   CREATININE 1.9* 1.9* 1.9*   CALCIUM 8.1* 8.2* 8.0*   PROT  --  4.4* 4.2*   ALBUMIN  --  1.3* 1.2*   BILITOT  --  0.4 0.4   ALKPHOS  --  167* 162*   AST  --  33 22   ALT  --  18 15   ANIONGAP 7* 8 6*   EGFRNONAA 25.2* 25.2* 25.2*     No results for input(s): PT, INR, APTT in the last 24 hours.    No results for input(s): PH, PCO2, PO2, HCO3, POCSATURATED, BE in the last 72 hours.    Diagnostic Results:  None

## 2022-07-20 NOTE — PLAN OF CARE
PRN pain medication given x 2. BM x1. Wound care completed. No other complaints this shift. Pt turned throughout shift. POC reviewed with patient; understanding verbalized. Pt. with nonskid footwear on, bed in lowest position, and locked with bed rails up x2. Pt. has call light and personal items within reach. VSS and afebrile this shift. All questions and concerns addressed at this time. Will continue to monitor.

## 2022-07-21 LAB
ACANTHOCYTES BLD QL SMEAR: PRESENT
ALBUMIN SERPL BCP-MCNC: 1.2 G/DL (ref 3.5–5.2)
ALP SERPL-CCNC: 149 U/L (ref 55–135)
ALT SERPL W/O P-5'-P-CCNC: 12 U/L (ref 10–44)
ANION GAP SERPL CALC-SCNC: 7 MMOL/L (ref 8–16)
ANISOCYTOSIS BLD QL SMEAR: SLIGHT
AST SERPL-CCNC: 18 U/L (ref 10–40)
BACTERIA #/AREA URNS AUTO: ABNORMAL /HPF
BASOPHILS # BLD AUTO: 0.1 K/UL (ref 0–0.2)
BASOPHILS NFR BLD: 0.2 % (ref 0–1.9)
BILIRUB SERPL-MCNC: 0.4 MG/DL (ref 0.1–1)
BILIRUB UR QL STRIP: NEGATIVE
BUN SERPL-MCNC: 31 MG/DL (ref 8–23)
BURR CELLS BLD QL SMEAR: ABNORMAL
CALCIUM SERPL-MCNC: 8.2 MG/DL (ref 8.7–10.5)
CHLORIDE SERPL-SCNC: 113 MMOL/L (ref 95–110)
CLARITY UR REFRACT.AUTO: ABNORMAL
CO2 SERPL-SCNC: 13 MMOL/L (ref 23–29)
COLOR UR AUTO: ABNORMAL
CREAT SERPL-MCNC: 1.9 MG/DL (ref 0.5–1.4)
DIFFERENTIAL METHOD: ABNORMAL
EOSINOPHIL # BLD AUTO: 0.1 K/UL (ref 0–0.5)
EOSINOPHIL NFR BLD: 0.2 % (ref 0–8)
ERYTHROCYTE [DISTWIDTH] IN BLOOD BY AUTOMATED COUNT: 16 % (ref 11.5–14.5)
EST. GFR  (AFRICAN AMERICAN): 29.1 ML/MIN/1.73 M^2
EST. GFR  (NON AFRICAN AMERICAN): 25.2 ML/MIN/1.73 M^2
GLUCOSE SERPL-MCNC: 122 MG/DL (ref 70–110)
GLUCOSE SERPL-MCNC: 140 MG/DL (ref 70–110)
GLUCOSE SERPL-MCNC: 160 MG/DL (ref 70–110)
GLUCOSE UR QL STRIP: NEGATIVE
HCT VFR BLD AUTO: 29.2 % (ref 37–48.5)
HGB BLD-MCNC: 9 G/DL (ref 12–16)
HGB UR QL STRIP: ABNORMAL
HYALINE CASTS UR QL AUTO: 0 /LPF
HYPOCHROMIA BLD QL SMEAR: ABNORMAL
IMM GRANULOCYTES # BLD AUTO: 1.37 K/UL (ref 0–0.04)
IMM GRANULOCYTES NFR BLD AUTO: 3.1 % (ref 0–0.5)
KETONES UR QL STRIP: NEGATIVE
LEUKOCYTE ESTERASE UR QL STRIP: ABNORMAL
LYMPHOCYTES # BLD AUTO: 0.9 K/UL (ref 1–4.8)
LYMPHOCYTES NFR BLD: 2 % (ref 18–48)
MAGNESIUM SERPL-MCNC: 1.9 MG/DL (ref 1.6–2.6)
MCH RBC QN AUTO: 27.7 PG (ref 27–31)
MCHC RBC AUTO-ENTMCNC: 30.8 G/DL (ref 32–36)
MCV RBC AUTO: 90 FL (ref 82–98)
MICROSCOPIC COMMENT: ABNORMAL
MONOCYTES # BLD AUTO: 0.9 K/UL (ref 0.3–1)
MONOCYTES NFR BLD: 2.1 % (ref 4–15)
NEUTROPHILS # BLD AUTO: 41 K/UL (ref 1.8–7.7)
NEUTROPHILS NFR BLD: 92.4 % (ref 38–73)
NITRITE UR QL STRIP: POSITIVE
NRBC BLD-RTO: 0 /100 WBC
OVALOCYTES BLD QL SMEAR: ABNORMAL
PH UR STRIP: 6 [PH] (ref 5–8)
PLATELET # BLD AUTO: 332 K/UL (ref 150–450)
PLATELET BLD QL SMEAR: ABNORMAL
PMV BLD AUTO: 9.9 FL (ref 9.2–12.9)
POCT GLUCOSE: 131 MG/DL (ref 70–110)
POCT GLUCOSE: 138 MG/DL (ref 70–110)
POCT GLUCOSE: 140 MG/DL (ref 70–110)
POCT GLUCOSE: 165 MG/DL (ref 70–110)
POCT GLUCOSE: 179 MG/DL (ref 70–110)
POIKILOCYTOSIS BLD QL SMEAR: SLIGHT
POLYCHROMASIA BLD QL SMEAR: ABNORMAL
POTASSIUM SERPL-SCNC: 3.3 MMOL/L (ref 3.5–5.1)
PROT SERPL-MCNC: 4 G/DL (ref 6–8.4)
PROT UR QL STRIP: ABNORMAL
RBC # BLD AUTO: 3.25 M/UL (ref 4–5.4)
RBC #/AREA URNS AUTO: >100 /HPF (ref 0–4)
SODIUM SERPL-SCNC: 133 MMOL/L (ref 136–145)
SP GR UR STRIP: 1.02 (ref 1–1.03)
TOXIC GRANULES BLD QL SMEAR: PRESENT
URN SPEC COLLECT METH UR: ABNORMAL
WBC # BLD AUTO: 44.35 K/UL (ref 3.9–12.7)
WBC #/AREA URNS AUTO: >100 /HPF (ref 0–5)
WBC CLUMPS UR QL AUTO: ABNORMAL

## 2022-07-21 PROCEDURE — 99497 ADVNCD CARE PLAN 30 MIN: CPT | Mod: ,,, | Performed by: CLINICAL NURSE SPECIALIST

## 2022-07-21 PROCEDURE — 81001 URINALYSIS AUTO W/SCOPE: CPT

## 2022-07-21 PROCEDURE — 80053 COMPREHEN METABOLIC PANEL: CPT

## 2022-07-21 PROCEDURE — 99233 SBSQ HOSP IP/OBS HIGH 50: CPT | Mod: ,,, | Performed by: CLINICAL NURSE SPECIALIST

## 2022-07-21 PROCEDURE — 99497 PR ADVNCD CARE PLAN 30 MIN: ICD-10-PCS | Mod: ,,, | Performed by: CLINICAL NURSE SPECIALIST

## 2022-07-21 PROCEDURE — 85025 COMPLETE CBC W/AUTO DIFF WBC: CPT | Performed by: INTERNAL MEDICINE

## 2022-07-21 PROCEDURE — 25000003 PHARM REV CODE 250: Performed by: STUDENT IN AN ORGANIZED HEALTH CARE EDUCATION/TRAINING PROGRAM

## 2022-07-21 PROCEDURE — C1751 CATH, INF, PER/CENT/MIDLINE: HCPCS

## 2022-07-21 PROCEDURE — S0030 INJECTION, METRONIDAZOLE: HCPCS | Performed by: STUDENT IN AN ORGANIZED HEALTH CARE EDUCATION/TRAINING PROGRAM

## 2022-07-21 PROCEDURE — 20600001 HC STEP DOWN PRIVATE ROOM

## 2022-07-21 PROCEDURE — 99223 PR INITIAL HOSPITAL CARE,LEVL III: ICD-10-PCS | Mod: ,,, | Performed by: INTERNAL MEDICINE

## 2022-07-21 PROCEDURE — 25000003 PHARM REV CODE 250: Performed by: NURSE PRACTITIONER

## 2022-07-21 PROCEDURE — 25000003 PHARM REV CODE 250

## 2022-07-21 PROCEDURE — 99223 1ST HOSP IP/OBS HIGH 75: CPT | Mod: ,,, | Performed by: INTERNAL MEDICINE

## 2022-07-21 PROCEDURE — 83735 ASSAY OF MAGNESIUM: CPT | Performed by: INTERNAL MEDICINE

## 2022-07-21 PROCEDURE — 99233 PR SUBSEQUENT HOSPITAL CARE,LEVL III: ICD-10-PCS | Mod: ,,, | Performed by: CLINICAL NURSE SPECIALIST

## 2022-07-21 PROCEDURE — 99233 SBSQ HOSP IP/OBS HIGH 50: CPT | Mod: ,,, | Performed by: INTERNAL MEDICINE

## 2022-07-21 PROCEDURE — 63600175 PHARM REV CODE 636 W HCPCS

## 2022-07-21 PROCEDURE — 97110 THERAPEUTIC EXERCISES: CPT

## 2022-07-21 PROCEDURE — 99233 PR SUBSEQUENT HOSPITAL CARE,LEVL III: ICD-10-PCS | Mod: ,,, | Performed by: INTERNAL MEDICINE

## 2022-07-21 PROCEDURE — 27000207 HC ISOLATION

## 2022-07-21 RX ADMIN — APIXABAN 5 MG: 5 TABLET, FILM COATED ORAL at 08:07

## 2022-07-21 RX ADMIN — HYDROMORPHONE HYDROCHLORIDE 0.5 MG: 1 INJECTION, SOLUTION INTRAMUSCULAR; INTRAVENOUS; SUBCUTANEOUS at 06:07

## 2022-07-21 RX ADMIN — ALUMINUM HYDROXIDE, MAGNESIUM HYDROXIDE, AND DIMETHICONE 10 ML: 400; 400; 40 SUSPENSION ORAL at 12:07

## 2022-07-21 RX ADMIN — METRONIDAZOLE 500 MG: 500 INJECTION, SOLUTION INTRAVENOUS at 11:07

## 2022-07-21 RX ADMIN — ALUMINUM HYDROXIDE, MAGNESIUM HYDROXIDE, AND DIMETHICONE 10 ML: 400; 400; 40 SUSPENSION ORAL at 05:07

## 2022-07-21 RX ADMIN — VANCOMYCIN HYDROCHLORIDE 500 MG: KIT at 06:07

## 2022-07-21 RX ADMIN — METRONIDAZOLE 500 MG: 500 INJECTION, SOLUTION INTRAVENOUS at 06:07

## 2022-07-21 RX ADMIN — CHOLESTYRAMINE: 4 POWDER, FOR SUSPENSION ORAL at 08:07

## 2022-07-21 RX ADMIN — VANCOMYCIN HYDROCHLORIDE 500 MG: KIT at 05:07

## 2022-07-21 RX ADMIN — ALUMINUM HYDROXIDE, MAGNESIUM HYDROXIDE, AND DIMETHICONE 10 ML: 400; 400; 40 SUSPENSION ORAL at 08:07

## 2022-07-21 RX ADMIN — POTASSIUM BICARBONATE 50 MEQ: 978 TABLET, EFFERVESCENT ORAL at 06:07

## 2022-07-21 RX ADMIN — SODIUM CHLORIDE: 0.9 INJECTION, SOLUTION INTRAVENOUS at 11:07

## 2022-07-21 RX ADMIN — METRONIDAZOLE 500 MG: 500 INJECTION, SOLUTION INTRAVENOUS at 03:07

## 2022-07-21 RX ADMIN — INSULIN DETEMIR 5 UNITS: 100 INJECTION, SOLUTION SUBCUTANEOUS at 09:07

## 2022-07-21 RX ADMIN — VANCOMYCIN HYDROCHLORIDE 500 MG: KIT at 11:07

## 2022-07-21 RX ADMIN — HYDROMORPHONE HYDROCHLORIDE 0.5 MG: 1 INJECTION, SOLUTION INTRAMUSCULAR; INTRAVENOUS; SUBCUTANEOUS at 05:07

## 2022-07-21 RX ADMIN — HYDROMORPHONE HYDROCHLORIDE 0.5 MG: 1 SOLUTION ORAL at 04:07

## 2022-07-21 RX ADMIN — HYDROMORPHONE HYDROCHLORIDE 0.5 MG: 1 INJECTION, SOLUTION INTRAMUSCULAR; INTRAVENOUS; SUBCUTANEOUS at 10:07

## 2022-07-21 RX ADMIN — SODIUM CHLORIDE: 0.9 INJECTION, SOLUTION INTRAVENOUS at 12:07

## 2022-07-21 NOTE — SUBJECTIVE & OBJECTIVE
Past Medical History:   Diagnosis Date    Bladder cancer 2022    Bladder mass 2022    Choledocholithiasis 2022    Diabetes mellitus     Pneumonia due to COVID-19 virus 2021       Past Surgical History:   Procedure Laterality Date    CYSTOSCOPY N/A 2022    Procedure: CYSTOSCOPY;  Surgeon: Adrianna Gutierrez MD;  Location: [facility] OR [address] [place] [address];  Service: Urology;  Laterality: N/A;    NEPHROSTOMY Left 2022    RETROGRADE PYELOGRAPHY Right 2022    Procedure: PYELOGRAM, RETROGRADE;  Surgeon: Adrianna Gutierrez MD;  Location: [facility] OR [address] [place] [address];  Service: Urology;  Laterality: Right;       Review of patient's allergies indicates:  No Known Allergies    Medications:  Medications Prior to Admission   Medication Sig    acetaminophen (TYLENOL) 325 MG tablet Take 2 tablets (650 mg total) by mouth every 6 (six) hours as needed for Pain.    amLODIPine (NORVASC) 5 MG tablet Take 1 tablet (5 mg total) by mouth once daily.    apixaban (ELIQUIS) 5 mg Tab Take 1 tablet (5 mg total) by mouth 2 (two) times daily.    [] cefpodoxime (VANTIN) 200 MG tablet Take 1 tablet (200 mg total) by mouth 2 (two) times daily. for 11 days    duke's soln (benadryl 30 mL, mylanta 30 mL, LIDOcaine 30 mL, nystatin 30 mL) 120mL Take 10 mLs by mouth 4 (four) times daily.    glycerin adult suppository Place 1 suppository rectally as needed for Constipation.    insulin detemir U-100 (LEVEMIR FLEXTOUCH) 100 unit/mL (3 mL) SubQ InPn pen Inject 10 Units into the skin once daily. (Patient taking differently: Inject 20 Units into the skin once daily.)    lactulose (CHRONULAC) 20 gram/30 mL Soln Take 15 mLs (10 g total) by mouth every 6 (six) hours as needed (constipation).    LIDOcaine (LIDODERM) 5 % Place 1 patch onto the skin once daily. Place patch to back. Leave on for 12 hours and remove for 12 hours.    morphine (MS CONTIN) 15 MG 12 hr tablet Take 1 tablet (15 mg total) by mouth every 12 (twelve) hours. for 15 days     oxyCODONE (ROXICODONE) 5 MG immediate release tablet Take 1 tablet (5 mg total) by mouth every 4 (four) hours as needed for Pain.    sodium bicarbonate 650 MG tablet Take 1 tablet (650 mg total) by mouth 2 (two) times daily. (Patient taking differently: Take 650 mg by mouth 2 (two) times daily. 2 tabs BID)    [DISCONTINUED] apixaban (ELIQUIS) 5 mg Tab Take 2 tablets (10 mg total) by mouth 2 (two) times daily for 3 days, THEN Take 1 tablet (5 mg total) by mouth 2 (two) times daily thereafter.     Antibiotics (From admission, onward)                Start     Stop Route Frequency Ordered    07/19/22 1800  vancomycin 125 mg/5 mL oral solution 500 mg  (C. difficile Infection (CDI) Treatment Order Panel)         07/25 2359 Oral Every 6 hours 07/19/22 1429    07/19/22 1530  metronidazole IVPB 500 mg         -- IV Every 8 hours (non-standard times) 07/19/22 1429    07/15/22 0546  PIPERACILLIN-TAZOBACTAM 4.5G/100ML SODIUM CHLORIDE 0.9%-READY TO MIX IVPB        Note to Pharmacy: Created by cabinet override    07/15 1759   07/15/22 0546          Antifungals (From admission, onward)                Start     Stop Route Frequency Ordered    07/17/22 0945  duke's soln (benadryl 30 mL, mylanta 30 mL, LIDOcaine 30 mL, nystatin 30 mL) 120 mL         -- Oral 4 times daily 07/17/22 0842          Antivirals (From admission, onward)      None               There is no immunization history on file for this patient.    Family History       Problem Relation (Age of Onset)    Diabetes Mother, Daughter, Son    Heart attack Father    Kidney cancer Son    Stomach cancer Mother    Thyroid disease Daughter          Social History     Socioeconomic History    Marital status:    Tobacco Use    Smoking status: Never Smoker    Smokeless tobacco: Never Used   Substance and Sexual Activity    Alcohol use: No    Drug use: No     Social Determinants of Health     Financial Resource Strain: Unknown    Difficulty of Paying Living Expenses: Patient  refused   Food Insecurity: Unknown    Worried About Running Out of Food in the Last Year: Patient refused    Ran Out of Food in the Last Year: Patient refused   Transportation Needs: No Transportation Needs    Lack of Transportation (Medical): No    Lack of Transportation (Non-Medical): No   Physical Activity: Insufficiently Active    Days of Exercise per Week: 1 day    Minutes of Exercise per Session: 10 min   Stress: No Stress Concern Present    Feeling of Stress : Not at all   Social Connections: Unknown    Frequency of Communication with Friends and Family: More than three times a week    Frequency of Social Gatherings with Friends and Family: Patient refused    Active Member of Clubs or Organizations: No    Attends Club or Organization Meetings: Patient refused    Marital Status:    Housing Stability: Unknown    Unable to Pay for Housing in the Last Year: Patient refused    Unstable Housing in the Last Year: No     Review of Systems   Constitutional:  Negative for activity change, chills and fever.   HENT:  Negative for congestion, mouth sores, rhinorrhea, sinus pressure and sore throat.    Eyes:  Negative for photophobia, pain and redness.   Respiratory:  Negative for cough, chest tightness, shortness of breath and wheezing.    Cardiovascular:  Negative for chest pain and leg swelling.   Gastrointestinal:  Negative for abdominal distention, abdominal pain, diarrhea, nausea and vomiting.   Endocrine: Negative for polyuria.   Genitourinary:  Negative for decreased urine volume, dysuria and flank pain.   Musculoskeletal:  Negative for joint swelling and neck pain.   Skin:  Negative for color change.   Allergic/Immunologic: Negative for food allergies.   Neurological:  Negative for dizziness, weakness and headaches.   Hematological:  Negative for adenopathy.   Psychiatric/Behavioral:  Negative for agitation and confusion. The patient is not nervous/anxious.    Objective:     Vital Signs (Most  Recent):  Temp: 98.9 °F (37.2 °C) (07/21/22 1211)  Pulse: 96 (07/21/22 1211)  Resp: 16 (07/21/22 1512)  BP: (!) 97/54 (07/21/22 1211)  SpO2: 98 % (07/21/22 1211)   Vital Signs (24h Range):  Temp:  [97.5 °F (36.4 °C)-100.9 °F (38.3 °C)] 98.9 °F (37.2 °C)  Pulse:  [72-96] 96  Resp:  [16-18] 16  SpO2:  [93 %-99 %] 98 %  BP: ()/(54-60) 97/54     Weight: 56 kg (123 lb 7.3 oz)  Body mass index is 23.33 kg/m².    Estimated Creatinine Clearance: 19 mL/min (A) (based on SCr of 1.9 mg/dL (H)).    Physical Exam  Constitutional:       Appearance: She is well-developed.   HENT:      Head: Normocephalic and atraumatic.   Eyes:      Pupils: Pupils are equal, round, and reactive to light.   Cardiovascular:      Rate and Rhythm: Normal rate.   Pulmonary:      Effort: Pulmonary effort is normal.      Breath sounds: Normal breath sounds.   Abdominal:      General: Bowel sounds are normal.      Palpations: Abdomen is soft.   Musculoskeletal:         General: No tenderness.      Cervical back: Normal range of motion and neck supple.   Skin:     General: Skin is warm and dry.   Neurological:      Mental Status: She is alert and oriented to person, place, and time.       Significant Labs: CBC:   Recent Labs   Lab 07/20/22  0309 07/21/22  0457   WBC 34.72* 44.35*   HGB 9.0* 9.0*   HCT 28.6* 29.2*    332     CMP:   Recent Labs   Lab 07/19/22  1623 07/20/22  0309 07/21/22  0457   * 130* 133*   K 3.8 3.6 3.3*    109 113*   CO2 15* 15* 13*   * 175* 122*   BUN 30* 29* 31*   CREATININE 1.9* 1.9* 1.9*   CALCIUM 8.2* 8.0* 8.2*   PROT 4.4* 4.2* 4.0*   ALBUMIN 1.3* 1.2* 1.2*   BILITOT 0.4 0.4 0.4   ALKPHOS 167* 162* 149*   AST 33 22 18   ALT 18 15 12   ANIONGAP 8 6* 7*   EGFRNONAA 25.2* 25.2* 25.2*       Significant Imaging: I have reviewed all pertinent imaging results/findings within the past 24 hours.

## 2022-07-21 NOTE — PROGRESS NOTES
Heraclio Schroeder - Oncology ([facility])  Adult Nutrition  Progress Note    SUMMARY   Recommendations  Advance diet at tolerated, continue boost breeze increase to TID, RD following  Goals: Meet % EEN, EPN by RD f/u date  Nutrition Goal Status: goal not met  Communication of RD Recs:  (POC)    Assessment and Plan    Moderate malnutrition  Moderate Malnutrition  Nutrition Problem  Moderate Protein-Calorie Malnutrition  Malnutrition in the context of Chronic Illness    Related to (etiology):   Inadequate energy intake    Signs and Symptoms (as evidenced by):   Energy Intake: <75% of estimated energy requirement for >3 months  Body Fat Depletion: severe depletion of orbital, moderate depletion of triceps  Muscle Mass Depletion: moderate depletion of temples, clavicle region, interosseous muscle, lower extremities  Weight Loss: 18% x 12 months    Interventions/Recommendations (treatment strategy):  Collaboration of nutrition care with other providers  Commercial beverage( calories and protein) boost breeze TID  Clear liquid diet    Nutrition Diagnosis Status:   Continues           Malnutrition Assessment 7/15/22             Weight Loss (Malnutrition):  (18% x 12 months)  Energy Intake (Malnutrition): less than 75% for greater than or equal to 3 months   Orbital Region (Subcutaneous Fat Loss): severe depletion  Upper Arm Region (Subcutaneous Fat Loss): moderate depletion   [place] Region (Muscle Loss): moderate depletion  Clavicle Bone Region (Muscle Loss): moderate depletion  Clavicle and Acromion Bone Region (Muscle Loss): moderate depletion  Dorsal Hand (Muscle Loss): moderate depletion  Anterior Thigh Region (Muscle Loss): moderate depletion  Posterior Calf Region (Muscle Loss): moderate depletion                 Reason for Assessment    Reason For Assessment: RD follow-up  Diagnosis:  (Colitis)  Relevant Medical History: T2DM, HTN, THOMAS, Bladder cancer, Sepsis, UTI  Interdisciplinary Rounds: did not attend  General  "Information Comments: Remains on clear liquid diet, taking boost breeze to dc on hospice  Nutrition Discharge Planning: diet as tolerated    Nutrition Risk Screen    Nutrition Risk Screen: large or nonhealing wound, burn or pressure injury    Nutrition/Diet History    Spiritual, Cultural Beliefs, Jain Practices, Values that Affect Care: no    Anthropometrics    Temp: 98.9 °F (37.2 °C)  Height Method: Stated  Height: 5' 1" (154.9 cm)  Height (inches): 61 in  Weight Method: Bed Scale  Weight: 56 kg (123 lb 7.3 oz)  Weight (lb): 123.46 lb  Ideal Body Weight (IBW), Female: 105 lb  % Ideal Body Weight, Female (lb): 117.58 %  BMI (Calculated): 23.3       Lab/Procedures/Meds    Pertinent Labs Reviewed: reviewed  Pertinent Labs Comments: Hg 9.0, Hct 29.2  Pertinent Medications Reviewed: reviewed  Pertinent Medications Comments: Abx, Duke's solution         Estimated/Assessed Needs    Weight Used For Calorie Calculations: 56 kg (123 lb 7.3 oz)  Energy Calorie Requirements (kcal): 1680kcal  Energy Need Method: Kcal/kg (30)  Protein Requirements: 67-84g (1.2-1.5g/kg)  Weight Used For Protein Calculations: 56 kg (123 lb 7.3 oz)  Fluid Requirements (mL): 1ml/kcal or per MD  Estimated Fluid Requirement Method: RDA Method  RDA Method (mL): 1680         Nutrition Prescription Ordered  Current Diet Order: Clear liquid  Oral Nutrition Supplement: boost breeze BID    Evaluation of Received Nutrient/Fluid Intake  I/O: +6164 to date  Energy Calories Required: not meeting needs  Protein Required: not meeting needs  Fluid Required: meeting needs  Comments: LBM 7/21  Tolerance: tolerating  % Intake of Estimated Energy Needs: 50 - 75 %  % Meal Intake: 50 - 75 %    Nutrition Risk    Level of Risk/Frequency of Follow-up: low (one time per week)     Monitor and Evaluation    Food and Nutrient Intake: energy intake, food and beverage intake  Food and Nutrient Adminstration: diet order  Knowledge/Beliefs/Attitudes: beliefs and attitudes, " food and nutrition knowledge/skill  Physical Activity and Function: nutrition-related ADLs and IADLs  Anthropometric Measurements: height/length, weight, weight change, body mass index  Biochemical Data, Medical Tests and Procedures: electrolyte and renal panel, gastrointestinal profile, glucose/endocrine profile, inflammatory profile, lipid profile  Nutrition-Focused Physical Findings: overall appearance     Nutrition Follow-Up    RD Follow-up?: Yes

## 2022-07-21 NOTE — PT/OT/SLP PROGRESS
Occupational Therapy Treatment    Patient Name:  Karoline Justice   MRN:  [id number]  Admit Date: 7/15/2022  Admitting Diagnosis:  Sepsis   Length of Stay: 6 days  Recent Surgery: * No surgery found *      Recommendations:     Discharge Recommendations: nursing facility, skilled  Discharge Equipment Recommendations:  bedside commode  Barriers to discharge:  Other (Comment) (Increased skilled assistance required)    Plan:     Patient to be seen 3 x/week to address the above listed problems via self-care/home management, therapeutic activities, therapeutic exercises  · Plan of Care Expires: 08/11/22  · Plan of Care Reviewed with: patient    Assessment:   Karoline Justice is a 76 y.o. female with a medical diagnosis of Sepsis.  She presents with the following performance deficits affecting function: weakness, impaired endurance, impaired self care skills, impaired functional mobility, decreased lower extremity function, decreased upper extremity function, gait instability, decreased coordination, decreased ROM, impaired coordination, edema, pain, decreased safety awareness, impaired balance, impaired cardiopulmonary response to activity.      Pt tolerated session fairly this date and required encouragement and motivation to participate. Demonstrating continued increased pain, impaired endurance, increased weakness, impaired BUE and BLE function, increased edema, decreased activity tolerance, impaired balance, and decreased safety awareness, requiring increased time and assistance to complete functional tasks. Patient limited in increased pain and increased BLE edema this date, requiring increased time to complete BUE therex seated EOB. Patient is progressing towards established goals, and continues to benefit from acute skilled OT services to increase functional performance and improve quality of life. OT to continue to recommend SNF at discharge to improve pt functional independence and increase patient safety  "before returning home.      Rehab Prognosis: Fair; patient would benefit from acute skilled OT services to address these deficits and reach maximum level of function.        Subjective   Communicated with: Nurse prior to session.  Patient found HOB elevated with nephrostomy, peripheral IV upon OT entry to room. Pt agreeable to participate at this time.    Patient: " I can't do any more. "    Pain/Comfort:  · Pain Rating 1: 10/10  · Location - Orientation 1: generalized  · Location 1: other (see comments) ("all over")  · Pain Addressed 1: Pre-medicate for activity, Reposition, Distraction, Cessation of Activity  · Pain Rating Post-Intervention 1: other (see comments) (patient did not rate)    Objective:   Patient found with: nephrostomy, peripheral IV   General Precautions: Standard, Cardiac fall, special contact, hearing impaired   Orthopedic Precautions:N/A   Braces: N/A   Oxygen Device: Room Air  Vitals: BP (!) 97/54 (BP Location: Right arm, Patient Position: Lying)   Pulse 96   Temp 98.9 °F (37.2 °C) (Oral)   Resp 16   Ht 5' 1" (1.549 m)   Wt 56 kg (123 lb 7.3 oz)   SpO2 98%   BMI 23.33 kg/m²     Outcome Measures:  [facility] 6 Click ADL: 15    Cognition:   · Alert and Cooperative  · Command following: follows one-step commands  · Communication: clear/fluent    Occupational Performance:  Bed Mobility:    · Patient completed Rolling/Turning to Left with  moderate assistance  · Patient completed Supine to Sit with moderate assistance on L side of bed  · Scooting anteriorly to EOB to have both feet planted on floor: minimum assistance  · Patient completed Sit to Supine with minimum assistance on L side of bed  · Scooting to HOB in supine: dependent and drawsheet pull    Functional Mobility/Transfers:   Static Sitting EOB: SBA   Patient completed Sit <> Stand Transfer from EOB with moderate assistance  with  hand-held assist    Static Standing Balance: Mod A   Patient completed functional mobility of left " lateral steps x 2 with Min A using anterior therapist support.      Activities of Daily Living:  · Lower Body Dressing: maximal assistance to don / doff socks  · Declined additional ADL needs    AMPAC 6 Click ADL:  AMPAC Total Score: 15    Treatment & Education:  -Pt education on OT role and POC.  -Importance of E/OOB activity with staff assistance, emphasis on daily participation  -BUE therex x 10 seated EOB chest press, and shoulder flexion; requiring increased time and rest break bw each exercise  -BLE therex 2 x 5 seated EOB: march in place, and B knee flexion; requiring increased time and rest break bw each exercise  -Safety during functional transfer and mobility ensured  -Patient provided with education on importance of Bilateral UB/LB integration during functional tasks for improvement in functional performance.   -Education provided/reviewed, questions answered within OT scope of practice.   -Patient demonstrates understanding and learning this date.         Patient left HOB elevated with all lines intact and call button in reach    GOALS:   Multidisciplinary Problems     Occupational Therapy Goals        Problem: Occupational Therapy    Goal Priority Disciplines Outcome Interventions   Occupational Therapy Goal     OT, PT/OT Ongoing, Progressing    Description: Goals to be met by: 8/15/22     Patient will increase functional independence with ADLs by performing:    UE Dressing with Contact Guard Assistance.  LE Dressing with Moderate Assistance.  Sitting at edge of bed x10 minutes with Supervision.  Supine to sit with Supervision.  Step transfer with Contact Guard Assistance  Toilet transfer to bedside commode with Contact Guard Assistance.                     Time Tracking:     OT Date of Treatment: 07/21/22  OT Start Time: 1036  OT Stop Time: 1100  OT Total Time (min): 24 min    Billable Minutes:Therapeutic Exercise 24      7/21/2022

## 2022-07-21 NOTE — SUBJECTIVE & OBJECTIVE
Interval History: Pt would like to d/c with hospice care per daughter, DILEEP Grijalva. Pt is DNR. Pt admitted with colitis.     Past Medical History:   Diagnosis Date    Bladder cancer 05/2022    Bladder mass 05/06/2022    Choledocholithiasis 05/06/2022    Diabetes mellitus     Pneumonia due to COVID-19 virus 08/06/2021       Past Surgical History:   Procedure Laterality Date    CYSTOSCOPY N/A 05/09/2022    Procedure: CYSTOSCOPY;  Surgeon: Adrianna Gutierrez MD;  Location: [facility] OR [address] [place] [address];  Service: Urology;  Laterality: N/A;    NEPHROSTOMY Left 05/2022    RETROGRADE PYELOGRAPHY Right 05/09/2022    Procedure: PYELOGRAM, RETROGRADE;  Surgeon: Adrianna Gutierrez MD;  Location: [facility] OR [address] [place] [address];  Service: Urology;  Laterality: Right;       Review of patient's allergies indicates:  No Known Allergies    Medications:  Continuous Infusions:   sodium chloride 0.9% 100 mL/hr at 07/21/22 0935     Scheduled Meds:   apixaban  5 mg Oral BID    duke's soln (benadryl 30 mL, mylanta 30 mL, LIDOcaine 30 mL, nystatin 30 mL) 120 mL  10 mL Oral QID    insulin detemir U-100  5 Units Subcutaneous QHS    metronidazole  500 mg Intravenous Q8H    Questran and Aquaphor Topical Compound   Topical (Top) BID    vancomycin  500 mg Oral Q6H     PRN Meds:sodium chloride, dextrose 10%, dextrose 10%, glucagon (human recombinant), glucose, glucose, HYDROmorphone, HYDROmorphone, insulin aspart U-100, LORazepam, naloxone, ondansetron, prochlorperazine, sodium chloride 0.9%    Family History       Problem Relation (Age of Onset)    Diabetes Mother, Daughter, Son    Heart attack Father    Kidney cancer Son    Stomach cancer Mother    Thyroid disease Daughter          Tobacco Use    Smoking status: Never Smoker    Smokeless tobacco: Never Used   Substance and Sexual Activity    Alcohol use: No    Drug use: No    Sexual activity: Not on file       Review of Systems   Constitutional:  Positive for activity change and fatigue.   HENT:   Positive for hearing loss. Negative for trouble swallowing.    Respiratory:  Positive for shortness of breath.    Gastrointestinal:  Positive for abdominal pain and constipation.   Musculoskeletal:  Positive for gait problem.   Skin: Negative.    Neurological:  Positive for weakness.   Objective:     Vital Signs (Most Recent):  Temp: 97.5 °F (36.4 °C) (07/21/22 0736)  Pulse: 91 (07/21/22 0736)  Resp: 16 (07/21/22 1028)  BP: (!) 119/59 (07/21/22 0736)  SpO2: (!) 93 % (07/21/22 0736) Vital Signs (24h Range):  Temp:  [97.5 °F (36.4 °C)-100.9 °F (38.3 °C)] 97.5 °F (36.4 °C)  Pulse:  [72-95] 91  Resp:  [15-18] 16  SpO2:  [93 %-99 %] 93 %  BP: ()/(53-60) 119/59     Weight: 56 kg (123 lb 7.3 oz)  Body mass index is 23.33 kg/m².    Physical Exam  Constitutional:       General: She is awake.      Comments: Pt is [place]. Pt able to answer questions but sleepy due to just receiving pain meds.    HENT:      Head: Normocephalic and atraumatic.      Comments: [place]  Eyes:      General: Lids are normal.      Conjunctiva/sclera: Conjunctivae normal.   Pulmonary:      Effort: No accessory muscle usage or respiratory distress.   Musculoskeletal:      Comments: Weakness noted.    Skin:     General: Skin is warm and dry.   Neurological:      Mental Status: She is alert and oriented to person, place, and time.   Psychiatric:         Speech: Speech normal.       Review of Symptoms      Symptom Assessment (ESAS 0-10 Scale)  Pain:  0  Dyspnea:  0  Anxiety:  0  Nausea:  0  Depression:  0  Anorexia:  0  Fatigue:  0  Insomnia:  0  Restlessness:  0  Agitation:  0       Pain Assessment:    Location(s): abdomen    Abdomen       Location: generalized        Quality: Cramping        Quantity: 8/10 in intensity        Chronicity: Onset 0 second(s) ago, stable        Aggravating Factors: None        Alleviating Factors: Opiates        Associated Symptoms: None    ECOG Performance Status ndGndrndanddndend:nd nd2nd Living Arrangements:  Lives with  spouse    Psychosocial/Cultural: Pt lives with spouse and son, Reid in [place], [place]. Daughter, Valentine, lives locally and is MPOA. She works in [employer].       Advance Care Planning   Advance Directives:   Living Will: Yes        Copy on chart: Yes    Do Not Resuscitate Status: Yes    Medical Power of : Yes      Decision Making:  Patient answered questions and Family answered questions       Significant Labs: All pertinent labs within the past 24 hours have been reviewed.  CBC:   Recent Labs   Lab 07/21/22 0457   WBC 44.35*   HGB 9.0*   HCT 29.2*   MCV 90          BMP:  Recent Labs   Lab 07/21/22 0457   *   *   K 3.3*   *   CO2 13*   BUN 31*   CREATININE 1.9*   CALCIUM 8.2*   MG 1.9       LFT:  Lab Results   Component Value Date    AST 18 07/21/2022     (H) 06/14/2022    ALKPHOS 149 (H) 07/21/2022    BILITOT 0.4 07/21/2022     Albumin:   Albumin   Date Value Ref Range Status   07/21/2022 1.2 (L) 3.5 - 5.2 g/dL Final     Protein:   Total Protein   Date Value Ref Range Status   07/21/2022 4.0 (L) 6.0 - 8.4 g/dL Final     Lactic acid:   Lab Results   Component Value Date    LACTATE 1.4 07/15/2022    LACTATE 2.7 (H) 07/14/2022       Significant Imaging: I have reviewed all pertinent imaging results/findings within the past 24 hours.

## 2022-07-21 NOTE — SUBJECTIVE & OBJECTIVE
Interval History: Patient still looks fatigued. T-max of 100.9 which was not sustained. 2 Bms overnight. WBC increased today. ID following.    Oncology Treatment Plan:   OP PEMBROLIZUMAB 400MG Q6W    Medications:  Continuous Infusions:   sodium chloride 0.9% 100 mL/hr at 07/19/22 0200     Scheduled Meds:   apixaban  5 mg Oral BID    duke's soln (benadryl 30 mL, mylanta 30 mL, LIDOcaine 30 mL, nystatin 30 mL) 120 mL  10 mL Oral QID    insulin detemir U-100  5 Units Subcutaneous QHS    metronidazole  500 mg Intravenous Q8H    Questran and Aquaphor Topical Compound   Topical (Top) BID    vancomycin  500 mg Oral Q6H     PRN Meds:sodium chloride, dextrose 10%, dextrose 10%, glucagon (human recombinant), glucose, glucose, HYDROmorphone, HYDROmorphone, insulin aspart U-100, LORazepam, naloxone, ondansetron, prochlorperazine, sodium chloride 0.9%     Review of Systems   Constitutional:  Negative for chills and fever.   HENT:  Negative for congestion and sore throat.    Respiratory:  Negative for cough and shortness of breath.    Cardiovascular:  Negative for chest pain and palpitations.   Gastrointestinal:  Positive for abdominal pain and diarrhea. Negative for blood in stool and vomiting.   Musculoskeletal:  Positive for back pain. Negative for arthralgias and myalgias.   Skin:  Negative for rash and wound.   Neurological:  Negative for syncope.   Hematological:  Negative for adenopathy. Does not bruise/bleed easily.     Objective:     Vital Signs (Most Recent):  Temp: 97.5 °F (36.4 °C) (07/21/22 0736)  Pulse: 91 (07/21/22 0736)  Resp: 16 (07/21/22 0736)  BP: (!) 119/59 (07/21/22 0736)  SpO2: (!) 93 % (07/21/22 0736)   Vital Signs (24h Range):  Temp:  [97.5 °F (36.4 °C)-100.9 °F (38.3 °C)] 97.5 °F (36.4 °C)  Pulse:  [72-95] 91  Resp:  [15-18] 16  SpO2:  [93 %-99 %] 93 %  BP: ()/(53-60) 119/59     Weight: 56 kg (123 lb 7.3 oz)  Body mass index is 23.33 kg/m².  Body surface area is 1.55 meters  squared.      Intake/Output Summary (Last 24 hours) at 7/21/2022 0841  Last data filed at 7/20/2022 1815  Gross per 24 hour   Intake 601.67 ml   Output 110 ml   Net 491.67 ml     Physical Exam  Vitals and nursing note reviewed.   Constitutional:       Appearance: She is not ill-appearing, toxic-appearing or diaphoretic.   HENT:      Head: Normocephalic.   Eyes:      Extraocular Movements: Extraocular movements intact.   Cardiovascular:      Rate and Rhythm: Normal rate and regular rhythm.      Heart sounds: No murmur heard.  Pulmonary:      Effort: Pulmonary effort is normal. No respiratory distress.   Abdominal:      General: Abdomen is flat. There seems to be some fluid in abdomen.     Palpations: Abdomen is soft.      Tenderness: There is no abdominal tenderness. There is no guarding.   Musculoskeletal:      Right lower leg: No edema.      Left lower leg: No edema.   Skin:     General: Skin is warm and dry.   Neurological:      Mental Status: She is alert. Mental status is at baseline.      Cranial Nerves: No dysarthria.   Psychiatric:         Mood and Affect: Mood normal.         Behavior: Behavior normal.     Significant Labs:   CBC:   Recent Labs   Lab 07/20/22  0309 07/21/22  0457   WBC 34.72* 44.35*   HGB 9.0* 9.0*   HCT 28.6* 29.2*    332     CMP:   Recent Labs   Lab 07/19/22  1623 07/20/22  0309 07/21/22  0457   * 130* 133*   K 3.8 3.6 3.3*    109 113*   CO2 15* 15* 13*   * 175* 122*   BUN 30* 29* 31*   CREATININE 1.9* 1.9* 1.9*   CALCIUM 8.2* 8.0* 8.2*   PROT 4.4* 4.2* 4.0*   ALBUMIN 1.3* 1.2* 1.2*   BILITOT 0.4 0.4 0.4   ALKPHOS 167* 162* 149*   AST 33 22 18   ALT 18 15 12   ANIONGAP 8 6* 7*   EGFRNONAA 25.2* 25.2* 25.2*     Coagulation: No results for input(s): PT, INR, APTT in the last 48 hours.    LFTs:   Recent Labs   Lab 07/19/22  1623 07/20/22  0309 07/21/22  0457   ALT 18 15 12   AST 33 22 18   ALKPHOS 167* 162* 149*   BILITOT 0.4 0.4 0.4   PROT 4.4* 4.2* 4.0*   ALBUMIN  1.3* 1.2* 1.2*     Urine Studies: No results for input(s): COLORU, APPEARANCEUA, PHUR, SPECGRAV, PROTEINUA, GLUCUA, KETONESU, BILIRUBINUA, OCCULTUA, NITRITE, UROBILINOGEN, LEUKOCYTESUR, RBCUA, WBCUA, BACTERIA, SQUAMEPITHEL, HYALINECASTS in the last 48 hours.    Invalid input(s): WRIGHTSUR, and All pertinent labs from the last 24 hours have been reviewed.

## 2022-07-21 NOTE — PLAN OF CARE
Advance Care Planning   [facility] - Oncology ([facility])  Palliative Care       Patient Name: Karoilne Justice  MRN: [id number]  Admission Date: 7/15/2022  Hospital Length of Stay: 6 days  Code Status: DNR   Attending Provider: Judd Herrera MD  Palliative Care Provider:  BATSHEVA Guidry, APRN, AGCNS  Primary Care Physician: Dee Dee Oconnor MD  Principal Problem:Sepsis     Primary SW: Sandra Tolbert LCSW      Palliative Medicine Re-consult to further discuss hospice options.     Met with pt and pt's daughter. Dtr reports that hospice is pt's plan but she has verbalized that she does not want to die at home. Explained inpatient hospice criteria: Symptom Management that cannot be handled in home setting with shortened prognosis.      Spoke with daughter about option of nursing home with hospice if pt is not an inpatient candidate and doesn't want to be at home. Explained that prison nursing home placement is out of pocket/private pay while hospice covered under Medicare benefit.  Daughter expressed that this would be a financial strain on the family.    Daughter interested in making referral to [facility] for inpatient to see if pt would meet criteria while speaking further with family as home being an option.    Support provided to dtr.      SW updated primary SW Sandra Tolbert LCSW of above conversation.    Elke Carr LCSW, ACHP-SW

## 2022-07-21 NOTE — PLAN OF CARE
PRN pain medicine given x 2. BM x 1 this shift. Pt tolerated half of a Boost this shift. Minimal output from nephrostomy tubes. POC reviewed with patient; understanding verbalized. Pt. with nonskid footwear on, bed in lowest position, and locked with bed rails up x2. Pt. has call light and personal items within reach. VSS and afebrile this shift. All questions and concerns addressed at this time. Will continue to monitor.

## 2022-07-21 NOTE — PLAN OF CARE
No acute events this shift. T-max of 100.9. Did not sustain. All other VSS. PRN pain medication administered X2. 2 BMs this shift. Nat care completed. Wound care completed. All abx and fluids continued as ordered. Daughter remains at bedside. Bed in lowest position. Side rails up X2. All possessions and  call light within reach. Patient instructed to call for assistance as needed and verbalized understanding. Will continue to monitor with frequent rounding. All needs of patient currently met.

## 2022-07-21 NOTE — ASSESSMENT & PLAN NOTE
Impression: Pt is a 75 y/o female with bladder cancer, bilateral nephrostomy tubes, recent RLE DVT on Eliquis, and DM2 who presents as a transfer from [place] for intractable abdominal pain and lower back pain in the setting of colitis on CT scan. She had been off her pain meds for 2 days due to severe constipation. Pt to start outpt chemo on . Pt is a  DNR. Weakness noted. Pt [place].     Reason for consult: GOC. Communicated with Hem/Onc resident, Dr. Herndon.     Goals of care/advance care planning.     Today:   Met with pt and pt's daughter. Per daughter hospice is plan but pt has voiced she does not want to die at home. Daughter is aware pt may have to go home with hospice if not inpt candidate. Family unable to afford NH. Daughter would like a referral for [facility] who can do home and inpt.     Elke Carr LCSW communicated with the DAGOBERTO Rodriges with Hem/Onc    22  Met with pt and pt's daughter, Valentine who is MPOA. Per Valentine, pt has decided she wants to focus on comfort and do hospice care.  Pt verified this. Per daughter, pt lives in [facility] with her spouse and son, Reid. Per daughter, pt has 12 children, 3 . Two sons  recently. One son at pt's house.  Pt's daughter endorses what is most important to pt is to be comfortable.     Per daughter, she will speak to pt's family to update them and have hem/onc team update. Daughter works at [employer] in [employer].      Code status: DNR.   MPOA: Valentine Swenson- daughter.    Symptom management:     Pt denies nausea, vomiting.     Pain: Pt endorses pain to ABD area. Pt admitted with colitis. C-DIFF pending.    Current Meds: Pt on Dilaudid 0.5 mg IVP q 6hrs prn pain.     Recs:   Would have Dilaudid 0.5 mg IVP q 6 hrs prn severe pain.   Consider Oxycondone 5 mg q 4 hrs prn moderate pain.       Plan:   Pt would like hospice care at discharge.   Spoke to pt's daughter and pt.   Pt is a DNR.   Will follow.

## 2022-07-21 NOTE — ASSESSMENT & PLAN NOTE
-CT abd/pelvis pertinent for colitis with no free air or abscess  -C. Diff assay positive  -Currently taking 500mg q6 vancomycin and 500mg q8 metronidazole  -Consider switching antibiotics  -Clear liquid diet  -Monitor daily CBC/CMP    Post op gtt instructions reviewed with patient.

## 2022-07-21 NOTE — PROGRESS NOTES
"[facility] - Oncology ([facility])  Hematology/Oncology  Progress Note    Patient Name: Karoline Justice  Admission Date: 7/15/2022  Hospital Length of Stay: 6 days  Code Status: DNR     Subjective:     HPI:  Ms. Justice is a 76F with bladder cancer, bilateral nephrostomy tubes, recent RLE DVT on Eliquis, and DM2 who presents as a transfer from [place] for intractable abdominal pain and lower back pain in the setting of colitis on CT scan. She had been off her pain meds for 2 days due to severe constipation.   She was recently admitted to this service for a UTI and was discharged 7/7 with 11 days of cefpodoxime to complete a total 14 day course. She was also hypercalcemic during that admission and treated with fluids and pamindronate and calcitonin. Since that discharge she has had 3 ER visits for hypoglycemia, pain, and other various reasons.   She reports several days of abdominal pain, chills, and diarrhea. She is weak and prone to falling when walking to the bathroom.   At the outside hospital yesterday, she had worsening leukocytosis to 38, elevated lactate, evidence of recurrent/persistent UTI, and THOMAS. Per the ER physician's note, a CT revealed "moderate to severe long segment left-sided colitis, indeterminate may be infectious inflammatory or ischemic." However, I was not able to see the official radiology read yet. She was given meropenem and IV pain meds and transferred to [facility].            Patient information was obtained from patient, relative(s), past medical records, and ER records.      Oncology History:       Bladder cancer   5/20/2022 Initial Diagnosis     Bladder cancer        Tumor Conference     Presenting Hospital / Clinic: [facility]  Virtual Tumor Board Conference: Virtual  Presenter: Sylvia Escobar  Date Presented to Tumor Board: 5/26/2022  Specialties Present: Pathology; Urology; Radiation Oncology  Cancer Type: Bladder cancer  Recommended Plan Note: Will obtain bladder MRI, PET CT " and bone scan to further evaluate for metastatic disease. Seeing medical oncology to discuss systemic therapy.            6/16/2022 -  Chemotherapy     Treatment Summary   Plan Name: OP PEMBROLIZUMAB 400MG Q6W  Treatment Goal: Control  Status: Active  Start Date: 6/16/2022 (Planned)  End Date: 4/18/2024 (Planned)  Provider: Reid Snider MD  Chemotherapy: pembrolizumab (KEYTRUDA) 400 mg in sodium chloride 0.9% 116 mL infusion, 400 mg, Intravenous, Clinic/HOD 1 time, 0 of 17 cycles             Interval History: Patient still looks fatigued. T-max of 100.9 which was not sustained. 2 Bms overnight. WBC increased today. ID following.    Oncology Treatment Plan:   OP PEMBROLIZUMAB 400MG Q6W    Medications:  Continuous Infusions:   sodium chloride 0.9% 100 mL/hr at 07/19/22 0200     Scheduled Meds:   apixaban  5 mg Oral BID    duke's soln (benadryl 30 mL, mylanta 30 mL, LIDOcaine 30 mL, nystatin 30 mL) 120 mL  10 mL Oral QID    insulin detemir U-100  5 Units Subcutaneous QHS    metronidazole  500 mg Intravenous Q8H    Questran and Aquaphor Topical Compound   Topical (Top) BID    vancomycin  500 mg Oral Q6H     PRN Meds:sodium chloride, dextrose 10%, dextrose 10%, glucagon (human recombinant), glucose, glucose, HYDROmorphone, HYDROmorphone, insulin aspart U-100, LORazepam, naloxone, ondansetron, prochlorperazine, sodium chloride 0.9%     Review of Systems   Constitutional:  Negative for chills and fever.   HENT:  Negative for congestion and sore throat.    Respiratory:  Negative for cough and shortness of breath.    Cardiovascular:  Negative for chest pain and palpitations.   Gastrointestinal:  Positive for abdominal pain and diarrhea. Negative for blood in stool and vomiting.   Musculoskeletal:  Positive for back pain. Negative for arthralgias and myalgias.   Skin:  Negative for rash and wound.   Neurological:  Negative for syncope.   Hematological:  Negative for adenopathy. Does not bruise/bleed easily.      Objective:     Vital Signs (Most Recent):  Temp: 97.5 °F (36.4 °C) (07/21/22 0736)  Pulse: 91 (07/21/22 0736)  Resp: 16 (07/21/22 0736)  BP: (!) 119/59 (07/21/22 0736)  SpO2: (!) 93 % (07/21/22 0736)   Vital Signs (24h Range):  Temp:  [97.5 °F (36.4 °C)-100.9 °F (38.3 °C)] 97.5 °F (36.4 °C)  Pulse:  [72-95] 91  Resp:  [15-18] 16  SpO2:  [93 %-99 %] 93 %  BP: ()/(53-60) 119/59     Weight: 56 kg (123 lb 7.3 oz)  Body mass index is 23.33 kg/m².  Body surface area is 1.55 meters squared.      Intake/Output Summary (Last 24 hours) at 7/21/2022 0841  Last data filed at 7/20/2022 1815  Gross per 24 hour   Intake 601.67 ml   Output 110 ml   Net 491.67 ml     Physical Exam  Vitals and nursing note reviewed.   Constitutional:       Appearance: She is not ill-appearing, toxic-appearing or diaphoretic.   HENT:      Head: Normocephalic.   Eyes:      Extraocular Movements: Extraocular movements intact.   Cardiovascular:      Rate and Rhythm: Normal rate and regular rhythm.      Heart sounds: No murmur heard.  Pulmonary:      Effort: Pulmonary effort is normal. No respiratory distress.   Abdominal:      General: Abdomen is flat. There seems to be some fluid in abdomen.     Palpations: Abdomen is soft.      Tenderness: There is no abdominal tenderness. There is no guarding.   Musculoskeletal:      Right lower leg: No edema.      Left lower leg: No edema.   Skin:     General: Skin is warm and dry.   Neurological:      Mental Status: She is alert. Mental status is at baseline.      Cranial Nerves: No dysarthria.   Psychiatric:         Mood and Affect: Mood normal.         Behavior: Behavior normal.     Significant Labs:   CBC:   Recent Labs   Lab 07/20/22  0309 07/21/22  0457   WBC 34.72* 44.35*   HGB 9.0* 9.0*   HCT 28.6* 29.2*    332     CMP:   Recent Labs   Lab 07/19/22  1623 07/20/22  0309 07/21/22  0457   * 130* 133*   K 3.8 3.6 3.3*    109 113*   CO2 15* 15* 13*   * 175* 122*   BUN 30* 29*  31*   CREATININE 1.9* 1.9* 1.9*   CALCIUM 8.2* 8.0* 8.2*   PROT 4.4* 4.2* 4.0*   ALBUMIN 1.3* 1.2* 1.2*   BILITOT 0.4 0.4 0.4   ALKPHOS 167* 162* 149*   AST 33 22 18   ALT 18 15 12   ANIONGAP 8 6* 7*   EGFRNONAA 25.2* 25.2* 25.2*     Coagulation: No results for input(s): PT, INR, APTT in the last 48 hours.    LFTs:   Recent Labs   Lab 07/19/22  1623 07/20/22  0309 07/21/22  0457   ALT 18 15 12   AST 33 22 18   ALKPHOS 167* 162* 149*   BILITOT 0.4 0.4 0.4   PROT 4.4* 4.2* 4.0*   ALBUMIN 1.3* 1.2* 1.2*     Urine Studies: No results for input(s): COLORU, APPEARANCEUA, PHUR, SPECGRAV, PROTEINUA, GLUCUA, KETONESU, BILIRUBINUA, OCCULTUA, NITRITE, UROBILINOGEN, LEUKOCYTESUR, RBCUA, WBCUA, BACTERIA, SQUAMEPITHEL, HYALINECASTS in the last 48 hours.    Invalid input(s): WRIGHTSUR, and All pertinent labs from the last 24 hours have been reviewed.      Assessment/Plan:     * Sepsis  This patient does have evidence of infective focus  My overall impression is sepsis. Vital signs were reviewed and noted in progress note.  Antibiotics given-   Antibiotics (From admission, onward)            Start     Stop Route Frequency Ordered    07/19/22 1800  vancomycin 125 mg/5 mL oral solution 500 mg  (C. difficile Infection (CDI) Treatment Order Panel)         07/25 2359 Oral Every 6 hours 07/19/22 1429    07/19/22 1530  metronidazole IVPB 500 mg         -- IV Every 8 hours (non-standard times) 07/19/22 1429    07/15/22 0546  PIPERACILLIN-TAZOBACTAM 4.5G/100ML SODIUM CHLORIDE 0.9%-READY TO MIX IVPB        Note to Pharmacy: Created by cabinet override    07/15 1759   07/15/22 0546          Cultures were taken-   Microbiology Results (last 7 days)     Procedure Component Value Units Date/Time    Blood culture [[id number]] Collected: 07/15/22 0744    Order Status: Completed Specimen: Blood Updated: 07/20/22 1012     Blood Culture, Routine No growth after 5 days.    Blood culture [[id number]] Collected: 07/15/22 0744    Order Status: Completed  Specimen: Blood Updated: 07/20/22 1012     Blood Culture, Routine No growth after 5 days.    Stool culture [[id number]] Collected: 07/15/22 0538    Order Status: Completed Specimen: Stool Updated: 07/18/22 1243     Stool Culture No Salmonella,Shigella,Vibrio,Campylobacter,Yersinia isolated.    E. coli 0157 antigen [[id number]] Collected: 07/15/22 0538    Order Status: Completed Specimen: Stool Updated: 07/16/22 2234     Shiga Toxin 1 E.coli Negative     Shiga Toxin 2 E.coli Negative    Clostridium difficile EIA [[id number]]  (Abnormal) Collected: 07/15/22 0538    Order Status: Completed Specimen: Stool Updated: 07/15/22 2132     C. diff Antigen Positive     C difficile Toxins A+B, EIA Positive     Comment: Testing not recommended for children <24 months old.       Blood culture [[id number]]     Order Status: Canceled Specimen: Blood     Blood culture [[id number]]     Order Status: Canceled Specimen: Blood     Blood culture [[id number]]     Order Status: Canceled Specimen: Blood     Blood culture [[id number]]     Order Status: Canceled Specimen: Blood           Latest lactate reviewed, they are-  No results for input(s): LACTATE in the last 72 hours.    Organ dysfunction indicated by Acute kidney injury  Source- C diff colitis    Source control Achieved by- antibiotics. Currently on vancomycin and metronidazole. Consider switching antibiotics.    -Leukocytosis decreased to 34.72  -BP normotensive. Receiving  mL/hr infusion. Continue to monitor.   -Afebrile        Colitis  -CT abd/pelvis pertinent for colitis with no free air or abscess  -C. Diff assay positive  -Currently taking 500mg q6 vancomycin and 500mg q8 metronidazole  -Consider switching antibiotics  -Clear liquid diet  -Monitor daily CBC/CMP     UTI (urinary tract infection)  -Urine culture positive for Gram negative rods  -Patient has bilateral nephrostomy tubes with minimal output and pertinent for blood and opal pus.     Bladder cancer  Follows with   [facility]. Was scheduled to begin outpatient chemo 7/8.   Now s/p bilateral nephrostomy tubes  Has seen radiation oncology recently as well     -outpatient treatment likely  -Patient's daughter is considering hospice since patient informed her she didn't want to die in front of family at home. In contact with hospice of [facility]  -Prn dilaudid for pain control. Currently holding morphine and oxy in setting of THOMAS    THOMAS (acute kidney injury)  Cr stabilized at 1.9. Baseline around 0.8  -Hold home morphine and oxy in setting of THOMAS  -IVF  -Strict Is and Os    Anemia  Hgb 7.0 on admission  -Likely due to MABEL vs anemia of chronic disease  -Has had 1 pRBC since admission.  -Transfuse if < 7    HTN (hypertension)  Hold home meds, monitor bp     Controlled Type 2 diabetes mellitus  SSI, POC glucose        Deonna Hansen MD  Hematology/Oncology  [facility] - On[facility] (Hospital)        ATTENDING NOTE, ONCOLOGY INPATIENT TEAM    As above; events of last 24 hours noted.  Patient seen and examined, chart reviewed.  Appears slightly uncomfortable, complaining of malaise and fatigue. She states that she only had 1 BM since midnight  Lungs are clear to auscultation.  Abdomen is soft, nontender  Labs reviewed.  WBCs are 44,000 per cubic mm, hemoglobin is 9 grams/deciliter and platelets are 332,000 per cubic mm.  Her potassium is 3.3 mEq/L while her creatinine is 1.9 mg/dL with a BUN of 31.   The urine through the right nephrostomy tube appears dark and cloudy    PLAN  Follow labs daily  Repeat urinalysis  Continue vancomycin at 500 mg q.6 plus metronidazole 500 mg Q 8  Re-consult ID regarding recommendations for her presumptive urinary infection  Continue Eliquis  PT OT.  We will follow.    Judd Herrera MD

## 2022-07-21 NOTE — PLAN OF CARE
Advance diet at tolerated, continue boost breeze increase to TID, RD following  Goals: Meet % EEN, EPN by RD f/u date  Nutrition Goal Status: goal not met  Communication of RD Recs:  (POC)    Assessment and Plan    Moderate malnutrition  Moderate Malnutrition  Nutrition Problem  Moderate Protein-Calorie Malnutrition  Malnutrition in the context of Chronic Illness    Related to (etiology):   Inadequate energy intake    Signs and Symptoms (as evidenced by):   Energy Intake: <75% of estimated energy requirement for >3 months  Body Fat Depletion: severe depletion of orbital, moderate depletion of triceps  Muscle Mass Depletion: moderate depletion of temples, clavicle region, interosseous muscle, lower extremities  Weight Loss: 18% x 12 months    Interventions/Recommendations (treatment strategy):  Collaboration of nutrition care with other providers  Commercial beverage( calories and protein) boost breeze TID  Clear liquid diet    Nutrition Diagnosis Status:   Continues

## 2022-07-21 NOTE — PROGRESS NOTES
Received call from Elke Carr LCSW, with Palliative Care with update following their teams' consult with patient and family this morning.      Spoke with patient's daughter Valentine Swenson ([phone] cell.) and initiated referral to [facility] for informational visits and eval for inpatient vs home hospice services for patient. Called the local [place] agency, [phone], spoke with Darcie and faxed the admission record, H&P, today's Med Onc and Pall Care notes to the agency at [phone]. Received call from agency's liason Helen ([phone] cell.) and discussed the referral and patient's case with her. Asked that she contact patient's daughter directly to schedule meeting. If patient meets criteria for inpatient hospice the [facility] [place] will re-open next Monday; otherwise, home hospice services would be provided through the [place] office as this location services the area where patient's home is. Helen stated that patient's information has been forwarded to the staff at the [place] office so they can review also.     Will continue to follow and assist as needs are identified.

## 2022-07-21 NOTE — CONSULTS
[facility] - Oncology ([facility])  Infectious Disease  Consult Note    Patient Name: Karoline Justice  MRN: [id number]  Admission Date: 7/15/2022  Hospital Length of Stay: 6 days  Attending Physician: Judd Herrera MD  Primary Care Provider: Dee Dee Oconnor MD     Isolation Status: Special Contact    Patient information was obtained from patient and past medical records.      Consults  Assessment/Plan:     Leukocytosis  75 y/o F h/o  bladder cancer, b/l nephrostomy tubes and ureteral stent, recent RLE DVT on Eliquis, and DM2 who presents as a transfer from [place] for intractable abdominal pain and lower back pain CT with moderate to severe L sided colitis, found to have c diff (ag+/Toxin+), recently treated with cephalosporin for UTI.  She continued to have diarrhea and leukocytosis on PO vancomycin and dose was increased and metronidazole was added.  Today she had fever of 100.9 and WBC now 44K.  Of note urine culture from 7/15 growing serratia marcescens, cultures taken from indwelling PCNT.  She states her diarrhea has improved now down to 2 stools daily, overall feeling much improved, abdominal and flank pan much improved.  She also states that when she eats she coughs and had episode of this prior to having fever this AM.  She does have cloudy fluid in PCNT drain but bag empty.  - overall appears to be improving from Cdiff perspective.    - fever may be related to aspiration event?  - given improving CDI and overall clinical improvement, would hold on empiric antibiotics for now and monitor closely  - continue PO vanco and metronidazole  - discussed with team, will follow        Thank you for your consult. I will follow-up with patient. Please contact us if you have any additional questions.    Rian Sweeney MD  Infectious Disease  [facility] - Oncology ([facility])    Subjective:     Principal Problem: Sepsis    HPI: 75 y/o F h/o  bladder cancer, b/l nephrostomy tubes and ureteral stent, recent RLE DVT on  Eliquis, and DM2 who presents as a transfer from [place] for intractable abdominal pain and lower back pain CT with moderate to severe L sided colitis, found to have c diff (ag+/Toxin+), recently treated with cephalosporin for UTI.  She continued to have diarrhea and leukocytosis on PO vancomycin and dose was increased and metronidazole was added.  Today she had fever of 100.9 and WBC now 44K.  Of note urine culture from 7/15 growing serratia marcescens, cultures taken from indwelling PCNT.  She states her diarrhea has improved now down to 2 stools daily, overall feeling much improved, abdominal and flank pan much improved.  She also states that when she eats she coughs and had episode of this prior to having fever this AM      Past Medical History:   Diagnosis Date    Bladder cancer 2022    Bladder mass 2022    Choledocholithiasis 2022    Diabetes mellitus     Pneumonia due to COVID-19 virus 2021       Past Surgical History:   Procedure Laterality Date    CYSTOSCOPY N/A 2022    Procedure: CYSTOSCOPY;  Surgeon: Adrianna Gutierrez MD;  Location: [facility] OR [address] [place] [address];  Service: Urology;  Laterality: N/A;    NEPHROSTOMY Left 2022    RETROGRADE PYELOGRAPHY Right 2022    Procedure: PYELOGRAM, RETROGRADE;  Surgeon: Adrianna Gutierrez MD;  Location: [facility] OR [address] [place] [address];  Service: Urology;  Laterality: Right;       Review of patient's allergies indicates:  No Known Allergies    Medications:  Medications Prior to Admission   Medication Sig    acetaminophen (TYLENOL) 325 MG tablet Take 2 tablets (650 mg total) by mouth every 6 (six) hours as needed for Pain.    amLODIPine (NORVASC) 5 MG tablet Take 1 tablet (5 mg total) by mouth once daily.    apixaban (ELIQUIS) 5 mg Tab Take 1 tablet (5 mg total) by mouth 2 (two) times daily.    [] cefpodoxime (VANTIN) 200 MG tablet Take 1 tablet (200 mg total) by mouth 2 (two) times daily. for 11 days    duke's soln (benadryl 30 mL, mylanta 30  mL, LIDOcaine 30 mL, nystatin 30 mL) 120mL Take 10 mLs by mouth 4 (four) times daily.    glycerin adult suppository Place 1 suppository rectally as needed for Constipation.    insulin detemir U-100 (LEVEMIR FLEXTOUCH) 100 unit/mL (3 mL) SubQ InPn pen Inject 10 Units into the skin once daily. (Patient taking differently: Inject 20 Units into the skin once daily.)    lactulose (CHRONULAC) 20 gram/30 mL Soln Take 15 mLs (10 g total) by mouth every 6 (six) hours as needed (constipation).    LIDOcaine (LIDODERM) 5 % Place 1 patch onto the skin once daily. Place patch to back. Leave on for 12 hours and remove for 12 hours.    morphine (MS CONTIN) 15 MG 12 hr tablet Take 1 tablet (15 mg total) by mouth every 12 (twelve) hours. for 15 days    oxyCODONE (ROXICODONE) 5 MG immediate release tablet Take 1 tablet (5 mg total) by mouth every 4 (four) hours as needed for Pain.    sodium bicarbonate 650 MG tablet Take 1 tablet (650 mg total) by mouth 2 (two) times daily. (Patient taking differently: Take 650 mg by mouth 2 (two) times daily. 2 tabs BID)    [DISCONTINUED] apixaban (ELIQUIS) 5 mg Tab Take 2 tablets (10 mg total) by mouth 2 (two) times daily for 3 days, THEN Take 1 tablet (5 mg total) by mouth 2 (two) times daily thereafter.     Antibiotics (From admission, onward)                Start     Stop Route Frequency Ordered    07/19/22 1800  vancomycin 125 mg/5 mL oral solution 500 mg  (C. difficile Infection (CDI) Treatment Order Panel)         07/25 2359 Oral Every 6 hours 07/19/22 1429    07/19/22 1530  metronidazole IVPB 500 mg         -- IV Every 8 hours (non-standard times) 07/19/22 1429    07/15/22 0546  PIPERACILLIN-TAZOBACTAM 4.5G/100ML SODIUM CHLORIDE 0.9%-READY TO MIX IVPB        Note to Pharmacy: Created by cabinet override    07/15 4543   07/15/22 0546          Antifungals (From admission, onward)                Start     Stop Route Frequency Ordered    07/17/22 0945  duke's soln (benadryl 30 mL, mylanta  30 mL, LIDOcaine 30 mL, nystatin 30 mL) 120 mL         -- Oral 4 times daily 07/17/22 0842          Antivirals (From admission, onward)      None               There is no immunization history on file for this patient.    Family History       Problem Relation (Age of Onset)    Diabetes Mother, Daughter, Son    Heart attack Father    Kidney cancer Son    Stomach cancer Mother    Thyroid disease Daughter          Social History     Socioeconomic History    Marital status:    Tobacco Use    Smoking status: Never Smoker    Smokeless tobacco: Never Used   Substance and Sexual Activity    Alcohol use: No    Drug use: No     Social Determinants of Health     Financial Resource Strain: Unknown    Difficulty of Paying Living Expenses: Patient refused   Food Insecurity: Unknown    Worried About Running Out of Food in the Last Year: Patient refused    Ran Out of Food in the Last Year: Patient refused   Transportation Needs: No Transportation Needs    Lack of Transportation (Medical): No    Lack of Transportation (Non-Medical): No   Physical Activity: Insufficiently Active    Days of Exercise per Week: 1 day    Minutes of Exercise per Session: 10 min   Stress: No Stress Concern Present    Feeling of Stress : Not at all   Social Connections: Unknown    Frequency of Communication with Friends and Family: More than three times a week    Frequency of Social Gatherings with Friends and Family: Patient refused    Active Member of Clubs or Organizations: No    Attends Club or Organization Meetings: Patient refused    Marital Status:    Housing Stability: Unknown    Unable to Pay for Housing in the Last Year: Patient refused    Unstable Housing in the Last Year: No     Review of Systems   Constitutional:  Negative for activity change, chills and fever.   HENT:  Negative for congestion, mouth sores, rhinorrhea, sinus pressure and sore throat.    Eyes:  Negative for photophobia, pain and redness.    Respiratory:  Negative for cough, chest tightness, shortness of breath and wheezing.    Cardiovascular:  Negative for chest pain and leg swelling.   Gastrointestinal:  Negative for abdominal distention, abdominal pain, diarrhea, nausea and vomiting.   Endocrine: Negative for polyuria.   Genitourinary:  Negative for decreased urine volume, dysuria and flank pain.   Musculoskeletal:  Negative for joint swelling and neck pain.   Skin:  Negative for color change.   Allergic/Immunologic: Negative for food allergies.   Neurological:  Negative for dizziness, weakness and headaches.   Hematological:  Negative for adenopathy.   Psychiatric/Behavioral:  Negative for agitation and confusion. The patient is not nervous/anxious.    Objective:     Vital Signs (Most Recent):  Temp: 98.9 °F (37.2 °C) (07/21/22 1211)  Pulse: 96 (07/21/22 1211)  Resp: 16 (07/21/22 1512)  BP: (!) 97/54 (07/21/22 1211)  SpO2: 98 % (07/21/22 1211)   Vital Signs (24h Range):  Temp:  [97.5 °F (36.4 °C)-100.9 °F (38.3 °C)] 98.9 °F (37.2 °C)  Pulse:  [72-96] 96  Resp:  [16-18] 16  SpO2:  [93 %-99 %] 98 %  BP: ()/(54-60) 97/54     Weight: 56 kg (123 lb 7.3 oz)  Body mass index is 23.33 kg/m².    Estimated Creatinine Clearance: 19 mL/min (A) (based on SCr of 1.9 mg/dL (H)).    Physical Exam  Constitutional:       Appearance: She is well-developed.   HENT:      Head: Normocephalic and atraumatic.   Eyes:      Pupils: Pupils are equal, round, and reactive to light.   Cardiovascular:      Rate and Rhythm: Normal rate.   Pulmonary:      Effort: Pulmonary effort is normal.      Breath sounds: Normal breath sounds.   Abdominal:      General: Bowel sounds are normal.      Palpations: Abdomen is soft.   Musculoskeletal:         General: No tenderness.      Cervical back: Normal range of motion and neck supple.   Skin:     General: Skin is warm and dry.   Neurological:      Mental Status: She is alert and oriented to person, place, and time.       Significant  Labs: CBC:   Recent Labs   Lab 07/20/22  0309 07/21/22  0457   WBC 34.72* 44.35*   HGB 9.0* 9.0*   HCT 28.6* 29.2*    332     CMP:   Recent Labs   Lab 07/19/22  1623 07/20/22  0309 07/21/22  0457   * 130* 133*   K 3.8 3.6 3.3*    109 113*   CO2 15* 15* 13*   * 175* 122*   BUN 30* 29* 31*   CREATININE 1.9* 1.9* 1.9*   CALCIUM 8.2* 8.0* 8.2*   PROT 4.4* 4.2* 4.0*   ALBUMIN 1.3* 1.2* 1.2*   BILITOT 0.4 0.4 0.4   ALKPHOS 167* 162* 149*   AST 33 22 18   ALT 18 15 12   ANIONGAP 8 6* 7*   EGFRNONAA 25.2* 25.2* 25.2*       Significant Imaging: I have reviewed all pertinent imaging results/findings within the past 24 hours.

## 2022-07-21 NOTE — ASSESSMENT & PLAN NOTE
This patient does have evidence of infective focus  My overall impression is sepsis. Vital signs were reviewed and noted in progress note.  Antibiotics given-   Antibiotics (From admission, onward)            Start     Stop Route Frequency Ordered    07/19/22 1800  vancomycin 125 mg/5 mL oral solution 500 mg  (C. difficile Infection (CDI) Treatment Order Panel)         07/25 2359 Oral Every 6 hours 07/19/22 1429    07/19/22 1530  metronidazole IVPB 500 mg         -- IV Every 8 hours (non-standard times) 07/19/22 1429    07/15/22 0546  PIPERACILLIN-TAZOBACTAM 4.5G/100ML SODIUM CHLORIDE 0.9%-READY TO MIX IVPB        Note to Pharmacy: Created by cabinet override    07/15 1759   07/15/22 0546          Cultures were taken-   Microbiology Results (last 7 days)     Procedure Component Value Units Date/Time    Blood culture [[id number]] Collected: 07/15/22 0744    Order Status: Completed Specimen: Blood Updated: 07/20/22 1012     Blood Culture, Routine No growth after 5 days.    Blood culture [[id number]] Collected: 07/15/22 0744    Order Status: Completed Specimen: Blood Updated: 07/20/22 1012     Blood Culture, Routine No growth after 5 days.    Stool culture [[id number]] Collected: 07/15/22 0538    Order Status: Completed Specimen: Stool Updated: 07/18/22 1243     Stool Culture No Salmonella,Shigella,Vibrio,Campylobacter,Yersinia isolated.    E. coli 0157 antigen [[id number]] Collected: 07/15/22 0538    Order Status: Completed Specimen: Stool Updated: 07/16/22 2234     Shiga Toxin 1 E.coli Negative     Shiga Toxin 2 E.coli Negative    Clostridium difficile EIA [[id number]]  (Abnormal) Collected: 07/15/22 0538    Order Status: Completed Specimen: Stool Updated: 07/15/22 2132     C. diff Antigen Positive     C difficile Toxins A+B, EIA Positive     Comment: Testing not recommended for children <24 months old.       Blood culture [[id number]]     Order Status: Canceled Specimen: Blood     Blood culture [[id number]]     Order  Status: Canceled Specimen: Blood     Blood culture [[id number]]     Order Status: Canceled Specimen: Blood     Blood culture [[id number]]     Order Status: Canceled Specimen: Blood           Latest lactate reviewed, they are-  No results for input(s): LACTATE in the last 72 hours.    Organ dysfunction indicated by Acute kidney injury  Source- C diff colitis    Source control Achieved by- antibiotics. Currently on vancomycin and metronidazole. Consider switching antibiotics.    -Leukocytosis decreased to 34.72  -BP normotensive. Receiving  mL/hr infusion. Continue to monitor.   -Afebrile

## 2022-07-21 NOTE — PROGRESS NOTES
[facility]eder - Oncology ([facility])  Palliative Medicine  Progress Note    Patient Name: Karoline Justice  MRN: [id number]  Admission Date: 7/15/2022  Hospital Length of Stay: 6 days  Code Status: DNR   Attending Provider: Judd Herrera MD  Consulting Provider: ISIDRA Pleitez  Primary Care Physician: Dee Dee Oconnor MD  Principal Problem:Sepsis    Patient information was obtained from patient, relative(s) and primary team.      Assessment/Plan:     Palliative care encounter  Impression: Pt is a 75 y/o female with bladder cancer, bilateral nephrostomy tubes, recent RLE DVT on Eliquis, and DM2 who presents as a transfer from [place] for intractable abdominal pain and lower back pain in the setting of colitis on CT scan. She had been off her pain meds for 2 days due to severe constipation. Pt to start outpt chemo on . Pt is a  DNR. Weakness noted. Pt [place].     Reason for consult: GO. Communicated with Hem/Onc resident, Dr. Herndon.     Goals of care/advance care planning.     Today:   Met with pt and pt's daughter. Per daughter hospice is plan but pt has voiced she does not want to die at home. Daughter is aware pt may have to go home with hospice if not inpt candidate. Family unable to afford NH. Daughter would like a referral for [place] [facility] who can do home and inpt.     Elke Carr LCSW communicated with the DAGOBERTO Rodriges with Hem/Onc    22  Met with pt and pt's daughter, Valentine who is MPOA. Per Valentine, pt has decided she wants to focus on comfort and do hospice care.  Pt verified this. Per daughter, pt lives in [facility] with her spouse and son, Reid. Per daughter, pt has 12 children, 3 . Two sons  recently. One son at pt's house.  Pt's daughter endorses what is most important to pt is to be comfortable.     Per daughter, she will speak to pt's family to update them and have hem/onc team update. Daughter works at [employer] in [employer].      Code status: DNR.  "  MPOA: Valentine Swenson- daughter.    Symptom management:     Pt denies nausea, vomiting.     Pain: Pt endorses pain to ABD area. Pt admitted with colitis. C-DIFF pending.    Current Meds: Pt on Dilaudid 0.5 mg IVP q 6hrs prn pain.     Recs:   Would have Dilaudid 0.5 mg IVP q 6 hrs prn severe pain.   Consider Oxycondone 5 mg q 4 hrs prn moderate pain.       Plan:   Pt would like hospice care at discharge.   Spoke to pt's daughter and pt.   Pt is a DNR.   Will follow.           I will follow-up with patient. Please contact us if you have any additional questions.    Subjective:     Chief Complaint: No chief complaint on file.      HPI:   Pt is a 75 y/o female with bladder cancer, bilateral nephrostomy tubes, recent RLE DVT on Eliquis, and DM2 who presented as a transfer from [place] for intractable abdominal pain and lower back pain in the setting of colitis on CT scan. She had been off her pain meds for 2 days due to severe constipation.   Per chart review, pt was recently admitted to this service for a UTI and was discharged 7/7 with 11 days of cefpodoxime to complete a total 14 day course. She was also hypercalcemic during that admission and treated with fluids and pamindronate and calcitonin. Since that discharge she has had 3 ER visits for hypoglycemia, pain, and other various reasons.   Per chart review, pt reported several days of abdominal pain, chills, and diarrhea. She is weak and prone to falling when walking to the bathroom.   At the outside hospital yesterday, she had worsening leukocytosis to 38, elevated lactate, evidence of recurrent/persistent UTI, and THOMAS. Per the ER physician's note, a CT revealed "moderate to severe long segment left-sided colitis, indeterminate may be infectious inflammatory or ischemic." However, I was not able to see the official radiology read yet. She was given meropenem and IV pain meds and [place] [facility]            Patient information was obtained from patient, " relative(s), past medical records, and ER records    Pt is DNR.       Hospital Course:  No notes on file    Interval History: Pt would like to d/c with hospice care per daughter, DILEEP Grijalva. Pt is DNR. Pt admitted with colitis.     Past Medical History:   Diagnosis Date    Bladder cancer 05/2022    Bladder mass 05/06/2022    Choledocholithiasis 05/06/2022    Diabetes mellitus     Pneumonia due to COVID-19 virus 08/06/2021       Past Surgical History:   Procedure Laterality Date    CYSTOSCOPY N/A 05/09/2022    Procedure: CYSTOSCOPY;  Surgeon: Adrianna Gutierrez MD;  Location: [facility] OR [address] [place] [address];  Service: Urology;  Laterality: N/A;    NEPHROSTOMY Left 05/2022    RETROGRADE PYELOGRAPHY Right 05/09/2022    Procedure: PYELOGRAM, RETROGRADE;  Surgeon: Adrianna Gutierrez MD;  Location: [facility] OR [address] [place] [address];  Service: Urology;  Laterality: Right;       Review of patient's allergies indicates:  No Known Allergies    Medications:  Continuous Infusions:   sodium chloride 0.9% 100 mL/hr at 07/21/22 0935     Scheduled Meds:   apixaban  5 mg Oral BID    duke's soln (benadryl 30 mL, mylanta 30 mL, LIDOcaine 30 mL, nystatin 30 mL) 120 mL  10 mL Oral QID    insulin detemir U-100  5 Units Subcutaneous QHS    metronidazole  500 mg Intravenous Q8H    Questran and Aquaphor Topical Compound   Topical (Top) BID    vancomycin  500 mg Oral Q6H     PRN Meds:sodium chloride, dextrose 10%, dextrose 10%, glucagon (human recombinant), glucose, glucose, HYDROmorphone, HYDROmorphone, insulin aspart U-100, LORazepam, naloxone, ondansetron, prochlorperazine, sodium chloride 0.9%    Family History       Problem Relation (Age of Onset)    Diabetes Mother, Daughter, Son    Heart attack Father    Kidney cancer Son    Stomach cancer Mother    Thyroid disease Daughter          Tobacco Use    Smoking status: Never Smoker    Smokeless tobacco: Never Used   Substance and Sexual Activity    Alcohol use: No    Drug use: No    Sexual activity: Not  on file       Review of Systems   Constitutional:  Positive for activity change and fatigue.   HENT:  Positive for hearing loss. Negative for trouble swallowing.    Respiratory:  Positive for shortness of breath.    Gastrointestinal:  Positive for abdominal pain and constipation.   Musculoskeletal:  Positive for gait problem.   Skin: Negative.    Neurological:  Positive for weakness.   Objective:     Vital Signs (Most Recent):  Temp: 97.5 °F (36.4 °C) (07/21/22 0736)  Pulse: 91 (07/21/22 0736)  Resp: 16 (07/21/22 1028)  BP: (!) 119/59 (07/21/22 0736)  SpO2: (!) 93 % (07/21/22 0736) Vital Signs (24h Range):  Temp:  [97.5 °F (36.4 °C)-100.9 °F (38.3 °C)] 97.5 °F (36.4 °C)  Pulse:  [72-95] 91  Resp:  [15-18] 16  SpO2:  [93 %-99 %] 93 %  BP: ()/(53-60) 119/59     Weight: 56 kg (123 lb 7.3 oz)  Body mass index is 23.33 kg/m².    Physical Exam  Constitutional:       General: She is awake.      Comments: Pt is [place]. Pt able to answer questions but sleepy due to just receiving pain meds.    HENT:      Head: Normocephalic and atraumatic.      Comments: [place]  Eyes:      General: Lids are normal.      Conjunctiva/sclera: Conjunctivae normal.   Pulmonary:      Effort: No accessory muscle usage or respiratory distress.   Musculoskeletal:      Comments: Weakness noted.    Skin:     General: Skin is warm and dry.   Neurological:      Mental Status: She is alert and oriented to person, place, and time.   Psychiatric:         Speech: Speech normal.       Review of Symptoms      Symptom Assessment (ESAS 0-10 Scale)  Pain:  0  Dyspnea:  0  Anxiety:  0  Nausea:  0  Depression:  0  Anorexia:  0  Fatigue:  0  Insomnia:  0  Restlessness:  0  Agitation:  0       Pain Assessment:    Location(s): abdomen    Abdomen       Location: generalized        Quality: Cramping        Quantity: 8/10 in intensity        Chronicity: Onset 0 second(s) ago, stable        Aggravating Factors: None        Alleviating Factors: Opiates        Associated  Symptoms: None    ECOG Performance Status thGthrthathdtheth:th th4th Living Arrangements:  Lives with spouse    Psychosocial/Cultural: Pt lives with spouse and son, Reid in [place], [place]. Daughter, Valentine, lives locally and is MPOA. She works in [employer].       Advance Care Planning   Advance Directives:   Living Will: Yes        Copy on chart: Yes    Do Not Resuscitate Status: Yes    Medical Power of : Yes      Decision Making:  Patient answered questions and Family answered questions       Significant Labs: All pertinent labs within the past 24 hours have been reviewed.  CBC:   Recent Labs   Lab 07/21/22 0457   WBC 44.35*   HGB 9.0*   HCT 29.2*   MCV 90          BMP:  Recent Labs   Lab 07/21/22 0457   *   *   K 3.3*   *   CO2 13*   BUN 31*   CREATININE 1.9*   CALCIUM 8.2*   MG 1.9       LFT:  Lab Results   Component Value Date    AST 18 07/21/2022     (H) 06/14/2022    ALKPHOS 149 (H) 07/21/2022    BILITOT 0.4 07/21/2022     Albumin:   Albumin   Date Value Ref Range Status   07/21/2022 1.2 (L) 3.5 - 5.2 g/dL Final     Protein:   Total Protein   Date Value Ref Range Status   07/21/2022 4.0 (L) 6.0 - 8.4 g/dL Final     Lactic acid:   Lab Results   Component Value Date    LACTATE 1.4 07/15/2022    LACTATE 2.7 (H) 07/14/2022       Significant Imaging: I have reviewed all pertinent imaging results/findings within the past 24 hours.      20 minutes of advance care planning completed.       Alba Newman, CNS  Palliative Medicine  [facility] - Oncology ([facility])

## 2022-07-21 NOTE — ASSESSMENT & PLAN NOTE
75 y/o F h/o  bladder cancer, b/l nephrostomy tubes and ureteral stent, recent RLE DVT on Eliquis, and DM2 who presents as a transfer from [place] for intractable abdominal pain and lower back pain CT with moderate to severe L sided colitis, found to have c diff (ag+/Toxin+), recently treated with cephalosporin for UTI.  She continued to have diarrhea and leukocytosis on PO vancomycin and dose was increased and metronidazole was added.  Today she had fever of 100.9 and WBC now 44K.  Of note urine culture from 7/15 growing serratia marcescens, cultures taken from indwelling PCNT.  She states her diarrhea has improved now down to 2 stools daily, overall feeling much improved, abdominal and flank pan much improved.  She also states that when she eats she coughs and had episode of this prior to having fever this AM.  She does have cloudy fluid in PCNT drain but bag empty.  - overall appears to be improving from Cdiff perspective.    - fever may be related to aspiration event?  - given improving CDI and overall clinical improvement, would hold on empiric antibiotics for now and monitor closely  - continue PO vanco and metronidazole  - discussed with team, will follow

## 2022-07-22 PROBLEM — N39.0 UTI (URINARY TRACT INFECTION): Status: RESOLVED | Noted: 2022-07-05 | Resolved: 2022-07-22

## 2022-07-22 LAB
ALBUMIN SERPL BCP-MCNC: 1.2 G/DL (ref 3.5–5.2)
ALP SERPL-CCNC: 137 U/L (ref 55–135)
ALT SERPL W/O P-5'-P-CCNC: 12 U/L (ref 10–44)
ANION GAP SERPL CALC-SCNC: 5 MMOL/L (ref 8–16)
ANISOCYTOSIS BLD QL SMEAR: SLIGHT
AST SERPL-CCNC: 13 U/L (ref 10–40)
BASOPHILS # BLD AUTO: 0.11 K/UL (ref 0–0.2)
BASOPHILS NFR BLD: 0.2 % (ref 0–1.9)
BILIRUB SERPL-MCNC: 0.4 MG/DL (ref 0.1–1)
BUN SERPL-MCNC: 32 MG/DL (ref 8–23)
CALCIUM SERPL-MCNC: 8.8 MG/DL (ref 8.7–10.5)
CHLORIDE SERPL-SCNC: 114 MMOL/L (ref 95–110)
CO2 SERPL-SCNC: 14 MMOL/L (ref 23–29)
CREAT SERPL-MCNC: 2.1 MG/DL (ref 0.5–1.4)
DIFFERENTIAL METHOD: ABNORMAL
EOSINOPHIL # BLD AUTO: 0.1 K/UL (ref 0–0.5)
EOSINOPHIL NFR BLD: 0.1 % (ref 0–8)
ERYTHROCYTE [DISTWIDTH] IN BLOOD BY AUTOMATED COUNT: 16.2 % (ref 11.5–14.5)
EST. GFR  (AFRICAN AMERICAN): 25.8 ML/MIN/1.73 M^2
EST. GFR  (NON AFRICAN AMERICAN): 22.4 ML/MIN/1.73 M^2
GLUCOSE SERPL-MCNC: 119 MG/DL (ref 70–110)
HCT VFR BLD AUTO: 28.1 % (ref 37–48.5)
HGB BLD-MCNC: 8.8 G/DL (ref 12–16)
HYPOCHROMIA BLD QL SMEAR: ABNORMAL
IMM GRANULOCYTES # BLD AUTO: 1.41 K/UL (ref 0–0.04)
IMM GRANULOCYTES NFR BLD AUTO: 2.9 % (ref 0–0.5)
LYMPHOCYTES # BLD AUTO: 0.9 K/UL (ref 1–4.8)
LYMPHOCYTES NFR BLD: 1.9 % (ref 18–48)
MAGNESIUM SERPL-MCNC: 1.9 MG/DL (ref 1.6–2.6)
MCH RBC QN AUTO: 27.6 PG (ref 27–31)
MCHC RBC AUTO-ENTMCNC: 31.3 G/DL (ref 32–36)
MCV RBC AUTO: 88 FL (ref 82–98)
MONOCYTES # BLD AUTO: 1.1 K/UL (ref 0.3–1)
MONOCYTES NFR BLD: 2.2 % (ref 4–15)
NEUTROPHILS # BLD AUTO: 45.1 K/UL (ref 1.8–7.7)
NEUTROPHILS NFR BLD: 92.7 % (ref 38–73)
NRBC BLD-RTO: 0 /100 WBC
OVALOCYTES BLD QL SMEAR: ABNORMAL
PLATELET # BLD AUTO: 304 K/UL (ref 150–450)
PMV BLD AUTO: 9.9 FL (ref 9.2–12.9)
POCT GLUCOSE: 102 MG/DL (ref 70–110)
POIKILOCYTOSIS BLD QL SMEAR: SLIGHT
POLYCHROMASIA BLD QL SMEAR: ABNORMAL
POTASSIUM SERPL-SCNC: 3.2 MMOL/L (ref 3.5–5.1)
PROT SERPL-MCNC: 4.1 G/DL (ref 6–8.4)
RBC # BLD AUTO: 3.19 M/UL (ref 4–5.4)
SODIUM SERPL-SCNC: 133 MMOL/L (ref 136–145)
WBC # BLD AUTO: 48.64 K/UL (ref 3.9–12.7)

## 2022-07-22 PROCEDURE — 25000003 PHARM REV CODE 250: Performed by: STUDENT IN AN ORGANIZED HEALTH CARE EDUCATION/TRAINING PROGRAM

## 2022-07-22 PROCEDURE — 83735 ASSAY OF MAGNESIUM: CPT | Performed by: INTERNAL MEDICINE

## 2022-07-22 PROCEDURE — 85025 COMPLETE CBC W/AUTO DIFF WBC: CPT | Performed by: INTERNAL MEDICINE

## 2022-07-22 PROCEDURE — 27000207 HC ISOLATION

## 2022-07-22 PROCEDURE — A4216 STERILE WATER/SALINE, 10 ML: HCPCS | Performed by: STUDENT IN AN ORGANIZED HEALTH CARE EDUCATION/TRAINING PROGRAM

## 2022-07-22 PROCEDURE — 99233 SBSQ HOSP IP/OBS HIGH 50: CPT | Mod: ,,, | Performed by: INTERNAL MEDICINE

## 2022-07-22 PROCEDURE — 80053 COMPREHEN METABOLIC PANEL: CPT | Performed by: INTERNAL MEDICINE

## 2022-07-22 PROCEDURE — 97110 THERAPEUTIC EXERCISES: CPT | Mod: CQ

## 2022-07-22 PROCEDURE — 63600175 PHARM REV CODE 636 W HCPCS: Performed by: STUDENT IN AN ORGANIZED HEALTH CARE EDUCATION/TRAINING PROGRAM

## 2022-07-22 PROCEDURE — 20600001 HC STEP DOWN PRIVATE ROOM

## 2022-07-22 PROCEDURE — 99233 PR SUBSEQUENT HOSPITAL CARE,LEVL III: ICD-10-PCS | Mod: ,,, | Performed by: INTERNAL MEDICINE

## 2022-07-22 PROCEDURE — 63600175 PHARM REV CODE 636 W HCPCS

## 2022-07-22 PROCEDURE — S0030 INJECTION, METRONIDAZOLE: HCPCS | Performed by: STUDENT IN AN ORGANIZED HEALTH CARE EDUCATION/TRAINING PROGRAM

## 2022-07-22 RX ORDER — ONDANSETRON 4 MG/1
4 TABLET, ORALLY DISINTEGRATING ORAL EVERY 6 HOURS PRN
Refills: 0
Start: 2022-07-22

## 2022-07-22 RX ORDER — METRONIDAZOLE 500 MG/1
500 TABLET ORAL 3 TIMES DAILY
Qty: 15 TABLET | Refills: 0
Start: 2022-07-22 | End: 2022-07-28

## 2022-07-22 RX ORDER — FUROSEMIDE 10 MG/ML
40 INJECTION INTRAMUSCULAR; INTRAVENOUS ONCE
Status: COMPLETED | OUTPATIENT
Start: 2022-07-22 | End: 2022-07-22

## 2022-07-22 RX ORDER — POTASSIUM CHLORIDE 20 MEQ/1
40 TABLET, EXTENDED RELEASE ORAL ONCE
Status: COMPLETED | OUTPATIENT
Start: 2022-07-22 | End: 2022-07-22

## 2022-07-22 RX ADMIN — Medication 10 ML: at 04:07

## 2022-07-22 RX ADMIN — METRONIDAZOLE 500 MG: 500 INJECTION, SOLUTION INTRAVENOUS at 06:07

## 2022-07-22 RX ADMIN — Medication 10 ML: at 10:07

## 2022-07-22 RX ADMIN — CHOLESTYRAMINE: 4 POWDER, FOR SUSPENSION ORAL at 08:07

## 2022-07-22 RX ADMIN — VANCOMYCIN HYDROCHLORIDE 500 MG: KIT at 05:07

## 2022-07-22 RX ADMIN — HYDROMORPHONE HYDROCHLORIDE 0.5 MG: 1 INJECTION, SOLUTION INTRAMUSCULAR; INTRAVENOUS; SUBCUTANEOUS at 08:07

## 2022-07-22 RX ADMIN — POTASSIUM BICARBONATE 50 MEQ: 978 TABLET, EFFERVESCENT ORAL at 10:07

## 2022-07-22 RX ADMIN — VANCOMYCIN HYDROCHLORIDE 500 MG: KIT at 11:07

## 2022-07-22 RX ADMIN — APIXABAN 5 MG: 5 TABLET, FILM COATED ORAL at 08:07

## 2022-07-22 RX ADMIN — HYDROMORPHONE HYDROCHLORIDE 0.5 MG: 1 INJECTION, SOLUTION INTRAMUSCULAR; INTRAVENOUS; SUBCUTANEOUS at 10:07

## 2022-07-22 RX ADMIN — METRONIDAZOLE 500 MG: 500 INJECTION, SOLUTION INTRAVENOUS at 04:07

## 2022-07-22 RX ADMIN — ALUMINUM HYDROXIDE, MAGNESIUM HYDROXIDE, AND DIMETHICONE 10 ML: 400; 400; 40 SUSPENSION ORAL at 12:07

## 2022-07-22 RX ADMIN — METRONIDAZOLE 500 MG: 500 INJECTION, SOLUTION INTRAVENOUS at 11:07

## 2022-07-22 RX ADMIN — ALUMINUM HYDROXIDE, MAGNESIUM HYDROXIDE, AND DIMETHICONE 10 ML: 400; 400; 40 SUSPENSION ORAL at 08:07

## 2022-07-22 RX ADMIN — VANCOMYCIN HYDROCHLORIDE 500 MG: KIT at 12:07

## 2022-07-22 RX ADMIN — ALUMINUM HYDROXIDE, MAGNESIUM HYDROXIDE, AND DIMETHICONE 10 ML: 400; 400; 40 SUSPENSION ORAL at 05:07

## 2022-07-22 RX ADMIN — VANCOMYCIN HYDROCHLORIDE 500 MG: KIT at 06:07

## 2022-07-22 RX ADMIN — HYDROMORPHONE HYDROCHLORIDE 0.5 MG: 1 INJECTION, SOLUTION INTRAMUSCULAR; INTRAVENOUS; SUBCUTANEOUS at 04:07

## 2022-07-22 RX ADMIN — FUROSEMIDE 40 MG: 10 INJECTION, SOLUTION INTRAMUSCULAR; INTRAVENOUS at 10:07

## 2022-07-22 RX ADMIN — POTASSIUM CHLORIDE 40 MEQ: 1500 TABLET, EXTENDED RELEASE ORAL at 08:07

## 2022-07-22 NOTE — PT/OT/SLP PROGRESS
"Physical Therapy Treatment    Patient Name:  Karoline Justice   MRN:  [id number]    Recommendations:     Discharge Recommendations:  nursing facility, skilled   Discharge Equipment Recommendations: bedside commode   Barriers to discharge: decrease functional mobilty and increased level of assistance    Assessment:     Karoline Justice is a 76 y.o. female admitted with a medical diagnosis of Sepsis.  She presents with the following impairments/functional limitations:  weakness, impaired endurance, gait instability, impaired balance, decreased coordination, impaired cognition, pain, decreased safety awareness, decreased lower extremity function, impaired coordination, decreased ROM.  Pt required encouragement to participate, but was limited by pain and nausea. Pt completed some therex supine and seated EOB. Pt continues to benefit from therapy to improve functional endurance,strength, and mobility.     Rehab Prognosis: Fair; patient would benefit from acute skilled PT services to address these deficits and reach maximum level of function.    Recent Surgery: * No surgery found *      Plan:     During this hospitalization, patient to be seen 3 x/week to address the identified rehab impairments via gait training, therapeutic activities, therapeutic exercises, neuromuscular re-education and progress toward the following goals:    · Plan of Care Expires:  08/14/22    Subjective     Chief Complaint: nausea and pain   Patient/Family Comments/goals: "no more"  Pain/Comfort:  · Pain Rating 1: 9/10  · Location - Orientation 1: generalized  · Location 1:  (pelvic region and back)  · Pain Addressed 1: Reposition, Distraction, Nurse notified  · Pain Rating Post-Intervention 1: 9/10      Objective:     Communicated with RN prior to session.  Patient found HOB elevated with nephrostomy, peripheral IV upon PT entry to room.     General Precautions: Standard, fall, special contact, hearing impaired   Orthopedic Precautions:N/A "   Braces: N/A  Respiratory Status: Room air     Functional Mobility:  · Bed Mobility:     · Rolling Left:  minimum assistance with bed rails   · Supine to Sit: minimum assistance with HOB elevated   · Verbal/tactile cues for sequencing   · Assistance with trunk and BLE management   · Pt reports increase dizziness upon initially sitting up  · Sit to Supine: moderate assistance  · Assistance with trunk and BLE management  · Transfers:   Pt refused d/t pain and nausea   Balance:   · Static/Dynamic sitting EOB balance: good, Mirna-CGa for safety with BUE support   · Completed therex and talk with MD    AM-PAC 6 CLICK MOBILITY  Turning over in bed (including adjusting bedclothes, sheets and blankets)?: 3  Sitting down on and standing up from a chair with arms (e.g., wheelchair, bedside commode, etc.): 3  Moving from lying on back to sitting on the side of the bed?: 3  Moving to and from a bed to a chair (including a wheelchair)?: 3  Need to walk in hospital room?: 2  Climbing 3-5 steps with a railing?: 1  Basic Mobility Total Score: 15       Therapeutic Activities and Exercises:   Pt required encouragement to participate, session limited d/t pain and increase nausea sensation    Supine BLE therex x5 reps: ankle pumps    Seated BLE therex x5 reps: heel/toe raises, LAQ, and marching  o Pt would groan with movement.   Patient educated on role of therapy, goals of session, and benefits of out of bed mobility.  · Pt educated to stay hydrate and have HOB elevated more through tout the day  Instructed on use of call button and importance of calling nursing staff for assistance with mobility   Questions/concerns addressed within PTA scope of practice  Pt verbalized understanding.  Whiteboard Updated    Patient left HOB elevated with all lines intact, call button in reach, bed alarm on and Rn notified..    GOALS:   Multidisciplinary Problems     Physical Therapy Goals        Problem: Physical Therapy    Goal Priority  Disciplines Outcome Goal Variances Interventions   Physical Therapy Goal     PT, PT/OT Ongoing, Progressing     Description: Goals to be met by:     Patient will increase functional independence with mobility by performin. Supine to sit with Stand-by Assistance  2. Sit to supine with Contact Guard Assistance  3. Rolling to Left and Right with Supervision.  4. Sit to stand transfer with Contact Guard Assistance  5. Gait  x 30 feet with Contact Guard Assistance using Rolling Walker.  6. Ascend/descend 4 stairs with left Handrails Moderate Assistance using No Assistive Device.   7. Stand for 5 minutes with Contact Guard Assistance using Rolling Walker  8. Lower extremity exercise program x10 reps per handout, with assistance as needed                     Time Tracking:     PT Received On: 22  PT Start Time: 925     PT Stop Time: 941  PT Total Time (min): 16 min     Billable Minutes: Therapeutic Exercise 16    Treatment Type: Treatment  PT/PTA: PTA     PTA Visit Number: 2     2022

## 2022-07-22 NOTE — PLAN OF CARE
[facility]  Department of Hospital Medicine  [address] [place] [address]  [phone] after hours  [phone] fax    HOSPICE  ORDERS    07/22/2022    Admit to Hospice:  Home Service    Diagnoses:   Active Hospital Problems    Diagnosis  POA    *Sepsis [A41.9]  Yes    Incontinence associated dermatitis [L25.8, R32]  Yes    Colitis [K52.9]  Yes    Leukocytosis [D72.829]  Yes    Bladder cancer [C67.9]  Yes    THOMAS (acute kidney injury) [N17.9]  Yes    Controlled Type 2 diabetes mellitus [E11.9]  Yes     Chronic    HTN (hypertension) [I10]  Yes    Anemia [D64.9]  Yes      Resolved Hospital Problems    Diagnosis Date Resolved POA    UTI (urinary tract infection) [N39.0] 07/22/2022 Yes       Hospice Qualifying Diagnoses:        Patient has a life expectancy < 6 months due to:  1) Primary Hospice Diagnosis: metastatic urothelial cancer  Comorbid Conditions Contributing to Decline:  THOMAS, clostridium difficile colitis    Vital Signs: Routine per Hospice Protocol.    Code Status: DNR    Allergies: Review of patient's allergies indicates:  No Known Allergies    Diet: regular    Activities: As tolerated    Goals of Care Treatment Preferences:  Code Status: DNR    Living Will: Yes              Nursing: Per Hospice Routine.      Routine Skin for Bedridden Patients: Apply moisture barrier cream to all skin folds and   wet areas in perineal area daily and after baths and all bowel movements.    Oxygen: room air    Other Miscellaneous Care:     Nephrostomy tube care:   Wash your hands before you handle the nephrostomy tube.  Clean the area around the tube with soap and water every day.  Keep the drainage bag lower than the kidneys  Empty the drainage bag before it is completely full or every 2 to 3 hours.  Change the dressing around the nephrostomy tube about every 3 days or when it gets wet or dirty.    Medications:        Medication List      START taking these  medications    metroNIDAZOLE 500 MG tablet  Commonly known as: FLAGYL  Take 1 tablet (500 mg total) by mouth 3 (three) times daily. for 6 days  STOP DATE: 7/28/22   ondansetron 4 MG Tbdl  Commonly known as: ZOFRAN-ODT  Take 1 tablet (4 mg total) by mouth every 6 (six) hours as needed.        CONTINUE taking these medications    DUKE'S SOLUTION (BENADRYL 30 ML, MYLANTA 30 ML, LIDOCAINE 30 ML, NYSTATIN 30 ML)  Take 10 mLs by mouth 4 (four) times daily.     morphine 15 MG 12 hr tablet  Commonly known as: MS CONTIN  Take 1 tablet (15 mg total) by mouth every 12 (twelve) hours. for 15 days     oxyCODONE 5 MG immediate release tablet  Commonly known as: ROXICODONE  Take 1 tablet (5 mg total) by mouth every 4 (four) hours as needed for Pain.        STOP taking these medications    acetaminophen 325 MG tablet  Commonly known as: TYLENOL     amLODIPine 5 MG tablet  Commonly known as: NORVASC     apixaban 5 mg Tab  Commonly known as: ELIQUIS     calcium carbonate 200 mg calcium (500 mg) chewable tablet  Commonly known as: TUMS     cefpodoxime 200 MG tablet  Commonly known as: VANTIN     ELIQUIS 5 mg Tab  Generic drug: apixaban     glycerin adult suppository     insulin detemir U-100 100 unit/mL (3 mL) Inpn pen  Commonly known as: Levemir FLEXTOUCH     lactulose 20 gram/30 mL Soln  Commonly known as: CHRONULAC     LIDOcaine 5 %  Commonly known as: LIDODERM     sodium bicarbonate 650 MG tablet          Future Orders:  Hospice Medical Director may dictate new orders for comfortable care measures & sign death certificate.        _________________________________  Ivy Herndon, DO  07/22/2022

## 2022-07-22 NOTE — ASSESSMENT & PLAN NOTE
Follows with Dr. Snider. Was scheduled to begin outpatient chemo 7/8.   Now s/p bilateral nephrostomy tubes  Has seen radiation oncology recently as well     -outpatient treatment likely   -Patient's daughter is considering hospice. In contact with hosp[facility] [place]  -Prn dilaudid for pain control

## 2022-07-22 NOTE — CARE UPDATE
RAPID RESPONSE NURSE ROUND       Rounding completed with charge RN, Rian. No concerns verbalized at this time. Instructed to call [phone] for further concerns or assistance.

## 2022-07-22 NOTE — PHYSICIAN QUERY
"PT Name: Karoline Justice  MR #: [id number]    DOCUMENTATION CLARIFICATION     CDS/: Teressa Osborne RN CDI           Contact information:  [email or web address]    This form is a permanent document in the medical record.     Query Date: July 22, 2022    By submitting this query, we are merely seeking further clarification of documentation.. Please utilize your independent clinical judgment when addressing the question(s) below.    The medical record contains the following:   Indicators  Supporting Clinical Findings Location in Medical Record   X Energy Intake Energy Intake: <75% of estimated energy requirement for >3 months   Nutrition note 7/21 D [address]   X Weight Loss Weight Loss: 18% x 12 months   Nutrition note 7/21 D [address] RD   X Fat Loss Body Fat Depletion: severe depletion of orbital, moderate depletion of triceps   Nutrition note 7/21 D [address] RD   X Muscle Loss Muscle Mass Depletion: moderate depletion of temples, clavicle region, interosseous muscle, lower extremities   Nutrition note 7/21 D Walt RD    Edema/Fluid Accumulation      Reduced  Strength (by dynamometer)     X Weight, BMI, Usual Body Weight Height: 5' 1"   Weight: 56 kg (123 lb 7.3 oz)  BMI 23.3   Nutrition note 7/21 D Walt RD    Delayed Wound Healing     X Registered Dietician Diagnosis Moderate Malnutrition  Nutrition Problem  Moderate Protein-Calorie Malnutrition  Malnutrition in the context of Chronic Illness     Related to (etiology):   Inadequate energy intake   Nutrition note 7/21 D Walt RD   X Acute or Chronic Illness Sepsis source colitis  THOMAS  UTI  Bladder cancer  Anemia  DM2  HTN     Hematology 7/21 C Javier SAMPSON    Social or Environmental Circumstances     X Treatment Collaboration of nutrition care with other providers  Commercial beverage( calories and protein) boost breeze TID  Clear liquid diet   Nutrition note 7/21 D Walt RD    Other       Academy of Nutrition and Dietetics (Academy) and the " American Society for Parenteral and Enteral Nutrition (A.S.P.E.N.) Clinical Characteristics to support Malnutrition   Malnutrition in the Context of Acute Illness or Injury Malnutrition in the Context of Chronic Illness or Injury Malnutrition in the Context of Social or Environmental Circumstances   Malnutrition Level Moderate Severe Moderate Severe   Moderate   Severe   Energy Intake <75%                   >7 days <50%                 >5 days <75%           >1 month <75%                      >1 month   <75% for >3 months   <50% for >1 month   Weight Loss   1-2% in 1 week >2% in 1 week 5% in 1 month >5% in 1 month 5% in 1 month >5% in 1 month    5% in 1 month >5% in 1 month 7.5% in 3 months >7.5% in 3 months 7.5% in 3 months >7.5% in 3 months    7.5% in 3 months >7.5% in 3 months 10% in 6 months >10% in 6 months 10% in 6 months >10% in 6 months        20% in 1 year                    >20% in 1 year                                                                  20% in 1 year                            >20% in 1 year                                                  Subcutaneous Fat Loss Mild  Moderate  Mild  Severe    Mild   Severe   Muscle Loss Mild depletion Moderate depletion  Mild depletion Severe Depletion   Mild   Severe   Edema/Fluid Accumulation Mild Moderate to severe  Mild  Severe   Mild   Severe   Reduced  Strength         (based on standards supplied by  of dynamometer) N/A Measurably reduced N/A Measurably reduced N/A Measurably reduced     Criteria for mild malnutrition is defined as 1 characteristic outlined above within the established moderate or severe parameters.  A minimum of 2 out of the 6 characteristics noted above are recommended for a diagnosis of moderate or severe malnutrition.  Chronic illness/injury is a disease/condition lasting 3 months or longer.    The noted clinical guidelines are only system guidelines and do not replace the providers clinical  judgment.    Provider, please specify diagnosis or diagnoses associated with above clinical findings.    [  ] Mild Malnutrition - 1 characteristic outlined above within the established moderate or severe parameters   [  +] Moderate Malnutrition - a minimum of 2 of the 6 moderate malnutrition characteristics noted above    [  ] Other Nutritional Diagnosis (please specify): _______   [  ] Malnutrition ruled out   [  ] Clinically Undetermined       Please document in your progress notes daily for the duration of treatment until resolved and  include in your discharge summary.      References:    ANGELA Singleton, & PATRICK Thomas (2022, April). Assessment and management of anorexia and cachexia in palliative care. Retrieved May 23, 2022, from https://www.[employer]/contents/assessment-and-management-of-anorexia-and-cachexia-in-palliative-care?wjjxaNzl=5925&source=see_link     PAUL Diaz, PhD, RD, Phu HOLLIS P., PhD, RN, KENDRICK Rodriguez MD, PhD, Melissa DOZIER A., MS, RD, [facility], FAUSTO Lang, MS, RD, The Academy Malnutrition Work Group, The A.S.P.E.N. Board of Directors. (2012). Consensus Statement: Academy of Nutrition and Dietetics and American Society for Parenteral and Enteral Nutrition: Characteristics Recommended for the Identification and Documentation of Adult Malnutrition (Undernutrition). Journal of Parenteral and Enteral Nutrition, 36(3), 275-283. doi:10.1177/4118269991417832     Form No. [id number]

## 2022-07-22 NOTE — PLAN OF CARE
Bilateral nephro drains intact, with 25cc serosanguineous drainage for left drain. 225cc light black drainage from right drain. Skin breakdown noted to labia with stress incont. Noted. Stage 2 noted to coccyx area. EHOB mattress inflated.

## 2022-07-22 NOTE — ASSESSMENT & PLAN NOTE
Due to C diff infection    - continue oral vancomycin and IV flagyl  - holding off on aggressive workup for other infections at this time due to goals of care

## 2022-07-22 NOTE — PROGRESS NOTES
"[facility] ([facility])  Hematology/Oncology  Progress Note    Patient Name: Karoline Justice  Admission Date: 7/15/2022  Hospital Length of Stay: 7 days  Code Status: DNR     Subjective:     HPI:  Ms. Justice is a 76F with bladder cancer, bilateral nephrostomy tubes, recent RLE DVT on Eliquis, and DM2 who presents as a transfer from [place] for intractable abdominal pain and lower back pain in the setting of colitis on CT scan. She had been off her pain meds for 2 days due to severe constipation.   She was recently admitted to this service for a UTI and was discharged 7/7 with 11 days of cefpodoxime to complete a total 14 day course. She was also hypercalcemic during that admission and treated with fluids and pamindronate and calcitonin. Since that discharge she has had 3 ER visits for hypoglycemia, pain, and other various reasons.   She reports several days of abdominal pain, chills, and diarrhea. She is weak and prone to falling when walking to the bathroom.   At the outside hospital yesterday, she had worsening leukocytosis to 38, elevated lactate, evidence of recurrent/persistent UTI, and THOMAS. Per the ER physician's note, a CT revealed "moderate to severe long segment left-sided colitis, indeterminate may be infectious inflammatory or ischemic." However, I was not able to see the official radiology read yet. She was given meropenem and IV pain meds and transferred to [facility].            Patient information was obtained from patient, relative(s), past medical records, and ER records.      Oncology History:       Bladder cancer   5/20/2022 Initial Diagnosis     Bladder cancer        Tumor Conference     Presenting Hospital / Clinic: [facility]  Virtual Tumor Board Conference: Virtual  Presenter: Sylvia Escobar  Date Presented to Tumor Board: 5/26/2022  Specialties Present: Pathology; Urology; Radiation Oncology  Cancer Type: Bladder cancer  Recommended Plan Note: Will obtain bladder MRI, PET CT " and bone scan to further evaluate for metastatic disease. Seeing medical oncology to discuss systemic therapy.            6/16/2022 -  Chemotherapy     Treatment Summary   Plan Name: OP PEMBROLIZUMAB 400MG Q6W  Treatment Goal: Control  Status: Active  Start Date: 6/16/2022 (Planned)  End Date: 4/18/2024 (Planned)  Provider: Reid Snider MD  Chemotherapy: pembrolizumab (KEYTRUDA) 400 mg in sodium chloride 0.9% 116 mL infusion, 400 mg, Intravenous, Clinic/HOD 1 time, 0 of 17 cycles             Interval History: Only one BM yesterday, but still reports diffuse pain and fatigue. Bms are still loose but less frequent.     Oncology Treatment Plan:   OP PEMBROLIZUMAB 400MG Q6W    Medications:  Continuous Infusions:  Scheduled Meds:   apixaban  5 mg Oral BID    duke's soln (benadryl 30 mL, mylanta 30 mL, LIDOcaine 30 mL, nystatin 30 mL) 120 mL  10 mL Oral QID    insulin detemir U-100  5 Units Subcutaneous QHS    metronidazole  500 mg Intravenous Q8H    Questran and Aquaphor Topical Compound   Topical (Top) BID    vancomycin  500 mg Oral Q6H     PRN Meds:sodium chloride, dextrose 10%, dextrose 10%, glucagon (human recombinant), glucose, glucose, HYDROmorphone, HYDROmorphone, insulin aspart U-100, LORazepam, naloxone, ondansetron, prochlorperazine, sodium chloride 0.9%     Review of Systems   Constitutional:  Positive for fever. Negative for chills.   Gastrointestinal:  Positive for abdominal pain and diarrhea.   Objective:     Vital Signs (Most Recent):  Temp: (!) 95.3 °F (35.2 °C) (07/22/22 0741)  Pulse: 96 (07/22/22 0741)  Resp: (!) 22 (07/22/22 1038)  BP: 96/67 (07/22/22 0741)  SpO2: (!) 93 % (07/22/22 0741)   Vital Signs (24h Range):  Temp:  [95.3 °F (35.2 °C)-98.9 °F (37.2 °C)] 95.3 °F (35.2 °C)  Pulse:  [] 96  Resp:  [16-22] 22  SpO2:  [93 %-99 %] 93 %  BP: ()/(55-67) 96/67     Weight: 56 kg (123 lb 7.3 oz)  Body mass index is 23.33 kg/m².  Body surface area is 1.55 meters  squared.      Intake/Output Summary (Last 24 hours) at 7/22/2022 1231  Last data filed at 7/21/2022 1825  Gross per 24 hour   Intake 200 ml   Output 75 ml   Net 125 ml       Physical Exam  Constitutional:       General: She is not in acute distress.     Appearance: She is well-developed. She is not diaphoretic.   HENT:      Head: Normocephalic and atraumatic.   Eyes:      General: No scleral icterus.     Conjunctiva/sclera: Conjunctivae normal.   Cardiovascular:      Rate and Rhythm: Normal rate and regular rhythm.      Heart sounds: Normal heart sounds. No murmur heard.    No friction rub. No gallop.   Pulmonary:      Effort: Pulmonary effort is normal. No respiratory distress.      Breath sounds: Normal breath sounds. No wheezing or rales.   Abdominal:      General: Bowel sounds are normal. There is no distension.      Palpations: Abdomen is soft.      Tenderness: There is no abdominal tenderness. There is no guarding.   Musculoskeletal:         General: No tenderness. Normal range of motion.      Cervical back: Normal range of motion and neck supple.   Skin:     General: Skin is warm and dry.      Findings: No rash.   Neurological:      General: No focal deficit present.      Mental Status: She is alert and oriented to person, place, and time.   Psychiatric:         Behavior: Behavior normal.       Significant Labs:   CBC:   Recent Labs   Lab 07/21/22  0457 07/22/22  0455   WBC 44.35* 48.64*   HGB 9.0* 8.8*   HCT 29.2* 28.1*    304    and CMP:   Recent Labs   Lab 07/21/22 0457 07/22/22  0455   * 133*   K 3.3* 3.2*   * 114*   CO2 13* 14*   * 119*   BUN 31* 32*   CREATININE 1.9* 2.1*   CALCIUM 8.2* 8.8   PROT 4.0* 4.1*   ALBUMIN 1.2* 1.2*   BILITOT 0.4 0.4   ALKPHOS 149* 137*   AST 18 13   ALT 12 12   ANIONGAP 7* 5*   EGFRNONAA 25.2* 22.4*       Diagnostic Results:  I have reviewed all pertinent imaging results/findings within the past 24 hours.    Assessment/Plan:     * Sepsis  Due to C  diff infection    - continue oral vancomycin and IV flagyl  - holding off on aggressive workup for other infections at this time due to goals of care        Colitis  See above regarding sepsis and C diff    Leukocytosis  Goals of care are not aligned with aggressive workup at this time    Bladder cancer  Follows with Dr. Snider. Was scheduled to begin outpatient chemo 7/8.   Now s/p bilateral nephrostomy tubes  Has seen radiation oncology recently as well     -outpatient treatment likely   -Patient's daughter is considering hospice. In contact with hospice of [facility]  -Prn dilaudid for pain control    THOMAS (acute kidney injury)    -may be due to volume overload, will trial a dose of lasix today and hold IV fluids    Anemia  Hgb 7.0 on admission  -Likely due to MABEL vs anemia of chronic disease  -Has had 1 pRBC since admission.  -Transfuse if < 7    HTN (hypertension)  Hold home meds, monitor bp     Controlled Type 2 diabetes mellitus  SSI, POC glucose             Ivy Herndon, DO  Hematology/Oncology  [facility] (Hospital)            I have reviewed the notes, assessments, and/or procedures performed by the housestaff, as above.  I have personally interviewed and examined the patient at the beside, and rounded with the housestaff. I concur with her/his assessment and plan and the documentation of Karoline Justice.  More than 35 mins total time were spent during this encounter.      Milad Moore M.D., M.S., F.A.C.P.  Hematology/Oncology Attending  [facility]

## 2022-07-22 NOTE — PT/OT/SLP PROGRESS
Occupational Therapy      Patient Name:  Karoline Justice   MRN:  [id number]    Patient not seen today secondary to patient unwilling to participate due to pain, fatigue after physical therapy. Will follow-up 7/25/22.    7/22/2022

## 2022-07-22 NOTE — SUBJECTIVE & OBJECTIVE
Interval History: Only one BM yesterday, but still reports diffuse pain and fatigue. Bms are still loose but less frequent.     Oncology Treatment Plan:   OP PEMBROLIZUMAB 400MG Q6W    Medications:  Continuous Infusions:  Scheduled Meds:   apixaban  5 mg Oral BID    duke's soln (benadryl 30 mL, mylanta 30 mL, LIDOcaine 30 mL, nystatin 30 mL) 120 mL  10 mL Oral QID    insulin detemir U-100  5 Units Subcutaneous QHS    metronidazole  500 mg Intravenous Q8H    Questran and Aquaphor Topical Compound   Topical (Top) BID    vancomycin  500 mg Oral Q6H     PRN Meds:sodium chloride, dextrose 10%, dextrose 10%, glucagon (human recombinant), glucose, glucose, HYDROmorphone, HYDROmorphone, insulin aspart U-100, LORazepam, naloxone, ondansetron, prochlorperazine, sodium chloride 0.9%     Review of Systems   Constitutional:  Positive for fever. Negative for chills.   Gastrointestinal:  Positive for abdominal pain and diarrhea.   Objective:     Vital Signs (Most Recent):  Temp: (!) 95.3 °F (35.2 °C) (07/22/22 0741)  Pulse: 96 (07/22/22 0741)  Resp: (!) 22 (07/22/22 1038)  BP: 96/67 (07/22/22 0741)  SpO2: (!) 93 % (07/22/22 0741)   Vital Signs (24h Range):  Temp:  [95.3 °F (35.2 °C)-98.9 °F (37.2 °C)] 95.3 °F (35.2 °C)  Pulse:  [] 96  Resp:  [16-22] 22  SpO2:  [93 %-99 %] 93 %  BP: ()/(55-67) 96/67     Weight: 56 kg (123 lb 7.3 oz)  Body mass index is 23.33 kg/m².  Body surface area is 1.55 meters squared.      Intake/Output Summary (Last 24 hours) at 7/22/2022 1231  Last data filed at 7/21/2022 1825  Gross per 24 hour   Intake 200 ml   Output 75 ml   Net 125 ml       Physical Exam  Constitutional:       General: She is not in acute distress.     Appearance: She is well-developed. She is not diaphoretic.   HENT:      Head: Normocephalic and atraumatic.   Eyes:      General: No scleral icterus.     Conjunctiva/sclera: Conjunctivae normal.   Cardiovascular:      Rate and Rhythm: Normal rate and regular rhythm.      Heart  sounds: Normal heart sounds. No murmur heard.    No friction rub. No gallop.   Pulmonary:      Effort: Pulmonary effort is normal. No respiratory distress.      Breath sounds: Normal breath sounds. No wheezing or rales.   Abdominal:      General: Bowel sounds are normal. There is no distension.      Palpations: Abdomen is soft.      Tenderness: There is no abdominal tenderness. There is no guarding.   Musculoskeletal:         General: No tenderness. Normal range of motion.      Cervical back: Normal range of motion and neck supple.   Skin:     General: Skin is warm and dry.      Findings: No rash.   Neurological:      General: No focal deficit present.      Mental Status: She is alert and oriented to person, place, and time.   Psychiatric:         Behavior: Behavior normal.       Significant Labs:   CBC:   Recent Labs   Lab 07/21/22 0457 07/22/22 0455   WBC 44.35* 48.64*   HGB 9.0* 8.8*   HCT 29.2* 28.1*    304    and CMP:   Recent Labs   Lab 07/21/22 0457 07/22/22 0455   * 133*   K 3.3* 3.2*   * 114*   CO2 13* 14*   * 119*   BUN 31* 32*   CREATININE 1.9* 2.1*   CALCIUM 8.2* 8.8   PROT 4.0* 4.1*   ALBUMIN 1.2* 1.2*   BILITOT 0.4 0.4   ALKPHOS 149* 137*   AST 18 13   ALT 12 12   ANIONGAP 7* 5*   EGFRNONAA 25.2* 22.4*       Diagnostic Results:  I have reviewed all pertinent imaging results/findings within the past 24 hours.

## 2022-07-22 NOTE — PLAN OF CARE
Patient is progressing and involved with plan of care, communicating needs throughout shift. Poor appetite. Low output from nephrostomy tubes. Pt with +3 edema to both feet. Pain controlled with PRN Dilaudid X1. Wound care completed X2. CBG checked at HS. Scheduled insulin given as ordered. IVF and ABX continued. All vitals stable; no acute events this shift. Pt. Remaining free from falls or injury throughout shift; bed in lowest position; side rails up X2; call light within reach; pt instructed to call for assistance as needed - verbalized understanding. Q2h rounding on patient. Will continue to monitor.

## 2022-07-23 VITALS
RESPIRATION RATE: 17 BRPM | TEMPERATURE: 99 F | HEIGHT: 61 IN | WEIGHT: 123.44 LBS | DIASTOLIC BLOOD PRESSURE: 57 MMHG | HEART RATE: 101 BPM | BODY MASS INDEX: 23.3 KG/M2 | OXYGEN SATURATION: 98 % | SYSTOLIC BLOOD PRESSURE: 96 MMHG

## 2022-07-23 LAB — O+P STL MICRO: NORMAL

## 2022-07-23 PROCEDURE — 25000003 PHARM REV CODE 250: Performed by: STUDENT IN AN ORGANIZED HEALTH CARE EDUCATION/TRAINING PROGRAM

## 2022-07-23 PROCEDURE — 63600175 PHARM REV CODE 636 W HCPCS

## 2022-07-23 PROCEDURE — S0030 INJECTION, METRONIDAZOLE: HCPCS | Performed by: STUDENT IN AN ORGANIZED HEALTH CARE EDUCATION/TRAINING PROGRAM

## 2022-07-23 RX ADMIN — ALUMINUM HYDROXIDE, MAGNESIUM HYDROXIDE, AND DIMETHICONE 10 ML: 400; 400; 40 SUSPENSION ORAL at 12:07

## 2022-07-23 RX ADMIN — VANCOMYCIN HYDROCHLORIDE 500 MG: KIT at 12:07

## 2022-07-23 RX ADMIN — HYDROMORPHONE HYDROCHLORIDE 0.5 MG: 1 INJECTION, SOLUTION INTRAMUSCULAR; INTRAVENOUS; SUBCUTANEOUS at 06:07

## 2022-07-23 RX ADMIN — METRONIDAZOLE 500 MG: 500 INJECTION, SOLUTION INTRAVENOUS at 06:07

## 2022-07-23 RX ADMIN — CHOLESTYRAMINE: 4 POWDER, FOR SUSPENSION ORAL at 09:07

## 2022-07-23 RX ADMIN — METRONIDAZOLE 500 MG: 500 INJECTION, SOLUTION INTRAVENOUS at 04:07

## 2022-07-23 RX ADMIN — ALUMINUM HYDROXIDE, MAGNESIUM HYDROXIDE, AND DIMETHICONE 10 ML: 400; 400; 40 SUSPENSION ORAL at 05:07

## 2022-07-23 RX ADMIN — HYDROMORPHONE HYDROCHLORIDE 0.5 MG: 1 INJECTION, SOLUTION INTRAMUSCULAR; INTRAVENOUS; SUBCUTANEOUS at 05:07

## 2022-07-23 RX ADMIN — ALUMINUM HYDROXIDE, MAGNESIUM HYDROXIDE, AND DIMETHICONE 10 ML: 400; 400; 40 SUSPENSION ORAL at 09:07

## 2022-07-23 RX ADMIN — VANCOMYCIN HYDROCHLORIDE 500 MG: KIT at 05:07

## 2022-07-23 RX ADMIN — VANCOMYCIN HYDROCHLORIDE 500 MG: KIT at 06:07

## 2022-07-23 NOTE — PROGRESS NOTES
Oncology Social Worker received an on call referral re: finalizing home hospice arrangements for patient's discharge home today per orders from Dr. Moore's [facility] On[facility]. Communicated with Dr. Moore and Dr. Diana, and patient's daughter/TARAS Swenson ([phone] cell.), patient's nurse Yamileth, and Gali the on call nurse with the [facility] ([phone] agency's phone number; [phone] Gali's cell.). Faxed the hospice orders, admission record, and clinical information to the [facility]'s agency office at [phone]. Gali stated that an emergency kit of medications has been ordered for patient's home. Gali will be the nurse to see patient later today and admit her for services, and she will assess and order anything else needed. Gali asked to be notified at her cell. number [phone] when patient is leaving from the hospital unit. Patient's daughter Valentine also asked to be called when patient is leaving from the hospital; she has a stomach bug and is at her home but will call her family members who are at patient's house and will call Gali as well. Patient's daughter has already spoken with Gali earlier today. Provided patient's nurse with their contact numbers. Entered the [facility] request order for stretcher/ambulance transportation for a 1:45 PM . All arrangements are in place for patient's discharge home with hospice services today.

## 2022-07-23 NOTE — PLAN OF CARE
Pt involved in plan of care and communicating needs throughout shift. Pt discharged to home hospice per M.D. order. Currently waiting on [facility] transportation to  pt to bring pt home. Dilaudid given x 1 with moderate relief. Pt turned q2 as ordered. Wound care done. Night nurse notified to call pts daughter and hospice nurse when pt is leaving hospital. All VSS; no acute events so far this shift.  Pt remaining free from falls or injury throughout shift; bed locked and in lowest position; call light within reach.  Pt instructed to call for assistance as needed.  Q1H rounding done on pt.WCTM.

## 2022-07-23 NOTE — PROGRESS NOTES
Received call from the Resident inquiring about ambulance transport as patient has not been picked up and is getting restless and now sying she doesn't want to leave the hospital. Called [facility], [phone], and spoke with Star who checked and said that the ETA is around 5:30 PM as they've had a high volume of calls. Asked and he said if they can come sooner they will. Called the Resident back and he was going to speak with patient in her room. Informed patient's nurse Yamileth via Secure Chat message and asked that she let patient/family know. Sent a text message to aGli with [facility].

## 2022-07-23 NOTE — DISCHARGE INSTRUCTIONS
:  [facility], [phone], to provide home hospice services beginning with the nurse admission assessment visit on the same day as discharge from the hospital. Services will include skilled nursing care, aide visits,  and . Hospice staff will order comfort care medications and medical equipment and supplies as needed.    Stretcher transport to home from the hospital via [facility] on 7/23/22.    Sandra Tolbert, MSW, LCSW  [phone]

## 2022-07-23 NOTE — PLAN OF CARE
Patient is progressing and involved with plan of care, communicating needs throughout shift. Poor appetite. Low output from nephrostomy tubes. Pt with +3 edema to both feet. Pain controlled with PRN Dilaudid X1. Wound care completed X2.  ABX continued. All vitals stable; no acute events this shift. Pt. Remaining free from falls or injury throughout shift; bed in lowest position; side rails up X2; call light within reach; pt instructed to call for assistance as needed - verbalized understanding. Q2h rounding on patient. Will continue to monitor.

## 2022-07-27 ENCOUNTER — TELEPHONE (OUTPATIENT)
Dept: UROLOGY | Facility: HOSPITAL | Age: 77
End: 2022-07-27
Payer: MEDICARE

## 2022-07-27 NOTE — TELEPHONE ENCOUNTER
I spoke with patient's daughter this morning, Valentine Swenson, who states that patient passed away yesterday (7/26/22) while in home hospice.    PAUL Rincon MD  Urology, PGY-5  [facility]

## 2022-08-02 NOTE — DISCHARGE SUMMARY
"[facility] ([facility])  Hematology/Oncology  Discharge Summary      Patient Name: Karoline Justice  MRN: [id number]  Admission Date: 7/15/2022  Hospital Length of Stay: 8 days  Discharge Date and Time: 7/23/2022  8:17 PM  Attending Physician: Joycelyn att. providers found   Discharging Provider: Ivy Herndon DO  Primary Care Provider: Dee Dee Oconnor MD    HPI:   Ms. Justice is a 76F with bladder cancer, bilateral nephrostomy tubes, recent RLE DVT on Eliquis, and DM2 who presents as a transfer from [place] for intractable abdominal pain and lower back pain in the setting of colitis on CT scan. She had been off her pain meds for 2 days due to severe constipation.   She was recently admitted to this service for a UTI and was discharged 7/7 with 11 days of cefpodoxime to complete a total 14 day course. She was also hypercalcemic during that admission and treated with fluids and pamindronate and calcitonin. Since that discharge she has had 3 ER visits for hypoglycemia, pain, and other various reasons.   She reports several days of abdominal pain, chills, and diarrhea. She is weak and prone to falling when walking to the bathroom.   At the outside hospital yesterday, she had worsening leukocytosis to 38, elevated lactate, evidence of recurrent/persistent UTI, and THOMAS. Per the ER physician's note, a CT revealed "moderate to severe long segment left-sided colitis, indeterminate may be infectious inflammatory or ischemic." However, I was not able to see the official radiology read yet. She was given meropenem and IV pain meds and transferred to [facility].            Patient information was obtained from patient, relative(s), past medical records, and ER records.      Oncology History:       Bladder cancer   5/20/2022 Initial Diagnosis     Bladder cancer        Tumor Conference     Presenting Hospital / Clinic: [facility]  Virtual Tumor Board Conference: Virtual  Presenter: Sylvia Escobar  Date Presented to Tumor " Board: 5/26/2022  Specialties Present: Pathology; Urology; Radiation Oncology  Cancer Type: Bladder cancer  Recommended Plan Note: Will obtain bladder MRI, PET CT and bone scan to further evaluate for metastatic disease. Seeing medical oncology to discuss systemic therapy.            6/16/2022 -  Chemotherapy     Treatment Summary   Plan Name: OP PEMBROLIZUMAB 400MG Q6W  Treatment Goal: Control  Status: Active  Start Date: 6/16/2022 (Planned)  End Date: 4/18/2024 (Planned)  Provider: Reid Snider MD  Chemotherapy: pembrolizumab (KEYTRUDA) 400 mg in sodium chloride 0.9% 116 mL infusion, 400 mg, Intravenous, Clinic/HOD 1 time, 0 of 17 cycles             * No surgery found *     Hospital Course: Ms. Justice was admitted for diarrhea and treated for C difficile colitis. She was initially treated with oral vancomycin, however IV metronidazole was later added due to continued diarrhea. Her pain medications were adjusted and palliative care was consulted. After discussions with the patient and her daughter, she made the decision to transition to hospice care. She was discharged to home hospice on 7/23/22.       Goals of Care Treatment Preferences:  Code Status: DNR    Living Will: Yes              Consults:   Consults (From admission, onward)        Status Ordering Provider     Inpatient consult to Palliative Care  Once        Provider:  (Not yet assigned)    Completed KENNETH BACON     Inpatient consult to Spiritual Care  Once        Provider:  (Not yet assigned)    Completed AGUSTINA DAMICO     Inpatient consult to Midline team  Once        Provider:  (Not yet assigned)    Completed AGUSTINA DAMICO     Inpatient consult to Palliative Care  Once        Provider:  (Not yet assigned)    Completed SHAWNA AGUILAR     Inpatient consult to Registered Dietitian/Nutritionist  Once        Provider:  (Not yet assigned)    Completed TEE BOLDEN          Significant Diagnostic Studies: Labs: CMP No results for  input(s): NA, K, CL, CO2, GLU, BUN, CREATININE, CALCIUM, PROT, ALBUMIN, BILITOT, ALKPHOS, AST, ALT, ANIONGAP, ESTGFRAFRICA, EGFRNONAA in the last 48 hours. and CBC No results for input(s): WBC, HGB, HCT, PLT in the last 48 hours.    Pending Diagnostic Studies:     None        Final Active Diagnoses:    Diagnosis Date Noted POA    PRINCIPAL PROBLEM:  Sepsis [A41.9] 07/15/2022 Yes    Incontinence associated dermatitis [L25.8, R32] 07/18/2022 Yes    Colitis [K52.9] 07/15/2022 Yes    Leukocytosis [D72.829] 06/08/2022 Yes    Bladder cancer [C67.9] 05/20/2022 Yes    THOMAS (acute kidney injury) [N17.9] 05/11/2022 Yes    Controlled Type 2 diabetes mellitus [E11.9] 05/06/2022 Yes     Chronic    HTN (hypertension) [I10] 05/06/2022 Yes    Anemia [D64.9] 05/06/2022 Yes      Problems Resolved During this Admission:    Diagnosis Date Noted Date Resolved POA    UTI (urinary tract infection) [N39.0] 07/05/2022 07/22/2022 Yes      Discharged Condition: stable    Disposition: Hospice/Home    Follow Up:    Patient Instructions:   No discharge procedures on file.  Medications:  Reconciled Home Medications:      Medication List      START taking these medications    ondansetron 4 MG Tbdl  Commonly known as: ZOFRAN-ODT  Take 1 tablet (4 mg total) by mouth every 6 (six) hours as needed.        CONTINUE taking these medications    DUKE'S SOLUTION (BENADRYL 30 ML, MYLANTA 30 ML, LIDOCAINE 30 ML, NYSTATIN 30 ML)  Take 10 mLs by mouth 4 (four) times daily.        STOP taking these medications    acetaminophen 325 MG tablet  Commonly known as: TYLENOL     amLODIPine 5 MG tablet  Commonly known as: NORVASC     apixaban 5 mg Tab  Commonly known as: ELIQUIS     calcium carbonate 200 mg calcium (500 mg) chewable tablet  Commonly known as: TUMS     cefpodoxime 200 MG tablet  Commonly known as: VANTIN     ELIQUIS 5 mg Tab  Generic drug: apixaban     glycerin adult suppository     insulin detemir U-100 100 unit/mL (3 mL) Inpn pen  Commonly  known as: Levemir FLEXTOUCH     lactulose 20 gram/30 mL Soln  Commonly known as: CHRONULAC     LIDOcaine 5 %  Commonly known as: LIDODERM     sodium bicarbonate 650 MG tablet        ASK your doctor about these medications    metroNIDAZOLE 500 MG tablet  Commonly known as: FLAGYL  Take 1 tablet (500 mg total) by mouth 3 (three) times daily. for 6 days  Ask about: Should I take this medication?     morphine 15 MG 12 hr tablet  Commonly known as: MS CONTIN  Take 1 tablet (15 mg total) by mouth every 12 (twelve) hours. for 15 days  Ask about: Should I take this medication?     oxyCODONE 5 MG immediate release tablet  Commonly known as: ROXICODONE  Take 1 tablet (5 mg total) by mouth every 4 (four) hours as needed for Pain.  Ask about: Should I take this medication?            Ivy Herndon,   Hematology/Oncology  [facility] ([facility])
